# Patient Record
Sex: FEMALE | Race: WHITE | NOT HISPANIC OR LATINO | Employment: OTHER | URBAN - METROPOLITAN AREA
[De-identification: names, ages, dates, MRNs, and addresses within clinical notes are randomized per-mention and may not be internally consistent; named-entity substitution may affect disease eponyms.]

---

## 2017-04-21 RX ORDER — LOSARTAN POTASSIUM 50 MG/1
TABLET ORAL
Qty: 90 TABLET | Refills: 2 | Status: SHIPPED | OUTPATIENT
Start: 2017-04-21 | End: 2017-12-18 | Stop reason: SDUPTHER

## 2017-05-30 RX ORDER — SIMVASTATIN 20 MG
TABLET ORAL
Qty: 90 TABLET | Refills: 2 | Status: SHIPPED | OUTPATIENT
Start: 2017-05-30 | End: 2017-12-18 | Stop reason: SDUPTHER

## 2017-05-30 RX ORDER — LEVOTHYROXINE SODIUM 0.03 MG/1
TABLET ORAL
Qty: 90 TABLET | Refills: 2 | Status: SHIPPED | OUTPATIENT
Start: 2017-05-30 | End: 2017-12-18 | Stop reason: SDUPTHER

## 2017-06-12 RX ORDER — OMEPRAZOLE 20 MG/1
CAPSULE, DELAYED RELEASE ORAL
Qty: 90 CAPSULE | Refills: 3 | OUTPATIENT
Start: 2017-06-12

## 2017-07-27 ENCOUNTER — TELEPHONE (OUTPATIENT)
Dept: INTERNAL MEDICINE | Facility: CLINIC | Age: 72
End: 2017-07-27

## 2017-07-27 NOTE — TELEPHONE ENCOUNTER
----- Message from Tamiko Knowles sent at 7/26/2017  2:21 PM EDT -----  Pt called and said that she is having Stomach pain, breast pain, and chest tightness (states that the chest tightness is not related to chest pain). Would like to know if Dr. Ceja could work her in on Friday so she can see her for these issues.    Best call back number is [phone]          I am OFF on Wednesdays.    Received this message on 7-  I called pt at 8.00am to see how she was feeling. She said she is the same as yesterday and thinks it is a breast issue. I strongly encouraged her to go to at least  to seek treatment. Pt agreed to go and will call me when she returns home to let me know what they said. If needed I will make an appt to se Dr ceja on Friday.

## 2017-07-27 NOTE — TELEPHONE ENCOUNTER
Pt went to UC and was told it was GERD> she is still concerned with the breast so appt was made for tuesday

## 2017-08-01 ENCOUNTER — OFFICE VISIT (OUTPATIENT)
Dept: INTERNAL MEDICINE | Facility: CLINIC | Age: 72
End: 2017-08-01

## 2017-08-01 VITALS
SYSTOLIC BLOOD PRESSURE: 110 MMHG | HEART RATE: 78 BPM | BODY MASS INDEX: 28.49 KG/M2 | DIASTOLIC BLOOD PRESSURE: 60 MMHG | WEIGHT: 166 LBS | OXYGEN SATURATION: 97 %

## 2017-08-01 DIAGNOSIS — N64.4 BREAST PAIN: Primary | ICD-10-CM

## 2017-08-01 DIAGNOSIS — C50.112 MALIGNANT NEOPLASM OF CENTRAL PORTION OF LEFT FEMALE BREAST (HCC): ICD-10-CM

## 2017-08-01 DIAGNOSIS — K20.90 ESOPHAGITIS: ICD-10-CM

## 2017-08-01 PROCEDURE — 99214 OFFICE O/P EST MOD 30 MIN: CPT | Performed by: INTERNAL MEDICINE

## 2017-08-01 RX ORDER — PANTOPRAZOLE SODIUM 40 MG/1
40 TABLET, DELAYED RELEASE ORAL DAILY
Qty: 30 TABLET | Refills: 5 | Status: SHIPPED | OUTPATIENT
Start: 2017-08-01 | End: 2017-12-18 | Stop reason: SDUPTHER

## 2017-08-01 NOTE — PROGRESS NOTES
"Chief Complaint   Patient presents with   • Breast Pain     both breasts \"tightness\"   chest pain, JO,     History of Present Illness   Nedra Roberts is a 72 y.o. female presents for acute care. Reports increasing tightness and fullness in the breasts B. She went to urgent care for chest tightness. Patient previously taking omeprazole. Self discontinued this related to her concerns for possible side effects. She then developed some increasing symptoms of reflux as well as chest pain. She went to urgent care for this. Had a negative EKG.   Patient has a h/o breast cancer. In the past this was identified when she had unilateral fullness of the left breast.   Last mammogram 10/16.     The following portions of the patient's history were reviewed and updated as appropriate: allergies, current medications, past family history, past medical history, past social history, past surgical history and problem list.  Current Outpatient Prescriptions on File Prior to Visit   Medication Sig Dispense Refill   • levothyroxine (SYNTHROID, LEVOTHROID) 25 MCG tablet take 1 tablet by mouth once daily as directed 90 tablet 2   • losartan (COZAAR) 50 MG tablet take 1 tablet by mouth once daily 90 tablet 2   • simvastatin (ZOCOR) 20 MG tablet take 1 tablet by mouth once daily as directed 90 tablet 2   • [DISCONTINUED] omeprazole (PRILOSEC) 20 MG capsule Take 1 capsule by mouth Daily. 90 capsule 0     No current facility-administered medications on file prior to visit.      Review of Systems   Constitutional: Negative.    HENT: Negative.    Eyes: Negative.    Respiratory: Positive for chest tightness.    Cardiovascular: Negative.    Gastrointestinal: Positive for nausea.        Reflux   Endocrine: Negative.    Genitourinary: Negative.    Musculoskeletal: Positive for arthralgias.   Skin: Negative.    Allergic/Immunologic: Negative.    Neurological: Negative.    Hematological: Negative.    Psychiatric/Behavioral: Negative.        Objective "   Physical Exam   Constitutional: She is oriented to person, place, and time. She appears well-developed and well-nourished.   HENT:   Head: Normocephalic and atraumatic.   Right Ear: External ear normal.   Left Ear: External ear normal.   Mouth/Throat: Oropharynx is clear and moist.   Eyes: Conjunctivae and EOM are normal. Pupils are equal, round, and reactive to light.   Neck: Normal range of motion. Neck supple.   Cardiovascular: Normal rate, regular rhythm, normal heart sounds and intact distal pulses.    Pulmonary/Chest: Effort normal and breath sounds normal.   Abdominal: Soft. Bowel sounds are normal.   Musculoskeletal: Normal range of motion.   Neurological: She is alert and oriented to person, place, and time. She has normal reflexes.   Skin: Skin is warm and dry.   Psychiatric: She has a normal mood and affect. Her behavior is normal. Judgment and thought content normal.   Nursing note and vitals reviewed.       /60  Pulse 78  Wt 166 lb (75.3 kg)  SpO2 97%  BMI 28.49 kg/m2    Assessment/Plan   Diagnoses and all orders for this visit:    Breast pain  -     Mammo Diagnostic Bilateral With CAD; Future    Malignant neoplasm of central portion of left female breast  -     Mammo Diagnostic Bilateral With CAD; Future    Esophagitis    Other orders  -     pantoprazole (PROTONIX) 40 MG EC tablet; Take 1 tablet by mouth Daily.      Patient w/ known esophagitis. She developed chest pain off of med. Will try pantoprazole and encouraged to continue this medication indefinately.she will probably need repeat EGD next year. She will have a mammogram now as the breast pain mimics what she felt when diagnosed w/ breast ca previously. She will f/u in December for a CPE.

## 2017-08-21 ENCOUNTER — TELEPHONE (OUTPATIENT)
Dept: INTERNAL MEDICINE | Facility: CLINIC | Age: 72
End: 2017-08-21

## 2017-10-23 ENCOUNTER — HOSPITAL ENCOUNTER (OUTPATIENT)
Dept: MAMMOGRAPHY | Facility: HOSPITAL | Age: 72
Discharge: HOME | End: 2017-10-23
Attending: INTERNAL MEDICINE | Admitting: INTERNAL MEDICINE

## 2017-10-23 DIAGNOSIS — I10 ESSENTIAL HYPERTENSION: ICD-10-CM

## 2017-10-23 PROCEDURE — G0202 SCR MAMMO BI INCL CAD: HCPCS

## 2017-10-30 ENCOUNTER — OFFICE VISIT (OUTPATIENT)
Dept: ONCOLOGY | Facility: CLINIC | Age: 72
End: 2017-10-30

## 2017-10-30 ENCOUNTER — LAB (OUTPATIENT)
Dept: LAB | Facility: HOSPITAL | Age: 72
End: 2017-10-30

## 2017-10-30 VITALS
SYSTOLIC BLOOD PRESSURE: 168 MMHG | TEMPERATURE: 97.8 F | HEIGHT: 64 IN | BODY MASS INDEX: 28.31 KG/M2 | HEART RATE: 68 BPM | OXYGEN SATURATION: 97 % | WEIGHT: 165.8 LBS | RESPIRATION RATE: 16 BRPM | DIASTOLIC BLOOD PRESSURE: 88 MMHG

## 2017-10-30 DIAGNOSIS — Z85.3 HISTORY OF BREAST CANCER: Primary | ICD-10-CM

## 2017-10-30 DIAGNOSIS — Z12.31 ENCOUNTER FOR SCREENING MAMMOGRAM FOR MALIGNANT NEOPLASM OF BREAST: ICD-10-CM

## 2017-10-30 DIAGNOSIS — I10 ESSENTIAL HYPERTENSION: ICD-10-CM

## 2017-10-30 LAB
ALBUMIN SERPL-MCNC: 4.5 G/DL (ref 3.5–5.2)
ALBUMIN/GLOB SERPL: 1.6 G/DL (ref 1.1–2.4)
ALP SERPL-CCNC: 76 U/L (ref 38–116)
ALT SERPL W P-5'-P-CCNC: 25 U/L (ref 0–33)
ANION GAP SERPL CALCULATED.3IONS-SCNC: 12 MMOL/L
AST SERPL-CCNC: 18 U/L (ref 0–32)
BASOPHILS # BLD AUTO: 0.06 10*3/MM3 (ref 0–0.1)
BASOPHILS NFR BLD AUTO: 0.7 % (ref 0–1.1)
BILIRUB SERPL-MCNC: 0.2 MG/DL (ref 0.1–1.2)
BUN BLD-MCNC: 21 MG/DL (ref 6–20)
BUN/CREAT SERPL: 25.3 (ref 7.3–30)
CALCIUM SPEC-SCNC: 10 MG/DL (ref 8.5–10.2)
CHLORIDE SERPL-SCNC: 104 MMOL/L (ref 98–107)
CO2 SERPL-SCNC: 24 MMOL/L (ref 22–29)
CREAT BLD-MCNC: 0.83 MG/DL (ref 0.6–1.1)
DEPRECATED RDW RBC AUTO: 42.1 FL (ref 37–49)
EOSINOPHIL # BLD AUTO: 0.05 10*3/MM3 (ref 0–0.36)
EOSINOPHIL NFR BLD AUTO: 0.6 % (ref 1–5)
ERYTHROCYTE [DISTWIDTH] IN BLOOD BY AUTOMATED COUNT: 12.1 % (ref 11.7–14.5)
GFR SERPL CREATININE-BSD FRML MDRD: 68 ML/MIN/1.73
GLOBULIN UR ELPH-MCNC: 2.9 GM/DL (ref 1.8–3.5)
GLUCOSE BLD-MCNC: 95 MG/DL (ref 74–124)
HCT VFR BLD AUTO: 41 % (ref 34–45)
HGB BLD-MCNC: 13.6 G/DL (ref 11.5–14.9)
IMM GRANULOCYTES # BLD: 0.02 10*3/MM3 (ref 0–0.03)
IMM GRANULOCYTES NFR BLD: 0.2 % (ref 0–0.5)
LYMPHOCYTES # BLD AUTO: 1.53 10*3/MM3 (ref 1–3.5)
LYMPHOCYTES NFR BLD AUTO: 17.9 % (ref 20–49)
MCH RBC QN AUTO: 31.4 PG (ref 27–33)
MCHC RBC AUTO-ENTMCNC: 33.2 G/DL (ref 32–35)
MCV RBC AUTO: 94.7 FL (ref 83–97)
MONOCYTES # BLD AUTO: 0.58 10*3/MM3 (ref 0.25–0.8)
MONOCYTES NFR BLD AUTO: 6.8 % (ref 4–12)
NEUTROPHILS # BLD AUTO: 6.31 10*3/MM3 (ref 1.5–7)
NEUTROPHILS NFR BLD AUTO: 73.8 % (ref 39–75)
NRBC BLD MANUAL-RTO: 0 /100 WBC (ref 0–0)
PLATELET # BLD AUTO: 388 10*3/MM3 (ref 150–375)
PMV BLD AUTO: 8.3 FL (ref 8.9–12.1)
POTASSIUM BLD-SCNC: 4.8 MMOL/L (ref 3.5–4.7)
PROT SERPL-MCNC: 7.4 G/DL (ref 6.3–8)
RBC # BLD AUTO: 4.33 10*6/MM3 (ref 3.9–5)
SODIUM BLD-SCNC: 140 MMOL/L (ref 134–145)
WBC NRBC COR # BLD: 8.55 10*3/MM3 (ref 4–10)

## 2017-10-30 PROCEDURE — 36415 COLL VENOUS BLD VENIPUNCTURE: CPT | Performed by: INTERNAL MEDICINE

## 2017-10-30 PROCEDURE — 80053 COMPREHEN METABOLIC PANEL: CPT | Performed by: INTERNAL MEDICINE

## 2017-10-30 PROCEDURE — 99213 OFFICE O/P EST LOW 20 MIN: CPT | Performed by: INTERNAL MEDICINE

## 2017-10-30 PROCEDURE — 85025 COMPLETE CBC W/AUTO DIFF WBC: CPT | Performed by: INTERNAL MEDICINE

## 2017-10-30 NOTE — PROGRESS NOTES
"  Subjective    Follow-up for history of stage I breast cancer of the left breast      History of Present Illness    Mrs. Roberts is a jeffry 71-year-old woman returning for annual follow-up for history of stage I breast cancer affecting the left breast.  She underwent lumpectomy and 4 cycles of adjuvant chemotherapy with TC.  Her tumor was estrogen receptor positive and she underwent hormonal therapy with Arimidex between May 2009 in May 2014.    She returned today doing well.  2 months ago she noted some mild tightness in the breast but had a negative exam with another physician and since has had bilateral screening mammography which were negative.  She has noted no lumps or bumps in the breasts.  She denies bone pain, shortness of breath, cough or any other major concerns.    Past Medical History:  Pertinent for hypertension, acid reflux, hyperlipidemia, hypothyroidism    Review of Systems   A comprehensive 14 point review of systems was performed and was negative except as mentioned.    Medications:  The current medication list was reviewed in the EMR    ALLERGIES:  No Known Allergies    Objective      Vitals:    10/30/17 1014   BP: 168/88   Pulse: 68   Resp: 16   Temp: 97.8 °F (36.6 °C)   TempSrc: Oral   SpO2: 97%   Weight: 165 lb 12.8 oz (75.2 kg)   Height: 64\" (162.6 cm)   PainSc: 0-No pain     Current Status 10/30/2017   ECOG score 0       Physical Exam    Gen: pleasant lady, NAD  HEENT: decreased hearing  CV:  RRR  RESP: CTA bilat  Breast:  No masses are noted in either breast, no supraclavicular or axillary lymphadenopathy noted.  There is a healed lumpectomy scar inferior to the areola of the left breast with skin dimpling    RECENT LABS:  Hematology WBC   Date Value Ref Range Status   10/30/2017 8.55 4.00 - 10.00 10*3/mm3 Final     RBC   Date Value Ref Range Status   10/30/2017 4.33 3.90 - 5.00 10*6/mm3 Final     Hemoglobin   Date Value Ref Range Status   10/30/2017 13.6 11.5 - 14.9 g/dL Final "     Hematocrit   Date Value Ref Range Status   10/30/2017 41.0 34.0 - 45.0 % Final     Platelets   Date Value Ref Range Status   10/30/2017 388 (H) 150 - 375 10*3/mm3 Final     Lab Results   Component Value Date    GLUCOSE 93 10/31/2016    BUN 15 10/31/2016    CREATININE 0.81 10/31/2016    EGFRIFNONA 70 10/31/2016    EGFRIFAFRI 87 03/31/2016    BCR 18.5 10/31/2016    K 4.2 10/31/2016    CO2 24.1 10/31/2016    CALCIUM 9.4 10/31/2016    PROTENTOTREF 7.0 03/31/2016    ALBUMIN 4.40 10/31/2016    LABIL2 1.8 10/31/2016    AST 18 10/31/2016    ALT 19 10/31/2016              Assessment/Plan   Ms. Roberts returns today for follow-up of her stage I hormone receptor positive breast cancer affecting the left breast treated with lumpectomy, 4 cycles of adjuvant TC, and 5 years of tamoxifen completed May 2014.  She had a negative exam and review of systems today.  I recommended she continue her monthly self breast exam and she will be due screening mammography October 2018.  One-year follow-up requested after her annual mammogram.                  10/30/2017      CC:

## 2017-12-11 ENCOUNTER — LAB (OUTPATIENT)
Dept: INTERNAL MEDICINE | Facility: CLINIC | Age: 72
End: 2017-12-11

## 2017-12-11 DIAGNOSIS — Z85.3 HISTORY OF BREAST CANCER: ICD-10-CM

## 2017-12-11 DIAGNOSIS — I10 ESSENTIAL HYPERTENSION: Primary | ICD-10-CM

## 2017-12-11 DIAGNOSIS — E03.9 HYPOTHYROIDISM, UNSPECIFIED TYPE: ICD-10-CM

## 2017-12-11 DIAGNOSIS — E78.2 MULTIPLE-TYPE HYPERLIPIDEMIA: ICD-10-CM

## 2017-12-12 LAB
BUN SERPL-MCNC: 18 MG/DL (ref 8–27)
BUN/CREAT SERPL: 23 (ref 12–28)
CALCIUM SERPL-MCNC: 10.6 MG/DL (ref 8.7–10.3)
CHLORIDE SERPL-SCNC: 101 MMOL/L (ref 96–106)
CHOLEST SERPL-MCNC: 187 MG/DL (ref 100–199)
CO2 SERPL-SCNC: 25 MMOL/L (ref 18–29)
CREAT SERPL-MCNC: 0.8 MG/DL (ref 0.57–1)
GFR SERPLBLD CREATININE-BSD FMLA CKD-EPI: 74 ML/MIN/1.73
GFR SERPLBLD CREATININE-BSD FMLA CKD-EPI: 86 ML/MIN/1.73
GLUCOSE SERPL-MCNC: 96 MG/DL (ref 65–99)
HDLC SERPL-MCNC: 54 MG/DL
LDLC SERPL CALC-MCNC: 100 MG/DL (ref 0–99)
LDLC/HDLC SERPL: 1.9 RATIO UNITS (ref 0–3.2)
POTASSIUM SERPL-SCNC: 5 MMOL/L (ref 3.5–5.2)
SODIUM SERPL-SCNC: 142 MMOL/L (ref 134–144)
TRIGL SERPL-MCNC: 164 MG/DL (ref 0–149)
TSH SERPL DL<=0.005 MIU/L-ACNC: 1.06 UIU/ML (ref 0.45–4.5)
VLDLC SERPL CALC-MCNC: 33 MG/DL (ref 5–40)

## 2017-12-18 ENCOUNTER — OFFICE VISIT (OUTPATIENT)
Dept: INTERNAL MEDICINE | Facility: CLINIC | Age: 72
End: 2017-12-18

## 2017-12-18 VITALS
DIASTOLIC BLOOD PRESSURE: 70 MMHG | HEART RATE: 85 BPM | OXYGEN SATURATION: 97 % | HEIGHT: 64 IN | SYSTOLIC BLOOD PRESSURE: 116 MMHG | WEIGHT: 167 LBS | BODY MASS INDEX: 28.51 KG/M2

## 2017-12-18 DIAGNOSIS — K21.00 GASTROESOPHAGEAL REFLUX DISEASE WITH ESOPHAGITIS: ICD-10-CM

## 2017-12-18 DIAGNOSIS — K20.90 ESOPHAGITIS: ICD-10-CM

## 2017-12-18 DIAGNOSIS — I10 ESSENTIAL HYPERTENSION: ICD-10-CM

## 2017-12-18 DIAGNOSIS — Z00.00 HEALTHCARE MAINTENANCE: Primary | ICD-10-CM

## 2017-12-18 DIAGNOSIS — E03.9 HYPOTHYROIDISM, UNSPECIFIED TYPE: ICD-10-CM

## 2017-12-18 PROBLEM — K21.9 GERD (GASTROESOPHAGEAL REFLUX DISEASE): Status: ACTIVE | Noted: 2017-12-18

## 2017-12-18 PROCEDURE — G0101 CA SCREEN;PELVIC/BREAST EXAM: HCPCS | Performed by: INTERNAL MEDICINE

## 2017-12-18 PROCEDURE — 99213 OFFICE O/P EST LOW 20 MIN: CPT | Performed by: INTERNAL MEDICINE

## 2017-12-18 PROCEDURE — G0008 ADMIN INFLUENZA VIRUS VAC: HCPCS | Performed by: INTERNAL MEDICINE

## 2017-12-18 PROCEDURE — 90662 IIV NO PRSV INCREASED AG IM: CPT | Performed by: INTERNAL MEDICINE

## 2017-12-18 PROCEDURE — G0439 PPPS, SUBSEQ VISIT: HCPCS | Performed by: INTERNAL MEDICINE

## 2017-12-18 RX ORDER — LOSARTAN POTASSIUM 50 MG/1
50 TABLET ORAL DAILY
Qty: 90 TABLET | Refills: 3 | Status: SHIPPED | OUTPATIENT
Start: 2017-12-18 | End: 2019-02-19 | Stop reason: SDUPTHER

## 2017-12-18 RX ORDER — PANTOPRAZOLE SODIUM 40 MG/1
40 TABLET, DELAYED RELEASE ORAL DAILY
Qty: 90 TABLET | Refills: 3 | Status: SHIPPED | OUTPATIENT
Start: 2017-12-18 | End: 2019-02-19 | Stop reason: SDUPTHER

## 2017-12-18 RX ORDER — LEVOTHYROXINE SODIUM 0.03 MG/1
25 TABLET ORAL DAILY
Qty: 90 TABLET | Refills: 3 | Status: SHIPPED | OUTPATIENT
Start: 2017-12-18 | End: 2018-12-27 | Stop reason: SDUPTHER

## 2017-12-18 RX ORDER — SIMVASTATIN 20 MG
20 TABLET ORAL NIGHTLY
Qty: 90 TABLET | Refills: 3 | Status: SHIPPED | OUTPATIENT
Start: 2017-12-18 | End: 2018-12-27 | Stop reason: SDUPTHER

## 2017-12-18 NOTE — PATIENT INSTRUCTIONS
Food Choices for Gastroesophageal Reflux Disease, Adult  When you have gastroesophageal reflux disease (GERD), the foods you eat and your eating habits are very important. Choosing the right foods can help ease the discomfort of GERD.  WHAT GENERAL GUIDELINES DO I NEED TO FOLLOW?  · Choose fruits, vegetables, whole grains, low-fat dairy products, and low-fat meat, fish, and poultry.  · Limit fats such as oils, salad dressings, butter, nuts, and avocado.  · Keep a food diary to identify foods that cause symptoms.  · Avoid foods that cause reflux. These may be different for different people.  · Eat frequent small meals instead of three large meals each day.  · Eat your meals slowly, in a relaxed setting.  · Limit fried foods.  · Cook foods using methods other than frying.  · Avoid drinking alcohol.  · Avoid drinking large amounts of liquids with your meals.  · Avoid bending over or lying down until 2-3 hours after eating.  WHAT FOODS ARE NOT RECOMMENDED?  The following are some foods and drinks that may worsen your symptoms:  Vegetables  Tomatoes. Tomato juice. Tomato and spaghetti sauce. Chili peppers. Onion and garlic. Horseradish.  Fruits  Oranges, grapefruit, and lemon (fruit and juice).  Meats  High-fat meats, fish, and poultry. This includes hot dogs, ribs, ham, sausage, salami, and davila.  Dairy  Whole milk and chocolate milk. Sour cream. Cream. Butter. Ice cream. Cream cheese.   Beverages  Coffee and tea, with or without caffeine. Carbonated beverages or energy drinks.  Condiments  Hot sauce. Barbecue sauce.   Sweets/Desserts  Chocolate and cocoa. Donuts. Peppermint and spearmint.  Fats and Oils  High-fat foods, including French fries and potato chips.  Other  Vinegar. Strong spices, such as black pepper, white pepper, red pepper, cayenne, sultana powder, cloves, ginger, and chili powder.  The items listed above may not be a complete list of foods and beverages to avoid. Contact your dietitian for more  information.     This information is not intended to replace advice given to you by your health care provider. Make sure you discuss any questions you have with your health care provider.     Document Released: 2006 Document Revised: 2016 Document Reviewed: 10/22/2014  [employer] Interactive Patient Education © [employer]    Medicare Wellness  Personal Prevention Plan of Service     Date of Office Visit:  2017  Encounter Provider:  Prisca Church MD  Place of Service:  [facility]  Patient Name: Nedra Roberts  :  1945    As part of the Medicare Wellness portion of your visit today, we are providing you with this personalized preventive plan of services (PPPS). This plan is based upon recommendations of the [place] Preventive Services Task Force (USPSTF) and the Advisory Committee on Immunization Practices (ACIP).    This lists the preventive care services that should be considered, and provides dates of when you are due. Items listed as completed are up-to-date and do not require any further intervention.    Health Maintenance   Topic Date Due   • HEPATITIS C SCREENING  2016   • ZOSTER VACCINE  2016   • LIPID PANEL  2018   • MEDICARE ANNUAL WELLNESS  2018   • MAMMOGRAM  10/23/2019   • TDAP/TD VACCINES (2 - Td) 2022   • COLONOSCOPY  2025   • INFLUENZA VACCINE  Completed   • PNEUMOCOCCAL VACCINES (65+ LOW/MEDIUM RISK)  Completed       Orders Placed This Encounter   Procedures   • Flu Vaccine High Dose PF 65YR+   • Ambulatory Referral to General Surgery     Referral Priority:   Routine     Referral Type:   Consultation     Referral Reason:   Specialty Services Required     Referred to Provider:   Aaron Tolliver MD     Requested Specialty:   General Surgery     Number of Visits Requested:   1   • ECG 12 Lead     Order Specific Question:   Reason for Exam:     Answer:   hm       Return in about 1 year (around  12/18/2018) for Medicare Wellness.

## 2017-12-18 NOTE — PROGRESS NOTES
Subjective   AWV  JO  Hypothyroidism  Hyperlipidemia    Nedra Roberts is a 72 y.o. female who presents for an annual wellness visit and review of chronic medical issues. Patient has a history of reflux esophagitis with ulcerations. She has been rxd pantoprazole. Reports that she has had recent weight gain. She feels that she has had some increased eating at times. She is drinking wine 2-3 glasses every evening. She reports walking about 3 days/ week about 3 miles each time.  She drinks 2 cups decaf coffee daily. She also notes reflux w/ tomato based foods.   Reports some arthritis pain of the hands and knees. She does not take medications for this.       Review of Systems   Constitutional: Negative.    HENT: Negative.    Eyes: Negative.    Respiratory: Negative.    Cardiovascular: Negative.    Gastrointestinal: Negative.    Endocrine: Negative.    Genitourinary: Negative.    Musculoskeletal: Negative.    Skin: Negative.    Allergic/Immunologic: Negative.    Neurological: Negative.    Hematological: Negative.    Psychiatric/Behavioral: Negative.        The following portions of the patient's history were reviewed and updated as appropriate: allergies, current medications, past family history, past medical history, past social history, past surgical history and problem list.     Patient Active Problem List   Diagnosis   • Essential hypertension   • Gastritis   • Hypothyroidism   • Multiple-type hyperlipidemia   • Healthcare maintenance   • History of breast cancer   • GERD (gastroesophageal reflux disease)   • Esophagitis       Past Medical History:   Diagnosis Date   • Breast cancer 01/2009    Stage I left breast   • Drug therapy    • Gastritis    • GERD (gastroesophageal reflux disease)    • H/O lumpectomy    • Hyperlipidemia    • Hypertension    • Hypothyroidism        Past Surgical History:   Procedure Laterality Date   • BREAST BIOPSY     • BREAST LUMPECTOMY  01/21/2009   • COLONOSCOPY  2002    Dr. Tolliver   •  "ENDOSCOPY      gastritis   • ENDOSCOPY AND COLONOSCOPY  03/31/2015    Ulcerative esophagitis and diverticulosis   • HYSTERECTOMY  04/2002       Family History   Problem Relation Age of Onset   • Heart disease Mother    • Breast cancer Mother    • Heart disease Father    • Diabetes Maternal Grandmother    • Cancer Paternal Grandfather    • Cancer Sister    • Diabetes Sister    • Leukemia Maternal Aunt        Social History     Social History   • Marital status:      Spouse name: Rocky   • Number of children: N/A   • Years of education: N/A     Occupational History   • Disability leave    •  Retired     Social History Main Topics   • Smoking status: Former Smoker     Packs/day: 1.00     Years: 30.00     Types: Cigarettes     Quit date: 1/1/2002   • Smokeless tobacco: Never Used   • Alcohol use 4.2 oz/week     7 Glasses of wine per week      Comment: NIGHTLY   • Drug use: Defer   • Sexual activity: Defer     Other Topics Concern   • Not on file     Social History Narrative       Current Outpatient Prescriptions on File Prior to Visit   Medication Sig Dispense Refill   • [DISCONTINUED] levothyroxine (SYNTHROID, LEVOTHROID) 25 MCG tablet take 1 tablet by mouth once daily as directed 90 tablet 2   • [DISCONTINUED] losartan (COZAAR) 50 MG tablet take 1 tablet by mouth once daily 90 tablet 2   • [DISCONTINUED] pantoprazole (PROTONIX) 40 MG EC tablet Take 1 tablet by mouth Daily. 30 tablet 5   • [DISCONTINUED] simvastatin (ZOCOR) 20 MG tablet take 1 tablet by mouth once daily as directed 90 tablet 2     No current facility-administered medications on file prior to visit.        No Known Allergies    Immunization History   Administered Date(s) Administered   • Pneumococcal Conjugate 13-Valent 11/30/2015   • Pneumococcal Polysaccharide 01/01/2012   • Tdap 01/01/2012       Objective     /70  Pulse 85  Ht 162.6 cm (64\")  Wt 75.8 kg (167 lb)  SpO2 97%  BMI 28.67 kg/m2    Physical Exam   Constitutional: She is " oriented to person, place, and time. She appears well-developed and well-nourished.   HENT:   Head: Normocephalic and atraumatic.   Right Ear: External ear normal.   Left Ear: External ear normal.   Nose: Nose normal.   Mouth/Throat: Oropharynx is clear and moist.   Eyes: EOM are normal. Pupils are equal, round, and reactive to light.   Neck: Neck supple.   Cardiovascular: Normal rate, regular rhythm and normal heart sounds.    Pulmonary/Chest: Effort normal and breath sounds normal. No respiratory distress. Right breast exhibits no inverted nipple, no mass, no nipple discharge, no skin change and no tenderness. Left breast exhibits no inverted nipple, no mass, no nipple discharge, no skin change and no tenderness.       Abdominal: Soft.   Genitourinary:       There is no rash, tenderness, lesion or injury on the right labia. There is no rash, tenderness, lesion or injury on the left labia.   Musculoskeletal: Normal range of motion.   Neurological: She is alert and oriented to person, place, and time.   Skin: Skin is warm and dry.   Psychiatric: She has a normal mood and affect. Her behavior is normal.   Nursing note and vitals reviewed.  No cognitive deficits.     Assessment/Plan   Nedra was seen today for annual exam.    Diagnoses and all orders for this visit:    Healthcare maintenance    Essential hypertension  -     ECG 12 Lead    Esophagitis  -     Ambulatory Referral to General Surgery    Gastroesophageal reflux disease with esophagitis    Hypothyroidism, unspecified type    Other orders  -     levothyroxine (SYNTHROID, LEVOTHROID) 25 MCG tablet; Take 1 tablet by mouth Daily.  -     losartan (COZAAR) 50 MG tablet; Take 1 tablet by mouth Daily.  -     pantoprazole (PROTONIX) 40 MG EC tablet; Take 1 tablet by mouth Daily.  -     simvastatin (ZOCOR) 20 MG tablet; Take 1 tablet by mouth Every Night.        Discussion    AWV.  See scanned forms for catrachita history, PHQ-9, functional ability questionnaire,  cognitive impairment screening and preventive services check list.  These were all reviewed with the patient and the patient was provided with a copy of the preventative services checklist. Direct observation of cognitive ability was evaluated and is normal. Patient was given health advice or handouts on the following:  nutrition, fall prevention, exercise. Flu vaccination today. zosta vax when new preparation is available.     Patient w/ hypertension, hyperlipidemia, and hypothyroidism. She will continue to take cozaar, synthroid, and zocor. She has a history of esophagitis. Encouraged to restart protonix in a qd fashion. Strongly encouraged to reduce etoh and limit caffeine. Elevate head of bed. Follow up for repeat egd if indicated. Discussed lifestyle modifications with increased fitness and decreased calories for her weight concerns. Advised that wine calories and light activity much more likely to cause weight gain than pantoprazole. Also advised that this is exacerbating her mackenzie.   She will continue to f/u w/ oncologist for history of breast cancer and is current on mammo at this time.                  Future Appointments  Date Time Provider Department Center   10/25/2018 9:00 AM [facility]  [facility]

## 2018-10-25 ENCOUNTER — HOSPITAL ENCOUNTER (OUTPATIENT)
Dept: MAMMOGRAPHY | Facility: HOSPITAL | Age: 73
Discharge: HOME | End: 2018-10-25
Attending: INTERNAL MEDICINE | Admitting: INTERNAL MEDICINE

## 2018-10-25 DIAGNOSIS — Z85.3 HISTORY OF BREAST CANCER: ICD-10-CM

## 2018-10-25 DIAGNOSIS — Z12.31 ENCOUNTER FOR SCREENING MAMMOGRAM FOR MALIGNANT NEOPLASM OF BREAST: ICD-10-CM

## 2018-10-25 PROCEDURE — 77067 SCR MAMMO BI INCL CAD: CPT

## 2018-11-09 ENCOUNTER — OFFICE VISIT (OUTPATIENT)
Dept: ONCOLOGY | Facility: CLINIC | Age: 73
End: 2018-11-09

## 2018-11-09 ENCOUNTER — APPOINTMENT (OUTPATIENT)
Dept: LAB | Facility: HOSPITAL | Age: 73
End: 2018-11-09

## 2018-11-09 VITALS
DIASTOLIC BLOOD PRESSURE: 82 MMHG | TEMPERATURE: 98.1 F | WEIGHT: 169.8 LBS | HEIGHT: 64 IN | OXYGEN SATURATION: 91 % | HEART RATE: 76 BPM | BODY MASS INDEX: 28.99 KG/M2 | SYSTOLIC BLOOD PRESSURE: 138 MMHG | RESPIRATION RATE: 16 BRPM

## 2018-11-09 DIAGNOSIS — Z85.3 HISTORY OF BREAST CANCER: Primary | ICD-10-CM

## 2018-11-09 LAB
ALBUMIN SERPL-MCNC: 4.5 G/DL (ref 3.5–5.2)
ALBUMIN/GLOB SERPL: 1.6 G/DL (ref 1.1–2.4)
ALP SERPL-CCNC: 77 U/L (ref 38–116)
ALT SERPL W P-5'-P-CCNC: 29 U/L (ref 0–33)
ANION GAP SERPL CALCULATED.3IONS-SCNC: 12.4 MMOL/L
AST SERPL-CCNC: 22 U/L (ref 0–32)
BASOPHILS # BLD AUTO: 0.02 10*3/MM3 (ref 0–0.1)
BASOPHILS NFR BLD AUTO: 0.3 % (ref 0–1.1)
BILIRUB SERPL-MCNC: 0.3 MG/DL (ref 0.1–1.2)
BUN BLD-MCNC: 13 MG/DL (ref 6–20)
BUN/CREAT SERPL: 14.3 (ref 7.3–30)
CALCIUM SPEC-SCNC: 9.5 MG/DL (ref 8.5–10.2)
CHLORIDE SERPL-SCNC: 104 MMOL/L (ref 98–107)
CO2 SERPL-SCNC: 26.6 MMOL/L (ref 22–29)
CREAT BLD-MCNC: 0.91 MG/DL (ref 0.6–1.1)
DEPRECATED RDW RBC AUTO: 40.9 FL (ref 37–49)
EOSINOPHIL # BLD AUTO: 0.05 10*3/MM3 (ref 0–0.36)
EOSINOPHIL NFR BLD AUTO: 0.9 % (ref 1–5)
ERYTHROCYTE [DISTWIDTH] IN BLOOD BY AUTOMATED COUNT: 12 % (ref 11.7–14.5)
GFR SERPL CREATININE-BSD FRML MDRD: 61 ML/MIN/1.73
GLOBULIN UR ELPH-MCNC: 2.8 GM/DL (ref 1.8–3.5)
GLUCOSE BLD-MCNC: 99 MG/DL (ref 74–124)
HCT VFR BLD AUTO: 39.6 % (ref 34–45)
HGB BLD-MCNC: 13.3 G/DL (ref 11.5–14.9)
IMM GRANULOCYTES # BLD: 0.01 10*3/MM3 (ref 0–0.03)
IMM GRANULOCYTES NFR BLD: 0.2 % (ref 0–0.5)
LYMPHOCYTES # BLD AUTO: 1.53 10*3/MM3 (ref 1–3.5)
LYMPHOCYTES NFR BLD AUTO: 26.7 % (ref 20–49)
MCH RBC QN AUTO: 31.2 PG (ref 27–33)
MCHC RBC AUTO-ENTMCNC: 33.6 G/DL (ref 32–35)
MCV RBC AUTO: 93 FL (ref 83–97)
MONOCYTES # BLD AUTO: 0.47 10*3/MM3 (ref 0.25–0.8)
MONOCYTES NFR BLD AUTO: 8.2 % (ref 4–12)
NEUTROPHILS # BLD AUTO: 3.66 10*3/MM3 (ref 1.5–7)
NEUTROPHILS NFR BLD AUTO: 63.7 % (ref 39–75)
NRBC BLD MANUAL-RTO: 0 /100 WBC (ref 0–0)
PLATELET # BLD AUTO: 384 10*3/MM3 (ref 150–375)
PMV BLD AUTO: 8.1 FL (ref 8.9–12.1)
POTASSIUM BLD-SCNC: 4.7 MMOL/L (ref 3.5–4.7)
PROT SERPL-MCNC: 7.3 G/DL (ref 6.3–8)
RBC # BLD AUTO: 4.26 10*6/MM3 (ref 3.9–5)
SODIUM BLD-SCNC: 143 MMOL/L (ref 134–145)
WBC NRBC COR # BLD: 5.74 10*3/MM3 (ref 4–10)

## 2018-11-09 PROCEDURE — 85025 COMPLETE CBC W/AUTO DIFF WBC: CPT | Performed by: INTERNAL MEDICINE

## 2018-11-09 PROCEDURE — 80053 COMPREHEN METABOLIC PANEL: CPT | Performed by: INTERNAL MEDICINE

## 2018-11-09 PROCEDURE — 99213 OFFICE O/P EST LOW 20 MIN: CPT | Performed by: INTERNAL MEDICINE

## 2018-11-09 PROCEDURE — 36415 COLL VENOUS BLD VENIPUNCTURE: CPT | Performed by: INTERNAL MEDICINE

## 2018-11-09 NOTE — PROGRESS NOTES
"  Subjective    Follow-up for history of stage I breast cancer of the left breast      History of Present Illness    Mrs. Roberts is a jeffry 71-year-old woman returning for annual follow-up for history of stage I breast cancer affecting the left breast.  She underwent lumpectomy and 4 cycles of adjuvant chemotherapy with TC.  Her tumor was estrogen receptor positive and she underwent hormonal therapy with Arimidex between May 2009 in May 2014.    The patient returns today doing well.  She has twinges of pain in the left lower quadrant pretty infrequently.  She denies changes in the bowel habits, is up-to-date on colonoscopy.  She denies weight loss.  She has noted no changes in the self breast exam.    Past Medical History:  Pertinent for hypertension, acid reflux, hyperlipidemia, hypothyroidism    Review of Systems   Constitutional: Negative.    Respiratory: Negative.    Cardiovascular: Negative.    Gastrointestinal: Positive for abdominal pain.   Musculoskeletal: Negative.    Skin: Negative.    Hematological: Negative.    Psychiatric/Behavioral: Negative.           Medications:  The current medication list was reviewed in the EMR    ALLERGIES:  No Known Allergies    Objective      Vitals:    11/09/18 1102   BP: 138/82   Pulse: 76   Resp: 16   Temp: 98.1 °F (36.7 °C)   TempSrc: Oral   SpO2: 91%   Weight: 77 kg (169 lb 12.8 oz)   Height: 163 cm (64.17\")   PainSc: 0-No pain     Current Status 11/9/2018   ECOG score 0       Physical Exam    Gen: pleasant lady, NAD  HEENT: decreased hearing  CV:  RRR  RESP: CTA bilat  Breast:  No masses are noted in either breast, no supraclavicular or axillary lymphadenopathy noted.  There is a healed lumpectomy scar inferior to the areola of the left breast with skin dimpling.  There is fullness in the upper outer quadrant of the left breast    RECENT LABS:  Hematology WBC   Date Value Ref Range Status   11/09/2018 5.74 4.00 - 10.00 10*3/mm3 Final     RBC   Date Value Ref Range Status "   11/09/2018 4.26 3.90 - 5.00 10*6/mm3 Final     Hemoglobin   Date Value Ref Range Status   11/09/2018 13.3 11.5 - 14.9 g/dL Final     Hematocrit   Date Value Ref Range Status   11/09/2018 39.6 34.0 - 45.0 % Final     Platelets   Date Value Ref Range Status   11/09/2018 384 (H) 150 - 375 10*3/mm3 Final     Lab Results   Component Value Date    GLUCOSE 95 10/30/2017    BUN 18 12/11/2017    CREATININE 0.80 12/11/2017    EGFRIFNONA 74 12/11/2017    EGFRIFAFRI 86 12/11/2017    BCR 23 12/11/2017    K 5.0 12/11/2017    CO2 25 12/11/2017    CALCIUM 10.6 (H) 12/11/2017    PROTENTOTREF 7.0 03/31/2016    ALBUMIN 4.50 10/30/2017    LABIL2 1.8 03/31/2016    AST 18 10/30/2017    ALT 25 10/30/2017              Assessment/Plan   Ms. Roberts returns today for follow-up of her stage I hormone receptor positive breast cancer affecting the left breast treated with lumpectomy, 4 cycles of adjuvant TC, and 5 years of tamoxifen completed May 2014.  Mammography from October was benign.  On exam there is vague fullness in the upper outer quadrant of the left breast; I recommended an ultrasound of the area to be performed and ordered.  As long as this is negative, one year follow-up recommended.  I recommended she continue her monthly self breast exam and will be due mammography October 2019.                  11/9/2018      CC:

## 2018-11-13 ENCOUNTER — HOSPITAL ENCOUNTER (OUTPATIENT)
Dept: ULTRASOUND IMAGING | Facility: HOSPITAL | Age: 73
Discharge: HOME | End: 2018-11-13
Attending: INTERNAL MEDICINE | Admitting: INTERNAL MEDICINE

## 2018-11-13 DIAGNOSIS — Z85.3 HISTORY OF BREAST CANCER: ICD-10-CM

## 2018-11-13 PROCEDURE — 76641 ULTRASOUND BREAST COMPLETE: CPT

## 2018-12-16 DIAGNOSIS — E03.9 HYPOTHYROIDISM, UNSPECIFIED TYPE: ICD-10-CM

## 2018-12-16 DIAGNOSIS — I10 ESSENTIAL HYPERTENSION: Primary | ICD-10-CM

## 2018-12-16 DIAGNOSIS — E78.2 MULTIPLE-TYPE HYPERLIPIDEMIA: ICD-10-CM

## 2018-12-21 ENCOUNTER — OFFICE VISIT (OUTPATIENT)
Dept: INTERNAL MEDICINE | Facility: CLINIC | Age: 73
End: 2018-12-21

## 2018-12-21 VITALS
OXYGEN SATURATION: 98 % | DIASTOLIC BLOOD PRESSURE: 92 MMHG | SYSTOLIC BLOOD PRESSURE: 152 MMHG | WEIGHT: 170 LBS | BODY MASS INDEX: 29.02 KG/M2 | HEART RATE: 80 BPM

## 2018-12-21 DIAGNOSIS — Z00.00 HEALTHCARE MAINTENANCE: Primary | ICD-10-CM

## 2018-12-21 DIAGNOSIS — E78.2 MULTIPLE-TYPE HYPERLIPIDEMIA: ICD-10-CM

## 2018-12-21 DIAGNOSIS — I10 ESSENTIAL HYPERTENSION: ICD-10-CM

## 2018-12-21 DIAGNOSIS — Z11.59 ENCOUNTER FOR HEPATITIS C SCREENING TEST FOR LOW RISK PATIENT: ICD-10-CM

## 2018-12-21 DIAGNOSIS — K29.50 CHRONIC GASTRITIS WITHOUT BLEEDING, UNSPECIFIED GASTRITIS TYPE: ICD-10-CM

## 2018-12-21 DIAGNOSIS — E03.9 HYPOTHYROIDISM, UNSPECIFIED TYPE: ICD-10-CM

## 2018-12-21 LAB
ALBUMIN SERPL-MCNC: 4.4 G/DL (ref 3.5–5.2)
ALBUMIN/GLOB SERPL: 1.6 G/DL
ALP SERPL-CCNC: 76 U/L (ref 39–117)
ALT SERPL-CCNC: 19 U/L (ref 1–33)
APPEARANCE UR: CLEAR
AST SERPL-CCNC: 18 U/L (ref 1–32)
BACTERIA #/AREA URNS HPF: NORMAL /HPF
BACTERIA UR CULT: ABNORMAL
BACTERIA UR CULT: ABNORMAL
BASOPHILS # BLD AUTO: 0.03 10*3/MM3 (ref 0–0.2)
BASOPHILS NFR BLD AUTO: 0.5 % (ref 0–1.5)
BILIRUB SERPL-MCNC: 0.3 MG/DL (ref 0.1–1.2)
BILIRUB UR QL STRIP: NEGATIVE
BUN SERPL-MCNC: 14 MG/DL (ref 8–23)
BUN/CREAT SERPL: 17.5 (ref 7–25)
CALCIUM SERPL-MCNC: 10.1 MG/DL (ref 8.6–10.5)
CHLORIDE SERPL-SCNC: 103 MMOL/L (ref 98–107)
CHOLEST SERPL-MCNC: 181 MG/DL (ref 0–200)
CO2 SERPL-SCNC: 23.6 MMOL/L (ref 22–29)
COLOR UR: YELLOW
CREAT SERPL-MCNC: 0.8 MG/DL (ref 0.57–1)
EOSINOPHIL # BLD AUTO: 0.03 10*3/MM3 (ref 0–0.7)
EOSINOPHIL NFR BLD AUTO: 0.5 % (ref 0.3–6.2)
EPI CELLS #/AREA URNS HPF: NORMAL /HPF
ERYTHROCYTE [DISTWIDTH] IN BLOOD BY AUTOMATED COUNT: 12.5 % (ref 11.7–13)
GLOBULIN SER CALC-MCNC: 2.7 GM/DL
GLUCOSE SERPL-MCNC: 109 MG/DL (ref 65–99)
GLUCOSE UR QL: NEGATIVE
HCT VFR BLD AUTO: 39 % (ref 35.6–45.5)
HDLC SERPL-MCNC: 53 MG/DL (ref 40–60)
HGB BLD-MCNC: 13.1 G/DL (ref 11.9–15.5)
HGB UR QL STRIP: NEGATIVE
IMM GRANULOCYTES # BLD: 0.02 10*3/MM3 (ref 0–0.03)
IMM GRANULOCYTES NFR BLD: 0.3 % (ref 0–0.5)
KETONES UR QL STRIP: NEGATIVE
LDLC SERPL CALC-MCNC: 91 MG/DL (ref 0–100)
LDLC/HDLC SERPL: 1.71 {RATIO}
LEUKOCYTE ESTERASE UR QL STRIP: ABNORMAL
LYMPHOCYTES # BLD AUTO: 1.77 10*3/MM3 (ref 0.9–4.8)
LYMPHOCYTES NFR BLD AUTO: 27.2 % (ref 19.6–45.3)
MCH RBC QN AUTO: 30.7 PG (ref 26.9–32)
MCHC RBC AUTO-ENTMCNC: 33.6 G/DL (ref 32.4–36.3)
MCV RBC AUTO: 91.3 FL (ref 80.5–98.2)
MICRO URNS: ABNORMAL
MONOCYTES # BLD AUTO: 0.49 10*3/MM3 (ref 0.2–1.2)
MONOCYTES NFR BLD AUTO: 7.5 % (ref 5–12)
NEUTROPHILS # BLD AUTO: 4.18 10*3/MM3 (ref 1.9–8.1)
NEUTROPHILS NFR BLD AUTO: 64.3 % (ref 42.7–76)
NITRITE UR QL STRIP: NEGATIVE
PH UR STRIP: 6.5 [PH] (ref 5–7.5)
PLATELET # BLD AUTO: 399 10*3/MM3 (ref 140–500)
POTASSIUM SERPL-SCNC: 4.9 MMOL/L (ref 3.5–5.2)
PROT SERPL-MCNC: 7.1 G/DL (ref 6–8.5)
PROT UR QL STRIP: NEGATIVE
RBC # BLD AUTO: 4.27 10*6/MM3 (ref 3.9–5.2)
RBC #/AREA URNS HPF: NORMAL /HPF
SODIUM SERPL-SCNC: 140 MMOL/L (ref 136–145)
SP GR UR: 1.01 (ref 1–1.03)
TRIGL SERPL-MCNC: 186 MG/DL (ref 0–150)
TSH SERPL DL<=0.005 MIU/L-ACNC: 2 MIU/ML (ref 0.27–4.2)
URINALYSIS REFLEX: ABNORMAL
UROBILINOGEN UR STRIP-MCNC: 0.2 MG/DL (ref 0.2–1)
VLDLC SERPL CALC-MCNC: 37.2 MG/DL (ref 5–40)
WBC # BLD AUTO: 6.5 10*3/MM3 (ref 4.5–10.7)
WBC #/AREA URNS HPF: NORMAL /HPF

## 2018-12-21 PROCEDURE — G0439 PPPS, SUBSEQ VISIT: HCPCS | Performed by: INTERNAL MEDICINE

## 2018-12-21 PROCEDURE — 93000 ELECTROCARDIOGRAM COMPLETE: CPT | Performed by: INTERNAL MEDICINE

## 2018-12-21 PROCEDURE — 90662 IIV NO PRSV INCREASED AG IM: CPT | Performed by: INTERNAL MEDICINE

## 2018-12-21 PROCEDURE — G0008 ADMIN INFLUENZA VIRUS VAC: HCPCS | Performed by: INTERNAL MEDICINE

## 2018-12-21 PROCEDURE — 99214 OFFICE O/P EST MOD 30 MIN: CPT | Performed by: INTERNAL MEDICINE

## 2018-12-21 RX ORDER — RANITIDINE 150 MG/1
150 CAPSULE ORAL EVERY EVENING
Qty: 30 CAPSULE | Refills: 11 | Status: SHIPPED | OUTPATIENT
Start: 2018-12-21 | End: 2019-05-30

## 2018-12-21 NOTE — PROGRESS NOTES
Subjective   AWV  Right knee pain  Hypothyroid  Hypertension  Hyperlipidemia  JO    Nedra Roberts is a 73 y.o. female who presents for an annual wellness visit, review of chronic issues, and to discuss acute needs. Patient reports right knee pain. Can be sharp in nature. She has taken tylenol in the past for this. She does note that she has not been walking as much related to the pain. She has JO. She is taking pantoprazole daily. Can have a flair in symptoms w/ chocolate or wine. Patient has hypertensions. She is taking cozaar daily for this. Her bp is elevated today. She reports occasional cramping in her legs. Thighs. She is unsure of her hydration status. Thyroid is euthryoid. Lipids are at goal w/ exception of tg elevated. fbs also elevated.       Review of Systems   Constitutional: Negative.    HENT: Negative.    Eyes: Negative.    Respiratory: Negative.    Cardiovascular: Negative.    Gastrointestinal: Negative.    Endocrine: Negative.    Genitourinary: Negative.    Musculoskeletal: Positive for arthralgias.   Skin: Negative.    Allergic/Immunologic: Negative.    Neurological: Negative.    Hematological: Negative.    Psychiatric/Behavioral: Negative.        The following portions of the patient's history were reviewed and updated as appropriate: allergies, current medications, past family history, past medical history, past social history, past surgical history and problem list.     Patient Active Problem List   Diagnosis   • Essential hypertension   • Gastritis   • Hypothyroidism   • Multiple-type hyperlipidemia   • Healthcare maintenance   • History of breast cancer   • GERD (gastroesophageal reflux disease)   • Esophagitis       Past Medical History:   Diagnosis Date   • Breast cancer (CMS/[facility]) 01/2009    Stage I left breast   • Drug therapy    • Gastritis    • GERD (gastroesophageal reflux disease)    • H/O lumpectomy    • Hyperlipidemia    • Hypertension    • Hypothyroidism        Past Surgical  History:   Procedure Laterality Date   • BREAST BIOPSY     • BREAST LUMPECTOMY  2009   • COLONOSCOPY      Dr. Tolliver   • ENDOSCOPY      gastritis   • ENDOSCOPY AND COLONOSCOPY  2015    Ulcerative esophagitis and diverticulosis   • HYSTERECTOMY  2002       Family History   Problem Relation Age of Onset   • Heart disease Mother    • Breast cancer Mother    • Heart disease Father    • Diabetes Maternal Grandmother    • Cancer Paternal Grandfather    • Cancer Sister    • Diabetes Sister    • Leukemia Maternal Aunt        Social History     Socioeconomic History   • Marital status:      Spouse name: Rocky   • Number of children: Not on file   • Years of education: Not on file   • Highest education level: Not on file   Social Needs   • Financial resource strain: Not on file   • Food insecurity - worry: Not on file   • Food insecurity - inability: Not on file   • Transportation needs - medical: Not on file   • Transportation needs - non-medical: Not on file   Occupational History   • Occupation: Disability leave     Employer: RETIRED   Tobacco Use   • Smoking status: Former Smoker     Packs/day: 1.00     Years: 30.00     Pack years: 30.00     Types: Cigarettes     Last attempt to quit: 2002     Years since quittin.9   • Smokeless tobacco: Never Used   Substance and Sexual Activity   • Alcohol use: Yes     Alcohol/week: 4.2 oz     Types: 7 Glasses of wine per week     Comment: NIGHTLY   • Drug use: Defer   • Sexual activity: Defer   Other Topics Concern   • Not on file   Social History Narrative   • Not on file       Current Outpatient Medications on File Prior to Visit   Medication Sig Dispense Refill   • levothyroxine (SYNTHROID, LEVOTHROID) 25 MCG tablet Take 1 tablet by mouth Daily. 90 tablet 3   • losartan (COZAAR) 50 MG tablet Take 1 tablet by mouth Daily. 90 tablet 3   • pantoprazole (PROTONIX) 40 MG EC tablet Take 1 tablet by mouth Daily. 90 tablet 3   • simvastatin (ZOCOR) 20 MG  tablet Take 1 tablet by mouth Every Night. 90 tablet 3     No current facility-administered medications on file prior to visit.        No Known Allergies    Immunization History   Administered Date(s) Administered   • Flu Vaccine High Dose PF 65YR+ 12/18/2017   • Pneumococcal Conjugate 13-Valent (PCV13) 11/30/2015   • Pneumococcal Polysaccharide (PPSV23) 01/01/2012   • Shingrix 11/20/2018   • Tdap 01/01/2012       Objective     /92   Pulse 80   Wt 77.1 kg (170 lb)   SpO2 98%   BMI 29.02 kg/m²     Physical Exam   Constitutional: She is oriented to person, place, and time. She appears well-developed and well-nourished.   HENT:   Head: Normocephalic and atraumatic.   Right Ear: Hearing, tympanic membrane and external ear normal.   Left Ear: Hearing, tympanic membrane and external ear normal.   Nose: Nose normal.   Mouth/Throat: Oropharynx is clear and moist.   Eyes: Conjunctivae and EOM are normal. Pupils are equal, round, and reactive to light.   Neck: Normal range of motion. Neck supple. No thyromegaly present.   Cardiovascular: Normal rate, regular rhythm, normal heart sounds and intact distal pulses.   No murmur heard.  Pulmonary/Chest: Effort normal and breath sounds normal. Right breast exhibits no mass. Left breast exhibits no mass.   Abdominal: Soft. Bowel sounds are normal. She exhibits no distension. There is no hepatosplenomegaly. There is no tenderness.   Genitourinary: No breast tenderness.   Musculoskeletal: Normal range of motion.   Lymphadenopathy:     She has no cervical adenopathy.   Neurological: She is alert and oriented to person, place, and time.   Skin: Skin is warm and dry.   Psychiatric: She has a normal mood and affect. Her speech is normal and behavior is normal. Judgment and thought content normal. Cognition and memory are normal.   Nursing note and vitals reviewed.  ekg for htn, hyperlipidemia, hypothyroid. Sinus. No st changes. Unchanged from prior.       Assessment/Plan    Nedra was seen today for annual exam, heartburn and knee pain.    Diagnoses and all orders for this visit:    Healthcare maintenance    Essential hypertension  -     ECG 12 Lead    Encounter for hepatitis C screening test for low risk patient  -     Hepatitis C Antibody    Multiple-type hyperlipidemia    Chronic gastritis without bleeding, unspecified gastritis type    Hypothyroidism, unspecified type    Other orders  -     ranitidine (ZANTAC) 150 MG capsule; Take 1 capsule by mouth Every Evening.        Discussion    AWV.     Direct observation of cognitive ability was evaluated and is normal. Patient was given health advice or handouts on the following:  nutrition, fall prevention, exercise, weight management. Patient will receive a flu vac today. She is advised hep A vac as well. She will add on zantac as needed. She will f/u for repeat egd routinely. She will continue med for lipids, thyroid, and bp regulation. She will reduce her dietary carbohydrates and fats. She will have a follow up in 7 months to repeat glucose testing, bp, and lipid regulation. She is encouraged to continue annual eye exams, q 6 mo dental, and q year derm.                No future appointments.

## 2018-12-21 NOTE — PROGRESS NOTES
Your laboratory results are NORMAL. We will discuss these results in detail at your next office visit. Please call with any questions or concerns.  Sincerely,  Prisca Church MD

## 2018-12-25 LAB
HCV AB S/CO SERPL IA: <0.1 S/CO RATIO (ref 0–0.9)
Lab: NORMAL
WRITTEN AUTHORIZATION: NORMAL

## 2018-12-27 RX ORDER — LEVOTHYROXINE SODIUM 0.03 MG/1
TABLET ORAL
Qty: 90 TABLET | Refills: 3 | Status: SHIPPED | OUTPATIENT
Start: 2018-12-27 | End: 2020-01-23 | Stop reason: SDUPTHER

## 2018-12-27 RX ORDER — SIMVASTATIN 20 MG
TABLET ORAL
Qty: 90 TABLET | Refills: 3 | Status: SHIPPED | OUTPATIENT
Start: 2018-12-27 | End: 2020-01-23 | Stop reason: SDUPTHER

## 2019-01-09 ENCOUNTER — TELEPHONE (OUTPATIENT)
Dept: INTERNAL MEDICINE | Facility: CLINIC | Age: 74
End: 2019-01-09

## 2019-01-09 DIAGNOSIS — R23.9 RECENT SKIN CHANGES: Primary | ICD-10-CM

## 2019-01-09 NOTE — TELEPHONE ENCOUNTER
Pt was calling to get a dermatology referral for Dr. Alvarenga at [facility] for a pre cancerous condition. The phone number is [phone] and the fax number is [phone].

## 2019-02-19 RX ORDER — PANTOPRAZOLE SODIUM 40 MG/1
TABLET, DELAYED RELEASE ORAL
Qty: 90 TABLET | Refills: 3 | Status: SHIPPED | OUTPATIENT
Start: 2019-02-19 | End: 2020-01-22

## 2019-02-19 RX ORDER — LOSARTAN POTASSIUM 50 MG/1
TABLET ORAL
Qty: 90 TABLET | Refills: 3 | Status: SHIPPED | OUTPATIENT
Start: 2019-02-19 | End: 2019-07-30 | Stop reason: DRUGHIGH

## 2019-07-18 DIAGNOSIS — I10 ESSENTIAL HYPERTENSION: ICD-10-CM

## 2019-07-18 DIAGNOSIS — E78.5 HYPERLIPIDEMIA, UNSPECIFIED HYPERLIPIDEMIA TYPE: Primary | ICD-10-CM

## 2019-07-23 LAB
ALBUMIN SERPL-MCNC: 4.5 G/DL (ref 3.5–5.2)
ALBUMIN/GLOB SERPL: 2 G/DL
ALP SERPL-CCNC: 81 U/L (ref 39–117)
ALT SERPL-CCNC: 25 U/L (ref 1–33)
AST SERPL-CCNC: 19 U/L (ref 1–32)
BILIRUB SERPL-MCNC: 0.4 MG/DL (ref 0.2–1.2)
BUN SERPL-MCNC: 18 MG/DL (ref 8–23)
BUN/CREAT SERPL: 21.7 (ref 7–25)
CALCIUM SERPL-MCNC: 9 MG/DL (ref 8.6–10.5)
CHLORIDE SERPL-SCNC: 104 MMOL/L (ref 98–107)
CHOLEST SERPL-MCNC: 190 MG/DL (ref 0–200)
CO2 SERPL-SCNC: 28.1 MMOL/L (ref 22–29)
CREAT SERPL-MCNC: 0.83 MG/DL (ref 0.57–1)
GLOBULIN SER CALC-MCNC: 2.3 GM/DL
GLUCOSE SERPL-MCNC: 106 MG/DL (ref 65–99)
HDLC SERPL-MCNC: 51 MG/DL (ref 40–60)
LDLC SERPL CALC-MCNC: 109 MG/DL (ref 0–100)
POTASSIUM SERPL-SCNC: 4.5 MMOL/L (ref 3.5–5.2)
PROT SERPL-MCNC: 6.8 G/DL (ref 6–8.5)
SODIUM SERPL-SCNC: 142 MMOL/L (ref 136–145)
TRIGL SERPL-MCNC: 149 MG/DL (ref 0–150)
VLDLC SERPL CALC-MCNC: 29.8 MG/DL (ref 5–40)

## 2019-07-30 ENCOUNTER — OFFICE VISIT (OUTPATIENT)
Dept: INTERNAL MEDICINE | Facility: CLINIC | Age: 74
End: 2019-07-30

## 2019-07-30 VITALS
OXYGEN SATURATION: 96 % | BODY MASS INDEX: 26.78 KG/M2 | SYSTOLIC BLOOD PRESSURE: 130 MMHG | HEART RATE: 79 BPM | DIASTOLIC BLOOD PRESSURE: 74 MMHG | WEIGHT: 156 LBS

## 2019-07-30 DIAGNOSIS — E78.2 MULTIPLE-TYPE HYPERLIPIDEMIA: ICD-10-CM

## 2019-07-30 DIAGNOSIS — E03.9 HYPOTHYROIDISM, UNSPECIFIED TYPE: ICD-10-CM

## 2019-07-30 DIAGNOSIS — I10 ESSENTIAL HYPERTENSION: Primary | ICD-10-CM

## 2019-07-30 DIAGNOSIS — K21.00 GASTROESOPHAGEAL REFLUX DISEASE WITH ESOPHAGITIS: ICD-10-CM

## 2019-07-30 DIAGNOSIS — Z12.31 ENCOUNTER FOR SCREENING MAMMOGRAM FOR BREAST CANCER: ICD-10-CM

## 2019-07-30 PROCEDURE — 99214 OFFICE O/P EST MOD 30 MIN: CPT | Performed by: INTERNAL MEDICINE

## 2019-07-30 RX ORDER — LOSARTAN POTASSIUM 100 MG/1
100 TABLET ORAL DAILY
Qty: 90 TABLET | Refills: 3 | Status: SHIPPED | OUTPATIENT
Start: 2019-07-30 | End: 2020-07-24

## 2019-07-30 NOTE — PATIENT INSTRUCTIONS
"da  DASH Eating Plan  DASH stands for \"Dietary Approaches to Stop Hypertension.\" The DASH eating plan is a healthy eating plan that has been shown to reduce high blood pressure (hypertension). It may also reduce your risk for type 2 diabetes, heart disease, and stroke. The DASH eating plan may also help with weight loss.  What are tips for following this plan?  General guidelines  · Avoid eating more than 2,300 mg (milligrams) of salt (sodium) a day. If you have hypertension, you may need to reduce your sodium intake to 1,500 mg a day.  · Limit alcohol intake to no more than 1 drink a day for nonpregnant women and 2 drinks a day for men. One drink equals 12 oz of beer, 5 oz of wine, or 1½ oz of hard liquor.  · Work with your health care provider to maintain a healthy body weight or to lose weight. Ask what an ideal weight is for you.  · Get at least 30 minutes of exercise that causes your heart to beat faster (aerobic exercise) most days of the week. Activities may include walking, swimming, or biking.  · Work with your health care provider or diet and nutrition specialist (dietitian) to adjust your eating plan to your individual calorie needs.  Reading food labels  · Check food labels for the amount of sodium per serving. Choose foods with less than 5 percent of the Daily Value of sodium. Generally, foods with less than 300 mg of sodium per serving fit into this eating plan.  · To find whole grains, look for the word \"whole\" as the first word in the ingredient list.  Shopping  · Buy products labeled as \"low-sodium\" or \"no salt added.\"  · Buy fresh foods. Avoid canned foods and premade or frozen meals.  Cooking  · Avoid adding salt when cooking. Use salt-free seasonings or herbs instead of table salt or sea salt. Check with your health care provider or pharmacist before using salt substitutes.  · Do not mg foods. Cook foods using healthy methods such as baking, boiling, grilling, and broiling instead.  · Cook with " heart-healthy oils, such as olive, canola, soybean, or sunflower oil.  Meal planning    · Eat a balanced diet that includes:  ? 5 or more servings of fruits and vegetables each day. At each meal, try to fill half of your plate with fruits and vegetables.  ? Up to 6-8 servings of whole grains each day.  ? Less than 6 oz of lean meat, poultry, or fish each day. A 3-oz serving of meat is about the same size as a deck of cards. One egg equals 1 oz.  ? 2 servings of low-fat dairy each day.  ? A serving of nuts, seeds, or beans 5 times each week.  ? Heart-healthy fats. Healthy fats called Omega-3 fatty acids are found in foods such as flaxseeds and coldwater fish, like sardines, salmon, and mackerel.  · Limit how much you eat of the following:  ? Canned or prepackaged foods.  ? Food that is high in trans fat, such as fried foods.  ? Food that is high in saturated fat, such as fatty meat.  ? Sweets, desserts, sugary drinks, and other foods with added sugar.  ? Full-fat dairy products.  · Do not salt foods before eating.  · Try to eat at least 2 vegetarian meals each week.  · Eat more home-cooked food and less restaurant, buffet, and fast food.  · When eating at a restaurant, ask that your food be prepared with less salt or no salt, if possible.  What foods are recommended?  The items listed may not be a complete list. Talk with your dietitian about what dietary choices are best for you.  Grains  Whole-grain or whole-wheat bread. Whole-grain or whole-wheat pasta. Brown rice. Oatmeal. Quinoa. Bulgur. Whole-grain and low-sodium cereals. Malia bread. Low-fat, low-sodium crackers. Whole-wheat flour tortillas.  Vegetables  Fresh or frozen vegetables (raw, steamed, roasted, or grilled). Low-sodium or reduced-sodium tomato and vegetable juice. Low-sodium or reduced-sodium tomato sauce and tomato paste. Low-sodium or reduced-sodium canned vegetables.  Fruits  All fresh, dried, or frozen fruit. Canned fruit in natural juice (without  added sugar).  Meat and other protein foods  Skinless chicken or turkey. Ground chicken or turkey. Pork with fat trimmed off. Fish and seafood. Egg whites. Dried beans, peas, or lentils. Unsalted nuts, nut butters, and seeds. Unsalted canned beans. Lean cuts of beef with fat trimmed off. Low-sodium, lean deli meat.  Dairy  Low-fat (1%) or fat-free (skim) milk. Fat-free, low-fat, or reduced-fat cheeses. Nonfat, low-sodium ricotta or cottage cheese. Low-fat or nonfat yogurt. Low-fat, low-sodium cheese.  Fats and oils  Soft margarine without trans fats. Vegetable oil. Low-fat, reduced-fat, or light mayonnaise and salad dressings (reduced-sodium). Canola, safflower, olive, soybean, and sunflower oils. Avocado.  Seasoning and other foods  Herbs. Spices. Seasoning mixes without salt. Unsalted popcorn and pretzels. Fat-free sweets.  What foods are not recommended?  The items listed may not be a complete list. Talk with your dietitian about what dietary choices are best for you.  Grains  Baked goods made with fat, such as croissants, muffins, or some breads. Dry pasta or rice meal packs.  Vegetables  Creamed or fried vegetables. Vegetables in a cheese sauce. Regular canned vegetables (not low-sodium or reduced-sodium). Regular canned tomato sauce and paste (not low-sodium or reduced-sodium). Regular tomato and vegetable juice (not low-sodium or reduced-sodium). Pickles. Olives.  Fruits  Canned fruit in a light or heavy syrup. Fried fruit. Fruit in cream or butter sauce.  Meat and other protein foods  Fatty cuts of meat. Ribs. Fried meat. Camacho. Sausage. Bologna and other processed lunch meats. Salami. Fatback. Hotdogs. Bratwurst. Salted nuts and seeds. Canned beans with added salt. Canned or smoked fish. Whole eggs or egg yolks. Chicken or turkey with skin.  Dairy  Whole or 2% milk, cream, and half-and-half. Whole or full-fat cream cheese. Whole-fat or sweetened yogurt. Full-fat cheese. Nondairy creamers. Whipped toppings.  Processed cheese and cheese spreads.  Fats and oils  Butter. Stick margarine. Lard. Shortening. Ghee. Camacho fat. Tropical oils, such as coconut, palm kernel, or palm oil.  Seasoning and other foods  Salted popcorn and pretzels. Onion salt, garlic salt, seasoned salt, table salt, and sea salt. Worcestershire sauce. Tartar sauce. Barbecue sauce. Teriyaki sauce. Soy sauce, including reduced-sodium. Steak sauce. Canned and packaged gravies. Fish sauce. Oyster sauce. Cocktail sauce. Horseradish that you find on the shelf. Ketchup. Mustard. Meat flavorings and tenderizers. Bouillon cubes. Hot sauce and Tabasco sauce. Premade or packaged marinades. Premade or packaged taco seasonings. Relishes. Regular salad dressings.  Where to find more information:  · National Heart, Lung, and Blood [place]: www.nhlbi.nih.gov  · American Heart Association: www.heart.org  Summary  · The DASH eating plan is a healthy eating plan that has been shown to reduce high blood pressure (hypertension). It may also reduce your risk for type 2 diabetes, heart disease, and stroke.  · With the DASH eating plan, you should limit salt (sodium) intake to 2,300 mg a day. If you have hypertension, you may need to reduce your sodium intake to 1,500 mg a day.  · When on the DASH eating plan, aim to eat more fresh fruits and vegetables, whole grains, lean proteins, low-fat dairy, and heart-healthy fats.  · Work with your health care provider or diet and nutrition specialist (dietitian) to adjust your eating plan to your individual calorie needs.  This information is not intended to replace advice given to you by your health care provider. Make sure you discuss any questions you have with your health care provider.  Document Released: 12/06/2012 Document Revised: 12/11/2017 Document Reviewed: 12/11/2017  [employer] Patient Education © 2019 [employer]

## 2019-07-30 NOTE — PROGRESS NOTES
Chief Complaint   Patient presents with   • Hypertension   • Hyperlipidemia   • Heartburn   • Hypothyroidism       History of Present Illness   Nedra Roberts is a 74 y.o. female presents for follow up evaluation. She is doing well today. Has a history of hypertension, mackenzie, hyperlipidemia, and hypothryoidism. She is doing well. She has medically managed hypertension and hyperlipidemia. Levels are at goal with this. She has MACKENZIE that does well with daily protonix. Occasionally needs a tums but this is predictably after drinking wine or similar triggers. She has hypothyroidims and she feels clinically euthryroid.     The following portions of the patient's history were reviewed and updated as appropriate: allergies, current medications, past family history, past medical history, past social history, past surgical history and problem list.  Current Outpatient Medications on File Prior to Visit   Medication Sig Dispense Refill   • levothyroxine (SYNTHROID, LEVOTHROID) 25 MCG tablet take 1 tablet by mouth once daily 90 tablet 3   • pantoprazole (PROTONIX) 40 MG EC tablet take 1 tablet by mouth once daily 90 tablet 3   • simvastatin (ZOCOR) 20 MG tablet take 1 tablet by mouth every evening 90 tablet 3   • [DISCONTINUED] losartan (COZAAR) 50 MG tablet take 1 tablet by mouth once daily 90 tablet 3   • azithromycin (ZITHROMAX Z-MONA) 250 MG tablet Take 2 tablets the first day, then 1 tablet daily for 4 days. 6 tablet 0   • benzonatate (TESSALON) 200 MG capsule Take 1 capsule by mouth 3 (Three) Times a Day As Needed for Cough. 30 capsule 0     No current facility-administered medications on file prior to visit.      Review of Systems   Constitutional: Negative.    HENT: Negative.    Eyes: Negative.    Respiratory: Negative.    Cardiovascular: Negative.    Gastrointestinal:        Reflux     Endocrine: Negative.    Genitourinary: Negative.    Musculoskeletal: Negative.    Skin: Negative.    Allergic/Immunologic: Negative.     Neurological: Negative.    Hematological: Negative.    Psychiatric/Behavioral: Negative.        Objective   Physical Exam   Constitutional: She is oriented to person, place, and time. She appears well-developed and well-nourished.   HENT:   Head: Normocephalic and atraumatic.   Right Ear: External ear normal.   Left Ear: External ear normal.   Nose: Nose normal.   Mouth/Throat: Oropharynx is clear and moist.   Eyes: Conjunctivae and EOM are normal. Pupils are equal, round, and reactive to light.   Neck: Normal range of motion. Neck supple.   Cardiovascular: Normal rate, regular rhythm, normal heart sounds and intact distal pulses.   Pulmonary/Chest: Effort normal and breath sounds normal. No respiratory distress.   Abdominal: Soft. Bowel sounds are normal.   Musculoskeletal: Normal range of motion.   Neurological: She is alert and oriented to person, place, and time.   Skin: Skin is warm and dry.   Psychiatric: She has a normal mood and affect. Her behavior is normal. Judgment and thought content normal.   Nursing note and vitals reviewed.       /74   Pulse 79   Wt 70.8 kg (156 lb)   SpO2 96%   BMI 26.78 kg/m²     Assessment/Plan   Diagnoses and all orders for this visit:    Essential hypertension    Encounter for screening mammogram for breast cancer  -     Mammo screening digital tomosynthesis bilateral w CAD; Future    Multiple-type hyperlipidemia    Gastroesophageal reflux disease with esophagitis    Hypothyroidism, unspecified type    Other orders  -     losartan (COZAAR) 100 MG tablet; Take 1 tablet by mouth Daily.      Patient has hypertension. She will reduce dietary sodium and increase fitness. Increase losartan to 100 mg daily. She will reduce dietary carbohydrates as well given ifg. Test a1c w/ next labs. She will avoid mackenzie triggers and continue prontonix. May take zantac or pepcid prn. Continue sytnhroid for thyroid replacement. Continue zocor for lipid regulation. Stretch legs after activity  or w/ cramping. F/u in 6-8 mo awv. Get hep a and shingrix at pharmacy.

## 2019-11-04 ENCOUNTER — HOSPITAL ENCOUNTER (OUTPATIENT)
Dept: MAMMOGRAPHY | Facility: HOSPITAL | Age: 74
Discharge: HOME | End: 2019-11-04
Admitting: INTERNAL MEDICINE

## 2019-11-04 DIAGNOSIS — Z12.31 ENCOUNTER FOR SCREENING MAMMOGRAM FOR BREAST CANCER: ICD-10-CM

## 2019-11-04 PROCEDURE — 77067 SCR MAMMO BI INCL CAD: CPT

## 2019-11-04 PROCEDURE — 77063 BREAST TOMOSYNTHESIS BI: CPT

## 2019-12-06 ENCOUNTER — LAB (OUTPATIENT)
Dept: LAB | Facility: HOSPITAL | Age: 74
End: 2019-12-06

## 2019-12-06 ENCOUNTER — OFFICE VISIT (OUTPATIENT)
Dept: ONCOLOGY | Facility: CLINIC | Age: 74
End: 2019-12-06

## 2019-12-06 VITALS
RESPIRATION RATE: 12 BRPM | DIASTOLIC BLOOD PRESSURE: 75 MMHG | OXYGEN SATURATION: 96 % | TEMPERATURE: 98 F | WEIGHT: 167.4 LBS | SYSTOLIC BLOOD PRESSURE: 145 MMHG | BODY MASS INDEX: 28.58 KG/M2 | HEIGHT: 64 IN | HEART RATE: 85 BPM

## 2019-12-06 DIAGNOSIS — Z85.3 HISTORY OF BREAST CANCER: Primary | ICD-10-CM

## 2019-12-06 LAB
ALBUMIN SERPL-MCNC: 4.4 G/DL (ref 3.5–5.2)
ALBUMIN/GLOB SERPL: 1.5 G/DL (ref 1.1–2.4)
ALP SERPL-CCNC: 85 U/L (ref 38–116)
ALT SERPL W P-5'-P-CCNC: 22 U/L (ref 0–33)
ANION GAP SERPL CALCULATED.3IONS-SCNC: 12.1 MMOL/L (ref 5–15)
AST SERPL-CCNC: 20 U/L (ref 0–32)
BASOPHILS # BLD AUTO: 0.03 10*3/MM3 (ref 0–0.2)
BASOPHILS NFR BLD AUTO: 0.5 % (ref 0–1.5)
BILIRUB SERPL-MCNC: 0.4 MG/DL (ref 0.2–1.2)
BUN BLD-MCNC: 17 MG/DL (ref 6–20)
BUN/CREAT SERPL: 17.7 (ref 7.3–30)
CALCIUM SPEC-SCNC: 10.2 MG/DL (ref 8.5–10.2)
CHLORIDE SERPL-SCNC: 103 MMOL/L (ref 98–107)
CO2 SERPL-SCNC: 26.9 MMOL/L (ref 22–29)
CREAT BLD-MCNC: 0.96 MG/DL (ref 0.6–1.1)
DEPRECATED RDW RBC AUTO: 40.3 FL (ref 37–54)
EOSINOPHIL # BLD AUTO: 0.04 10*3/MM3 (ref 0–0.4)
EOSINOPHIL NFR BLD AUTO: 0.6 % (ref 0.3–6.2)
ERYTHROCYTE [DISTWIDTH] IN BLOOD BY AUTOMATED COUNT: 11.8 % (ref 12.3–15.4)
GFR SERPL CREATININE-BSD FRML MDRD: 57 ML/MIN/1.73
GLOBULIN UR ELPH-MCNC: 2.9 GM/DL (ref 1.8–3.5)
GLUCOSE BLD-MCNC: 111 MG/DL (ref 74–124)
HCT VFR BLD AUTO: 40.8 % (ref 34–46.6)
HGB BLD-MCNC: 13.7 G/DL (ref 12–15.9)
IMM GRANULOCYTES # BLD AUTO: 0.02 10*3/MM3 (ref 0–0.05)
IMM GRANULOCYTES NFR BLD AUTO: 0.3 % (ref 0–0.5)
LYMPHOCYTES # BLD AUTO: 1.52 10*3/MM3 (ref 0.7–3.1)
LYMPHOCYTES NFR BLD AUTO: 23.2 % (ref 19.6–45.3)
MCH RBC QN AUTO: 31 PG (ref 26.6–33)
MCHC RBC AUTO-ENTMCNC: 33.6 G/DL (ref 31.5–35.7)
MCV RBC AUTO: 92.3 FL (ref 79–97)
MONOCYTES # BLD AUTO: 0.46 10*3/MM3 (ref 0.1–0.9)
MONOCYTES NFR BLD AUTO: 7 % (ref 5–12)
NEUTROPHILS # BLD AUTO: 4.48 10*3/MM3 (ref 1.7–7)
NEUTROPHILS NFR BLD AUTO: 68.4 % (ref 42.7–76)
NRBC BLD AUTO-RTO: 0 /100 WBC (ref 0–0.2)
PLATELET # BLD AUTO: 402 10*3/MM3 (ref 140–450)
PMV BLD AUTO: 8.3 FL (ref 6–12)
POTASSIUM BLD-SCNC: 4.4 MMOL/L (ref 3.5–4.7)
PROT SERPL-MCNC: 7.3 G/DL (ref 6.3–8)
RBC # BLD AUTO: 4.42 10*6/MM3 (ref 3.77–5.28)
SODIUM BLD-SCNC: 142 MMOL/L (ref 134–145)
WBC NRBC COR # BLD: 6.55 10*3/MM3 (ref 3.4–10.8)

## 2019-12-06 PROCEDURE — 36415 COLL VENOUS BLD VENIPUNCTURE: CPT

## 2019-12-06 PROCEDURE — 99213 OFFICE O/P EST LOW 20 MIN: CPT | Performed by: INTERNAL MEDICINE

## 2019-12-06 PROCEDURE — 80053 COMPREHEN METABOLIC PANEL: CPT

## 2019-12-06 PROCEDURE — 85025 COMPLETE CBC W/AUTO DIFF WBC: CPT

## 2019-12-06 NOTE — PROGRESS NOTES
"  Subjective    Follow-up for history of stage I breast cancer of the left breast      History of Present Illness    Mrs. Roberts is a jeffry 71-year-old woman returning for annual follow-up for history of stage I breast cancer affecting the left breast.  She underwent lumpectomy and 4 cycles of adjuvant chemotherapy with TC.  Her tumor was estrogen receptor positive and she underwent hormonal therapy with Arimidex between May 2009 in May 2014.    The patient returns today doing well.  She denies changes in the self breast exam.  She denies unusual pain, cough or shortness of breath.  Only new medical problem over the past year is worsening acid reflux.  This is been evaluated with an upper GI.  Past Medical History:  Pertinent for hypertension, acid reflux, hyperlipidemia, hypothyroidism    Review of Systems   Constitutional: Negative.    Respiratory: Negative.    Cardiovascular: Negative.    Gastrointestinal: Negative for abdominal pain.        Reflux   Musculoskeletal: Negative.    Skin: Negative.    Hematological: Negative.    Psychiatric/Behavioral: Negative.           Medications:  The current medication list was reviewed in the EMR    ALLERGIES:  No Known Allergies    Objective      Vitals:    12/06/19 1015   BP: 145/75   Pulse: 85   Resp: 12   Temp: 98 °F (36.7 °C)   TempSrc: Oral   SpO2: 96%   Weight: 75.9 kg (167 lb 6.4 oz)   Height: 162.6 cm (64.02\")   PainSc: 0-No pain     Current Status 12/6/2019   ECOG score 0       Physical Exam    Gen: [facility], NAD  HEENT: decreased hearing  CV:  RRR  RESP: CTA bilat  Breast:  No masses are noted in either breast, no supraclavicular or axillary lymphadenopathy noted.  There is a healed lumpectomy scar inferior to the areola of the left breast with skin dimpling and downward retraction of the nipple.      RECENT LABS:  Hematology WBC   Date Value Ref Range Status   12/06/2019 6.55 3.40 - 10.80 10*3/mm3 Final   12/17/2018 6.50 4.50 - 10.70 10*3/mm3 Final     RBC "   Date Value Ref Range Status   12/06/2019 4.42 3.77 - 5.28 10*6/mm3 Final   12/17/2018 4.27 3.90 - 5.20 10*6/mm3 Final     Hemoglobin   Date Value Ref Range Status   12/06/2019 13.7 12.0 - 15.9 g/dL Final     Hematocrit   Date Value Ref Range Status   12/06/2019 40.8 34.0 - 46.6 % Final     Platelets   Date Value Ref Range Status   12/06/2019 402 140 - 450 10*3/mm3 Final     Lab Results   Component Value Date    GLUCOSE 99 11/09/2018    BUN 18 07/23/2019    CREATININE 0.83 07/23/2019    EGFRIFNONA 67 07/23/2019    EGFRIFAFRI 82 07/23/2019    BCR 21.7 07/23/2019    K 4.5 07/23/2019    CO2 28.1 07/23/2019    CALCIUM 9.0 07/23/2019    PROTENTOTREF 6.8 07/23/2019    ALBUMIN 4.50 07/23/2019    LABIL2 2.0 07/23/2019    AST 19 07/23/2019    ALT 25 07/23/2019          Bilateral mammography 11/4/2019: Scattered fibroglandular densities unchanged.  No evidence of malignancy    Assessment/Plan   Ms. Roberts returns today for follow-up of her stage I hormone receptor positive breast cancer affecting the left breast treated with lumpectomy, 4 cycles of adjuvant TC, and 5 years of tamoxifen completed May 2014.      No changes on the breast exam and mammography reviewed showed benign findings.  I recommended the patient to continue her monthly breast exams.  I will see her back in 1 year after annual mammography.              12/6/2019      CC:

## 2019-12-09 DIAGNOSIS — Z85.3 HISTORY OF BREAST CANCER: Primary | ICD-10-CM

## 2019-12-09 DIAGNOSIS — Z12.31 SCREENING MAMMOGRAM FOR HIGH-RISK PATIENT: ICD-10-CM

## 2020-01-22 RX ORDER — PANTOPRAZOLE SODIUM 40 MG/1
TABLET, DELAYED RELEASE ORAL
Qty: 90 TABLET | Refills: 0 | Status: SHIPPED | OUTPATIENT
Start: 2020-01-22 | End: 2020-02-17 | Stop reason: SDUPTHER

## 2020-01-23 RX ORDER — LEVOTHYROXINE SODIUM 0.03 MG/1
25 TABLET ORAL DAILY
Qty: 90 TABLET | Refills: 0 | Status: SHIPPED | OUTPATIENT
Start: 2020-01-23 | End: 2020-04-19

## 2020-01-23 RX ORDER — SIMVASTATIN 20 MG
20 TABLET ORAL EVERY EVENING
Qty: 90 TABLET | Refills: 0 | Status: SHIPPED | OUTPATIENT
Start: 2020-01-23 | End: 2020-04-19

## 2020-01-30 DIAGNOSIS — E78.2 MULTIPLE-TYPE HYPERLIPIDEMIA: ICD-10-CM

## 2020-01-30 DIAGNOSIS — E03.9 HYPOTHYROIDISM, UNSPECIFIED TYPE: ICD-10-CM

## 2020-01-30 DIAGNOSIS — I10 ESSENTIAL HYPERTENSION: Primary | ICD-10-CM

## 2020-01-30 DIAGNOSIS — Z00.00 HEALTHCARE MAINTENANCE: ICD-10-CM

## 2020-02-08 LAB
ALBUMIN SERPL-MCNC: 4.8 G/DL (ref 3.5–5.2)
ALBUMIN/GLOB SERPL: 2.1 G/DL
ALP SERPL-CCNC: 74 U/L (ref 39–117)
ALT SERPL-CCNC: 21 U/L (ref 1–33)
APPEARANCE UR: CLEAR
AST SERPL-CCNC: 19 U/L (ref 1–32)
BACTERIA #/AREA URNS HPF: NORMAL /HPF
BASOPHILS # BLD AUTO: 0.03 10*3/MM3 (ref 0–0.2)
BASOPHILS NFR BLD AUTO: 0.4 % (ref 0–1.5)
BILIRUB SERPL-MCNC: 0.3 MG/DL (ref 0.2–1.2)
BILIRUB UR QL STRIP: NEGATIVE
BUN SERPL-MCNC: 13 MG/DL (ref 8–23)
BUN/CREAT SERPL: 14.3 (ref 7–25)
CALCIUM SERPL-MCNC: 10.4 MG/DL (ref 8.6–10.5)
CHLORIDE SERPL-SCNC: 102 MMOL/L (ref 98–107)
CHOLEST SERPL-MCNC: 195 MG/DL (ref 0–200)
CO2 SERPL-SCNC: 24.8 MMOL/L (ref 22–29)
COLOR UR: YELLOW
CREAT SERPL-MCNC: 0.91 MG/DL (ref 0.57–1)
EOSINOPHIL # BLD AUTO: 0.04 10*3/MM3 (ref 0–0.4)
EOSINOPHIL NFR BLD AUTO: 0.6 % (ref 0.3–6.2)
EPI CELLS #/AREA URNS HPF: NORMAL /HPF (ref 0–10)
ERYTHROCYTE [DISTWIDTH] IN BLOOD BY AUTOMATED COUNT: 12.3 % (ref 12.3–15.4)
GLOBULIN SER CALC-MCNC: 2.3 GM/DL
GLUCOSE SERPL-MCNC: 104 MG/DL (ref 65–99)
GLUCOSE UR QL: NEGATIVE
HCT VFR BLD AUTO: 40 % (ref 34–46.6)
HDLC SERPL-MCNC: 59 MG/DL (ref 40–60)
HGB BLD-MCNC: 13.6 G/DL (ref 12–15.9)
HGB UR QL STRIP: NEGATIVE
IMM GRANULOCYTES # BLD AUTO: 0.03 10*3/MM3 (ref 0–0.05)
IMM GRANULOCYTES NFR BLD AUTO: 0.4 % (ref 0–0.5)
KETONES UR QL STRIP: NEGATIVE
LDLC SERPL CALC-MCNC: 102 MG/DL (ref 0–100)
LEUKOCYTE ESTERASE UR QL STRIP: NEGATIVE
LYMPHOCYTES # BLD AUTO: 1.63 10*3/MM3 (ref 0.7–3.1)
LYMPHOCYTES NFR BLD AUTO: 24.1 % (ref 19.6–45.3)
MCH RBC QN AUTO: 30.6 PG (ref 26.6–33)
MCHC RBC AUTO-ENTMCNC: 34 G/DL (ref 31.5–35.7)
MCV RBC AUTO: 90.1 FL (ref 79–97)
MICRO URNS: NORMAL
MICRO URNS: NORMAL
MONOCYTES # BLD AUTO: 0.39 10*3/MM3 (ref 0.1–0.9)
MONOCYTES NFR BLD AUTO: 5.8 % (ref 5–12)
MUCOUS THREADS URNS QL MICRO: PRESENT /HPF
NEUTROPHILS # BLD AUTO: 4.65 10*3/MM3 (ref 1.7–7)
NEUTROPHILS NFR BLD AUTO: 68.7 % (ref 42.7–76)
NITRITE UR QL STRIP: NEGATIVE
NRBC BLD AUTO-RTO: 0 /100 WBC (ref 0–0.2)
PH UR STRIP: 7.5 [PH] (ref 5–7.5)
PLATELET # BLD AUTO: 464 10*3/MM3 (ref 140–450)
POTASSIUM SERPL-SCNC: 4.9 MMOL/L (ref 3.5–5.2)
PROT SERPL-MCNC: 7.1 G/DL (ref 6–8.5)
PROT UR QL STRIP: NEGATIVE
RBC # BLD AUTO: 4.44 10*6/MM3 (ref 3.77–5.28)
RBC #/AREA URNS HPF: NORMAL /HPF (ref 0–2)
SODIUM SERPL-SCNC: 141 MMOL/L (ref 136–145)
SP GR UR: 1.02 (ref 1–1.03)
TRIGL SERPL-MCNC: 170 MG/DL (ref 0–150)
TSH SERPL DL<=0.005 MIU/L-ACNC: 1.23 UIU/ML (ref 0.27–4.2)
URINALYSIS REFLEX: NORMAL
UROBILINOGEN UR STRIP-MCNC: 0.2 MG/DL (ref 0.2–1)
VLDLC SERPL CALC-MCNC: 34 MG/DL (ref 5–40)
WBC # BLD AUTO: 6.77 10*3/MM3 (ref 3.4–10.8)
WBC #/AREA URNS HPF: NORMAL /HPF (ref 0–5)

## 2020-02-17 ENCOUNTER — OFFICE VISIT (OUTPATIENT)
Dept: INTERNAL MEDICINE | Facility: CLINIC | Age: 75
End: 2020-02-17

## 2020-02-17 VITALS
HEART RATE: 80 BPM | HEIGHT: 64 IN | WEIGHT: 168 LBS | DIASTOLIC BLOOD PRESSURE: 78 MMHG | BODY MASS INDEX: 28.68 KG/M2 | SYSTOLIC BLOOD PRESSURE: 138 MMHG

## 2020-02-17 DIAGNOSIS — E78.2 MULTIPLE-TYPE HYPERLIPIDEMIA: ICD-10-CM

## 2020-02-17 DIAGNOSIS — Z00.00 HEALTHCARE MAINTENANCE: Primary | ICD-10-CM

## 2020-02-17 DIAGNOSIS — I10 ESSENTIAL HYPERTENSION: ICD-10-CM

## 2020-02-17 DIAGNOSIS — K21.00 GASTROESOPHAGEAL REFLUX DISEASE WITH ESOPHAGITIS: ICD-10-CM

## 2020-02-17 DIAGNOSIS — E03.9 HYPOTHYROIDISM, UNSPECIFIED TYPE: ICD-10-CM

## 2020-02-17 PROCEDURE — 90653 IIV ADJUVANT VACCINE IM: CPT | Performed by: INTERNAL MEDICINE

## 2020-02-17 PROCEDURE — 99214 OFFICE O/P EST MOD 30 MIN: CPT | Performed by: INTERNAL MEDICINE

## 2020-02-17 PROCEDURE — G0008 ADMIN INFLUENZA VIRUS VAC: HCPCS | Performed by: INTERNAL MEDICINE

## 2020-02-17 PROCEDURE — G0439 PPPS, SUBSEQ VISIT: HCPCS | Performed by: INTERNAL MEDICINE

## 2020-02-17 RX ORDER — PANTOPRAZOLE SODIUM 40 MG/1
40 TABLET, DELAYED RELEASE ORAL 2 TIMES DAILY
Qty: 180 TABLET | Refills: 1 | Status: SHIPPED | OUTPATIENT
Start: 2020-02-17 | End: 2021-02-16 | Stop reason: SDUPTHER

## 2020-02-17 NOTE — PATIENT INSTRUCTIONS
Gastroesophageal Reflux Disease, Adult  Gastroesophageal reflux (JO) happens when acid from the stomach flows up into the tube that connects the mouth and the stomach (esophagus). Normally, food travels down the esophagus and stays in the stomach to be digested. With JO, food and stomach acid sometimes move back up into the esophagus. You may have a disease called gastroesophageal reflux disease (GERD) if the reflux:  Happens often.  Causes frequent or very bad symptoms.  Causes problems such as damage to the esophagus.  When this happens, the esophagus becomes sore and swollen (inflamed). Over time, GERD can make small holes (ulcers) in the lining of the esophagus.  What are the causes?  This condition is caused by a problem with the muscle between the esophagus and the stomach. When this muscle is weak or not normal, it does not close properly to keep food and acid from coming back up from the stomach. The muscle can be weak because of:  Tobacco use.  Pregnancy.  Having a certain type of hernia (hiatal hernia).  Alcohol use.  Certain foods and drinks, such as coffee, chocolate, onions, and peppermint.  What increases the risk?  You are more likely to develop this condition if you:  Are overweight.  Have a disease that affects your connective tissue.  Use NSAID medicines.  What are the signs or symptoms?  Symptoms of this condition include:  Heartburn.  Difficult or painful swallowing.  The feeling of having a lump in the throat.  A bitter taste in the mouth.  Bad breath.  Having a lot of saliva.  Having an upset or bloated stomach.  Belching.  Chest pain. Different conditions can cause chest pain. Make sure you see your doctor if you have chest pain.  Shortness of breath or noisy breathing (wheezing).  Ongoing (chronic) cough or a cough at night.  Wearing away of the surface of teeth (tooth enamel).  Weight loss.  How is this treated?  Treatment will depend on how bad your symptoms are. Your doctor may  suggest:  Changes to your diet.  Medicine.  Surgery.  Follow these instructions at home:  Eating and drinking    Follow a diet as told by your doctor. You may need to avoid foods and drinks such as:  Coffee and tea (with or without caffeine).  Drinks that contain alcohol.  Energy drinks and sports drinks.  Bubbly (carbonated) drinks or sodas.  Chocolate and cocoa.  Peppermint and mint flavorings.  Garlic and onions.  Horseradish.  Spicy and acidic foods. These include peppers, chili powder, sultana powder, vinegar, hot sauces, and BBQ sauce.  Citrus fruit juices and citrus fruits, such as oranges, ludwin, and limes.  Tomato-based foods. These include red sauce, chili, salsa, and pizza with red sauce.  Fried and fatty foods. These include donuts, french fries, potato chips, and high-fat dressings.  High-fat meats. These include hot dogs, rib eye steak, sausage, ham, and davila.  High-fat dairy items, such as whole milk, butter, and cream cheese.  Eat small meals often. Avoid eating large meals.  Avoid drinking large amounts of liquid with your meals.  Avoid eating meals during the 2-3 hours before bedtime.  Avoid lying down right after you eat.  Do not exercise right after you eat.  Lifestyle    Do not use any products that contain nicotine or tobacco. These include cigarettes, e-cigarettes, and chewing tobacco. If you need help quitting, ask your doctor.  Try to lower your stress. If you need help doing this, ask your doctor.  If you are overweight, lose an amount of weight that is healthy for you. Ask your doctor about a safe weight loss goal.  General instructions  Pay attention to any changes in your symptoms.  Take over-the-counter and prescription medicines only as told by your doctor. Do not take aspirin, ibuprofen, or other NSAIDs unless your doctor says it is okay.  Wear loose clothes. Do not wear anything tight around your waist.  Raise (elevate) the head of your bed about 6 inches (15 cm).  Avoid bending over  if this makes your symptoms worse.  Keep all follow-up visits as told by your doctor. This is important.  Contact a doctor if:  You have new symptoms.  You lose weight and you do not know why.  You have trouble swallowing or it hurts to swallow.  You have wheezing or a cough that keeps happening.  Your symptoms do not get better with treatment.  You have a hoarse voice.  Get help right away if:  You have pain in your arms, neck, jaw, teeth, or back.  You feel sweaty, dizzy, or light-headed.  You have chest pain or shortness of breath.  You throw up (vomit) and your throw-up looks like blood or coffee grounds.  You pass out (faint).  Your poop (stool) is bloody or black.  You cannot swallow, drink, or eat.  Summary  If a person has gastroesophageal reflux disease (GERD), food and stomach acid move back up into the esophagus and cause symptoms or problems such as damage to the esophagus.  Treatment will depend on how bad your symptoms are.  Follow a diet as told by your doctor.  Take all medicines only as told by your doctor.  This information is not intended to replace advice given to you by your health care provider. Make sure you discuss any questions you have with your health care provider.  Document Released: 06/05/2009 Document Revised: 06/26/2019 Document Reviewed: 06/26/2019  [employer] Interactive Patient Education © 2019 [employer]  Food Choices for Gastroesophageal Reflux Disease, Adult  When you have gastroesophageal reflux disease (GERD), the foods you eat and your eating habits are very important. Choosing the right foods can help ease your discomfort. Think about working with a nutrition specialist (dietitian) to help you make good choices.  What are tips for following this plan?    Meals  · Choose healthy foods that are low in fat, such as fruits, vegetables, whole grains, low-fat dairy products, and lean meat, fish, and poultry.  · Eat small meals often instead of 3 large meals a day. Eat your meals  slowly, and in a place where you are relaxed. Avoid bending over or lying down until 2-3 hours after eating.  · Avoid eating meals 2-3 hours before bed.  · Avoid drinking a lot of liquid with meals.  · Cook foods using methods other than frying. Bake, grill, or broil food instead.  · Avoid or limit:  ? Chocolate.  ? Peppermint or spearmint.  ? Alcohol.  ? Pepper.  ? Black and decaffeinated coffee.  ? Black and decaffeinated tea.  ? Bubbly (carbonated) soft drinks.  ? Caffeinated energy drinks and soft drinks.  · Limit high-fat foods such as:  ? Fatty meat or fried foods.  ? Whole milk, cream, butter, or ice cream.  ? Nuts and nut butters.  ? Pastries, donuts, and sweets made with butter or shortening.  · Avoid foods that cause symptoms. These foods may be different for everyone. Common foods that cause symptoms include:  ? Tomatoes.  ? Oranges, ludwin, and limes.  ? Peppers.  ? Spicy food.  ? Onions and garlic.  ? Vinegar.  Lifestyle  · Maintain a healthy weight. Ask your doctor what weight is healthy for you. If you need to lose weight, work with your doctor to do so safely.  · Exercise for at least 30 minutes for 5 or more days each week, or as told by your doctor.  · Wear loose-fitting clothes.  · Do not smoke. If you need help quitting, ask your doctor.  · Sleep with the head of your bed higher than your feet. Use a wedge under the mattress or blocks under the bed frame to raise the head of the bed.  Summary  · When you have gastroesophageal reflux disease (GERD), food and lifestyle choices are very important in easing your symptoms.  · Eat small meals often instead of 3 large meals a day. Eat your meals slowly, and in a place where you are relaxed.  · Limit high-fat foods such as fatty meat or fried foods.  · Avoid bending over or lying down until 2-3 hours after eating.  · Avoid peppermint and spearmint, caffeine, alcohol, and chocolate.  This information is not intended to replace advice given to you by your  "health care provider. Make sure you discuss any questions you have with your health care provider.  Document Released: 06/18/2013 Document Revised: 01/23/2018 Document Reviewed: 01/23/2018  [employer] Interactive Patient Education © 2019 [employer]  DASH Eating Plan  DASH stands for \"Dietary Approaches to Stop Hypertension.\" The DASH eating plan is a healthy eating plan that has been shown to reduce high blood pressure (hypertension). It may also reduce your risk for type 2 diabetes, heart disease, and stroke. The DASH eating plan may also help with weight loss.  What are tips for following this plan?    General guidelines  · Avoid eating more than 2,300 mg (milligrams) of salt (sodium) a day. If you have hypertension, you may need to reduce your sodium intake to 1,500 mg a day.  · Limit alcohol intake to no more than 1 drink a day for nonpregnant women and 2 drinks a day for men. One drink equals 12 oz of beer, 5 oz of wine, or 1½ oz of hard liquor.  · Work with your health care provider to maintain a healthy body weight or to lose weight. Ask what an ideal weight is for you.  · Get at least 30 minutes of exercise that causes your heart to beat faster (aerobic exercise) most days of the week. Activities may include walking, swimming, or biking.  · Work with your health care provider or diet and nutrition specialist (dietitian) to adjust your eating plan to your individual calorie needs.  Reading food labels    · Check food labels for the amount of sodium per serving. Choose foods with less than 5 percent of the Daily Value of sodium. Generally, foods with less than 300 mg of sodium per serving fit into this eating plan.  · To find whole grains, look for the word \"whole\" as the first word in the ingredient list.  Shopping  · Buy products labeled as \"low-sodium\" or \"no salt added.\"  · Buy fresh foods. Avoid canned foods and premade or frozen meals.  Cooking  · Avoid adding salt when cooking. Use salt-free seasonings " or herbs instead of table salt or sea salt. Check with your health care provider or pharmacist before using salt substitutes.  · Do not mg foods. Cook foods using healthy methods such as baking, boiling, grilling, and broiling instead.  · Cook with heart-healthy oils, such as olive, canola, soybean, or sunflower oil.  Meal planning  · Eat a balanced diet that includes:  ? 5 or more servings of fruits and vegetables each day. At each meal, try to fill half of your plate with fruits and vegetables.  ? Up to 6-8 servings of whole grains each day.  ? Less than 6 oz of lean meat, poultry, or fish each day. A 3-oz serving of meat is about the same size as a deck of cards. One egg equals 1 oz.  ? 2 servings of low-fat dairy each day.  ? A serving of nuts, seeds, or beans 5 times each week.  ? Heart-healthy fats. Healthy fats called Omega-3 fatty acids are found in foods such as flaxseeds and coldwater fish, like sardines, salmon, and mackerel.  · Limit how much you eat of the following:  ? Canned or prepackaged foods.  ? Food that is high in trans fat, such as fried foods.  ? Food that is high in saturated fat, such as fatty meat.  ? Sweets, desserts, sugary drinks, and other foods with added sugar.  ? Full-fat dairy products.  · Do not salt foods before eating.  · Try to eat at least 2 vegetarian meals each week.  · Eat more home-cooked food and less restaurant, buffet, and fast food.  · When eating at a restaurant, ask that your food be prepared with less salt or no salt, if possible.  What foods are recommended?  The items listed may not be a complete list. Talk with your dietitian about what dietary choices are best for you.  Grains  Whole-grain or whole-wheat bread. Whole-grain or whole-wheat pasta. Brown rice. Oatmeal. Quinoa. Bulgur. Whole-grain and low-sodium cereals. Malia bread. Low-fat, low-sodium crackers. Whole-wheat flour tortillas.  Vegetables  Fresh or frozen vegetables (raw, steamed, roasted, or grilled).  Low-sodium or reduced-sodium tomato and vegetable juice. Low-sodium or reduced-sodium tomato sauce and tomato paste. Low-sodium or reduced-sodium canned vegetables.  Fruits  All fresh, dried, or frozen fruit. Canned fruit in natural juice (without added sugar).  Meat and other protein foods  Skinless chicken or turkey. Ground chicken or turkey. Pork with fat trimmed off. Fish and seafood. Egg whites. Dried beans, peas, or lentils. Unsalted nuts, nut butters, and seeds. Unsalted canned beans. Lean cuts of beef with fat trimmed off. Low-sodium, lean deli meat.  Dairy  Low-fat (1%) or fat-free (skim) milk. Fat-free, low-fat, or reduced-fat cheeses. Nonfat, low-sodium ricotta or cottage cheese. Low-fat or nonfat yogurt. Low-fat, low-sodium cheese.  Fats and oils  Soft margarine without trans fats. Vegetable oil. Low-fat, reduced-fat, or light mayonnaise and salad dressings (reduced-sodium). Canola, safflower, olive, soybean, and sunflower oils. Avocado.  Seasoning and other foods  Herbs. Spices. Seasoning mixes without salt. Unsalted popcorn and pretzels. Fat-free sweets.  What foods are not recommended?  The items listed may not be a complete list. Talk with your dietitian about what dietary choices are best for you.  Grains  Baked goods made with fat, such as croissants, muffins, or some breads. Dry pasta or rice meal packs.  Vegetables  Creamed or fried vegetables. Vegetables in a cheese sauce. Regular canned vegetables (not low-sodium or reduced-sodium). Regular canned tomato sauce and paste (not low-sodium or reduced-sodium). Regular tomato and vegetable juice (not low-sodium or reduced-sodium). Pickles. Olives.  Fruits  Canned fruit in a light or heavy syrup. Fried fruit. Fruit in cream or butter sauce.  Meat and other protein foods  Fatty cuts of meat. Ribs. Fried meat. Caamcho. Sausage. Bologna and other processed lunch meats. Salami. Fatback. Hotdogs. Bratwurst. Salted nuts and seeds. Canned beans with added  salt. Canned or smoked fish. Whole eggs or egg yolks. Chicken or turkey with skin.  Dairy  Whole or 2% milk, cream, and half-and-half. Whole or full-fat cream cheese. Whole-fat or sweetened yogurt. Full-fat cheese. Nondairy creamers. Whipped toppings. Processed cheese and cheese spreads.  Fats and oils  Butter. Stick margarine. Lard. Shortening. Ghee. Camacho fat. Tropical oils, such as coconut, palm kernel, or palm oil.  Seasoning and other foods  Salted popcorn and pretzels. Onion salt, garlic salt, seasoned salt, table salt, and sea salt. Worcestershire sauce. Tartar sauce. Barbecue sauce. Teriyaki sauce. Soy sauce, including reduced-sodium. Steak sauce. Canned and packaged gravies. Fish sauce. Oyster sauce. Cocktail sauce. Horseradish that you find on the shelf. Ketchup. Mustard. Meat flavorings and tenderizers. Bouillon cubes. Hot sauce and Tabasco sauce. Premade or packaged marinades. Premade or packaged taco seasonings. Relishes. Regular salad dressings.  Where to find more information:  · National Heart, Lung, and Blood [place]: www.nhlbi.nih.gov  · American Heart Association: www.heart.org  Summary  · The DASH eating plan is a healthy eating plan that has been shown to reduce high blood pressure (hypertension). It may also reduce your risk for type 2 diabetes, heart disease, and stroke.  · With the DASH eating plan, you should limit salt (sodium) intake to 2,300 mg a day. If you have hypertension, you may need to reduce your sodium intake to 1,500 mg a day.  · When on the DASH eating plan, aim to eat more fresh fruits and vegetables, whole grains, lean proteins, low-fat dairy, and heart-healthy fats.  · Work with your health care provider or diet and nutrition specialist (dietitian) to adjust your eating plan to your individual calorie needs.  This information is not intended to replace advice given to you by your health care provider. Make sure you discuss any questions you have with your health care  provider.  Document Released: 12/06/2012 Document Revised: 12/11/2017 Document Reviewed: 12/11/2017  [employer] Interactive Patient Education © 2019 [employer]

## 2020-02-17 NOTE — PROGRESS NOTES
Subjective     Nedra Roberts is a 74 y.o. female who presents for an annual wellness visit as well as check up of JO, hypothyroidism, hypertension.    History of Present Illness   Patient has reflux esophagitis. EGD last year with evidence of esophagitis. She is taking panotprazole q hs w/ continued symptoms. She notes this to be more pronounced at night after spicy meals and etoh. She is drinking wine every evening. No NSAIDS.       Review of Systems   Constitutional: Negative.    HENT: Negative.    Eyes: Negative.    Respiratory: Negative.    Cardiovascular: Negative.    Gastrointestinal: Positive for abdominal pain.   Endocrine: Negative.    Genitourinary: Negative.    Musculoskeletal: Negative.    Skin: Negative.    Allergic/Immunologic: Negative.    Neurological: Negative.    Hematological: Negative.    Psychiatric/Behavioral: Negative.        The following portions of the patient's history were reviewed and updated as appropriate: allergies, current medications, past family history, past medical history, past social history, past surgical history and problem list.  Health maintenance tab was reviewed and updated with the patient.       Patient Active Problem List    Diagnosis Date Noted   • GERD (gastroesophageal reflux disease) 12/18/2017   • Esophagitis 12/18/2017   • History of breast cancer 10/30/2017   • Essential hypertension 04/11/2016   • Gastritis 04/11/2016   • Hypothyroidism 04/11/2016   • Multiple-type hyperlipidemia 04/11/2016   • Healthcare maintenance 04/11/2016       Past Medical History:   Diagnosis Date   • Breast cancer (CMS/[facility]) 01/2009    Stage I left breast   • Drug therapy    • Gastritis    • GERD (gastroesophageal reflux disease)    • H/O lumpectomy    • Hyperlipidemia    • Hypertension    • Hypothyroidism        Past Surgical History:   Procedure Laterality Date   • BREAST BIOPSY     • BREAST LUMPECTOMY  01/21/2009   • COLONOSCOPY  2002    Dr. Tolliver   • ENDOSCOPY      gastritis   •  ENDOSCOPY AND COLONOSCOPY  2015    Ulcerative esophagitis and diverticulosis   • HYSTERECTOMY  2002       Family History   Problem Relation Age of Onset   • Heart disease Mother    • Breast cancer Mother    • Heart disease Father    • Diabetes Maternal Grandmother    • Cancer Paternal Grandfather    • Cancer Sister    • Diabetes Sister    • Leukemia Maternal Aunt        Social History     Socioeconomic History   • Marital status:      Spouse name: Rocky   • Number of children: Not on file   • Years of education: Not on file   • Highest education level: Not on file   Occupational History   • Occupation: Disability leave     Employer: RETIRED   Tobacco Use   • Smoking status: Former Smoker     Packs/day: 1.00     Years: 30.00     Pack years: 30.00     Types: Cigarettes     Last attempt to quit: 2002     Years since quittin.1   • Smokeless tobacco: Never Used   Substance and Sexual Activity   • Alcohol use: Yes     Alcohol/week: 7.0 standard drinks     Types: 7 Glasses of wine per week     Comment: NIGHTLY   • Drug use: Defer   • Sexual activity: Defer       Current Outpatient Medications on File Prior to Visit   Medication Sig Dispense Refill   • levothyroxine (SYNTHROID, LEVOTHROID) 25 MCG tablet Take 1 tablet by mouth Daily. 90 tablet 0   • losartan (COZAAR) 100 MG tablet Take 1 tablet by mouth Daily. 90 tablet 3   • simvastatin (ZOCOR) 20 MG tablet Take 1 tablet by mouth Every Evening. 90 tablet 0   • [DISCONTINUED] pantoprazole (PROTONIX) 40 MG EC tablet TAKE 1 TABLET BY MOUTH ONCE DAILY 90 tablet 0   • [DISCONTINUED] azithromycin (ZITHROMAX Z-MONA) 250 MG tablet Take 2 tablets the first day, then 1 tablet daily for 4 days. 6 tablet 0   • [DISCONTINUED] benzonatate (TESSALON) 200 MG capsule Take 1 capsule by mouth 3 (Three) Times a Day As Needed for Cough. 30 capsule 0     No current facility-administered medications on file prior to visit.        No Known Allergies    Immunization History  "  Administered Date(s) Administered   • Fluzone High Dose =>65 Years ([employer] ONLY) 12/18/2017, 12/21/2018   • Pneumococcal Conjugate 13-Valent (PCV13) 11/30/2015   • Pneumococcal Polysaccharide (PPSV23) 01/01/2012   • Shingrix 11/20/2018, 07/17/2019   • Tdap 01/01/2012       Objective     /78   Pulse 80   Ht 162.6 cm (64\")   Wt 76.2 kg (168 lb)   BMI 28.84 kg/m²     Physical Exam   Constitutional: She is oriented to person, place, and time. She appears well-developed and well-nourished.   HENT:   Head: Normocephalic and atraumatic.   Right Ear: External ear normal.   Left Ear: External ear normal.   Nose: Nose normal.   Mouth/Throat: Oropharynx is clear and moist.   Eyes: Pupils are equal, round, and reactive to light. EOM are normal.   Neck: Neck supple.   Cardiovascular: Normal rate, regular rhythm and normal heart sounds.   Pulmonary/Chest: Effort normal and breath sounds normal. No respiratory distress. Right breast exhibits no inverted nipple, no mass, no nipple discharge, no skin change and no tenderness. Left breast exhibits no inverted nipple, no mass, no nipple discharge, no skin change and no tenderness.       Abdominal: Soft.   Genitourinary:   Genitourinary Comments: Atrophic vaginal changes as well as below and london the rectum. . S/p hysterectomy.    Musculoskeletal: Normal range of motion.   Neurological: She is alert and oriented to person, place, and time.   Skin: Skin is warm and dry.   Psychiatric: She has a normal mood and affect. Her behavior is normal.   Nursing note and vitals reviewed.      Assessment/Plan   Nedra was seen today for annual exam.    Diagnoses and all orders for this visit:    Gastroesophageal reflux disease with esophagitis  -     Ambulatory Referral to Gastroenterology    Healthcare maintenance    Hypothyroidism, unspecified type    Essential hypertension    Multiple-type hyperlipidemia    Other orders  -     pantoprazole (PROTONIX) 40 MG EC tablet; Take 1 tablet " by mouth 2 (Two) Times a Day. Indications: Esophagus Inflammation with Erosion, Gastroesophageal Reflux Disease        Discussion    AWV.  See scanned forms and/or computer for catrachita history, PHQ-9, functional ability questionnaire, cognitive impairment screening.  Direct observation of cognitive abilities:  Normal cognition. These were all reviewed with the patient and the patient was provided with a personal prevention plan of service in patient instructions.  Patient was given advice or handouts on the following topics:  nutrition, fall prevention, exercise, weight management.   ACP discussion was held with the patient during this visit. Patient has an advance directive that is valid in EMR. .    I have recommended that the patient get the following immunizations:  flu and hepatitis A.    She is encouraged to get an eye exam and dental evaluation. She will continue annual mammo. cscope was 3/31/15. Given continued reflux symptoms on daily pantoprazole will increase to bid and refer to GI specialist. She is to decrease wine and is given handouts on DASH for htn and JO dietary modifications. She will hydrate well. She will receive a flu vac today and is to get hep A at the [facility]. F/u in 6 mo or prn.       Health Maintenance   Topic Date Due   • LIPID PANEL  02/07/2021   • MEDICARE ANNUAL WELLNESS  02/17/2021   • MAMMOGRAM  11/04/2021   • TDAP/TD VACCINES (2 - Td) 01/01/2022   • COLONOSCOPY  03/23/2025   • HEPATITIS C SCREENING  Completed   • Pneumococcal Vaccine Once at 65 Years Old  Completed   • INFLUENZA VACCINE  Completed   • ZOSTER VACCINE  Completed            Future Appointments   Date Time Provider Department Center   11/5/2020 10:30 AM [facility]   11/13/2020 10:40 AM VITALS ONLY [facility]   11/13/2020 11:00 AM [place] MD [facility]

## 2020-04-19 RX ORDER — SIMVASTATIN 20 MG
20 TABLET ORAL EVERY EVENING
Qty: 90 TABLET | Refills: 0 | Status: SHIPPED | OUTPATIENT
Start: 2020-04-19 | End: 2020-07-10

## 2020-04-19 RX ORDER — LEVOTHYROXINE SODIUM 0.03 MG/1
25 TABLET ORAL DAILY
Qty: 90 TABLET | Refills: 0 | Status: SHIPPED | OUTPATIENT
Start: 2020-04-19 | End: 2020-07-10

## 2020-07-06 ENCOUNTER — OFFICE VISIT (OUTPATIENT)
Dept: GASTROENTEROLOGY | Facility: CLINIC | Age: 75
End: 2020-07-06

## 2020-07-06 VITALS
DIASTOLIC BLOOD PRESSURE: 70 MMHG | HEIGHT: 64 IN | SYSTOLIC BLOOD PRESSURE: 110 MMHG | TEMPERATURE: 98.6 F | WEIGHT: 168 LBS | BODY MASS INDEX: 28.68 KG/M2

## 2020-07-06 DIAGNOSIS — R10.13 DYSPEPSIA: Primary | ICD-10-CM

## 2020-07-06 PROCEDURE — 99204 OFFICE O/P NEW MOD 45 MIN: CPT | Performed by: INTERNAL MEDICINE

## 2020-07-06 NOTE — PROGRESS NOTES
Chief Complaint   Patient presents with   • Heartburn   • Difficulty Swallowing       History of Present Illness:   75 y.o. female c/o burning discomfort in the upper chest area with nausea. It is worse after eating or with wine, chocolate. She drinks 1.5 glasses of wine/night. NO dysphagia or odynaphagia. She takes Pantoprazole 40 mg/day and takes in the evening. Denies NSAIDs use. No change with exercise. No SOA. Rare nausea. NO fevers, chills. Some diarrhea. Rare consitpation. No rectal bleeding or melena. Weight stable. Last colonoscopy in 2015. Drinks decaf coffee.     She last had an EGD in 5 of 2019 by Dr. Vitaly Tolliver (@ [facility] that showed grade C distal esophagitis with a hiatal hernia.    Past Medical History:   Diagnosis Date   • Breast cancer (CMS/HCC) 01/2009    Stage I left breast   • Drug therapy    • Gastritis    • GERD (gastroesophageal reflux disease)    • H/O lumpectomy    • Hyperlipidemia    • Hypertension    • Hypothyroidism        Past Surgical History:   Procedure Laterality Date   • BREAST BIOPSY     • BREAST LUMPECTOMY  01/21/2009   • COLONOSCOPY  2002    Dr. Tollvier   • ENDOSCOPY      gastritis   • ENDOSCOPY AND COLONOSCOPY  03/31/2015    Ulcerative esophagitis and diverticulosis   • HYSTERECTOMY  04/2002         Current Outpatient Medications:   •  levothyroxine (SYNTHROID, LEVOTHROID) 25 MCG tablet, TAKE 1 TABLET BY MOUTH DAILY, Disp: 90 tablet, Rfl: 0  •  losartan (COZAAR) 100 MG tablet, Take 1 tablet by mouth Daily., Disp: 90 tablet, Rfl: 3  •  pantoprazole (PROTONIX) 40 MG EC tablet, Take 1 tablet by mouth 2 (Two) Times a Day. Indications: Esophagus Inflammation with Erosion, Gastroesophageal Reflux Disease, Disp: 180 tablet, Rfl: 1  •  simvastatin (ZOCOR) 20 MG tablet, TAKE 1 TABLET BY MOUTH EVERY EVENING, Disp: 90 tablet, Rfl: 0    No Known Allergies    Family History   Problem Relation Age of Onset   • Heart disease Mother    • Breast cancer Mother    • Heart disease Father     • Diabetes Maternal Grandmother    • Cancer Paternal Grandfather    • Cancer Sister    • Diabetes Sister    • Leukemia Maternal Aunt        Social History     Socioeconomic History   • Marital status:      Spouse name: Rocky   • Number of children: Not on file   • Years of education: Not on file   • Highest education level: Not on file   Occupational History   • Occupation: Disability leave     Employer: RETIRED   Tobacco Use   • Smoking status: Former Smoker     Packs/day: 1.00     Years: 30.00     Pack years: 30.00     Types: Cigarettes     Last attempt to quit: 2002     Years since quittin.5   • Smokeless tobacco: Never Used   Substance and Sexual Activity   • Alcohol use: Yes     Alcohol/week: 7.0 standard drinks     Types: 7 Glasses of wine per week     Comment: NIGHTLY   • Drug use: Defer   • Sexual activity: Defer       Review of Systems   Gastrointestinal: Negative for abdominal pain.     Pertinent positives and negatives documented in the HPI and all other systems reviewed and were found to be negative.  Vitals:    20 0918   BP: 110/70   Temp: 98.6 °F (37 °C)       Physical Exam   Constitutional: She is oriented to person, place, and time. She appears well-developed and well-nourished. No distress.   HENT:   Head: Normocephalic and atraumatic. Hair is normal.   Right Ear: Hearing, tympanic membrane, external ear and ear canal normal. No drainage. No decreased hearing is noted.   Left Ear: Hearing, tympanic membrane, external ear and ear canal normal. No decreased hearing is noted.   Nose: No nasal deformity.   Mouth/Throat: Oropharynx is clear and moist.   Eyes: Pupils are equal, round, and reactive to light. Conjunctivae, EOM and lids are normal. Right eye exhibits no discharge. Left eye exhibits no discharge.   Neck: Normal range of motion. Neck supple. No JVD present. No tracheal deviation present. No thyromegaly present.   Cardiovascular: Normal rate, regular rhythm, normal heart  sounds, intact distal pulses and normal pulses. Exam reveals no gallop and no friction rub.   No murmur heard.  Pulmonary/Chest: Effort normal and breath sounds normal. No respiratory distress. She has no wheezes. She has no rales. She exhibits no tenderness.   Abdominal: Soft. Bowel sounds are normal. She exhibits no distension and no mass. There is no tenderness. There is no rebound and no guarding. No hernia.   Genitourinary: Rectal exam shows guaiac negative stool.   Musculoskeletal: Normal range of motion. She exhibits no edema, tenderness or deformity.   Lymphadenopathy:     She has no cervical adenopathy.   Neurological: She is alert and oriented to person, place, and time. She has normal reflexes. She displays normal reflexes. No cranial nerve deficit. She exhibits normal muscle tone. Coordination normal.   Skin: Skin is warm and dry. No rash noted. She is not diaphoretic. No erythema.   Psychiatric: She has a normal mood and affect. Her behavior is normal. Judgment and thought content normal.   Vitals reviewed.      Nedra was seen today for heartburn and difficulty swallowing.    Diagnoses and all orders for this visit:    Dyspepsia  -     US Gallbladder; Future      Assessment:  1. Dyspepsia  2.     Recommendations:  1. Try taking Pantoprazole 40 mg/day first thing in the AM on an empty stomach.   2. Try stopping all ETOH and see if dyspepsia resolves?  3. US of the GB  4. F/u 8 weeks.     Return in about 8 weeks (around 8/31/2020).    Lucius Gomez MD  7/6/2020  Answers for HPI/ROS submitted by the patient on 6/29/2020   What is the primary reason for your visit?: Other  Please describe your symptoms.: GERD, Pain/discomfort/inflammation in upper GI tract.  Have you had these symptoms before?: Yes  How long have you been having these symptoms?: Greater than 2 weeks  Please list any medications you are currently taking for this condition.: Pantoprazole, Tums  Please describe any probable cause for these  symptoms. : Eating, drinking occasional triggers

## 2020-07-10 RX ORDER — LEVOTHYROXINE SODIUM 0.03 MG/1
25 TABLET ORAL DAILY
Qty: 90 TABLET | Refills: 0 | Status: SHIPPED | OUTPATIENT
Start: 2020-07-10 | End: 2020-10-08 | Stop reason: SDUPTHER

## 2020-07-10 RX ORDER — SIMVASTATIN 20 MG
20 TABLET ORAL EVERY EVENING
Qty: 90 TABLET | Refills: 0 | Status: SHIPPED | OUTPATIENT
Start: 2020-07-10 | End: 2020-10-08 | Stop reason: SDUPTHER

## 2020-07-16 ENCOUNTER — HOSPITAL ENCOUNTER (OUTPATIENT)
Dept: ULTRASOUND IMAGING | Facility: HOSPITAL | Age: 75
Discharge: HOME | End: 2020-07-16
Admitting: INTERNAL MEDICINE

## 2020-07-16 DIAGNOSIS — R10.13 DYSPEPSIA: ICD-10-CM

## 2020-07-16 PROCEDURE — 76705 ECHO EXAM OF ABDOMEN: CPT

## 2020-07-17 NOTE — PROGRESS NOTES
07/16/20  Tell her that the US of the gallbladder shows fatty liver (the treatment for fatty liver is weight loss and stop ETOH). She has two small gallbladder polyps. We can discuss in more detail when I see her in the office next. FAX to her PCP.  Janis ortega

## 2020-07-20 ENCOUNTER — TELEPHONE (OUTPATIENT)
Dept: GASTROENTEROLOGY | Facility: CLINIC | Age: 75
End: 2020-07-20

## 2020-07-20 NOTE — TELEPHONE ENCOUNTER
----- Message from Lucius Gomez MD sent at 7/16/2020  9:20 PM EDT -----  07/16/20  Tell her that the US of the gallbladder shows fatty liver (the treatment for fatty liver is weight loss and stop ETOH). She has two small gallbladder polyps. We can discuss in more detail when I see her in the office next. FAX to her PCP.  Janis vail

## 2020-07-22 ENCOUNTER — TELEPHONE (OUTPATIENT)
Dept: GASTROENTEROLOGY | Facility: CLINIC | Age: 75
End: 2020-07-22

## 2020-07-22 NOTE — TELEPHONE ENCOUNTER
Called pt and pt is requesting a copy of Dr Gomez's note. Advised pt we will mail this to her home address.

## 2020-07-22 NOTE — TELEPHONE ENCOUNTER
----- Message from eNdra Roberts sent at 7/21/2020 12:17 PM EDT -----  Regarding: Test Results Question  Contact: [phone]  Dr. Jason Romero. Your office called me with the results of a recent ultrasound. While they conveyed the results verbally over the phone, I requested a written copy. I was hoping to see the test results either via e-mail, [facility] My[facility] or text, but I haven't seen them yet. Could your office please send me or post my test results and their ramifications? Thank you, Nedra Roberts

## 2020-07-24 RX ORDER — LOSARTAN POTASSIUM 100 MG/1
TABLET ORAL
Qty: 90 TABLET | Refills: 3 | Status: SHIPPED | OUTPATIENT
Start: 2020-07-24 | End: 2021-07-06

## 2020-08-06 DIAGNOSIS — I10 ESSENTIAL HYPERTENSION: Primary | ICD-10-CM

## 2020-08-06 DIAGNOSIS — E78.2 MULTIPLE-TYPE HYPERLIPIDEMIA: ICD-10-CM

## 2020-08-06 DIAGNOSIS — Z00.00 HEALTHCARE MAINTENANCE: ICD-10-CM

## 2020-08-06 DIAGNOSIS — E03.9 HYPOTHYROIDISM, UNSPECIFIED TYPE: ICD-10-CM

## 2020-08-14 LAB
ALBUMIN SERPL-MCNC: 4.8 G/DL (ref 3.5–5.2)
ALBUMIN/GLOB SERPL: 2.8 G/DL
ALP SERPL-CCNC: 75 U/L (ref 39–117)
ALT SERPL-CCNC: 22 U/L (ref 1–33)
AST SERPL-CCNC: 20 U/L (ref 1–32)
BASOPHILS # BLD AUTO: 0.04 10*3/MM3 (ref 0–0.2)
BASOPHILS NFR BLD AUTO: 0.6 % (ref 0–1.5)
BILIRUB SERPL-MCNC: 0.3 MG/DL (ref 0–1.2)
BUN SERPL-MCNC: 16 MG/DL (ref 8–23)
BUN/CREAT SERPL: 17.4 (ref 7–25)
CALCIUM SERPL-MCNC: 10 MG/DL (ref 8.6–10.5)
CHLORIDE SERPL-SCNC: 104 MMOL/L (ref 98–107)
CHOLEST SERPL-MCNC: 172 MG/DL (ref 0–200)
CO2 SERPL-SCNC: 27.1 MMOL/L (ref 22–29)
CREAT SERPL-MCNC: 0.92 MG/DL (ref 0.57–1)
EOSINOPHIL # BLD AUTO: 0.05 10*3/MM3 (ref 0–0.4)
EOSINOPHIL NFR BLD AUTO: 0.7 % (ref 0.3–6.2)
ERYTHROCYTE [DISTWIDTH] IN BLOOD BY AUTOMATED COUNT: 12.3 % (ref 12.3–15.4)
GLOBULIN SER CALC-MCNC: 1.7 GM/DL
GLUCOSE SERPL-MCNC: 103 MG/DL (ref 65–99)
HCT VFR BLD AUTO: 40 % (ref 34–46.6)
HDLC SERPL-MCNC: 46 MG/DL (ref 40–60)
HGB BLD-MCNC: 13.4 G/DL (ref 12–15.9)
IMM GRANULOCYTES # BLD AUTO: 0.02 10*3/MM3 (ref 0–0.05)
IMM GRANULOCYTES NFR BLD AUTO: 0.3 % (ref 0–0.5)
LDLC SERPL CALC-MCNC: 90 MG/DL (ref 0–100)
LYMPHOCYTES # BLD AUTO: 1.52 10*3/MM3 (ref 0.7–3.1)
LYMPHOCYTES NFR BLD AUTO: 21.5 % (ref 19.6–45.3)
MCH RBC QN AUTO: 30.7 PG (ref 26.6–33)
MCHC RBC AUTO-ENTMCNC: 33.5 G/DL (ref 31.5–35.7)
MCV RBC AUTO: 91.5 FL (ref 79–97)
MONOCYTES # BLD AUTO: 0.42 10*3/MM3 (ref 0.1–0.9)
MONOCYTES NFR BLD AUTO: 5.9 % (ref 5–12)
NEUTROPHILS # BLD AUTO: 5.01 10*3/MM3 (ref 1.7–7)
NEUTROPHILS NFR BLD AUTO: 71 % (ref 42.7–76)
NRBC BLD AUTO-RTO: 0 /100 WBC (ref 0–0.2)
PLATELET # BLD AUTO: 468 10*3/MM3 (ref 140–450)
POTASSIUM SERPL-SCNC: 4.5 MMOL/L (ref 3.5–5.2)
PROT SERPL-MCNC: 6.5 G/DL (ref 6–8.5)
RBC # BLD AUTO: 4.37 10*6/MM3 (ref 3.77–5.28)
SODIUM SERPL-SCNC: 142 MMOL/L (ref 136–145)
TRIGL SERPL-MCNC: 178 MG/DL (ref 0–150)
TSH SERPL DL<=0.005 MIU/L-ACNC: 1.5 UIU/ML (ref 0.27–4.2)
VLDLC SERPL CALC-MCNC: 35.6 MG/DL
WBC # BLD AUTO: 7.06 10*3/MM3 (ref 3.4–10.8)

## 2020-08-18 ENCOUNTER — OFFICE VISIT (OUTPATIENT)
Dept: INTERNAL MEDICINE | Facility: CLINIC | Age: 75
End: 2020-08-18

## 2020-08-18 VITALS
BODY MASS INDEX: 28.68 KG/M2 | DIASTOLIC BLOOD PRESSURE: 74 MMHG | OXYGEN SATURATION: 97 % | SYSTOLIC BLOOD PRESSURE: 132 MMHG | HEART RATE: 79 BPM | HEIGHT: 64 IN | TEMPERATURE: 98.4 F | WEIGHT: 168 LBS

## 2020-08-18 DIAGNOSIS — E78.2 MULTIPLE-TYPE HYPERLIPIDEMIA: ICD-10-CM

## 2020-08-18 DIAGNOSIS — I10 ESSENTIAL HYPERTENSION: Primary | ICD-10-CM

## 2020-08-18 DIAGNOSIS — E03.9 HYPOTHYROIDISM, UNSPECIFIED TYPE: ICD-10-CM

## 2020-08-18 PROCEDURE — 99214 OFFICE O/P EST MOD 30 MIN: CPT | Performed by: INTERNAL MEDICINE

## 2020-08-18 NOTE — PATIENT INSTRUCTIONS
Cholesterol Content in Foods  Cholesterol is a waxy, fat-like substance that helps to carry fat in the blood. The body needs cholesterol in small amounts, but too much cholesterol can cause damage to the arteries and heart.  Most people should eat less than 200 milligrams (mg) of cholesterol a day.  Foods with cholesterol    Cholesterol is found in animal-based foods, such as meat, seafood, and dairy. Generally, low-fat dairy and lean meats have less cholesterol than full-fat dairy and fatty meats. The milligrams of cholesterol per serving (mg per serving) of common cholesterol-containing foods are listed below.  Meat and other proteins  · Egg -- one large whole egg has 186 mg.  · Veal shank -- 4 oz has 141 mg.  · Lean ground turkey (93% lean) -- 4 oz has 118 mg.  · Fat-trimmed lamb loin -- 4 oz has 106 mg.  · Lean ground beef (90% lean) -- 4 oz has 100 mg.  · Lobster -- 3.5 oz has 90 mg.  · Pork loin chops -- 4 oz has 86 mg.  · Canned salmon -- 3.5 oz has 83 mg.  · Fat-trimmed beef top loin -- 4 oz has 78 mg.  · Frankfurter -- 1 mehul (3.5 oz) has 77 mg.  · Crab -- 3.5 oz has 71 mg.  · Roasted chicken without skin, white meat -- 4 oz has 66 mg.  · Light bologna -- 2 oz has 45 mg.  · Deli-cut turkey -- 2 oz has 31 mg.  · Canned tuna -- 3.5 oz has 31 mg.  · Camacho -- 1 oz has 29 mg.  · Oysters and mussels (raw) -- 3.5 oz has 25 mg.  · Mackerel -- 1 oz has 22 mg.  · Trout -- 1 oz has 20 mg.  · Pork sausage -- 1 link (1 oz) has 17 mg.  · [place] -- 1 oz has 16 mg.  · Tilapia -- 1 oz has 14 mg.  Dairy  · Soft-serve ice cream -- ½ cup (4 oz) has 103 mg.  · Whole-milk yogurt -- 1 cup (8 oz) has 29 mg.  · Cheddar cheese -- 1 oz has 28 mg.  · American cheese -- 1 oz has 28 mg.  · Whole milk -- 1 cup (8 oz) has 23 mg.  · 2% milk -- 1 cup (8 oz) has 18 mg.  · Cream cheese -- 1 tablespoon (Tbsp) has 15 mg.  · Cottage cheese -- ½ cup (4 oz) has 14 mg.  · Low-fat (1%) milk -- 1 cup (8 oz) has 10 mg.  · Sour cream -- 1 Tbsp has 8.5  "mg.  · Low-fat yogurt -- 1 cup (8 oz) has 8 mg.  · Nonfat Greek yogurt -- 1 cup (8 oz) has 7 mg.  · Half-and-half cream -- 1 Tbsp has 5 mg.  Fats and oils  · Cod liver oil -- 1 tablespoon (Tbsp) has 82 mg.  · Butter -- 1 Tbsp has 15 mg.  · Lard -- 1 Tbsp has 14 mg.  · Camacho grease -- 1 Tbsp has 14 mg.  · Mayonnaise -- 1 Tbsp has 5-10 mg.  · Margarine -- 1 Tbsp has 3-10 mg.  Exact amounts of cholesterol in these foods may vary depending on specific ingredients and brands.  Foods without cholesterol  Most plant-based foods do not have cholesterol unless you combine them with a food that has cholesterol. Foods without cholesterol include:  · Grains and cereals.  · Vegetables.  · Fruits.  · Vegetable oils, such as olive, canola, and sunflower oil.  · Legumes, such as peas, beans, and lentils.  · Nuts and seeds.  · Egg whites.  Summary  · The body needs cholesterol in small amounts, but too much cholesterol can cause damage to the arteries and heart.  · Most people should eat less than 200 milligrams (mg) of cholesterol a day.  This information is not intended to replace advice given to you by your health care provider. Make sure you discuss any questions you have with your health care provider.  Document Released: 08/14/2018 Document Revised: 11/30/2018 Document Reviewed: 08/14/2018  [employer] Patient Education © 2020 [employer]  DASH Eating Plan  DASH stands for \"Dietary Approaches to Stop Hypertension.\" The DASH eating plan is a healthy eating plan that has been shown to reduce high blood pressure (hypertension). It may also reduce your risk for type 2 diabetes, heart disease, and stroke. The DASH eating plan may also help with weight loss.  What are tips for following this plan?    General guidelines  · Avoid eating more than 2,300 mg (milligrams) of salt (sodium) a day. If you have hypertension, you may need to reduce your sodium intake to 1,500 mg a day.  · Limit alcohol intake to no more than 1 drink a day for " "nonpregnant women and 2 drinks a day for men. One drink equals 12 oz of beer, 5 oz of wine, or 1½ oz of hard liquor.  · Work with your health care provider to maintain a healthy body weight or to lose weight. Ask what an ideal weight is for you.  · Get at least 30 minutes of exercise that causes your heart to beat faster (aerobic exercise) most days of the week. Activities may include walking, swimming, or biking.  · Work with your health care provider or diet and nutrition specialist (dietitian) to adjust your eating plan to your individual calorie needs.  Reading food labels    · Check food labels for the amount of sodium per serving. Choose foods with less than 5 percent of the Daily Value of sodium. Generally, foods with less than 300 mg of sodium per serving fit into this eating plan.  · To find whole grains, look for the word \"whole\" as the first word in the ingredient list.  Shopping  · Buy products labeled as \"low-sodium\" or \"no salt added.\"  · Buy fresh foods. Avoid canned foods and premade or frozen meals.  Cooking  · Avoid adding salt when cooking. Use salt-free seasonings or herbs instead of table salt or sea salt. Check with your health care provider or pharmacist before using salt substitutes.  · Do not mg foods. Cook foods using healthy methods such as baking, boiling, grilling, and broiling instead.  · Cook with heart-healthy oils, such as olive, canola, soybean, or sunflower oil.  Meal planning  · Eat a balanced diet that includes:  ? 5 or more servings of fruits and vegetables each day. At each meal, try to fill half of your plate with fruits and vegetables.  ? Up to 6-8 servings of whole grains each day.  ? Less than 6 oz of lean meat, poultry, or fish each day. A 3-oz serving of meat is about the same size as a deck of cards. One egg equals 1 oz.  ? 2 servings of low-fat dairy each day.  ? A serving of nuts, seeds, or beans 5 times each week.  ? Heart-healthy fats. Healthy fats called Omega-3 " fatty acids are found in foods such as flaxseeds and coldwater fish, like sardines, salmon, and mackerel.  · Limit how much you eat of the following:  ? Canned or prepackaged foods.  ? Food that is high in trans fat, such as fried foods.  ? Food that is high in saturated fat, such as fatty meat.  ? Sweets, desserts, sugary drinks, and other foods with added sugar.  ? Full-fat dairy products.  · Do not salt foods before eating.  · Try to eat at least 2 vegetarian meals each week.  · Eat more home-cooked food and less restaurant, buffet, and fast food.  · When eating at a restaurant, ask that your food be prepared with less salt or no salt, if possible.  What foods are recommended?  The items listed may not be a complete list. Talk with your dietitian about what dietary choices are best for you.  Grains  Whole-grain or whole-wheat bread. Whole-grain or whole-wheat pasta. Brown rice. Oatmeal. Quinoa. Bulgur. Whole-grain and low-sodium cereals. Malia bread. Low-fat, low-sodium crackers. Whole-wheat flour tortillas.  Vegetables  Fresh or frozen vegetables (raw, steamed, roasted, or grilled). Low-sodium or reduced-sodium tomato and vegetable juice. Low-sodium or reduced-sodium tomato sauce and tomato paste. Low-sodium or reduced-sodium canned vegetables.  Fruits  All fresh, dried, or frozen fruit. Canned fruit in natural juice (without added sugar).  Meat and other protein foods  Skinless chicken or turkey. Ground chicken or turkey. Pork with fat trimmed off. Fish and seafood. Egg whites. Dried beans, peas, or lentils. Unsalted nuts, nut butters, and seeds. Unsalted canned beans. Lean cuts of beef with fat trimmed off. Low-sodium, lean deli meat.  Dairy  Low-fat (1%) or fat-free (skim) milk. Fat-free, low-fat, or reduced-fat cheeses. Nonfat, low-sodium ricotta or cottage cheese. Low-fat or nonfat yogurt. Low-fat, low-sodium cheese.  Fats and oils  Soft margarine without trans fats. Vegetable oil. Low-fat, reduced-fat, or  light mayonnaise and salad dressings (reduced-sodium). Canola, safflower, olive, soybean, and sunflower oils. Avocado.  Seasoning and other foods  Herbs. Spices. Seasoning mixes without salt. Unsalted popcorn and pretzels. Fat-free sweets.  What foods are not recommended?  The items listed may not be a complete list. Talk with your dietitian about what dietary choices are best for you.  Grains  Baked goods made with fat, such as croissants, muffins, or some breads. Dry pasta or rice meal packs.  Vegetables  Creamed or fried vegetables. Vegetables in a cheese sauce. Regular canned vegetables (not low-sodium or reduced-sodium). Regular canned tomato sauce and paste (not low-sodium or reduced-sodium). Regular tomato and vegetable juice (not low-sodium or reduced-sodium). Pickles. Olives.  Fruits  Canned fruit in a light or heavy syrup. Fried fruit. Fruit in cream or butter sauce.  Meat and other protein foods  Fatty cuts of meat. Ribs. Fried meat. Caamcho. Sausage. Bologna and other processed lunch meats. Salami. Fatback. Hotdogs. Bratwurst. Salted nuts and seeds. Canned beans with added salt. Canned or smoked fish. Whole eggs or egg yolks. Chicken or turkey with skin.  Dairy  Whole or 2% milk, cream, and half-and-half. Whole or full-fat cream cheese. Whole-fat or sweetened yogurt. Full-fat cheese. Nondairy creamers. Whipped toppings. Processed cheese and cheese spreads.  Fats and oils  Butter. Stick margarine. Lard. Shortening. Ghee. Camacho fat. Tropical oils, such as coconut, palm kernel, or palm oil.  Seasoning and other foods  Salted popcorn and pretzels. Onion salt, garlic salt, seasoned salt, table salt, and sea salt. Worcestershire sauce. Tartar sauce. Barbecue sauce. Teriyaki sauce. Soy sauce, including reduced-sodium. Steak sauce. Canned and packaged gravies. Fish sauce. Oyster sauce. Cocktail sauce. Horseradish that you find on the shelf. Ketchup. Mustard. Meat flavorings and tenderizers. Bouillon cubes. Hot  sauce and Tabasco sauce. Premade or packaged marinades. Premade or packaged taco seasonings. Relishes. Regular salad dressings.  Where to find more information:  · National Heart, Lung, and [employer] [place]: www.nhlbi.nih.gov  · American Heart Association: www.heart.org  Summary  · The DASH eating plan is a healthy eating plan that has been shown to reduce high blood pressure (hypertension). It may also reduce your risk for type 2 diabetes, heart disease, and stroke.  · With the DASH eating plan, you should limit salt (sodium) intake to 2,300 mg a day. If you have hypertension, you may need to reduce your sodium intake to 1,500 mg a day.  · When on the DASH eating plan, aim to eat more fresh fruits and vegetables, whole grains, lean proteins, low-fat dairy, and heart-healthy fats.  · Work with your health care provider or diet and nutrition specialist (dietitian) to adjust your eating plan to your individual calorie needs.  This information is not intended to replace advice given to you by your health care provider. Make sure you discuss any questions you have with your health care provider.  Document Released: 12/06/2012 Document Revised: 11/30/2018 Document Reviewed: 12/11/2017  [employer] Patient Education © 2020 [employer]

## 2020-08-18 NOTE — PROGRESS NOTES
Chief Complaint   Patient presents with   • Hypertension   • Hyperlipidemia   • Follow-up     from gastro    • Hypothyroidism       History of Present Illness   Nedra Roberts is a 75 y.o. female presents for follow up evaluation. She has hypertension. She is compliant w/ losartan one tablet daily. She has mackenzie and this has improved after stopping evening etoh. She had an ultrasound ruq which did reveal some fatty liver changes. ldl chol at goal of 90 but tg slightly elevated at 178. Patient reports in addition to wine she was indulging in cream sauces on foods. She is taking simvastatin daily. Patient is walking for fitness 30-40 min 3-4 days weekly.   Mild back pain. Located in low back region and hips. Noted after lifting recently. She reports some discomfort when walking.   Thyroid is euthyroid      The following portions of the patient's history were reviewed and updated as appropriate: allergies, current medications, past family history, past medical history, past social history, past surgical history and problem list.  Current Outpatient Medications on File Prior to Visit   Medication Sig Dispense Refill   • levothyroxine (SYNTHROID, LEVOTHROID) 25 MCG tablet TAKE 1 TABLET BY MOUTH DAILY 90 tablet 0   • losartan (COZAAR) 100 MG tablet TAKE 1 TABLET BY MOUTH ONCE DAILY 90 tablet 3   • pantoprazole (PROTONIX) 40 MG EC tablet Take 1 tablet by mouth 2 (Two) Times a Day. Indications: Esophagus Inflammation with Erosion, Gastroesophageal Reflux Disease 180 tablet 1   • simvastatin (ZOCOR) 20 MG tablet TAKE 1 TABLET BY MOUTH EVERY EVENING 90 tablet 0     No current facility-administered medications on file prior to visit.      Review of Systems   Constitutional: Negative.    HENT: Negative.    Eyes: Negative.    Respiratory: Negative.    Cardiovascular: Negative.    Gastrointestinal: Negative.    Endocrine: Negative.    Genitourinary: Negative.    Musculoskeletal: Negative.    Skin: Negative.    Allergic/Immunologic:  "Negative.    Neurological: Negative.    Hematological: Negative.    Psychiatric/Behavioral: Negative.        Objective   Physical Exam   Constitutional: She is oriented to person, place, and time. She appears well-developed and well-nourished.   HENT:   Head: Normocephalic and atraumatic.   Right Ear: Hearing, tympanic membrane and external ear normal.   Left Ear: Hearing, tympanic membrane and external ear normal.   Nose: Nose normal.   Mouth/Throat: Oropharynx is clear and moist.   Eyes: Pupils are equal, round, and reactive to light. Conjunctivae and EOM are normal.   Neck: Normal range of motion. Neck supple. No thyromegaly present.   Cardiovascular: Normal rate, regular rhythm, normal heart sounds and intact distal pulses.   No murmur heard.  Pulmonary/Chest: Effort normal and breath sounds normal. Right breast exhibits no mass. Left breast exhibits no mass. No breast tenderness.   Abdominal: Soft. Bowel sounds are normal. She exhibits no distension. There is no hepatosplenomegaly. There is no tenderness.   Genitourinary: No breast tenderness.   Musculoskeletal: Normal range of motion.   Lymphadenopathy:     She has no cervical adenopathy.   Neurological: She is alert and oriented to person, place, and time.   Skin: Skin is warm and dry.   Psychiatric: She has a normal mood and affect. Her speech is normal and behavior is normal. Judgment and thought content normal. Cognition and memory are normal.   Nursing note and vitals reviewed.       /74   Pulse 79   Temp 98.4 °F (36.9 °C)   Ht 162.6 cm (64\")   Wt 76.2 kg (168 lb)   SpO2 97%   BMI 28.84 kg/m²     Assessment/Plan   Diagnoses and all orders for this visit:    Essential hypertension    Multiple-type hyperlipidemia    Hypothyroidism, unspecified type        Patient w/ hypertension. Gave lit on DASH dietary plan. Encouraged to reduce dietary sodium, carbohydrates, and fats. reitereated that treatment for fatty liver is largely through diet and " activity. She was given hand outs on low fat diet plans as well as advised to get vascular screening. She will continue current meds. Check bp 3-4 times weekly. To get new footwear and use tylenol prn low back pain. F/u in 6 months or prn.

## 2020-08-31 ENCOUNTER — OFFICE VISIT (OUTPATIENT)
Dept: GASTROENTEROLOGY | Facility: CLINIC | Age: 75
End: 2020-08-31

## 2020-08-31 VITALS — TEMPERATURE: 97 F | BODY MASS INDEX: 28.48 KG/M2 | WEIGHT: 166.8 LBS | HEIGHT: 64 IN

## 2020-08-31 DIAGNOSIS — K29.00 OTHER ACUTE GASTRITIS WITHOUT HEMORRHAGE: ICD-10-CM

## 2020-08-31 DIAGNOSIS — K76.0 FATTY LIVER: ICD-10-CM

## 2020-08-31 DIAGNOSIS — K21.00 GASTROESOPHAGEAL REFLUX DISEASE WITH ESOPHAGITIS: Primary | ICD-10-CM

## 2020-08-31 DIAGNOSIS — K82.4 GALLBLADDER POLYP: ICD-10-CM

## 2020-08-31 PROCEDURE — 99214 OFFICE O/P EST MOD 30 MIN: CPT | Performed by: NURSE PRACTITIONER

## 2020-08-31 NOTE — PROGRESS NOTES
Chief Complaint   Patient presents with   • Heartburn       Nedra Roberts is a  75 y.o. female here for a follow up visit for GERD.    HPI  5-year-old female presents today for follow-up visit for GERD.  She is a patient of Dr. Gomez.  She was last seen in the office on 7/6/2020.  She has a history of LA grade C reflux esophagitis with gastritis.  She is happy to report that since cutting significantly back on her wine drinking at night and changing her pantoprazole to in the morning she is feeling so much better.  She is continuing to take Protonix 40 mg every morning and is working hard to lose weight.  She underwent a right upper quadrant ultrasound that showed:    IMPRESSION:  1. There are findings suggestive of mild hepatic steatosis. Correlation  with the patient's liver specific laboratory values is suggested as this  may be within normal limits.  2. There are 2 sub-5 mm gallbladder polyps.    Most recent LFTs were completely normal.  She tells me her bowels are moving well.  She tells me she has been working hard to lose weight and has cut way back on her alcohol at night.  She used to drink a couple glasses of wine every night and now she is cut that way back to maybe 1 or 2 glasses a week.  She tells me she can definitely feel a difference.  Last EGD was on 5/19 by Dr. Tolliver.  She denies any dysphagia, reflux, abdominal pain, nausea vomiting, diarrhea, constipation, to bleeding or melena.  She was appetite is good and her weight is stable.  Past Medical History:   Diagnosis Date   • Breast cancer (CMS/HCC) 01/2009    Stage I left breast   • Drug therapy    • Gastritis    • GERD (gastroesophageal reflux disease)    • H/O lumpectomy    • Hyperlipidemia    • Hypertension    • Hypothyroidism        Past Surgical History:   Procedure Laterality Date   • BREAST BIOPSY     • BREAST LUMPECTOMY  01/21/2009   • COLONOSCOPY  2002    Dr. Tolliver   • ENDOSCOPY      gastritis   • ENDOSCOPY AND COLONOSCOPY  03/31/2015     Ulcerative esophagitis and diverticulosis   • HYSTERECTOMY  2002       Scheduled Meds:    Continuous Infusions:  No current facility-administered medications for this visit.     PRN Meds:.    No Known Allergies    Social History     Socioeconomic History   • Marital status:      Spouse name: Rocky   • Number of children: Not on file   • Years of education: Not on file   • Highest education level: Not on file   Occupational History   • Occupation: Disability leave     Employer: RETIRED   Tobacco Use   • Smoking status: Former Smoker     Packs/day: 1.00     Years: 30.00     Pack years: 30.00     Types: Cigarettes     Last attempt to quit: 2002     Years since quittin.6   • Smokeless tobacco: Never Used   Substance and Sexual Activity   • Alcohol use: Yes     Alcohol/week: 7.0 standard drinks     Types: 7 Glasses of wine per week     Comment: NIGHTLY   • Drug use: Defer   • Sexual activity: Defer       Family History   Problem Relation Age of Onset   • Heart disease Mother    • Breast cancer Mother    • Heart disease Father    • Diabetes Maternal Grandmother    • Cancer Paternal Grandfather    • Cancer Sister    • Diabetes Sister    • Leukemia Maternal Aunt        Review of Systems   Constitutional: Negative for appetite change, chills, diaphoresis, fatigue, fever and unexpected weight change.   HENT: Negative for nosebleeds, postnasal drip, sore throat, trouble swallowing and voice change.    Respiratory: Negative for cough, choking, chest tightness, shortness of breath and wheezing.    Cardiovascular: Negative for chest pain, palpitations and leg swelling.   Gastrointestinal: Negative for abdominal distention, abdominal pain, anal bleeding, blood in stool, constipation, diarrhea, nausea, rectal pain and vomiting.   Endocrine: Negative for polydipsia, polyphagia and polyuria.   Musculoskeletal: Negative for gait problem.   Skin: Negative for rash and wound.   Allergic/Immunologic: Negative for food  allergies.   Neurological: Negative for dizziness, speech difficulty and light-headedness.   Psychiatric/Behavioral: Negative for confusion, self-injury, sleep disturbance and suicidal ideas.       Vitals:    08/31/20 0901   Temp: 97 °F (36.1 °C)       Physical Exam   Constitutional: She is oriented to person, place, and time. She appears well-developed and well-nourished. She does not appear ill. No distress.   HENT:   Head: Normocephalic.   Eyes: Pupils are equal, round, and reactive to light.   Cardiovascular: Normal rate, regular rhythm and normal heart sounds.   Pulmonary/Chest: Effort normal and breath sounds normal.   Abdominal: Soft. Bowel sounds are normal. She exhibits no distension and no mass. There is no hepatosplenomegaly. There is no tenderness. There is no rebound and no guarding. No hernia.   Musculoskeletal: Normal range of motion.   Neurological: She is alert and oriented to person, place, and time.   Skin: Skin is warm and dry.   Psychiatric: She has a normal mood and affect. Her speech is normal and behavior is normal. Judgment normal.       No radiology results for the last 7 days     Assessment and plan     1. Gastroesophageal reflux disease with esophagitis    2. Other acute gastritis without hemorrhage    3. Fatty liver    4. Gallbladder polyp    Reviewed gallbladder ultrasound with her today.  Most recent LFTs were completely normal.  Bowels are moving well.  She has really noticed a significant improvement in her reflux symptoms with cutting back on the alcohol at night.  She also thinks moving the Protonix to daytime is also helped.  Continue Protonix 40 mg once daily.  Continue GERD precautions.  We had a long discussion today about fatty liver disease and the diet modifications that need to take place for her to see improvement.  She is going to continue to cut back on the alcohol and continue to lose weight.  She also needs to start exercising as tolerated.  Patient to call the office  with any issues.  Patient to follow-up with me in 6 months.

## 2020-10-08 RX ORDER — SIMVASTATIN 20 MG
20 TABLET ORAL EVERY EVENING
Qty: 90 TABLET | Refills: 0 | Status: SHIPPED | OUTPATIENT
Start: 2020-10-08 | End: 2021-01-06

## 2020-10-08 RX ORDER — LEVOTHYROXINE SODIUM 0.03 MG/1
25 TABLET ORAL DAILY
Qty: 90 TABLET | Refills: 0 | Status: SHIPPED | OUTPATIENT
Start: 2020-10-08 | End: 2021-01-06

## 2020-11-05 ENCOUNTER — HOSPITAL ENCOUNTER (OUTPATIENT)
Dept: MAMMOGRAPHY | Facility: HOSPITAL | Age: 75
Discharge: HOME | End: 2020-11-05
Admitting: INTERNAL MEDICINE

## 2020-11-05 DIAGNOSIS — Z12.31 SCREENING MAMMOGRAM FOR HIGH-RISK PATIENT: ICD-10-CM

## 2020-11-05 DIAGNOSIS — Z85.3 HISTORY OF BREAST CANCER: ICD-10-CM

## 2020-11-05 PROCEDURE — 77067 SCR MAMMO BI INCL CAD: CPT

## 2020-11-05 PROCEDURE — 77063 BREAST TOMOSYNTHESIS BI: CPT

## 2020-11-10 ENCOUNTER — FLU SHOT (OUTPATIENT)
Dept: INTERNAL MEDICINE | Facility: CLINIC | Age: 75
End: 2020-11-10

## 2020-11-10 PROCEDURE — 90694 VACC AIIV4 NO PRSRV 0.5ML IM: CPT | Performed by: INTERNAL MEDICINE

## 2020-11-10 PROCEDURE — G0008 ADMIN INFLUENZA VIRUS VAC: HCPCS | Performed by: INTERNAL MEDICINE

## 2020-11-10 NOTE — PROGRESS NOTES
"  Subjective    Follow-up for history of stage I breast cancer of the left breast      History of Present Illness    Mrs. Roberts is a jeffry 75 y.o. woman returning for annual follow-up for history of stage I breast cancer affecting the left breast.  She underwent lumpectomy and 4 cycles of adjuvant chemotherapy with TC.  Her tumor was estrogen receptor positive and she underwent hormonal therapy with Arimidex between May 2009 in May 2014.    The patient returns today doing well.  She denies any breast changes per self breast exam.  Patient asking about lab work to be done in our office today.  She continues close follow-up through primary care, Dr. Prisca Church and lab work done in August 2020 reviewed with CBC unremarkable.  She has follow-up again with additional lab work in 2 weeks with Dr. Church and therefore we discussed no need for lab work in the office today.    She had yearly mammogram 11/5/2020, reviewed today with no concerning findings.  She is grateful for this.    She does have underlying issues with reflux though reports this is now improved with daily pantoprazole and giving up chocolate and wine.    She denies other concerns today.    Past Medical History:  Pertinent for hypertension, acid reflux, hyperlipidemia, hypothyroidism    Review of Systems   Constitutional: Negative.    Respiratory: Negative.    Cardiovascular: Negative.    Gastrointestinal: Negative for abdominal pain.        Reflux   Musculoskeletal: Negative.    Skin: Negative.    Hematological: Negative.    Psychiatric/Behavioral: Negative.           Medications:  The current medication list was reviewed in the EMR    ALLERGIES:  No Known Allergies    Objective      Vitals:    11/13/20 1051   BP: 144/81   Pulse: 70   Resp: 16   Temp: 98.4 °F (36.9 °C)   TempSrc: Skin   SpO2: 95%   Weight: 75.9 kg (167 lb 6.4 oz)   Height: 162.6 cm (64.02\")   PainSc: 0-No pain     Current Status 11/13/2020   ECOG score 0       Physical Exam    Gen: " pleasant lady, NAD  HEENT: decreased hearing  CV:  RRR  RESP: CTA bilat  Breast:  No masses are noted in either breast, no supraclavicular or axillary lymphadenopathy noted.  There is a healed lumpectomy scar inferior to the areola of the left breast with skin dimpling and downward retraction of the nipple.    Physical exam is unchanged today, 11/13/2020    RECENT LABS:              Lab work done through primary care August 2020 reviewed      Bilateral mammogram 11/5/2020:  IMPRESSION/RECOMMENDATION(S):  No mammographic evidence of malignancy. Recommend annual screening  mammogram in one year.    Assessment/Plan   Ms. Roberts is seen today in annual follow-up for history of stage I hormone receptor positive breast cancer affecting the left breast treated with lumpectomy, 4 cycles of adjuvant TC, and 5 years of tamoxifen completed May 2014.      Most recent mammogram performed 11/5/2020 shows no evidence of recurrent malignancy or concerning findings.  She is doing well.  She will return in 1 year for annual follow-up with mammogram performed just prior to that time, ordered today.              11/13/2020      CC:

## 2020-11-13 ENCOUNTER — OFFICE VISIT (OUTPATIENT)
Dept: ONCOLOGY | Facility: CLINIC | Age: 75
End: 2020-11-13

## 2020-11-13 ENCOUNTER — APPOINTMENT (OUTPATIENT)
Dept: LAB | Facility: HOSPITAL | Age: 75
End: 2020-11-13

## 2020-11-13 VITALS
WEIGHT: 167.4 LBS | SYSTOLIC BLOOD PRESSURE: 144 MMHG | TEMPERATURE: 98.4 F | RESPIRATION RATE: 16 BRPM | OXYGEN SATURATION: 95 % | BODY MASS INDEX: 28.58 KG/M2 | HEIGHT: 64 IN | HEART RATE: 70 BPM | DIASTOLIC BLOOD PRESSURE: 81 MMHG

## 2020-11-13 DIAGNOSIS — Z12.31 SCREENING MAMMOGRAM FOR HIGH-RISK PATIENT: ICD-10-CM

## 2020-11-13 DIAGNOSIS — Z85.3 HISTORY OF BREAST CANCER: Primary | ICD-10-CM

## 2020-11-13 PROCEDURE — 99213 OFFICE O/P EST LOW 20 MIN: CPT | Performed by: NURSE PRACTITIONER

## 2021-01-06 RX ORDER — SIMVASTATIN 20 MG
20 TABLET ORAL EVERY EVENING
Qty: 90 TABLET | Refills: 0 | Status: SHIPPED | OUTPATIENT
Start: 2021-01-06 | End: 2021-04-06

## 2021-01-06 RX ORDER — LEVOTHYROXINE SODIUM 0.03 MG/1
25 TABLET ORAL DAILY
Qty: 90 TABLET | Refills: 0 | Status: SHIPPED | OUTPATIENT
Start: 2021-01-06 | End: 2021-04-06

## 2021-02-11 DIAGNOSIS — E78.2 MULTIPLE-TYPE HYPERLIPIDEMIA: ICD-10-CM

## 2021-02-11 DIAGNOSIS — Z00.00 HEALTHCARE MAINTENANCE: ICD-10-CM

## 2021-02-11 DIAGNOSIS — I10 ESSENTIAL HYPERTENSION: Primary | ICD-10-CM

## 2021-02-11 DIAGNOSIS — E03.9 HYPOTHYROIDISM, UNSPECIFIED TYPE: ICD-10-CM

## 2021-02-16 LAB
ALBUMIN SERPL-MCNC: 4.7 G/DL (ref 3.5–5.2)
ALBUMIN/GLOB SERPL: 1.7 G/DL
ALP SERPL-CCNC: 103 U/L (ref 39–117)
ALT SERPL-CCNC: 29 U/L (ref 1–33)
APPEARANCE UR: CLEAR
AST SERPL-CCNC: 26 U/L (ref 1–32)
BACTERIA #/AREA URNS HPF: NORMAL /HPF
BASOPHILS # BLD AUTO: 0.04 10*3/MM3 (ref 0–0.2)
BASOPHILS NFR BLD AUTO: 0.6 % (ref 0–1.5)
BILIRUB SERPL-MCNC: 0.3 MG/DL (ref 0–1.2)
BILIRUB UR QL STRIP: NEGATIVE
BUN SERPL-MCNC: 14 MG/DL (ref 8–23)
BUN/CREAT SERPL: 12.8 (ref 7–25)
CALCIUM SERPL-MCNC: 10.5 MG/DL (ref 8.6–10.5)
CASTS URNS MICRO: NORMAL
CHLORIDE SERPL-SCNC: 104 MMOL/L (ref 98–107)
CHOLEST SERPL-MCNC: 188 MG/DL (ref 0–200)
CO2 SERPL-SCNC: 26.7 MMOL/L (ref 22–29)
COLOR UR: YELLOW
CREAT SERPL-MCNC: 1.09 MG/DL (ref 0.57–1)
EOSINOPHIL # BLD AUTO: 0.07 10*3/MM3 (ref 0–0.4)
EOSINOPHIL NFR BLD AUTO: 1.1 % (ref 0.3–6.2)
EPI CELLS #/AREA URNS HPF: NORMAL /HPF
ERYTHROCYTE [DISTWIDTH] IN BLOOD BY AUTOMATED COUNT: 12.7 % (ref 12.3–15.4)
GLOBULIN SER CALC-MCNC: 2.7 GM/DL
GLUCOSE SERPL-MCNC: 98 MG/DL (ref 65–99)
GLUCOSE UR QL: NEGATIVE
HCT VFR BLD AUTO: 40.4 % (ref 34–46.6)
HDLC SERPL-MCNC: 48 MG/DL (ref 40–60)
HGB BLD-MCNC: 13.5 G/DL (ref 12–15.9)
HGB UR QL STRIP: NEGATIVE
IMM GRANULOCYTES # BLD AUTO: 0.01 10*3/MM3 (ref 0–0.05)
IMM GRANULOCYTES NFR BLD AUTO: 0.2 % (ref 0–0.5)
KETONES UR QL STRIP: NEGATIVE
LDLC SERPL CALC-MCNC: 105 MG/DL (ref 0–100)
LEUKOCYTE ESTERASE UR QL STRIP: ABNORMAL
LYMPHOCYTES # BLD AUTO: 2.08 10*3/MM3 (ref 0.7–3.1)
LYMPHOCYTES NFR BLD AUTO: 33.3 % (ref 19.6–45.3)
MCH RBC QN AUTO: 30.7 PG (ref 26.6–33)
MCHC RBC AUTO-ENTMCNC: 33.4 G/DL (ref 31.5–35.7)
MCV RBC AUTO: 91.8 FL (ref 79–97)
MONOCYTES # BLD AUTO: 0.41 10*3/MM3 (ref 0.1–0.9)
MONOCYTES NFR BLD AUTO: 6.6 % (ref 5–12)
NEUTROPHILS # BLD AUTO: 3.63 10*3/MM3 (ref 1.7–7)
NEUTROPHILS NFR BLD AUTO: 58.2 % (ref 42.7–76)
NITRITE UR QL STRIP: NEGATIVE
NRBC BLD AUTO-RTO: 0 /100 WBC (ref 0–0.2)
PH UR STRIP: 6 [PH] (ref 5–8)
PLATELET # BLD AUTO: 443 10*3/MM3 (ref 140–450)
POTASSIUM SERPL-SCNC: 4.6 MMOL/L (ref 3.5–5.2)
PROT SERPL-MCNC: 7.4 G/DL (ref 6–8.5)
PROT UR QL STRIP: NEGATIVE
RBC # BLD AUTO: 4.4 10*6/MM3 (ref 3.77–5.28)
RBC #/AREA URNS HPF: NORMAL /HPF
SODIUM SERPL-SCNC: 142 MMOL/L (ref 136–145)
SP GR UR: 1.01 (ref 1–1.03)
TRIGL SERPL-MCNC: 203 MG/DL (ref 0–150)
TSH SERPL DL<=0.005 MIU/L-ACNC: 2.11 UIU/ML (ref 0.27–4.2)
UROBILINOGEN UR STRIP-MCNC: ABNORMAL MG/DL
VLDLC SERPL CALC-MCNC: 35 MG/DL (ref 5–40)
WBC # BLD AUTO: 6.24 10*3/MM3 (ref 3.4–10.8)
WBC #/AREA URNS HPF: NORMAL /HPF

## 2021-02-17 RX ORDER — PANTOPRAZOLE SODIUM 40 MG/1
40 TABLET, DELAYED RELEASE ORAL 2 TIMES DAILY
Qty: 180 TABLET | Refills: 1 | Status: SHIPPED | OUTPATIENT
Start: 2021-02-17 | End: 2021-10-13

## 2021-02-22 ENCOUNTER — OFFICE VISIT (OUTPATIENT)
Dept: INTERNAL MEDICINE | Facility: CLINIC | Age: 76
End: 2021-02-22

## 2021-02-22 VITALS
SYSTOLIC BLOOD PRESSURE: 140 MMHG | HEIGHT: 64 IN | DIASTOLIC BLOOD PRESSURE: 78 MMHG | HEART RATE: 79 BPM | BODY MASS INDEX: 28.51 KG/M2 | WEIGHT: 167 LBS

## 2021-02-22 DIAGNOSIS — I10 ESSENTIAL HYPERTENSION: ICD-10-CM

## 2021-02-22 DIAGNOSIS — E78.2 MULTIPLE-TYPE HYPERLIPIDEMIA: ICD-10-CM

## 2021-02-22 DIAGNOSIS — E03.9 HYPOTHYROIDISM, UNSPECIFIED TYPE: ICD-10-CM

## 2021-02-22 DIAGNOSIS — Z00.00 HEALTHCARE MAINTENANCE: Primary | ICD-10-CM

## 2021-02-22 DIAGNOSIS — K29.00 OTHER ACUTE GASTRITIS WITHOUT HEMORRHAGE: ICD-10-CM

## 2021-02-22 DIAGNOSIS — K21.00 GASTROESOPHAGEAL REFLUX DISEASE WITH ESOPHAGITIS WITHOUT HEMORRHAGE: ICD-10-CM

## 2021-02-22 PROCEDURE — 1160F RVW MEDS BY RX/DR IN RCRD: CPT | Performed by: INTERNAL MEDICINE

## 2021-02-22 PROCEDURE — G0439 PPPS, SUBSEQ VISIT: HCPCS | Performed by: INTERNAL MEDICINE

## 2021-02-22 PROCEDURE — 99213 OFFICE O/P EST LOW 20 MIN: CPT | Performed by: INTERNAL MEDICINE

## 2021-02-22 PROCEDURE — 1170F FXNL STATUS ASSESSED: CPT | Performed by: INTERNAL MEDICINE

## 2021-02-22 NOTE — PROGRESS NOTES
"Subjective   AWV  Reflux  Hypothyroidism  Hypertension  Hyperlipidemia  Gastritis    Nedra Roberts is a 75 y.o. female who presents for an annual wellness visit. She is doing fairly well. She notes that she has continued reflux symptoms. This is most common when she lies down at night or after eating certain foods. She is taking protonix twice daily. She does walk 40 min without symptoms. Does this about 3 times a week.   Having left hand discomfort. Describes this as an occasional \"sharp\" pain. Right has stable discomfort.   Patient has hypertension. She does not routinely test bp. She is normotensive. She is euthyroid with lipids at a goal level.   marquise has a history of breast cancer. She is doing well and follows w/ oncology annually.   No recent hospitalizations  Mental and physical health unchanged from prior.   She is not taking aspirin and it is not indicated.       Review of Systems   Constitutional: Negative.    HENT: Negative.    Eyes: Negative.    Respiratory: Negative.    Cardiovascular: Negative.    Gastrointestinal: Negative.         Reflux   Endocrine: Negative.    Genitourinary: Negative.    Musculoskeletal: Negative.         Wrist pain   Skin: Negative.    Allergic/Immunologic: Negative.    Neurological: Negative.    Hematological: Negative.    Psychiatric/Behavioral: Negative.        The following portions of the patient's history were reviewed and updated as appropriate: allergies, current medications, past family history, past medical history, past social history, past surgical history and problem list.     Patient Active Problem List   Diagnosis   • Essential hypertension   • Gastritis   • Hypothyroidism   • Multiple-type hyperlipidemia   • Healthcare maintenance   • History of breast cancer   • GERD (gastroesophageal reflux disease)   • Esophagitis       Past Medical History:   Diagnosis Date   • Breast cancer (CMS/[facility]) 01/2009    Stage I left breast   • Drug therapy    • Gastritis    • " GERD (gastroesophageal reflux disease)    • H/O lumpectomy    • Hyperlipidemia    • Hypertension    • Hypothyroidism        Past Surgical History:   Procedure Laterality Date   • BREAST BIOPSY     • BREAST LUMPECTOMY  2009   • COLONOSCOPY      Dr. Tolliver   • ENDOSCOPY      gastritis   • ENDOSCOPY AND COLONOSCOPY  2015    Ulcerative esophagitis and diverticulosis   • HYSTERECTOMY  2002       Family History   Problem Relation Age of Onset   • Heart disease Mother    • Breast cancer Mother    • Heart disease Father    • Diabetes Maternal Grandmother    • Cancer Paternal Grandfather    • Cancer Sister    • Diabetes Sister    • Leukemia Maternal Aunt        Social History     Socioeconomic History   • Marital status:      Spouse name: Rocky   • Number of children: Not on file   • Years of education: Not on file   • Highest education level: Not on file   Occupational History   • Occupation: Disability leave     Employer: RETIRED   Tobacco Use   • Smoking status: Former Smoker     Packs/day: 1.00     Years: 30.00     Pack years: 30.00     Types: Cigarettes     Quit date: 2002     Years since quittin.1   • Smokeless tobacco: Never Used   Substance and Sexual Activity   • Alcohol use: Yes     Alcohol/week: 7.0 standard drinks     Types: 7 Glasses of wine per week     Comment: NIGHTLY   • Drug use: Defer   • Sexual activity: Defer       Current Outpatient Medications on File Prior to Visit   Medication Sig Dispense Refill   • levothyroxine (SYNTHROID, LEVOTHROID) 25 MCG tablet TAKE 1 TABLET BY MOUTH DAILY 90 tablet 0   • losartan (COZAAR) 100 MG tablet TAKE 1 TABLET BY MOUTH ONCE DAILY 90 tablet 3   • pantoprazole (PROTONIX) 40 MG EC tablet Take 1 tablet by mouth 2 (Two) Times a Day. Indications: Esophagus Inflammation with Erosion, Gastroesophageal Reflux Disease 180 tablet 1   • simvastatin (ZOCOR) 20 MG tablet TAKE 1 TABLET BY MOUTH EVERY EVENING 90 tablet 0     No current  "facility-administered medications on file prior to visit.        No Known Allergies    Immunization History   Administered Date(s) Administered   • COVID-19 (PFIZER) 01/16/2021, 02/07/2021   • Fluad Quad 65+ 02/17/2020, 11/10/2020   • Fluzone High Dose =>65 Years ([employer] ONLY) 12/18/2017, 12/21/2018   • Pneumococcal Conjugate 13-Valent (PCV13) 11/30/2015   • Pneumococcal Polysaccharide (PPSV23) 01/01/2012   • Shingrix 11/20/2018, 07/17/2019   • Tdap 01/01/2012       Objective     /78   Pulse 79   Ht 162.6 cm (64\")   Wt 75.8 kg (167 lb)   BMI 28.67 kg/m²     Physical Exam  Vitals signs and nursing note reviewed.   Constitutional:       Appearance: Normal appearance. She is well-developed.   HENT:      Head: Normocephalic and atraumatic.      Right Ear: Tympanic membrane and external ear normal.      Left Ear: Tympanic membrane and external ear normal.      Nose: Nose normal.      Mouth/Throat:      Mouth: Mucous membranes are moist.   Eyes:      Extraocular Movements: Extraocular movements intact.      Pupils: Pupils are equal, round, and reactive to light.   Neck:      Musculoskeletal: Normal range of motion and neck supple.   Cardiovascular:      Rate and Rhythm: Normal rate and regular rhythm.      Heart sounds: Normal heart sounds.   Pulmonary:      Effort: Pulmonary effort is normal. No respiratory distress.      Breath sounds: Normal breath sounds.   Abdominal:      General: Abdomen is flat. Bowel sounds are normal.      Palpations: Abdomen is soft.   Musculoskeletal: Normal range of motion.   Skin:     General: Skin is warm and dry.   Neurological:      General: No focal deficit present.      Mental Status: She is alert and oriented to person, place, and time.   Psychiatric:         Mood and Affect: Mood normal.         Behavior: Behavior normal.         Thought Content: Thought content normal.         Judgment: Judgment normal.         Assessment/Plan   Diagnoses and all orders for this " visit:    1. Healthcare maintenance (Primary)    2. Essential hypertension    3. Other acute gastritis without hemorrhage    4. Hypothyroidism, unspecified type    5. Gastroesophageal reflux disease with esophagitis without hemorrhage    6. Multiple-type hyperlipidemia        Discussion    AWV.     Direct observation of cognitive ability was evaluated and is normal. Patient was given health advice or handouts on the following:  nutrition, fall prevention, exercise, weight management  Patient will continue current medications for bp, thyroid, and lipid regulation. She has JO. She is to continue protonix but will monitor renal function and get a DEXA next year (normal bone on last study). She is encouraged lifestyle modifications w/ increased fitness, reduced dietary carbohydrates and fats. She is advised to get HEP A vaccination and is current on all others. She will f/u w/ oncology annually and follow up here in one year or as needed.              Future Appointments   Date Time Provider Department Center   11/8/2021 10:30 AM [facility] 2  [facility]   11/12/2021 10:50 AM LAB CHAIR 1 [facility]  [facility]   11/12/2021 11:20 AM [place] MD [facility]

## 2021-04-06 RX ORDER — SIMVASTATIN 20 MG
20 TABLET ORAL EVERY EVENING
Qty: 90 TABLET | Refills: 0 | Status: SHIPPED | OUTPATIENT
Start: 2021-04-06 | End: 2021-07-06

## 2021-04-06 RX ORDER — LEVOTHYROXINE SODIUM 0.03 MG/1
25 TABLET ORAL DAILY
Qty: 90 TABLET | Refills: 0 | Status: SHIPPED | OUTPATIENT
Start: 2021-04-06 | End: 2021-07-06

## 2021-07-06 RX ORDER — LEVOTHYROXINE SODIUM 0.03 MG/1
25 TABLET ORAL DAILY
Qty: 90 TABLET | Refills: 0 | Status: SHIPPED | OUTPATIENT
Start: 2021-07-06 | End: 2021-07-09

## 2021-07-06 RX ORDER — LOSARTAN POTASSIUM 100 MG/1
TABLET ORAL
Qty: 90 TABLET | Refills: 3 | Status: SHIPPED | OUTPATIENT
Start: 2021-07-06 | End: 2022-06-30

## 2021-07-06 RX ORDER — SIMVASTATIN 20 MG
20 TABLET ORAL EVERY EVENING
Qty: 90 TABLET | Refills: 0 | Status: SHIPPED | OUTPATIENT
Start: 2021-07-06 | End: 2021-10-04

## 2021-07-09 RX ORDER — LEVOTHYROXINE SODIUM 0.03 MG/1
25 TABLET ORAL DAILY
Qty: 90 TABLET | Refills: 0 | Status: SHIPPED | OUTPATIENT
Start: 2021-07-09 | End: 2022-01-03

## 2021-10-04 RX ORDER — SIMVASTATIN 20 MG
20 TABLET ORAL EVERY EVENING
Qty: 90 TABLET | Refills: 0 | Status: SHIPPED | OUTPATIENT
Start: 2021-10-04 | End: 2021-10-15

## 2021-10-13 RX ORDER — PANTOPRAZOLE SODIUM 40 MG/1
TABLET, DELAYED RELEASE ORAL
Qty: 180 TABLET | Refills: 1 | Status: SHIPPED | OUTPATIENT
Start: 2021-10-13 | End: 2022-05-11

## 2021-10-15 RX ORDER — SIMVASTATIN 20 MG
20 TABLET ORAL EVERY EVENING
Qty: 90 TABLET | Refills: 0 | Status: SHIPPED | OUTPATIENT
Start: 2021-10-15 | End: 2022-04-01

## 2021-11-08 ENCOUNTER — HOSPITAL ENCOUNTER (OUTPATIENT)
Dept: MAMMOGRAPHY | Facility: HOSPITAL | Age: 76
Discharge: HOME | End: 2021-11-08
Admitting: NURSE PRACTITIONER

## 2021-11-08 DIAGNOSIS — Z85.3 HISTORY OF BREAST CANCER: ICD-10-CM

## 2021-11-08 DIAGNOSIS — Z12.31 SCREENING MAMMOGRAM FOR HIGH-RISK PATIENT: ICD-10-CM

## 2021-11-08 PROCEDURE — 77063 BREAST TOMOSYNTHESIS BI: CPT

## 2021-11-08 PROCEDURE — 77067 SCR MAMMO BI INCL CAD: CPT

## 2021-11-15 ENCOUNTER — OFFICE VISIT (OUTPATIENT)
Dept: ONCOLOGY | Facility: CLINIC | Age: 76
End: 2021-11-15

## 2021-11-15 ENCOUNTER — LAB (OUTPATIENT)
Dept: LAB | Facility: HOSPITAL | Age: 76
End: 2021-11-15

## 2021-11-15 VITALS
BODY MASS INDEX: 28.92 KG/M2 | HEIGHT: 63 IN | HEART RATE: 83 BPM | RESPIRATION RATE: 16 BRPM | TEMPERATURE: 96.6 F | DIASTOLIC BLOOD PRESSURE: 79 MMHG | SYSTOLIC BLOOD PRESSURE: 133 MMHG | OXYGEN SATURATION: 96 % | WEIGHT: 163.2 LBS

## 2021-11-15 DIAGNOSIS — Z85.3 HISTORY OF BREAST CANCER: Primary | ICD-10-CM

## 2021-11-15 DIAGNOSIS — Z12.31 ENCOUNTER FOR SCREENING MAMMOGRAM FOR MALIGNANT NEOPLASM OF BREAST: ICD-10-CM

## 2021-11-15 DIAGNOSIS — E03.9 HYPOTHYROIDISM, UNSPECIFIED TYPE: Primary | ICD-10-CM

## 2021-11-15 LAB
BASOPHILS # BLD AUTO: 0.04 10*3/MM3 (ref 0–0.2)
BASOPHILS NFR BLD AUTO: 0.4 % (ref 0–1.5)
DEPRECATED RDW RBC AUTO: 39.9 FL (ref 37–54)
EOSINOPHIL # BLD AUTO: 0.06 10*3/MM3 (ref 0–0.4)
EOSINOPHIL NFR BLD AUTO: 0.6 % (ref 0.3–6.2)
ERYTHROCYTE [DISTWIDTH] IN BLOOD BY AUTOMATED COUNT: 12.3 % (ref 12.3–15.4)
HCT VFR BLD AUTO: 39.9 % (ref 34–46.6)
HGB BLD-MCNC: 13.1 G/DL (ref 12–15.9)
IMM GRANULOCYTES # BLD AUTO: 0.06 10*3/MM3 (ref 0–0.05)
IMM GRANULOCYTES NFR BLD AUTO: 0.6 % (ref 0–0.5)
LYMPHOCYTES # BLD AUTO: 1.77 10*3/MM3 (ref 0.7–3.1)
LYMPHOCYTES NFR BLD AUTO: 18.3 % (ref 19.6–45.3)
MCH RBC QN AUTO: 29.4 PG (ref 26.6–33)
MCHC RBC AUTO-ENTMCNC: 32.8 G/DL (ref 31.5–35.7)
MCV RBC AUTO: 89.5 FL (ref 79–97)
MONOCYTES # BLD AUTO: 0.66 10*3/MM3 (ref 0.1–0.9)
MONOCYTES NFR BLD AUTO: 6.8 % (ref 5–12)
NEUTROPHILS NFR BLD AUTO: 7.07 10*3/MM3 (ref 1.7–7)
NEUTROPHILS NFR BLD AUTO: 73.3 % (ref 42.7–76)
NRBC BLD AUTO-RTO: 0 /100 WBC (ref 0–0.2)
PLATELET # BLD AUTO: 401 10*3/MM3 (ref 140–450)
PMV BLD AUTO: 8.4 FL (ref 6–12)
RBC # BLD AUTO: 4.46 10*6/MM3 (ref 3.77–5.28)
WBC # BLD AUTO: 9.66 10*3/MM3 (ref 3.4–10.8)

## 2021-11-15 PROCEDURE — 85025 COMPLETE CBC W/AUTO DIFF WBC: CPT

## 2021-11-15 PROCEDURE — 36415 COLL VENOUS BLD VENIPUNCTURE: CPT

## 2021-11-15 PROCEDURE — 99213 OFFICE O/P EST LOW 20 MIN: CPT | Performed by: INTERNAL MEDICINE

## 2021-11-15 NOTE — PROGRESS NOTES
"  Subjective    Follow-up for history of stage I breast cancer of the left breast      History of Present Illness    Mrs. Roberts is a jeffry 76 y.o. woman returning for annual follow-up for history of stage I breast cancer affecting the left breast.  She underwent lumpectomy and 4 cycles of adjuvant chemotherapy with TC.  Her tumor was estrogen receptor positive and she underwent hormonal therapy with Arimidex between May 2009 in May 2014.    The patient returned today for an annual follow-up.  She is doing well.  She complains of knee pain.  She has had no changes in the self breast examination.  She enjoys painting watercolor pictures of houses.    Past Medical History:  Pertinent for hypertension, acid reflux, hyperlipidemia, hypothyroidism    Review of Systems   Constitutional: Negative.    Respiratory: Negative.    Cardiovascular: Negative.    Gastrointestinal: Negative for abdominal pain.   Musculoskeletal: Negative.    Skin: Negative.    Hematological: Negative.    Psychiatric/Behavioral: Negative.           Medications:  The current medication list was reviewed in the EMR    ALLERGIES:  No Known Allergies    Objective      Vitals:    11/15/21 1053   BP: 133/79   Pulse: 83   Resp: 16   Temp: 96.6 °F (35.9 °C)   TempSrc: Temporal   SpO2: 96%   Weight: 74 kg (163 lb 3.2 oz)   Height: 160 cm (62.99\")  Comment: new ht   PainSc: 0-No pain     Current Status 11/15/2021   ECOG score 0       Physical Exam    Gen: pleasant lady, NAD  HEENT: decreased hearing  Lymph: Mild prominence of the left trapezius muscle but does not feel like a mass or lymph node to me  CV:  RRR  RESP: CTA bilat  Breast:  No masses are noted in either breast, no supraclavicular or axillary lymphadenopathy noted.  There is a healed lumpectomy scar inferior to the areola of the left breast with skin dimpling and downward retraction of the nipple.        RECENT LABS:  Results from last 7 days   Lab Units 11/15/21  1036   WBC 10*3/mm3 9.66   NEUTROS " ABS 10*3/mm3 7.07*   HEMOGLOBIN g/dL 13.1   HEMATOCRIT % 39.9   PLATELETS 10*3/mm3 401             Lab work done through primary care August 2020 reviewed      Bilateral mammogram 11/8/2021: No evidence of malignancy    Assessment/Plan   Ms. Roberts is seen today in annual follow-up for history of stage I hormone receptor positive breast cancer affecting the left breast treated with lumpectomy, 4 cycles of adjuvant TC, and 5 years of tamoxifen completed May 2014.      Most recent mammogram performed 11/8/2021 shows no evidence of recurrent malignancy or concerning findings.  She is doing well.  She will return in 1 year for annual follow-up with mammogram performed just prior to that time, ordered today.              11/15/2021      CC:

## 2022-01-03 RX ORDER — LEVOTHYROXINE SODIUM 0.03 MG/1
25 TABLET ORAL DAILY
Qty: 90 TABLET | Refills: 0 | Status: SHIPPED | OUTPATIENT
Start: 2022-01-03 | End: 2022-04-01

## 2022-01-25 ENCOUNTER — TELEPHONE (OUTPATIENT)
Dept: INTERNAL MEDICINE | Facility: CLINIC | Age: 77
End: 2022-01-25

## 2022-02-16 DIAGNOSIS — E03.9 HYPOTHYROIDISM, UNSPECIFIED TYPE: ICD-10-CM

## 2022-02-16 DIAGNOSIS — E78.2 MULTIPLE-TYPE HYPERLIPIDEMIA: ICD-10-CM

## 2022-02-16 DIAGNOSIS — I10 ESSENTIAL HYPERTENSION: Primary | ICD-10-CM

## 2022-02-22 LAB
ALBUMIN SERPL-MCNC: 4.3 G/DL (ref 3.7–4.7)
ALBUMIN/GLOB SERPL: 1.6 {RATIO} (ref 1.2–2.2)
ALP SERPL-CCNC: 110 IU/L (ref 44–121)
ALT SERPL-CCNC: 19 IU/L (ref 0–32)
APPEARANCE UR: CLEAR
AST SERPL-CCNC: 20 IU/L (ref 0–40)
BACTERIA #/AREA URNS HPF: ABNORMAL /[HPF]
BASOPHILS # BLD AUTO: 0 X10E3/UL (ref 0–0.2)
BASOPHILS NFR BLD AUTO: 1 %
BILIRUB SERPL-MCNC: 0.3 MG/DL (ref 0–1.2)
BILIRUB UR QL STRIP: NEGATIVE
BUN SERPL-MCNC: 18 MG/DL (ref 8–27)
BUN/CREAT SERPL: 19 (ref 12–28)
CALCIUM SERPL-MCNC: 9.7 MG/DL (ref 8.7–10.3)
CASTS URNS QL MICRO: ABNORMAL /LPF
CHLORIDE SERPL-SCNC: 103 MMOL/L (ref 96–106)
CHOLEST SERPL-MCNC: 156 MG/DL (ref 100–199)
CO2 SERPL-SCNC: 23 MMOL/L (ref 20–29)
COLOR UR: YELLOW
CREAT SERPL-MCNC: 0.95 MG/DL (ref 0.57–1)
EOSINOPHIL # BLD AUTO: 0.1 X10E3/UL (ref 0–0.4)
EOSINOPHIL NFR BLD AUTO: 1 %
EPI CELLS #/AREA URNS HPF: ABNORMAL /HPF (ref 0–10)
ERYTHROCYTE [DISTWIDTH] IN BLOOD BY AUTOMATED COUNT: 12.4 % (ref 11.7–15.4)
GLOBULIN SER CALC-MCNC: 2.7 G/DL (ref 1.5–4.5)
GLUCOSE SERPL-MCNC: 94 MG/DL (ref 65–99)
GLUCOSE UR QL STRIP: NEGATIVE
HCT VFR BLD AUTO: 39.4 % (ref 34–46.6)
HDLC SERPL-MCNC: 48 MG/DL
HGB BLD-MCNC: 12.8 G/DL (ref 11.1–15.9)
HGB UR QL STRIP: NEGATIVE
IMM GRANULOCYTES # BLD AUTO: 0 X10E3/UL (ref 0–0.1)
IMM GRANULOCYTES NFR BLD AUTO: 0 %
KETONES UR QL STRIP: ABNORMAL
LDLC SERPL CALC-MCNC: 86 MG/DL (ref 0–99)
LEUKOCYTE ESTERASE UR QL STRIP: ABNORMAL
LYMPHOCYTES # BLD AUTO: 1.4 X10E3/UL (ref 0.7–3.1)
LYMPHOCYTES NFR BLD AUTO: 26 %
MCH RBC QN AUTO: 29.2 PG (ref 26.6–33)
MCHC RBC AUTO-ENTMCNC: 32.5 G/DL (ref 31.5–35.7)
MCV RBC AUTO: 90 FL (ref 79–97)
MICRO URNS: ABNORMAL
MONOCYTES # BLD AUTO: 0.4 X10E3/UL (ref 0.1–0.9)
MONOCYTES NFR BLD AUTO: 7 %
NEUTROPHILS # BLD AUTO: 3.5 X10E3/UL (ref 1.4–7)
NEUTROPHILS NFR BLD AUTO: 65 %
NITRITE UR QL STRIP: NEGATIVE
PH UR STRIP: 6 [PH] (ref 5–7.5)
PLATELET # BLD AUTO: 457 X10E3/UL (ref 150–450)
POTASSIUM SERPL-SCNC: 4.6 MMOL/L (ref 3.5–5.2)
PROT SERPL-MCNC: 7 G/DL (ref 6–8.5)
PROT UR QL STRIP: NEGATIVE
RBC # BLD AUTO: 4.38 X10E6/UL (ref 3.77–5.28)
RBC #/AREA URNS HPF: ABNORMAL /HPF (ref 0–2)
SODIUM SERPL-SCNC: 140 MMOL/L (ref 134–144)
SP GR UR STRIP: 1.02 (ref 1–1.03)
TRIGL SERPL-MCNC: 124 MG/DL (ref 0–149)
TSH SERPL DL<=0.005 MIU/L-ACNC: 1.64 UIU/ML (ref 0.45–4.5)
UROBILINOGEN UR STRIP-MCNC: 0.2 MG/DL (ref 0.2–1)
VLDLC SERPL CALC-MCNC: 22 MG/DL (ref 5–40)
WBC # BLD AUTO: 5.3 X10E3/UL (ref 3.4–10.8)
WBC #/AREA URNS HPF: ABNORMAL /HPF (ref 0–5)

## 2022-02-25 ENCOUNTER — OFFICE VISIT (OUTPATIENT)
Dept: INTERNAL MEDICINE | Facility: CLINIC | Age: 77
End: 2022-02-25

## 2022-02-25 VITALS
BODY MASS INDEX: 29.06 KG/M2 | DIASTOLIC BLOOD PRESSURE: 70 MMHG | HEART RATE: 87 BPM | WEIGHT: 164 LBS | HEIGHT: 63 IN | SYSTOLIC BLOOD PRESSURE: 130 MMHG

## 2022-02-25 DIAGNOSIS — E03.9 ACQUIRED HYPOTHYROIDISM: ICD-10-CM

## 2022-02-25 DIAGNOSIS — G47.33 OSA (OBSTRUCTIVE SLEEP APNEA): ICD-10-CM

## 2022-02-25 DIAGNOSIS — Z78.0 MENOPAUSE: ICD-10-CM

## 2022-02-25 DIAGNOSIS — G89.29 CHRONIC PAIN OF RIGHT KNEE: ICD-10-CM

## 2022-02-25 DIAGNOSIS — D23.21 CYST, DERMOID, EAR, RIGHT: ICD-10-CM

## 2022-02-25 DIAGNOSIS — G89.29 CHRONIC LEFT SHOULDER PAIN: ICD-10-CM

## 2022-02-25 DIAGNOSIS — I10 ESSENTIAL HYPERTENSION: ICD-10-CM

## 2022-02-25 DIAGNOSIS — M25.561 CHRONIC PAIN OF RIGHT KNEE: ICD-10-CM

## 2022-02-25 DIAGNOSIS — Z00.00 HEALTHCARE MAINTENANCE: Primary | ICD-10-CM

## 2022-02-25 DIAGNOSIS — M25.512 CHRONIC LEFT SHOULDER PAIN: ICD-10-CM

## 2022-02-25 PROCEDURE — 99214 OFFICE O/P EST MOD 30 MIN: CPT | Performed by: INTERNAL MEDICINE

## 2022-02-25 PROCEDURE — 1170F FXNL STATUS ASSESSED: CPT | Performed by: INTERNAL MEDICINE

## 2022-02-25 PROCEDURE — 93000 ELECTROCARDIOGRAM COMPLETE: CPT | Performed by: INTERNAL MEDICINE

## 2022-02-25 PROCEDURE — 1159F MED LIST DOCD IN RCRD: CPT | Performed by: INTERNAL MEDICINE

## 2022-02-25 PROCEDURE — G0439 PPPS, SUBSEQ VISIT: HCPCS | Performed by: INTERNAL MEDICINE

## 2022-02-25 NOTE — PROGRESS NOTES
"The ABCs of the Annual Wellness Visit  Subsequent Medicare Wellness Visit    Chief Complaint   Patient presents with   • Annual Exam   • Earache   • Hypertension   • Hyperlipidemia   • Hypothyroidism   • Knee Pain      Subjective    History of Present Illness:  Nedra Roberts is a 76 y.o. female who presents for a Subsequent Medicare Wellness Visit, to review chronic issues, and to discuss acute needs.patient reports about 1 month of right ear pain. Scheduled a visit to evaluate and then cancelled this. Still notes a little discomfort. Patient has several complaints. Right krnee pain. Had injections about 3 years ago that gave benefit at that time. She is now having pain once again. Also having right shoulder pain.   Patient reports a prior history of ROBERTO CARLOS. She used a CPAP device several years ago then stopped due to concerns of keeping the device clean etc. She now is \"afraid to use it for that reason\".     The following portions of the patient's history were reviewed and   updated as appropriate: allergies, current medications, past family history, past medical history, past social history, past surgical history and problem list.    Compared to one year ago, the patient feels her physical   health is the same.    Compared to one year ago, the patient feels her mental   health is the same.    Recent Hospitalizations:  She was not admitted to the hospital during the last year.       Current Medical Providers:  Patient Care Team:  Prisca Church MD as PCP - General  Prisca Church MD as PCP - Family Medicine  Rocky Ford MD as Consulting Physician (Hematology and Oncology)  Aaron Tolliver MD as Referring Physician (General Surgery)    Outpatient Medications Prior to Visit   Medication Sig Dispense Refill   • levothyroxine (SYNTHROID, LEVOTHROID) 25 MCG tablet TAKE 1 TABLET BY MOUTH DAILY 90 tablet 0   • losartan (COZAAR) 100 MG tablet TAKE 1 TABLET BY MOUTH EVERY DAY 90 tablet 3   • pantoprazole (PROTONIX) " "40 MG EC tablet TAKE 1 TABLET BY MOUTH TWICE DAILY 180 tablet 1   • simvastatin (ZOCOR) 20 MG tablet TAKE 1 TABLET BY MOUTH EVERY EVENING 90 tablet 0     No facility-administered medications prior to visit.       No opioid medication identified on active medication list. I have reviewed chart for other potential  high risk medication/s and harmful drug interactions in the elderly.          Aspirin is not on active medication list.  Aspirin use is not indicated based on review of current medical condition/s. Risk of harm outweighs potential benefits.  .    Patient Active Problem List   Diagnosis   • Essential hypertension   • Gastritis   • Hypothyroidism   • Multiple-type hyperlipidemia   • Healthcare maintenance   • History of breast cancer   • GERD (gastroesophageal reflux disease)   • Esophagitis     Advance Care Planning  Advance Directive is on file.  ACP discussion was held with the patient during this visit. Patient has an advance directive in EMR which is still valid.           Objective    Vitals:    02/25/22 0911   BP: 130/70   Pulse: 87   Weight: 74.4 kg (164 lb)   Height: 160 cm (63\")     BMI Readings from Last 1 Encounters:   02/25/22 29.05 kg/m²   BMI is above normal parameters. Recommendations include: exercise counseling and nutrition counseling    Does the patient have evidence of cognitive impairment? No    Physical Exam  Vitals and nursing note reviewed.   Constitutional:       Appearance: Normal appearance. She is well-developed.   HENT:      Head: Normocephalic and atraumatic.      Right Ear: Tympanic membrane and external ear normal.      Left Ear: Tympanic membrane and external ear normal.      Ears:      Comments: Cyst at base just behind right ear       Nose: Nose normal.      Mouth/Throat:      Mouth: Mucous membranes are moist.   Eyes:      Extraocular Movements: Extraocular movements intact.      Pupils: Pupils are equal, round, and reactive to light.   Cardiovascular:      Rate and " Rhythm: Normal rate and regular rhythm.      Pulses: Normal pulses.      Heart sounds: Normal heart sounds.   Pulmonary:      Effort: Pulmonary effort is normal. No respiratory distress.      Breath sounds: Normal breath sounds.   Abdominal:      General: Abdomen is flat.      Palpations: Abdomen is soft.   Musculoskeletal:         General: Normal range of motion.      Cervical back: Normal range of motion and neck supple.   Skin:     General: Skin is warm and dry.   Neurological:      General: No focal deficit present.      Mental Status: She is alert and oriented to person, place, and time.   Psychiatric:         Mood and Affect: Mood normal.         Behavior: Behavior normal.         Thought Content: Thought content normal.         Judgment: Judgment normal.       Lab Results   Component Value Date    CHLPL 156 2022    TRIG 124 2022    HDL 48 2022    LDL 86 2022    VLDL 22 2022            HEALTH RISK ASSESSMENT    Smoking Status:  Social History     Tobacco Use   Smoking Status Former Smoker   • Packs/day: 1.00   • Years: 30.00   • Pack years: 30.00   • Types: Cigarettes   • Quit date: 2002   • Years since quittin.1   Smokeless Tobacco Never Used     Alcohol Consumption:  Social History     Substance and Sexual Activity   Alcohol Use Yes   • Alcohol/week: 7.0 standard drinks   • Types: 7 Glasses of wine per week    Comment: NIGHTLY     Fall Risk Screen:    YORDANADI Fall Risk Assessment was completed, and patient is at LOW risk for falls.Assessment completed on:2022    Depression Screening:  PHQ-2/PHQ-9 Depression Screening 2022   Little interest or pleasure in doing things 0   Feeling down, depressed, or hopeless 0   Total Score 0       Health Habits and Functional and Cognitive Screening:  Functional & Cognitive Status 2022   Do you have difficulty preparing food and eating? No   Do you have difficulty bathing yourself, getting dressed or grooming yourself? No    Do you have difficulty using the toilet? No   Do you have difficulty moving around from place to place? No   Do you have trouble with steps or getting out of a bed or a chair? No   Current Diet Well Balanced Diet   Dental Exam Up to date   Eye Exam Up to date   Exercise (times per week) -   Current Exercise Activities Include -   Do you need help using the phone?  No   Are you deaf or do you have serious difficulty hearing?  No   Do you need help with transportation? No   Do you need help shopping? No   Do you need help preparing meals?  No   Do you need help with housework?  No   Do you need help with laundry? No   Do you need help taking your medications? No   Do you need help managing money? No   Do you ever drive or ride in a car without wearing a seat belt? No   Have you felt unusual stress, anger or loneliness in the last month? No   Who do you live with? Spouse   If you need help, do you have trouble finding someone available to you? No   Have you been bothered in the last four weeks by sexual problems? No   Do you have difficulty concentrating, remembering or making decisions? No       Age-appropriate Screening Schedule:  Refer to the list below for future screening recommendations based on patient's age, sex and/or medical conditions. Orders for these recommended tests are listed in the plan section. The patient has been provided with a written plan.    Health Maintenance   Topic Date Due   • DXA SCAN  12/18/2020   • INFLUENZA VACCINE  08/01/2021   • TDAP/TD VACCINES (2 - Td or Tdap) 01/01/2022   • LIPID PANEL  02/21/2023   • MAMMOGRAM  11/08/2023   • ZOSTER VACCINE  Completed              Assessment/Plan   CMS Preventative Services Quick Reference  Risk Factors Identified During Encounter  Cardiovascular Disease  Hearing Problem  Immunizations Discussed/Encouraged (specific Immunizations; Td, Hepatitis A Vaccine/Series and Influenza  The above risks/problems have been discussed with the patient.  Follow  up actions/plans if indicated are seen below in the Assessment/Plan Section.  Pertinent information has been shared with the patient in the After Visit Summary.    Diagnoses and all orders for this visit:    1. Healthcare maintenance (Primary)    2. Essential hypertension  -     ECG 12 Lead    3. ROBERTO CARLOS (obstructive sleep apnea)  -     Ambulatory Referral to Sleep Medicine    4. Chronic left shoulder pain  -     Ambulatory Referral to Orthopedic Surgery    5. Chronic pain of right knee  -     Ambulatory Referral to Orthopedic Surgery    6. Acquired hypothyroidism    7. Cyst, dermoid, ear, right  -     Ambulatory Referral to ENT (Otolaryngology)    8. Menopause  -     DEXA Bone Density Axial        Follow Up:   No follow-ups on file.     An After Visit Summary and PPPS were made available to the patient.  reveiwed cbc, cmp, lipid, tsh, u/a.   ROBERTO CARLOS- to sleep med. She will take her CPAP device with her to visit. She is not to drive if feeling drowsy.  Knee and shoulder pain- to ortho. Declines PT until seen by ortho  htn- continue current therapy  Hypothyroid- continue current therapy.   Dermoid cyst right ear- to ent   Of note, slight elevation platelet. ? Reactive. wnl in nov. She will have this monitored here and w/ hematology.   To continue all routine hcm. Advised td, flu, hep A and vascular screening. DEXA ordered.           I spent 50 minutes caring for Nedra on this date of service. This time includes time spent by me in the following activities:preparing for the visit, reviewing tests, obtaining and/or reviewing a separately obtained history, performing a medically appropriate examination and/or evaluation , counseling and educating the patient/family/caregiver, ordering medications, tests, or procedures, referring and communicating with other health care professionals , documenting information in the medical record and independently interpreting results and communicating that information with the  patient/family/caregiver

## 2022-04-01 RX ORDER — SIMVASTATIN 20 MG
20 TABLET ORAL EVERY EVENING
Qty: 90 TABLET | Refills: 0 | Status: SHIPPED | OUTPATIENT
Start: 2022-04-01 | End: 2022-06-30

## 2022-04-01 RX ORDER — LEVOTHYROXINE SODIUM 0.03 MG/1
25 TABLET ORAL DAILY
Qty: 90 TABLET | Refills: 0 | Status: SHIPPED | OUTPATIENT
Start: 2022-04-01 | End: 2022-05-13

## 2022-05-11 RX ORDER — PANTOPRAZOLE SODIUM 40 MG/1
TABLET, DELAYED RELEASE ORAL
Qty: 180 TABLET | Refills: 1 | Status: SHIPPED | OUTPATIENT
Start: 2022-05-11 | End: 2023-01-23

## 2022-05-13 RX ORDER — LEVOTHYROXINE SODIUM 0.03 MG/1
25 TABLET ORAL DAILY
Qty: 90 TABLET | Refills: 0 | Status: SHIPPED | OUTPATIENT
Start: 2022-05-13 | End: 2022-08-18

## 2022-06-30 RX ORDER — SIMVASTATIN 20 MG
20 TABLET ORAL EVERY EVENING
Qty: 90 TABLET | Refills: 0 | Status: SHIPPED | OUTPATIENT
Start: 2022-06-30 | End: 2022-09-28

## 2022-06-30 RX ORDER — LOSARTAN POTASSIUM 100 MG/1
TABLET ORAL
Qty: 90 TABLET | Refills: 3 | Status: SHIPPED | OUTPATIENT
Start: 2022-06-30

## 2022-07-13 ENCOUNTER — TRANSCRIBE ORDERS (OUTPATIENT)
Dept: ADMINISTRATIVE | Facility: HOSPITAL | Age: 77
End: 2022-07-13

## 2022-07-13 DIAGNOSIS — Z13.6 ENCOUNTER FOR SCREENING FOR VASCULAR DISEASE: Primary | ICD-10-CM

## 2022-07-22 ENCOUNTER — OFFICE VISIT (OUTPATIENT)
Dept: INTERNAL MEDICINE | Facility: CLINIC | Age: 77
End: 2022-07-22

## 2022-07-22 VITALS
SYSTOLIC BLOOD PRESSURE: 140 MMHG | BODY MASS INDEX: 26.46 KG/M2 | DIASTOLIC BLOOD PRESSURE: 80 MMHG | HEART RATE: 71 BPM | WEIGHT: 155 LBS | HEIGHT: 64 IN

## 2022-07-22 DIAGNOSIS — G51.0 BELL'S PALSY: Primary | ICD-10-CM

## 2022-07-22 DIAGNOSIS — E03.9 ACQUIRED HYPOTHYROIDISM: ICD-10-CM

## 2022-07-22 DIAGNOSIS — R79.89 HIGH PLATELET COUNT: ICD-10-CM

## 2022-07-22 PROCEDURE — 99214 OFFICE O/P EST MOD 30 MIN: CPT | Performed by: INTERNAL MEDICINE

## 2022-07-22 NOTE — PROGRESS NOTES
"Chief Complaint   Patient presents with   • Follow-up     From urgent care   • acute bells palsy       History of Present Illness   Nedra Roberts is a 77 y.o. female presents for urgent care follow up. Patient reports a recent oral weakness. Patient went to urgent care. Diagnosed w/ [place] Palsy. She was rx a course of steroids. She does feel improvement today. She has lass weakness. No longer drooling, able to chew forcefully, and use a straw. She initially was experiencing ear discomfort and this has also resolved.   Patient has hypertension. She reports \"i'm not in the habit of checking this.\" she has hypothyroidism. tsh wnl 2/22.     The following portions of the patient's history were reviewed and updated as appropriate: allergies, current medications, past family history, past medical history, past social history, past surgical history and problem list.  Current Outpatient Medications on File Prior to Visit   Medication Sig Dispense Refill   • levothyroxine (SYNTHROID, LEVOTHROID) 25 MCG tablet TAKE 1 TABLET BY MOUTH DAILY 90 tablet 0   • losartan (COZAAR) 100 MG tablet TAKE 1 TABLET BY MOUTH EVERY DAY 90 tablet 3   • pantoprazole (PROTONIX) 40 MG EC tablet TAKE 1 TABLET BY MOUTH TWICE DAILY 180 tablet 1   • simvastatin (ZOCOR) 20 MG tablet TAKE 1 TABLET BY MOUTH EVERY EVENING 90 tablet 0   • predniSONE (DELTASONE) 20 MG tablet 1 po tid 21 tablet 0     No current facility-administered medications on file prior to visit.     Review of Systems   Constitutional: Negative for diaphoresis, fatigue and fever.   HENT: Negative.    Eyes: Negative.    Respiratory: Negative for shortness of breath.    Cardiovascular: Negative for chest pain and palpitations.   Gastrointestinal: Negative for abdominal pain, nausea and vomiting.   Endocrine: Negative.    Genitourinary: Negative.    Musculoskeletal: Negative for back pain and neck pain.   Allergic/Immunologic: Negative.    Neurological: Negative.  Negative for dizziness, " "syncope, weakness, light-headedness and headaches.        See hpi   Psychiatric/Behavioral: Negative for confusion.       Objective   Physical Exam  Vitals and nursing note reviewed.   Constitutional:       Appearance: Normal appearance.   HENT:      Head: Normocephalic and atraumatic.      Right Ear: Tympanic membrane normal.      Left Ear: Tympanic membrane normal.      Nose: Nose normal.      Mouth/Throat:      Mouth: Mucous membranes are moist.   Eyes:      Extraocular Movements: Extraocular movements intact.      Pupils: Pupils are equal, round, and reactive to light.   Cardiovascular:      Rate and Rhythm: Normal rate and regular rhythm.      Pulses: Normal pulses.      Heart sounds: Normal heart sounds.   Pulmonary:      Effort: Pulmonary effort is normal.      Breath sounds: Normal breath sounds.   Abdominal:      General: Abdomen is flat.      Palpations: Abdomen is soft.   Musculoskeletal:         General: Normal range of motion.      Cervical back: Normal range of motion.   Skin:     General: Skin is warm and dry.   Neurological:      Mental Status: She is alert and oriented to person, place, and time.      Comments: Facial nerve palsy     Psychiatric:         Mood and Affect: Mood normal.         Behavior: Behavior normal.         Thought Content: Thought content normal.         Judgment: Judgment normal.          /80   Pulse 71   Ht 162.6 cm (64\")   Wt 70.3 kg (155 lb)   BMI 26.61 kg/m²     Assessment & Plan   Diagnoses and all orders for this visit:    Bell's palsy    Acquired hypothyroidism  -     Basic Metabolic Panel  -     TSH    High platelet count  -     CBC & Differential        Patient w/ acute facial nerve palsy. She completed steroid course. No ocular dryness noted. Offered PT and she declines this. She does have a history of hypothyroidism and will test tsh today as well as repeat platelet levels. She will engage in facial strenthening at home. Advise stress reductions techniques " and gave number for alzheimers association for a support group given spouses dementia. Routine follow up.

## 2022-07-23 LAB
BASOPHILS # BLD AUTO: 0 X10E3/UL (ref 0–0.2)
BASOPHILS NFR BLD AUTO: 0 %
BUN SERPL-MCNC: 21 MG/DL (ref 8–27)
BUN/CREAT SERPL: 23 (ref 12–28)
CALCIUM SERPL-MCNC: 9.9 MG/DL (ref 8.7–10.3)
CHLORIDE SERPL-SCNC: 99 MMOL/L (ref 96–106)
CO2 SERPL-SCNC: 25 MMOL/L (ref 20–29)
CREAT SERPL-MCNC: 0.93 MG/DL (ref 0.57–1)
EGFRCR SERPLBLD CKD-EPI 2021: 64 ML/MIN/1.73
EOSINOPHIL # BLD AUTO: 0.1 X10E3/UL (ref 0–0.4)
EOSINOPHIL NFR BLD AUTO: 1 %
ERYTHROCYTE [DISTWIDTH] IN BLOOD BY AUTOMATED COUNT: 13.7 % (ref 11.7–15.4)
GLUCOSE SERPL-MCNC: 86 MG/DL (ref 65–99)
HCT VFR BLD AUTO: 40.3 % (ref 34–46.6)
HGB BLD-MCNC: 13.2 G/DL (ref 11.1–15.9)
IMM GRANULOCYTES # BLD AUTO: 0.1 X10E3/UL (ref 0–0.1)
IMM GRANULOCYTES NFR BLD AUTO: 1 %
LYMPHOCYTES # BLD AUTO: 2.4 X10E3/UL (ref 0.7–3.1)
LYMPHOCYTES NFR BLD AUTO: 23 %
MCH RBC QN AUTO: 29.9 PG (ref 26.6–33)
MCHC RBC AUTO-ENTMCNC: 32.8 G/DL (ref 31.5–35.7)
MCV RBC AUTO: 91 FL (ref 79–97)
MONOCYTES # BLD AUTO: 0.8 X10E3/UL (ref 0.1–0.9)
MONOCYTES NFR BLD AUTO: 8 %
NEUTROPHILS # BLD AUTO: 7.1 X10E3/UL (ref 1.4–7)
NEUTROPHILS NFR BLD AUTO: 67 %
PLATELET # BLD AUTO: 383 X10E3/UL (ref 150–450)
POTASSIUM SERPL-SCNC: 4.9 MMOL/L (ref 3.5–5.2)
RBC # BLD AUTO: 4.42 X10E6/UL (ref 3.77–5.28)
SODIUM SERPL-SCNC: 138 MMOL/L (ref 134–144)
TSH SERPL DL<=0.005 MIU/L-ACNC: 1.99 UIU/ML (ref 0.45–4.5)
WBC # BLD AUTO: 10.5 X10E3/UL (ref 3.4–10.8)

## 2022-07-25 ENCOUNTER — TELEPHONE (OUTPATIENT)
Dept: INTERNAL MEDICINE | Facility: CLINIC | Age: 77
End: 2022-07-25

## 2022-07-25 NOTE — TELEPHONE ENCOUNTER
Pt informed    ----- Message from Prisca Church MD sent at 7/24/2022  9:10 PM EDT -----  Lab results are normal  jw

## 2022-08-18 RX ORDER — LEVOTHYROXINE SODIUM 0.03 MG/1
25 TABLET ORAL DAILY
Qty: 90 TABLET | Refills: 0 | Status: SHIPPED | OUTPATIENT
Start: 2022-08-18 | End: 2022-10-18

## 2022-09-28 RX ORDER — SIMVASTATIN 20 MG
20 TABLET ORAL EVERY EVENING
Qty: 90 TABLET | Refills: 0 | Status: SHIPPED | OUTPATIENT
Start: 2022-09-28 | End: 2022-11-22

## 2022-10-18 RX ORDER — LEVOTHYROXINE SODIUM 0.03 MG/1
25 TABLET ORAL DAILY
Qty: 90 TABLET | Refills: 0 | Status: SHIPPED | OUTPATIENT
Start: 2022-10-18

## 2022-10-27 ENCOUNTER — TELEPHONE (OUTPATIENT)
Dept: INTERNAL MEDICINE | Facility: CLINIC | Age: 77
End: 2022-10-27

## 2022-10-27 DIAGNOSIS — M25.561 ACUTE PAIN OF RIGHT KNEE: ICD-10-CM

## 2022-10-27 DIAGNOSIS — M25.552 LEFT HIP PAIN: Primary | ICD-10-CM

## 2022-10-27 NOTE — TELEPHONE ENCOUNTER
Caller: Nedra Roberts    Relationship: Self    Best call back number: [phone]    What is the medical concern/diagnosis: PAIN IN RIGHT KNEE AND LEFT HIP     What specialty or service is being requested: ORTHOPEDIC     What is the provider, practice or medical service name: DR. TRUONG BERKOWITZ     What is the office phone number: [phone]    Any additional details: HAS APPOINTMENT SET UP ON Monday October 31ST  BUT NEEDS REFERRAL SENT OVER TO OFFICE

## 2022-11-09 ENCOUNTER — HOSPITAL ENCOUNTER (OUTPATIENT)
Dept: MAMMOGRAPHY | Facility: HOSPITAL | Age: 77
Discharge: HOME | End: 2022-11-09
Admitting: INTERNAL MEDICINE

## 2022-11-09 DIAGNOSIS — Z12.31 ENCOUNTER FOR SCREENING MAMMOGRAM FOR MALIGNANT NEOPLASM OF BREAST: ICD-10-CM

## 2022-11-09 DIAGNOSIS — Z85.3 HISTORY OF BREAST CANCER: ICD-10-CM

## 2022-11-09 PROCEDURE — 77063 BREAST TOMOSYNTHESIS BI: CPT

## 2022-11-09 PROCEDURE — 77067 SCR MAMMO BI INCL CAD: CPT

## 2022-11-09 NOTE — PROGRESS NOTES
Subjective    Follow-up for history of stage I breast cancer of the left breast      History of Present Illness    Mrs. Roberts is a jeffry 77 y.o. woman returning for annual follow-up for history of stage I breast cancer affecting the left breast.  She underwent lumpectomy and 4 cycles of adjuvant chemotherapy with TC.  Her tumor was estrogen receptor positive and she underwent hormonal therapy with Arimidex between May 2009 in May 2014.    The patient returned today for an annual follow-up.  She is doing well.  She had no new complaints today's visit except a tiny nodule at the junction of the earlobe with skin that has been evaluated by ENT    Past Medical History:  Pertinent for hypertension, acid reflux, hyperlipidemia, hypothyroidism    Review of Systems   Constitutional: Negative.    Respiratory: Negative.    Cardiovascular: Negative.    Gastrointestinal: Negative for abdominal pain.   Musculoskeletal: Negative.    Skin: Negative.    Hematological: Negative.    Psychiatric/Behavioral: Negative.    ROS unchanged-11/11/2022      Medications:  The current medication list was reviewed in the EMR    ALLERGIES:  No Known Allergies    Objective      There were no vitals filed for this visit.  Current Status 11/15/2021   ECOG score 0       Physical Exam    Gen: pleasant lady, NAD  HEENT: decreased hearing  CV:  RRR  RESP: CTA bilat  Breast:  No masses are noted in either breast, no supraclavicular or axillary lymphadenopathy noted.  There is a healed lumpectomy scar inferior to the areola of the left breast with skin dimpling and downward retraction of the nipple.  Breast exam unchanged from previous years.      RECENT LABS:           WBC   Date Value Ref Range Status   11/11/2022 7.46 3.40 - 10.80 10*3/mm3 Final   07/22/2022 10.5 3.4 - 10.8 x10E3/uL Final     RBC   Date Value Ref Range Status   11/11/2022 4.35 3.77 - 5.28 10*6/mm3 Final   07/22/2022 4.42 3.77 - 5.28 x10E6/uL Final     Hemoglobin   Date Value Ref  Range Status   11/11/2022 13.0 12.0 - 15.9 g/dL Final     Hematocrit   Date Value Ref Range Status   11/11/2022 39.6 34.0 - 46.6 % Final     MCV   Date Value Ref Range Status   11/11/2022 91.0 79.0 - 97.0 fL Final     MCH   Date Value Ref Range Status   11/11/2022 29.9 26.6 - 33.0 pg Final     MCHC   Date Value Ref Range Status   11/11/2022 32.8 31.5 - 35.7 g/dL Final     RDW   Date Value Ref Range Status   11/11/2022 12.4 12.3 - 15.4 % Final     RDW-SD   Date Value Ref Range Status   11/11/2022 41.4 37.0 - 54.0 fl Final     MPV   Date Value Ref Range Status   11/11/2022 8.4 6.0 - 12.0 fL Final     Platelets   Date Value Ref Range Status   11/11/2022 433 140 - 450 10*3/mm3 Final     Neutrophil %   Date Value Ref Range Status   11/11/2022 65.5 42.7 - 76.0 % Final     Lymphocyte %   Date Value Ref Range Status   11/11/2022 24.5 19.6 - 45.3 % Final     Monocyte %   Date Value Ref Range Status   11/11/2022 7.6 5.0 - 12.0 % Final     Eosinophil %   Date Value Ref Range Status   11/11/2022 1.2 0.3 - 6.2 % Final     Basophil %   Date Value Ref Range Status   11/11/2022 0.7 0.0 - 1.5 % Final     Immature Grans %   Date Value Ref Range Status   11/11/2022 0.5 0.0 - 0.5 % Final     Neutrophils, Absolute   Date Value Ref Range Status   11/11/2022 4.88 1.70 - 7.00 10*3/mm3 Final     Lymphocytes, Absolute   Date Value Ref Range Status   11/11/2022 1.83 0.70 - 3.10 10*3/mm3 Final     Monocytes, Absolute   Date Value Ref Range Status   11/11/2022 0.57 0.10 - 0.90 10*3/mm3 Final     Eosinophils, Absolute   Date Value Ref Range Status   11/11/2022 0.09 0.00 - 0.40 10*3/mm3 Final     Basophils, Absolute   Date Value Ref Range Status   11/11/2022 0.05 0.00 - 0.20 10*3/mm3 Final     Immature Grans, Absolute   Date Value Ref Range Status   11/11/2022 0.04 0.00 - 0.05 10*3/mm3 Final     nRBC   Date Value Ref Range Status   11/11/2022 0.0 0.0 - 0.2 /100 WBC Final                 Bilateral mammogram 11/9/2022: No evidence of  malignancy    Assessment & Plan   Ms. Roberts is seen today in annual follow-up for history of stage I hormone receptor positive breast cancer affecting the left breast treated with lumpectomy, 4 cycles of adjuvant TC, and 5 years of tamoxifen completed May 2014.      Most recent mammogram performed 11/9/2022 shows no evidence of recurrent malignancy or concerning findings.  She is doing well.  She will return in 1 year for annual follow-up with mammogram performed just prior to that time-ordered today.              11/9/2022      CC:

## 2022-11-11 ENCOUNTER — LAB (OUTPATIENT)
Dept: LAB | Facility: HOSPITAL | Age: 77
End: 2022-11-11

## 2022-11-11 ENCOUNTER — OFFICE VISIT (OUTPATIENT)
Dept: ONCOLOGY | Facility: CLINIC | Age: 77
End: 2022-11-11

## 2022-11-11 ENCOUNTER — TRANSCRIBE ORDERS (OUTPATIENT)
Dept: ADMINISTRATIVE | Facility: HOSPITAL | Age: 77
End: 2022-11-11

## 2022-11-11 VITALS
OXYGEN SATURATION: 98 % | HEIGHT: 64 IN | SYSTOLIC BLOOD PRESSURE: 146 MMHG | TEMPERATURE: 97.6 F | RESPIRATION RATE: 16 BRPM | WEIGHT: 155.5 LBS | HEART RATE: 68 BPM | DIASTOLIC BLOOD PRESSURE: 83 MMHG | BODY MASS INDEX: 26.55 KG/M2

## 2022-11-11 DIAGNOSIS — Z85.3 HISTORY OF BREAST CANCER: Primary | ICD-10-CM

## 2022-11-11 DIAGNOSIS — Z12.31 SCREENING MAMMOGRAM, ENCOUNTER FOR: Primary | ICD-10-CM

## 2022-11-11 DIAGNOSIS — Z12.31 ENCOUNTER FOR SCREENING MAMMOGRAM FOR MALIGNANT NEOPLASM OF BREAST: ICD-10-CM

## 2022-11-11 DIAGNOSIS — Z85.3 HISTORY OF BREAST CANCER: ICD-10-CM

## 2022-11-11 LAB
BASOPHILS # BLD AUTO: 0.05 10*3/MM3 (ref 0–0.2)
BASOPHILS NFR BLD AUTO: 0.7 % (ref 0–1.5)
DEPRECATED RDW RBC AUTO: 41.4 FL (ref 37–54)
EOSINOPHIL # BLD AUTO: 0.09 10*3/MM3 (ref 0–0.4)
EOSINOPHIL NFR BLD AUTO: 1.2 % (ref 0.3–6.2)
ERYTHROCYTE [DISTWIDTH] IN BLOOD BY AUTOMATED COUNT: 12.4 % (ref 12.3–15.4)
HCT VFR BLD AUTO: 39.6 % (ref 34–46.6)
HGB BLD-MCNC: 13 G/DL (ref 12–15.9)
IMM GRANULOCYTES # BLD AUTO: 0.04 10*3/MM3 (ref 0–0.05)
IMM GRANULOCYTES NFR BLD AUTO: 0.5 % (ref 0–0.5)
LYMPHOCYTES # BLD AUTO: 1.83 10*3/MM3 (ref 0.7–3.1)
LYMPHOCYTES NFR BLD AUTO: 24.5 % (ref 19.6–45.3)
MCH RBC QN AUTO: 29.9 PG (ref 26.6–33)
MCHC RBC AUTO-ENTMCNC: 32.8 G/DL (ref 31.5–35.7)
MCV RBC AUTO: 91 FL (ref 79–97)
MONOCYTES # BLD AUTO: 0.57 10*3/MM3 (ref 0.1–0.9)
MONOCYTES NFR BLD AUTO: 7.6 % (ref 5–12)
NEUTROPHILS NFR BLD AUTO: 4.88 10*3/MM3 (ref 1.7–7)
NEUTROPHILS NFR BLD AUTO: 65.5 % (ref 42.7–76)
NRBC BLD AUTO-RTO: 0 /100 WBC (ref 0–0.2)
PLATELET # BLD AUTO: 433 10*3/MM3 (ref 140–450)
PMV BLD AUTO: 8.4 FL (ref 6–12)
RBC # BLD AUTO: 4.35 10*6/MM3 (ref 3.77–5.28)
WBC NRBC COR # BLD: 7.46 10*3/MM3 (ref 3.4–10.8)

## 2022-11-11 PROCEDURE — 99213 OFFICE O/P EST LOW 20 MIN: CPT | Performed by: INTERNAL MEDICINE

## 2022-11-11 PROCEDURE — 36415 COLL VENOUS BLD VENIPUNCTURE: CPT

## 2022-11-11 PROCEDURE — 85025 COMPLETE CBC W/AUTO DIFF WBC: CPT

## 2022-11-22 ENCOUNTER — HOSPITAL ENCOUNTER (OUTPATIENT)
Dept: CARDIOLOGY | Facility: HOSPITAL | Age: 77
Discharge: HOME | End: 2022-11-22
Admitting: INTERNAL MEDICINE

## 2022-11-22 VITALS
HEIGHT: 63 IN | BODY MASS INDEX: 27.64 KG/M2 | WEIGHT: 156 LBS | DIASTOLIC BLOOD PRESSURE: 76 MMHG | SYSTOLIC BLOOD PRESSURE: 126 MMHG | HEART RATE: 70 BPM

## 2022-11-22 DIAGNOSIS — I65.21 STENOSIS OF RIGHT CAROTID ARTERY: Primary | ICD-10-CM

## 2022-11-22 DIAGNOSIS — Z13.6 ENCOUNTER FOR SCREENING FOR VASCULAR DISEASE: ICD-10-CM

## 2022-11-22 LAB
BH CV ECHO MEAS - DIST AO DIAM: 1.46 CM
BH CV VAS BP LEFT ARM: NORMAL MMHG
BH CV VAS BP RIGHT ARM: NORMAL MMHG
BH CV XLRA MEAS - MID AO DIAM: 1.66 CM
BH CV XLRA MEAS - PAD LEFT ABI DP: 1.24
BH CV XLRA MEAS - PAD LEFT ABI PT: 1.27
BH CV XLRA MEAS - PAD LEFT ARM: 122 MMHG
BH CV XLRA MEAS - PAD LEFT LEG DP: 156 MMHG
BH CV XLRA MEAS - PAD LEFT LEG PT: 160 MMHG
BH CV XLRA MEAS - PAD RIGHT ABI DP: 1.22
BH CV XLRA MEAS - PAD RIGHT ABI PT: 1.24
BH CV XLRA MEAS - PAD RIGHT ARM: 126 MMHG
BH CV XLRA MEAS - PAD RIGHT LEG DP: 154 MMHG
BH CV XLRA MEAS - PAD RIGHT LEG PT: 156 MMHG
BH CV XLRA MEAS - PROX AO DIAM: 1.73 CM
BH CV XLRA MEAS LEFT ICA/CCA RATIO: 1.4
BH CV XLRA MEAS LEFT MID CCA PSV: NORMAL CM/SEC
BH CV XLRA MEAS LEFT MID ICA PSV: NORMAL CM/SEC
BH CV XLRA MEAS LEFT PROX ECA PSV: NORMAL CM/SEC
BH CV XLRA MEAS RIGHT ICA/CCA RATIO: 2.4
BH CV XLRA MEAS RIGHT MID CCA PSV: NORMAL CM/SEC
BH CV XLRA MEAS RIGHT MID ICA PSV: NORMAL CM/SEC
BH CV XLRA MEAS RIGHT PROX ECA PSV: NORMAL CM/SEC
MAXIMAL PREDICTED HEART RATE: 144 BPM
STRESS TARGET HR: 122 BPM

## 2022-11-22 PROCEDURE — 93799 UNLISTED CV SVC/PROCEDURE: CPT

## 2022-11-22 RX ORDER — SIMVASTATIN 40 MG
40 TABLET ORAL NIGHTLY
Qty: 90 TABLET | Refills: 3 | Status: SHIPPED | OUTPATIENT
Start: 2022-11-22

## 2022-11-28 ENCOUNTER — TELEPHONE (OUTPATIENT)
Dept: INTERNAL MEDICINE | Facility: CLINIC | Age: 77
End: 2022-11-28

## 2022-11-28 NOTE — TELEPHONE ENCOUNTER
Caller: Nedra Roberts    Relationship: Self    Best call back number: [phone]    What orders are you requesting (i.e. lab or imaging): AUTHORIZATION     In what timeframe would the patient need to come in: AS SOON AS POSSIBLE     Where will you receive your lab/imaging services: [facility]     Additional notes: PATIENT STATES THAT [facility] TOLD HER THAT THEY HAVE NO RECORD OF REQUEST FOR AN AUTHORIZATION FOR HER VASCULAR SCAN SO THE HOSPITAL RESCHEDULED HER APPOINTMENT FROM 11/29/22 TO 12/5/22.     PATIENT STATES SHE WOULD LIKE A CALL BACK TO UPDATE HER.

## 2022-11-29 ENCOUNTER — APPOINTMENT (OUTPATIENT)
Dept: CARDIOLOGY | Facility: HOSPITAL | Age: 77
End: 2022-11-29

## 2022-11-29 NOTE — TELEPHONE ENCOUNTER
Spoke with patient advised her that [facility] [employer] submitted an authorization request through her insurance. The authorization is in pending review which means insurance is reviewing records to determine if imaging will be approved. [facility] [employer] [facility] is waiting on her insurance response. Gave patient [facility] Scheduling phone number. Patient to call scheduling.

## 2022-11-30 ENCOUNTER — HOSPITAL ENCOUNTER (OUTPATIENT)
Dept: CARDIOLOGY | Facility: HOSPITAL | Age: 77
Discharge: HOME | End: 2022-11-30
Admitting: INTERNAL MEDICINE

## 2022-11-30 LAB
BH CV XLRA MEAS RIGHT DIST CCA EDV: 20 CM/SEC
BH CV XLRA MEAS RIGHT DIST CCA PSV: 74.6 CM/SEC
BH CV XLRA MEAS RIGHT DIST ICA EDV: -18.1 CM/SEC
BH CV XLRA MEAS RIGHT DIST ICA PSV: -61.7 CM/SEC
BH CV XLRA MEAS RIGHT ICA/CCA RATIO: 1.83
BH CV XLRA MEAS RIGHT MID ICA EDV: -26.7 CM/SEC
BH CV XLRA MEAS RIGHT MID ICA PSV: -108 CM/SEC
BH CV XLRA MEAS RIGHT PROX CCA EDV: 17.7 CM/SEC
BH CV XLRA MEAS RIGHT PROX CCA PSV: 80.1 CM/SEC
BH CV XLRA MEAS RIGHT PROX ECA EDV: -12.2 CM/SEC
BH CV XLRA MEAS RIGHT PROX ECA PSV: -59.7 CM/SEC
BH CV XLRA MEAS RIGHT PROX ICA EDV: -41.3 CM/SEC
BH CV XLRA MEAS RIGHT PROX ICA PSV: -136.5 CM/SEC
BH CV XLRA MEAS RIGHT PROX SCLA PSV: 91.5 CM/SEC
BH CV XLRA MEAS RIGHT VERTEBRAL A EDV: 8.3 CM/SEC
BH CV XLRA MEAS RIGHT VERTEBRAL A PSV: 36.1 CM/SEC
MAXIMAL PREDICTED HEART RATE: 144 BPM
RIGHT ARM BP: NORMAL MMHG
STRESS TARGET HR: 122 BPM

## 2022-11-30 PROCEDURE — 93882 EXTRACRANIAL UNI/LTD STUDY: CPT

## 2022-12-02 ENCOUNTER — TELEPHONE (OUTPATIENT)
Dept: INTERNAL MEDICINE | Facility: CLINIC | Age: 77
End: 2022-12-02

## 2022-12-02 NOTE — TELEPHONE ENCOUNTER
----- Message from Prisca Church MD sent at 12/1/2022  9:11 PM EST -----  Carotid ultrasound shows moderate stenosis. Please ensure patient has appointment with vascular surgeon.   SEMAJ

## 2022-12-05 ENCOUNTER — APPOINTMENT (OUTPATIENT)
Dept: CARDIOLOGY | Facility: HOSPITAL | Age: 77
End: 2022-12-05

## 2023-01-23 RX ORDER — PANTOPRAZOLE SODIUM 40 MG/1
TABLET, DELAYED RELEASE ORAL
Qty: 180 TABLET | Refills: 1 | Status: SHIPPED | OUTPATIENT
Start: 2023-01-23

## 2023-01-23 RX ORDER — SIMVASTATIN 20 MG
20 TABLET ORAL EVERY EVENING
Qty: 90 TABLET | Refills: 0 | Status: SHIPPED | OUTPATIENT
Start: 2023-01-23 | End: 2023-02-27

## 2023-02-16 DIAGNOSIS — E78.2 MULTIPLE-TYPE HYPERLIPIDEMIA: ICD-10-CM

## 2023-02-16 DIAGNOSIS — I10 ESSENTIAL HYPERTENSION: Primary | ICD-10-CM

## 2023-02-16 DIAGNOSIS — E03.9 ACQUIRED HYPOTHYROIDISM: ICD-10-CM

## 2023-02-24 LAB
ALBUMIN SERPL-MCNC: 4.8 G/DL (ref 3.5–5.2)
ALBUMIN/GLOB SERPL: 2.2 G/DL
ALP SERPL-CCNC: 113 U/L (ref 39–117)
ALT SERPL-CCNC: 24 U/L (ref 1–33)
APPEARANCE UR: ABNORMAL
AST SERPL-CCNC: 24 U/L (ref 1–32)
BACTERIA #/AREA URNS HPF: ABNORMAL /HPF
BACTERIA UR CULT: NORMAL
BACTERIA UR CULT: NORMAL
BASOPHILS # BLD AUTO: 0.03 10*3/MM3 (ref 0–0.2)
BASOPHILS NFR BLD AUTO: 0.3 % (ref 0–1.5)
BILIRUB SERPL-MCNC: 0.4 MG/DL (ref 0–1.2)
BILIRUB UR QL STRIP: NEGATIVE
BUN SERPL-MCNC: 17 MG/DL (ref 8–23)
BUN/CREAT SERPL: 17.3 (ref 7–25)
CALCIUM SERPL-MCNC: 10 MG/DL (ref 8.6–10.5)
CASTS URNS QL MICRO: ABNORMAL /LPF
CHLORIDE SERPL-SCNC: 105 MMOL/L (ref 98–107)
CHOLEST SERPL-MCNC: 171 MG/DL (ref 0–200)
CO2 SERPL-SCNC: 26.8 MMOL/L (ref 22–29)
COLOR UR: YELLOW
CREAT SERPL-MCNC: 0.98 MG/DL (ref 0.57–1)
CRYSTALS URNS MICRO: ABNORMAL
EGFRCR SERPLBLD CKD-EPI 2021: 59.9 ML/MIN/1.73
EOSINOPHIL # BLD AUTO: 0.05 10*3/MM3 (ref 0–0.4)
EOSINOPHIL NFR BLD AUTO: 0.5 % (ref 0.3–6.2)
EPI CELLS #/AREA URNS HPF: ABNORMAL /HPF (ref 0–10)
ERYTHROCYTE [DISTWIDTH] IN BLOOD BY AUTOMATED COUNT: 12.5 % (ref 12.3–15.4)
GLOBULIN SER CALC-MCNC: 2.2 GM/DL
GLUCOSE SERPL-MCNC: 89 MG/DL (ref 65–99)
GLUCOSE UR QL STRIP: NEGATIVE
HCT VFR BLD AUTO: 38.8 % (ref 34–46.6)
HDLC SERPL-MCNC: 60 MG/DL (ref 40–60)
HGB BLD-MCNC: 12.6 G/DL (ref 12–15.9)
HGB UR QL STRIP: NEGATIVE
IMM GRANULOCYTES # BLD AUTO: 0.03 10*3/MM3 (ref 0–0.05)
IMM GRANULOCYTES NFR BLD AUTO: 0.3 % (ref 0–0.5)
KETONES UR QL STRIP: NEGATIVE
LDLC SERPL CALC-MCNC: 88 MG/DL (ref 0–100)
LEUKOCYTE ESTERASE UR QL STRIP: ABNORMAL
LYMPHOCYTES # BLD AUTO: 1.89 10*3/MM3 (ref 0.7–3.1)
LYMPHOCYTES NFR BLD AUTO: 20.3 % (ref 19.6–45.3)
MCH RBC QN AUTO: 29.7 PG (ref 26.6–33)
MCHC RBC AUTO-ENTMCNC: 32.5 G/DL (ref 31.5–35.7)
MCV RBC AUTO: 91.5 FL (ref 79–97)
MICRO URNS: ABNORMAL
MONOCYTES # BLD AUTO: 0.62 10*3/MM3 (ref 0.1–0.9)
MONOCYTES NFR BLD AUTO: 6.7 % (ref 5–12)
NEUTROPHILS # BLD AUTO: 6.7 10*3/MM3 (ref 1.7–7)
NEUTROPHILS NFR BLD AUTO: 71.9 % (ref 42.7–76)
NITRITE UR QL STRIP: NEGATIVE
NRBC BLD AUTO-RTO: 0 /100 WBC (ref 0–0.2)
PH UR STRIP: 6.5 [PH] (ref 5–7.5)
PLATELET # BLD AUTO: 429 10*3/MM3 (ref 140–450)
POTASSIUM SERPL-SCNC: 5 MMOL/L (ref 3.5–5.2)
PROT SERPL-MCNC: 7 G/DL (ref 6–8.5)
PROT UR QL STRIP: NEGATIVE
RBC # BLD AUTO: 4.24 10*6/MM3 (ref 3.77–5.28)
RBC #/AREA URNS HPF: ABNORMAL /HPF (ref 0–2)
SODIUM SERPL-SCNC: 142 MMOL/L (ref 136–145)
SP GR UR STRIP: 1.01 (ref 1–1.03)
TRIGL SERPL-MCNC: 135 MG/DL (ref 0–150)
TSH SERPL DL<=0.005 MIU/L-ACNC: 1.06 UIU/ML (ref 0.27–4.2)
UNIDENT CRYS URNS QL MICRO: PRESENT /LPF
URINALYSIS REFLEX: ABNORMAL
UROBILINOGEN UR STRIP-MCNC: 0.2 MG/DL (ref 0.2–1)
VLDLC SERPL CALC-MCNC: 23 MG/DL (ref 5–40)
WBC # BLD AUTO: 9.32 10*3/MM3 (ref 3.4–10.8)
WBC #/AREA URNS HPF: ABNORMAL /HPF (ref 0–5)

## 2023-02-27 ENCOUNTER — OFFICE VISIT (OUTPATIENT)
Dept: INTERNAL MEDICINE | Facility: CLINIC | Age: 78
End: 2023-02-27
Payer: MEDICARE

## 2023-02-27 VITALS
BODY MASS INDEX: 27.64 KG/M2 | DIASTOLIC BLOOD PRESSURE: 78 MMHG | HEART RATE: 84 BPM | WEIGHT: 156 LBS | SYSTOLIC BLOOD PRESSURE: 136 MMHG | OXYGEN SATURATION: 96 % | HEIGHT: 63 IN

## 2023-02-27 DIAGNOSIS — M54.50 CHRONIC MIDLINE LOW BACK PAIN WITHOUT SCIATICA: Primary | ICD-10-CM

## 2023-02-27 DIAGNOSIS — M25.551 BILATERAL HIP PAIN: ICD-10-CM

## 2023-02-27 DIAGNOSIS — M54.50 ACUTE MIDLINE LOW BACK PAIN WITHOUT SCIATICA: ICD-10-CM

## 2023-02-27 DIAGNOSIS — G89.29 CHRONIC MIDLINE LOW BACK PAIN WITHOUT SCIATICA: Primary | ICD-10-CM

## 2023-02-27 DIAGNOSIS — M25.561 CHRONIC PAIN OF RIGHT KNEE: ICD-10-CM

## 2023-02-27 DIAGNOSIS — M25.552 BILATERAL HIP PAIN: ICD-10-CM

## 2023-02-27 DIAGNOSIS — G89.29 CHRONIC PAIN OF RIGHT KNEE: ICD-10-CM

## 2023-02-27 PROCEDURE — 99214 OFFICE O/P EST MOD 30 MIN: CPT | Performed by: INTERNAL MEDICINE

## 2023-02-27 PROCEDURE — 1159F MED LIST DOCD IN RCRD: CPT | Performed by: INTERNAL MEDICINE

## 2023-02-27 PROCEDURE — G0439 PPPS, SUBSEQ VISIT: HCPCS | Performed by: INTERNAL MEDICINE

## 2023-02-27 PROCEDURE — 1170F FXNL STATUS ASSESSED: CPT | Performed by: INTERNAL MEDICINE

## 2023-02-27 PROCEDURE — 72100 X-RAY EXAM L-S SPINE 2/3 VWS: CPT | Performed by: INTERNAL MEDICINE

## 2023-02-27 RX ORDER — ASPIRIN 81 MG/1
81 TABLET ORAL DAILY
COMMUNITY

## 2023-02-27 NOTE — PROGRESS NOTES
"The ABCs of the Annual Wellness Visit  Subsequent Medicare Wellness Visit    Subjective    Nedra Roberts is a 77 y.o. female who presents for a Subsequent Medicare Wellness Visit. She reports \"I have a few complaints today\". Will also review chronic medical issues.       The following portions of the patient's history were reviewed and   updated as appropriate: allergies, current medications, past family history, past medical history, past social history, past surgical history and problem list.    Compared to one year ago, the patient feels her physical   health is the same.    Compared to one year ago, the patient feels her mental   health is the same.    Recent Hospitalizations:  She was not admitted to the hospital during the last year.       Current Medical Providers:  Patient Care Team:  Prisca Church MD as PCP - General  Prisca Church MD as PCP - Family Medicine  Rocky Ford MD as Consulting Physician (Hematology and Oncology)  Aaron Tolliver MD as Referring Physician (General Surgery)    Outpatient Medications Prior to Visit   Medication Sig Dispense Refill   • aspirin 81 MG EC tablet Take 81 mg by mouth Daily.     • levothyroxine (SYNTHROID, LEVOTHROID) 25 MCG tablet TAKE 1 TABLET BY MOUTH DAILY 90 tablet 0   • losartan (COZAAR) 100 MG tablet TAKE 1 TABLET BY MOUTH EVERY DAY 90 tablet 3   • pantoprazole (PROTONIX) 40 MG EC tablet TAKE 1 TABLET BY MOUTH TWICE DAILY 180 tablet 1   • simvastatin (Zocor) 40 MG tablet Take 1 tablet by mouth Every Night. 90 tablet 3   • simvastatin (ZOCOR) 20 MG tablet TAKE 1 TABLET BY MOUTH EVERY EVENING 90 tablet 0     No facility-administered medications prior to visit.       Opioid medication/s are on active medication list.  and I have evaluated her active treatment plan and pain score trends (see table).  There were no vitals filed for this visit.  I have reviewed the chart for potential of high risk medication and harmful drug interactions in the " "elderly.            Aspirin is on active medication list. Aspirin use is indicated based on review of current medical condition/s. Pros and cons of this therapy have been discussed today. Benefits of this medication outweigh potential harm.  Patient has been encouraged to continue taking this medication.  .      Patient Active Problem List   Diagnosis   • Essential hypertension   • Gastritis   • Hypothyroidism   • Multiple-type hyperlipidemia   • Healthcare maintenance   • History of breast cancer   • GERD (gastroesophageal reflux disease)   • Esophagitis     Advance Care Planning  Advance Directive is on file.  ACP discussion was held with the patient during this visit. Patient has an advance directive in EMR which is still valid.      Objective    Vitals:    23 1043   BP: 136/78   Pulse: 84   SpO2: 96%   Weight: 70.8 kg (156 lb)   Height: 160 cm (63\")     Estimated body mass index is 27.63 kg/m² as calculated from the following:    Height as of this encounter: 160 cm (63\").    Weight as of this encounter: 70.8 kg (156 lb).    BMI is >= 25 and <30. (Overweight) The following options were offered after discussion;: exercise counseling/recommendations and nutrition counseling/recommendations      Does the patient have evidence of cognitive impairment? No    Lab Results   Component Value Date    CHLPL 171 2023    TRIG 135 2023    HDL 60 2023    LDL 88 2023    VLDL 23 2023        HEALTH RISK ASSESSMENT    Smoking Status:  Social History     Tobacco Use   Smoking Status Former   • Packs/day: 1.00   • Years: 30.00   • Pack years: 30.00   • Types: Cigarettes   • Quit date: 2002   • Years since quittin.2   Smokeless Tobacco Never     Alcohol Consumption:  Social History     Substance and Sexual Activity   Alcohol Use Yes   • Alcohol/week: 7.0 standard drinks   • Types: 7 Glasses of wine per week    Comment: NIGHTLY     Fall Risk Screen:    DANIEL Fall Risk Assessment was " completed, and patient is at LOW risk for falls.Assessment completed on:2/27/2023    Depression Screening:  PHQ-2/PHQ-9 Depression Screening 2/27/2023   Little Interest or Pleasure in Doing Things 0-->not at all   Feeling Down, Depressed or Hopeless 0-->not at all   PHQ-9: Brief Depression Severity Measure Score 0       Health Habits and Functional and Cognitive Screening:  Functional & Cognitive Status 2/27/2023   Do you have difficulty preparing food and eating? No   Do you have difficulty bathing yourself, getting dressed or grooming yourself? No   Do you have difficulty using the toilet? No   Do you have difficulty moving around from place to place? No   Do you have trouble with steps or getting out of a bed or a chair? No   Current Diet Well Balanced Diet   Dental Exam Up to date   Eye Exam Up to date   Exercise (times per week) -   Current Exercise Activities Include -   Do you need help using the phone?  No   Are you deaf or do you have serious difficulty hearing?  No   Do you need help with transportation? No   Do you need help shopping? No   Do you need help preparing meals?  No   Do you need help with housework?  No   Do you need help with laundry? No   Do you need help taking your medications? No   Do you need help managing money? No   Do you ever drive or ride in a car without wearing a seat belt? No   Have you felt unusual stress, anger or loneliness in the last month? No   Who do you live with? Alone   If you need help, do you have trouble finding someone available to you? No   Have you been bothered in the last four weeks by sexual problems? No   Do you have difficulty concentrating, remembering or making decisions? No       Age-appropriate Screening Schedule:  Refer to the list below for future screening recommendations based on patient's age, sex and/or medical conditions. Orders for these recommended tests are listed in the plan section. The patient has been provided with a written plan.    Health  "Maintenance   Topic Date Due   • DXA SCAN  12/18/2020   • TDAP/TD VACCINES (2 - Td or Tdap) 01/01/2022   • LIPID PANEL  02/22/2024   • ANNUAL WELLNESS VISIT  02/27/2024   • MAMMOGRAM  11/09/2024   • COLORECTAL CANCER SCREENING  03/23/2025   • HEPATITIS C SCREENING  Completed   • COVID-19 Vaccine  Completed   • INFLUENZA VACCINE  Completed   • Pneumococcal Vaccine 65+  Completed   • ZOSTER VACCINE  Completed                CMS Preventative Services Quick Reference  Risk Factors Identified During Encounter  current on eye, dental, and skin evaluations per pateint   Advised hep A and td vaccinations    The above risks/problems have been discussed with the patient.  Pertinent information has been shared with the patient in the After Visit Summary.  An After Visit Summary and PPPS were made available to the patient.    Follow Up:   Next Medicare Wellness visit to be scheduled in 1 year.       Additional E&M Note during same encounter follows:  Patient has multiple medical problems which are significant and separately identifiable that require additional work above and beyond the Medicare Wellness Visit.      Chief Complaint  Annual Exam  htn  Ear pain  Hyperlipidemia  Hypothyroidism    Subjective        HPI  Nedra Roberts is also being seen today for right ear pain, upper thigh pain, back pain, right lower extremity numbness. Htn, hyperlipidemia, and hypothyroidism.   Patient notes \"soreness\" of her right 'ear\" under and behind it \"all the time\". diganosed w/ right side bells palsy 7/22 and was treated w/steroids for this. She did have some improvement but does still experience 3/10 discomfort of her right ear behind the ear.       She notes numbness in her right lower extremity  Groin pain bilaterally. This \"interferes w/ walking a long distance. Hurts after walking.   Low back pain aslso after walking better at rest.              Objective   Vital Signs:  /78   Pulse 84   Ht 160 cm (63\")   Wt 70.8 kg (156 lb) "   SpO2 96%   BMI 27.63 kg/m²     Physical Exam  Vitals and nursing note reviewed.   Constitutional:       Appearance: Normal appearance.      Comments: Chronic left lower lip droop   HENT:      Right Ear: Tympanic membrane normal.      Left Ear: Tympanic membrane normal.      Nose: Nose normal.      Mouth/Throat:      Mouth: Mucous membranes are moist.   Eyes:      Extraocular Movements: Extraocular movements intact.      Pupils: Pupils are equal, round, and reactive to light.   Cardiovascular:      Rate and Rhythm: Normal rate and regular rhythm.      Pulses: Normal pulses.      Heart sounds: Normal heart sounds.   Pulmonary:      Effort: Pulmonary effort is normal.      Breath sounds: Normal breath sounds.   Abdominal:      General: Abdomen is flat.      Palpations: Abdomen is soft.   Musculoskeletal:         General: Normal range of motion.      Cervical back: Normal range of motion.   Skin:     General: Skin is warm and dry.   Neurological:      General: No focal deficit present.      Mental Status: She is alert and oriented to person, place, and time.   Psychiatric:         Mood and Affect: Mood normal.         Behavior: Behavior normal.         Thought Content: Thought content normal.         Judgment: Judgment normal.          The following data was reviewed by: Prisca Church MD on 02/27/2023:  CMP    CMP 7/22/22 2/22/23   Glucose 86 89   BUN 21 17   Creatinine 0.93 0.98   Sodium 138 142   Potassium 4.9 5.0   Chloride 99 105   Calcium 9.9 10.0   Total Protein  7.0   Albumin  4.8   Globulin  2.2   Total Bilirubin  0.4   Alkaline Phosphatase  113   AST (SGOT)  24   ALT (SGPT)  24   BUN/Creatinine Ratio 23 17.3           CBC    CBC 7/22/22 11/11/22 2/22/23   WBC 10.5 7.46 9.32   RBC 4.42 4.35 4.24   Hemoglobin 13.2 13.0 12.6   Hematocrit 40.3 39.6 38.8   MCV 91 91.0 91.5   MCH 29.9 29.9 29.7   MCHC 32.8 32.8 32.5   RDW 13.7 12.4 12.5   Platelets 383 433 429           Lipid Panel    Lipid Panel 2/22/23    Total Cholesterol 171   Triglycerides 135   HDL Cholesterol 60   VLDL Cholesterol 23   LDL Cholesterol  88      Comments are available for some flowsheets but are not being displayed.           TSH    TSH 7/22/22 2/22/23   TSH 1.990 1.060           UA    Urinalysis 2/22/23 2/22/23    1003 1003   Blood, UA Negative    Leukocytes, UA 3+ (A)    Nitrite, UA Negative    RBC, UA  None seen   Bacteria, UA  Moderate (A)   (A) Abnormal value            Data reviewed: Radiologic studies films from ortho      Lumbar xr for low back pain- scoliotic curvature. Ddd. Most pronounced Ted-4-L5 and L5-s1. Sent for overread       Assessment and Plan   Diagnoses and all orders for this visit:    1. Chronic midline low back pain without sciatica (Primary)  -     XR Spine Lumbar 2 or 3 View (In Office)    2. Acute midline low back pain without sciatica  -     Ambulatory Referral to Physical Therapy Evaluate and treat    3. Bilateral hip pain  -     Ambulatory Referral to Physical Therapy Evaluate and treat    4. Chronic pain of right knee  -     Ambulatory Referral to Physical Therapy Evaluate and treat      Patient w/ psoterior auricular pain. Will try a lidocaine patch. Has a persistent but improved facial palsy. Is symptoms worsen or fail to improve would get mri imaging/ auditory focus. She will go to pt for back pain and try a lidocaine patch. She will continue a helathy diet w/ meds for thyroid, bp , and lipid regulation. whe will get td and hep a vaccinations. She will f/u in 6 months but sooner if pain worsens or fails to improve.        I spent 50 minutes caring for Nedra on this date of service. This time includes time spent by me in the following activities:preparing for the visit, reviewing tests, obtaining and/or reviewing a separately obtained history, performing a medically appropriate examination and/or evaluation , counseling and educating the patient/family/caregiver, ordering medications, tests, or procedures,  referring and communicating with other health care professionals , documenting information in the medical record and independently interpreting results and communicating that information with the patient/family/caregiver  Follow Up   No follow-ups on file.  Patient was given instructions and counseling regarding her condition or for health maintenance advice. Please see specific information pulled into the AVS if appropriate.

## 2023-03-05 DIAGNOSIS — Z85.3 HISTORY OF BREAST CANCER: ICD-10-CM

## 2023-03-05 DIAGNOSIS — R93.89 ABNORMAL X-RAY: Primary | ICD-10-CM

## 2023-03-06 ENCOUNTER — TELEPHONE (OUTPATIENT)
Dept: INTERNAL MEDICINE | Facility: CLINIC | Age: 78
End: 2023-03-06
Payer: MEDICARE

## 2023-03-06 NOTE — TELEPHONE ENCOUNTER
----- Message from Prisca Church MD sent at 3/5/2023  4:41 PM EST -----  Abnormalities noted on xray. Additional imaging is advised to further evaluate patient's back pain. She will be contacted to schedule this.   SEMAJ

## 2023-03-08 ENCOUNTER — TELEPHONE (OUTPATIENT)
Dept: INTERNAL MEDICINE | Facility: CLINIC | Age: 78
End: 2023-03-08

## 2023-03-08 NOTE — TELEPHONE ENCOUNTER
"Caller: Nedra Roberts \"Jeniffer\"    Relationship: Self    Best call back number: [phone]    What is the best time to reach you: ANYTIME     Who are you requesting to speak with (clinical staff, provider,  specific staff member): DR. SANDOVAL     What was the call regarding: PATIENT STATES SHE WOULD LIKE TO DR. SANDOVAL ABOUT PAIN MEDICATIONS.     PATIENT STATES SHE IS TAKING TYLENOL AND ALSO HAS 81% ASPIRIN.     PLEASE ADVISE.     Do you require a callback: YES   "

## 2023-03-10 ENCOUNTER — DOCUMENTATION (OUTPATIENT)
Dept: INTERNAL MEDICINE | Facility: CLINIC | Age: 78
End: 2023-03-10
Payer: MEDICARE

## 2023-03-10 DIAGNOSIS — M54.50 ACUTE MIDLINE LOW BACK PAIN WITHOUT SCIATICA: Primary | ICD-10-CM

## 2023-03-10 RX ORDER — TRAMADOL HYDROCHLORIDE 50 MG/1
50 TABLET ORAL EVERY 6 HOURS PRN
Qty: 60 TABLET | Refills: 2 | Status: SHIPPED | OUTPATIENT
Start: 2023-03-10

## 2023-03-10 NOTE — PROGRESS NOTES
Patient w/ back pain. Xray w/ ddd as well as sclerosis. She is scheduled for a bone scan to r/o metastasis. May need mri testing in the future. Will give rx for tramadol 50 mg tab q 8 hours aas needed for pain. Advised max of 3g of tylenol in a day 9ie 6 tab 500 mg.)/ stretching and healthy activity emphasized. Will escalate to hydrocodone if needed but she declines at this time. F/u after bone scan.   brandon

## 2023-03-20 ENCOUNTER — HOSPITAL ENCOUNTER (OUTPATIENT)
Dept: NUCLEAR MEDICINE | Facility: HOSPITAL | Age: 78
Discharge: HOME | End: 2023-03-20
Payer: MEDICARE

## 2023-03-20 DIAGNOSIS — R93.89 ABNORMAL X-RAY: ICD-10-CM

## 2023-03-20 DIAGNOSIS — Z85.3 HISTORY OF BREAST CANCER: ICD-10-CM

## 2023-03-20 PROCEDURE — 78306 BONE IMAGING WHOLE BODY: CPT

## 2023-03-20 PROCEDURE — 0 TECHNETIUM MEDRONATE KIT: Performed by: INTERNAL MEDICINE

## 2023-03-20 PROCEDURE — A9503 TC99M MEDRONATE: HCPCS | Performed by: INTERNAL MEDICINE

## 2023-03-20 RX ORDER — TC 99M MEDRONATE 20 MG/10ML
21 INJECTION, POWDER, LYOPHILIZED, FOR SOLUTION INTRAVENOUS
Status: COMPLETED | OUTPATIENT
Start: 2023-03-20 | End: 2023-03-20

## 2023-03-20 RX ADMIN — Medication 21 MILLICURIE: at 07:41

## 2023-03-21 ENCOUNTER — DOCUMENTATION (OUTPATIENT)
Dept: INTERNAL MEDICINE | Facility: CLINIC | Age: 78
End: 2023-03-21
Payer: MEDICARE

## 2023-03-21 DIAGNOSIS — C79.81 MALIGNANT NEOPLASM METASTATIC TO BREAST: Primary | ICD-10-CM

## 2023-03-21 NOTE — PROGRESS NOTES
Patient w recent back pain. Xray w/ suggestive findings for mets disease. Bone scan confirms multiple areas of metastasis. She was given tramadol for pain regulation. She notes that her pain has really subsided at this time and she notes that pain is now 0/10. She is adivsed of results and will f/u w/ oncology ASAP. May continue tylenol prn additional med if needed.   jw

## 2023-03-22 ENCOUNTER — TELEPHONE (OUTPATIENT)
Dept: ONCOLOGY | Facility: CLINIC | Age: 78
End: 2023-03-22
Payer: MEDICARE

## 2023-03-22 DIAGNOSIS — Z85.3 HISTORY OF BREAST CANCER: ICD-10-CM

## 2023-03-22 DIAGNOSIS — C79.51 MALIGNANT NEOPLASM METASTATIC TO BONE: Primary | ICD-10-CM

## 2023-03-22 RX ORDER — ANASTROZOLE 1 MG/1
1 TABLET ORAL DAILY
Qty: 30 TABLET | Refills: 2 | Status: SHIPPED | OUTPATIENT
Start: 2023-03-22

## 2023-03-22 NOTE — TELEPHONE ENCOUNTER
Returned call to pt. She had a bone scan done on 3/20 that showed she has bony metastatic disease. Dr. Church ordered scan. She would like to get back in to see Dr. Logan SOLIZ. Message sent to Dr. Ford to see when he would like to see pt in follow up.

## 2023-03-22 NOTE — TELEPHONE ENCOUNTER
Per Dr. Ford, if pt would like to start on treatment prior to her PET scan and MDVS, we can prescribe Arimidex 1mg for her to start now.     Called pt and informed her of this. She was anxious to get started on treatment and was agreeable to start Arimidex. Went over common and concerning side effects with pt as well as dosing and schedule. She v/u. Will e-mail her education regarding Arimidex as well. Escribed Arimidex to her pharmacy.

## 2023-03-22 NOTE — PROGRESS NOTES
Per Dr. Ford:    Pt had abnormal bone scan with PCP.  Please have her in for PET (breast cancer) cbc cmp ca15-3 and see me for follow up after.       Called pt and informed her of plan. She v/u. Orders placed and message sent to scheduling.

## 2023-03-22 NOTE — TELEPHONE ENCOUNTER
"  Caller: Nedra Roberts \"Lluvia\"    Relationship: Self    Best call back number: [phone]    What is the best time to reach you: ANY    Who are you requesting to speak with (clinical staff, provider,  specific staff member): CLINICAL    What was the call regarding: LLUVIA HAD A BONE SCAN ON Monday AND SHE STATES THAT SHE WAS DIAGNOSED WITH METASTATIC  BONE DISEASE . SHE WOULD LIKE AN APPT AS SOON POSSIBLE     RESULTS ARE ON Epic     Do you require a callback:YES           "

## 2023-03-23 ENCOUNTER — TELEPHONE (OUTPATIENT)
Dept: ONCOLOGY | Facility: CLINIC | Age: 78
End: 2023-03-23
Payer: MEDICARE

## 2023-03-23 NOTE — TELEPHONE ENCOUNTER
----- Message from Vikki Brand RN sent at 3/22/2023  9:45 AM EDT -----  Please schedule pt for PET scan as soon as possible, labs and MD visit with Dr. Ford following PET. Thank you!!  ----- Message -----  From: Claire Streeter RN  Sent: 3/22/2023   8:13 AM EDT  To: Avery Modi RN      ----- Message -----  From: Rocky Ford MD  Sent: 3/21/2023   4:56 PM EDT  To: Oncology Nurses    Pt had abnormal bone scan with PCP.  Please have her in for PET (breast cancer) cbc cmp ca15-3 and see me for follow up after.

## 2023-04-10 ENCOUNTER — HOSPITAL ENCOUNTER (OUTPATIENT)
Dept: PET IMAGING | Facility: HOSPITAL | Age: 78
Discharge: HOME | End: 2023-04-10
Payer: MEDICARE

## 2023-04-10 DIAGNOSIS — C79.51 MALIGNANT NEOPLASM METASTATIC TO BONE: ICD-10-CM

## 2023-04-10 DIAGNOSIS — Z85.3 HISTORY OF BREAST CANCER: ICD-10-CM

## 2023-04-10 LAB — GLUCOSE BLDC GLUCOMTR-MCNC: 88 MG/DL (ref 70–130)

## 2023-04-10 PROCEDURE — A9552 F18 FDG: HCPCS | Performed by: INTERNAL MEDICINE

## 2023-04-10 PROCEDURE — 78815 PET IMAGE W/CT SKULL-THIGH: CPT

## 2023-04-10 PROCEDURE — 82962 GLUCOSE BLOOD TEST: CPT

## 2023-04-10 PROCEDURE — 0 FLUDEOXYGLUCOSE F18 SOLUTION: Performed by: INTERNAL MEDICINE

## 2023-04-10 RX ADMIN — FLUDEOXYGLUCOSE F18 1 DOSE: 300 INJECTION INTRAVENOUS at 10:14

## 2023-04-10 NOTE — PROGRESS NOTES
Subjective    Follow-up for history of stage I breast cancer of the left breast      History of Present Illness    Mrs. Roberts is a jeffry 77 y.o. woman with a history of stage I breast cancer affecting the left breast ([place] 5/9, 1.5 cm, ER 90%, IN 70%, HER2 2+/negative FISH).  She underwent lumpectomy and 4 cycles of adjuvant chemotherapy with TC.  Her tumor was estrogen receptor positive and she underwent hormonal therapy with Arimidex between May 2009 in May 2014.    The patient saw her primary care provider on 3/21/2023 and complained of low back pain for which plain films were performed suspicious for malignancy.  Whole-body bone scan on 3/20/2023 showed multifocal uptake in the calvarium, ribs, cervical, thoracic and lumbar spine, sacrum, pelvis, humeri and proximal femora.  PET scan on 4/10/2023 showed multiple hypermetabolic bone lesions including the lumbar spine, thoracic spine, most numerous and intense in the pelvic bones and proximal femurs maximum SUV 5.7 pelvis and 6.4 proximal right femur.  The hypermetabolic foci are mixed lytic/sclerotic.  There is no hypermetabolic lymphadenopathy or visceral disease.  She has been started on anastrozole 1 mg daily.    The patient return today for follow-up accompanied by 2 daughters.  She is overall doing well.  Her pain in the back and hips is mild to nonexistent presently.  She denies cough or shortness of breath.  She of course regarding her results been mildly anxious.    Past Medical History:  Pertinent for hypertension, acid reflux, hyperlipidemia, hypothyroidism    Review of Systems   Constitutional: Negative.    Respiratory: Negative.    Cardiovascular: Negative.    Gastrointestinal: Negative for abdominal pain.   Musculoskeletal: Positive for arthralgias.   Skin: Negative.    Hematological: Negative.    Psychiatric/Behavioral: The patient is nervous/anxious.           Medications:  The current medication list was reviewed in the EMR    ALLERGIES:   "No Known Allergies    Objective      Vitals:    04/14/23 0757   BP: 158/85   Pulse: 83   Resp: 16   Temp: 97.1 °F (36.2 °C)   TempSrc: Temporal   SpO2: 96%   Weight: 71.8 kg (158 lb 6.4 oz)   Height: 162.6 cm (64.02\")   PainSc: 0-No pain         11/11/2022     9:06 AM   Current Status   ECOG score 0       Physical Exam    CONSTITUTIONAL: pleasant well-developed adult woman  HEENT: no icterus, no thrush, moist membranes, tender right mastoid  LYMPH: no cervical or supraclavicular lad  MUSC: no edema, normal gait  NEURO: alert and oriented x3, normal strength  PSYCH: normal mood and affect for situation  SKIN: no pallor or rash    RECENT LABS:  Results from last 7 days   Lab Units 04/14/23  0749   WBC 10*3/mm3 6.35   NEUTROS ABS 10*3/mm3 4.23   HEMOGLOBIN g/dL 12.9   HEMATOCRIT % 38.7   PLATELETS 10*3/mm3 386          WBC   Date Value Ref Range Status   04/14/2023 6.35 3.40 - 10.80 10*3/mm3 Final   02/22/2023 9.32 3.40 - 10.80 10*3/mm3 Final     RBC   Date Value Ref Range Status   04/14/2023 4.25 3.77 - 5.28 10*6/mm3 Final   02/22/2023 4.24 3.77 - 5.28 10*6/mm3 Final     Hemoglobin   Date Value Ref Range Status   04/14/2023 12.9 12.0 - 15.9 g/dL Final     Hematocrit   Date Value Ref Range Status   04/14/2023 38.7 34.0 - 46.6 % Final     MCV   Date Value Ref Range Status   04/14/2023 91.1 79.0 - 97.0 fL Final     MCH   Date Value Ref Range Status   04/14/2023 30.4 26.6 - 33.0 pg Final     MCHC   Date Value Ref Range Status   04/14/2023 33.3 31.5 - 35.7 g/dL Final     RDW   Date Value Ref Range Status   04/14/2023 12.7 12.3 - 15.4 % Final     RDW-SD   Date Value Ref Range Status   04/14/2023 41.9 37.0 - 54.0 fl Final     MPV   Date Value Ref Range Status   04/14/2023 8.0 6.0 - 12.0 fL Final     Platelets   Date Value Ref Range Status   04/14/2023 386 140 - 450 10*3/mm3 Final     Neutrophil %   Date Value Ref Range Status   04/14/2023 66.5 42.7 - 76.0 % Final     Lymphocyte %   Date Value Ref Range Status   04/14/2023 " 24.9 19.6 - 45.3 % Final     Monocyte %   Date Value Ref Range Status   04/14/2023 7.1 5.0 - 12.0 % Final     Eosinophil %   Date Value Ref Range Status   04/14/2023 0.8 0.3 - 6.2 % Final     Basophil %   Date Value Ref Range Status   04/14/2023 0.5 0.0 - 1.5 % Final     Immature Grans %   Date Value Ref Range Status   04/14/2023 0.2 0.0 - 0.5 % Final     Neutrophils, Absolute   Date Value Ref Range Status   04/14/2023 4.23 1.70 - 7.00 10*3/mm3 Final     Lymphocytes, Absolute   Date Value Ref Range Status   04/14/2023 1.58 0.70 - 3.10 10*3/mm3 Final     Monocytes, Absolute   Date Value Ref Range Status   04/14/2023 0.45 0.10 - 0.90 10*3/mm3 Final     Eosinophils, Absolute   Date Value Ref Range Status   04/14/2023 0.05 0.00 - 0.40 10*3/mm3 Final     Basophils, Absolute   Date Value Ref Range Status   04/14/2023 0.03 0.00 - 0.20 10*3/mm3 Final     Immature Grans, Absolute   Date Value Ref Range Status   04/14/2023 0.01 0.00 - 0.05 10*3/mm3 Final     nRBC   Date Value Ref Range Status   04/14/2023 0.0 0.0 - 0.2 /100 WBC Final           PET scan 4/10/2023:  IMPRESSION:  1. Multiple hypermetabolic lytic and mixed lytic and sclerotic bone  metastases as described. The activity at the inferior aspect of the  right mastoid process may possibly represent a metastasis, but the  low-level activity at the adjacent subcutaneous fat is of uncertain  etiology. There is no definitive fluid at the right mastoid air cells to  suggest acute mastoiditis, but please correlate clinically and follow-up  as needed.  2. There is no evidence for metastatic lymphadenopathy or visceral  Metastases.    I personally reviewed the pet images from 4/10/2023-the patient has significant metastatic disease to bone most prominent in the lumbar spine and pelvic bones    Bilateral mammogram 11/9/2022: No evidence of malignancy    Assessment & Plan   *history of stage I breast cancer affecting the left breast ([place] 5/9, 1.5 cm, ER 90%, [place] 70%, HER2  2+/negative FISH).    · She underwent lumpectomy and 4 cycles of adjuvant chemotherapy with TC.  Her tumor was estrogen receptor positive and she underwent hormonal therapy with Arimidex between May 2009 in May 2014.    *Radiographic studies consistent with metastatic disease to bone with sclerotic/lytic lesions involving spine (most prominent lumbar), scapula, pelvic bones, proximal femurs    *Pain of malignancy-mild at this point    Oncology plan/recommendations:  1. PET scan reviewed with the patient and her daughter today concerning for metastatic disease to bone most likely recurrence of her estrogen driven breast cancer.  I recommended a CT-guided bone biopsy to confirm the diagnosis and recheck ER/OR/HER2 in addition to broad molecular profiling of enough tissue  2. Check CA 15-3  3. Continue anastrozole 1 mg daily  4. Check DEXA scan  5. Check plain films of the bilateral proximal femurs to rule out impending fractures  6. She has extra-strength Tylenol or Ultram at home if needed for pain  7. Follow-up 1 week after biopsy for results and further discussion of treatment.  If metastatic breast cancer confirmed which remains ER positive which I expect will likely continue AI with addition of a CK4/6 inhibitor and Xgeva    I spent 56 minutes of time today on this case including reviewing primary care notes, imaging studies, personally reviewing PET images, face-to-face time with the patient and family discussing findings plan moving forward, , documentation of care etc.            4/14/2023      CC:

## 2023-04-14 ENCOUNTER — OFFICE VISIT (OUTPATIENT)
Dept: ONCOLOGY | Facility: CLINIC | Age: 78
End: 2023-04-14
Payer: MEDICARE

## 2023-04-14 ENCOUNTER — LAB (OUTPATIENT)
Dept: LAB | Facility: HOSPITAL | Age: 78
End: 2023-04-14
Payer: MEDICARE

## 2023-04-14 VITALS
SYSTOLIC BLOOD PRESSURE: 158 MMHG | WEIGHT: 158.4 LBS | OXYGEN SATURATION: 96 % | TEMPERATURE: 97.1 F | DIASTOLIC BLOOD PRESSURE: 85 MMHG | HEIGHT: 64 IN | RESPIRATION RATE: 16 BRPM | HEART RATE: 83 BPM | BODY MASS INDEX: 27.04 KG/M2

## 2023-04-14 DIAGNOSIS — C79.51 MALIGNANT NEOPLASM METASTATIC TO BONE: ICD-10-CM

## 2023-04-14 DIAGNOSIS — Z85.3 HISTORY OF BREAST CANCER: ICD-10-CM

## 2023-04-14 DIAGNOSIS — Z85.3 HISTORY OF BREAST CANCER: Primary | ICD-10-CM

## 2023-04-14 DIAGNOSIS — Z92.21 H/O AROMATASE INHIBITOR THERAPY: ICD-10-CM

## 2023-04-14 DIAGNOSIS — Z79.811 AROMATASE INHIBITOR USE: ICD-10-CM

## 2023-04-14 DIAGNOSIS — C79.51 CANCER, METASTATIC TO BONE: ICD-10-CM

## 2023-04-14 LAB
ALBUMIN SERPL-MCNC: 4.5 G/DL (ref 3.5–5.2)
ALBUMIN/GLOB SERPL: 1.7 G/DL (ref 1.1–2.4)
ALP SERPL-CCNC: 131 U/L (ref 38–116)
ALT SERPL W P-5'-P-CCNC: 25 U/L (ref 0–33)
ANION GAP SERPL CALCULATED.3IONS-SCNC: 12.7 MMOL/L (ref 5–15)
AST SERPL-CCNC: 26 U/L (ref 0–32)
BASOPHILS # BLD AUTO: 0.03 10*3/MM3 (ref 0–0.2)
BASOPHILS NFR BLD AUTO: 0.5 % (ref 0–1.5)
BILIRUB SERPL-MCNC: 0.5 MG/DL (ref 0.2–1.2)
BUN SERPL-MCNC: 15 MG/DL (ref 6–20)
BUN/CREAT SERPL: 16.3 (ref 7.3–30)
CALCIUM SPEC-SCNC: 10 MG/DL (ref 8.5–10.2)
CANCER AG15-3 SERPL-ACNC: 764 U/ML
CHLORIDE SERPL-SCNC: 102 MMOL/L (ref 98–107)
CO2 SERPL-SCNC: 24.3 MMOL/L (ref 22–29)
CREAT SERPL-MCNC: 0.92 MG/DL (ref 0.6–1.1)
DEPRECATED RDW RBC AUTO: 41.9 FL (ref 37–54)
EGFRCR SERPLBLD CKD-EPI 2021: 64.7 ML/MIN/1.73
EOSINOPHIL # BLD AUTO: 0.05 10*3/MM3 (ref 0–0.4)
EOSINOPHIL NFR BLD AUTO: 0.8 % (ref 0.3–6.2)
ERYTHROCYTE [DISTWIDTH] IN BLOOD BY AUTOMATED COUNT: 12.7 % (ref 12.3–15.4)
GLOBULIN UR ELPH-MCNC: 2.7 GM/DL (ref 1.8–3.5)
GLUCOSE SERPL-MCNC: 112 MG/DL (ref 74–124)
HCT VFR BLD AUTO: 38.7 % (ref 34–46.6)
HGB BLD-MCNC: 12.9 G/DL (ref 12–15.9)
IMM GRANULOCYTES # BLD AUTO: 0.01 10*3/MM3 (ref 0–0.05)
IMM GRANULOCYTES NFR BLD AUTO: 0.2 % (ref 0–0.5)
LYMPHOCYTES # BLD AUTO: 1.58 10*3/MM3 (ref 0.7–3.1)
LYMPHOCYTES NFR BLD AUTO: 24.9 % (ref 19.6–45.3)
MCH RBC QN AUTO: 30.4 PG (ref 26.6–33)
MCHC RBC AUTO-ENTMCNC: 33.3 G/DL (ref 31.5–35.7)
MCV RBC AUTO: 91.1 FL (ref 79–97)
MONOCYTES # BLD AUTO: 0.45 10*3/MM3 (ref 0.1–0.9)
MONOCYTES NFR BLD AUTO: 7.1 % (ref 5–12)
NEUTROPHILS NFR BLD AUTO: 4.23 10*3/MM3 (ref 1.7–7)
NEUTROPHILS NFR BLD AUTO: 66.5 % (ref 42.7–76)
NRBC BLD AUTO-RTO: 0 /100 WBC (ref 0–0.2)
PLATELET # BLD AUTO: 386 10*3/MM3 (ref 140–450)
PMV BLD AUTO: 8 FL (ref 6–12)
POTASSIUM SERPL-SCNC: 4.5 MMOL/L (ref 3.5–4.7)
PROT SERPL-MCNC: 7.2 G/DL (ref 6.3–8)
RBC # BLD AUTO: 4.25 10*6/MM3 (ref 3.77–5.28)
SODIUM SERPL-SCNC: 139 MMOL/L (ref 134–145)
WBC NRBC COR # BLD: 6.35 10*3/MM3 (ref 3.4–10.8)

## 2023-04-14 PROCEDURE — 36415 COLL VENOUS BLD VENIPUNCTURE: CPT

## 2023-04-14 PROCEDURE — 80053 COMPREHEN METABOLIC PANEL: CPT

## 2023-04-14 PROCEDURE — 85025 COMPLETE CBC W/AUTO DIFF WBC: CPT

## 2023-04-14 PROCEDURE — 86300 IMMUNOASSAY TUMOR CA 15-3: CPT | Performed by: INTERNAL MEDICINE

## 2023-04-21 RX ORDER — LEVOTHYROXINE SODIUM 0.03 MG/1
25 TABLET ORAL DAILY
Qty: 90 TABLET | Refills: 0 | Status: SHIPPED | OUTPATIENT
Start: 2023-04-21

## 2023-04-23 NOTE — PROGRESS NOTES
04/26/23 0001   Pre-Procedure Phone Call   Procedure Time Verified Yes   Arrival Time 0630  (4/26/2023)   Procedure Location Verified Yes   Medical History Reviewed No   NPO Status Reinforced Yes   Ride and Caregiver Arranged Yes   Phone Number for Ride/Caregiver Chris; daughter   Patient Knows to Bring Current Medications Yes   Bring Outside Films Requested No  (EPIC: 4/10/2023 - PET & 3/20/2023 - NM Bone scan.)

## 2023-04-25 ENCOUNTER — HOSPITAL ENCOUNTER (OUTPATIENT)
Dept: BONE DENSITY | Facility: HOSPITAL | Age: 78
Discharge: HOME | End: 2023-04-25
Admitting: INTERNAL MEDICINE
Payer: MEDICARE

## 2023-04-25 DIAGNOSIS — Z79.811 AROMATASE INHIBITOR USE: ICD-10-CM

## 2023-04-25 DIAGNOSIS — C79.51 CANCER, METASTATIC TO BONE: ICD-10-CM

## 2023-04-25 DIAGNOSIS — Z85.3 HISTORY OF BREAST CANCER: ICD-10-CM

## 2023-04-25 DIAGNOSIS — C79.51 MALIGNANT NEOPLASM METASTATIC TO BONE: ICD-10-CM

## 2023-04-25 PROCEDURE — 77080 DXA BONE DENSITY AXIAL: CPT

## 2023-04-26 ENCOUNTER — HOSPITAL ENCOUNTER (OUTPATIENT)
Dept: CT IMAGING | Facility: HOSPITAL | Age: 78
Discharge: HOME | End: 2023-04-26
Payer: MEDICARE

## 2023-04-26 ENCOUNTER — HOSPITAL ENCOUNTER (OUTPATIENT)
Dept: GENERAL RADIOLOGY | Facility: HOSPITAL | Age: 78
Discharge: HOME | End: 2023-04-26
Payer: MEDICARE

## 2023-04-26 VITALS
RESPIRATION RATE: 20 BRPM | OXYGEN SATURATION: 91 % | SYSTOLIC BLOOD PRESSURE: 131 MMHG | HEIGHT: 64 IN | HEART RATE: 80 BPM | DIASTOLIC BLOOD PRESSURE: 75 MMHG | TEMPERATURE: 98 F | BODY MASS INDEX: 26.46 KG/M2 | WEIGHT: 155 LBS

## 2023-04-26 DIAGNOSIS — Z85.3 HISTORY OF BREAST CANCER: ICD-10-CM

## 2023-04-26 DIAGNOSIS — C79.51 CANCER, METASTATIC TO BONE: ICD-10-CM

## 2023-04-26 DIAGNOSIS — C79.51 MALIGNANT NEOPLASM METASTATIC TO BONE: ICD-10-CM

## 2023-04-26 LAB
INR PPP: 1.1 (ref 0.8–1.2)
PLATELET # BLD AUTO: 377 10*3/MM3 (ref 140–450)
PROTHROMBIN TIME: 13.5 SECONDS (ref 12.8–15.2)

## 2023-04-26 PROCEDURE — 88360 TUMOR IMMUNOHISTOCHEM/MANUAL: CPT | Performed by: INTERNAL MEDICINE

## 2023-04-26 PROCEDURE — 85049 AUTOMATED PLATELET COUNT: CPT | Performed by: RADIOLOGY

## 2023-04-26 PROCEDURE — 73552 X-RAY EXAM OF FEMUR 2/>: CPT

## 2023-04-26 PROCEDURE — 0 LIDOCAINE 1 % SOLUTION: Performed by: RADIOLOGY

## 2023-04-26 PROCEDURE — 85610 PROTHROMBIN TIME: CPT

## 2023-04-26 PROCEDURE — 25010000002 FENTANYL CITRATE (PF) 50 MCG/ML SOLUTION: Performed by: RADIOLOGY

## 2023-04-26 PROCEDURE — 77012 CT SCAN FOR NEEDLE BIOPSY: CPT

## 2023-04-26 PROCEDURE — 88341 IMHCHEM/IMCYTCHM EA ADD ANTB: CPT | Performed by: INTERNAL MEDICINE

## 2023-04-26 PROCEDURE — 25010000002 MIDAZOLAM PER 1 MG: Performed by: RADIOLOGY

## 2023-04-26 PROCEDURE — 99152 MOD SED SAME PHYS/QHP 5/>YRS: CPT

## 2023-04-26 PROCEDURE — 88305 TISSUE EXAM BY PATHOLOGIST: CPT | Performed by: INTERNAL MEDICINE

## 2023-04-26 PROCEDURE — 88342 IMHCHEM/IMCYTCHM 1ST ANTB: CPT | Performed by: INTERNAL MEDICINE

## 2023-04-26 RX ORDER — MIDAZOLAM HYDROCHLORIDE 1 MG/ML
INJECTION INTRAMUSCULAR; INTRAVENOUS AS NEEDED
Status: COMPLETED | OUTPATIENT
Start: 2023-04-26 | End: 2023-04-26

## 2023-04-26 RX ORDER — FENTANYL CITRATE 50 UG/ML
INJECTION, SOLUTION INTRAMUSCULAR; INTRAVENOUS AS NEEDED
Status: COMPLETED | OUTPATIENT
Start: 2023-04-26 | End: 2023-04-26

## 2023-04-26 RX ORDER — LIDOCAINE HYDROCHLORIDE 10 MG/ML
20 INJECTION, SOLUTION INFILTRATION; PERINEURAL ONCE
Status: COMPLETED | OUTPATIENT
Start: 2023-04-26 | End: 2023-04-26

## 2023-04-26 RX ORDER — SODIUM CHLORIDE 0.9 % (FLUSH) 0.9 %
3 SYRINGE (ML) INJECTION EVERY 12 HOURS SCHEDULED
Status: DISCONTINUED | OUTPATIENT
Start: 2023-04-26 | End: 2023-04-27 | Stop reason: HOSPADM

## 2023-04-26 RX ORDER — SODIUM CHLORIDE 9 MG/ML
40 INJECTION, SOLUTION INTRAVENOUS AS NEEDED
Status: DISCONTINUED | OUTPATIENT
Start: 2023-04-26 | End: 2023-04-27 | Stop reason: HOSPADM

## 2023-04-26 RX ORDER — SODIUM CHLORIDE 0.9 % (FLUSH) 0.9 %
10 SYRINGE (ML) INJECTION AS NEEDED
Status: DISCONTINUED | OUTPATIENT
Start: 2023-04-26 | End: 2023-04-27 | Stop reason: HOSPADM

## 2023-04-26 RX ORDER — SODIUM CHLORIDE 9 MG/ML
25 INJECTION, SOLUTION INTRAVENOUS ONCE
Status: COMPLETED | OUTPATIENT
Start: 2023-04-26 | End: 2023-04-26

## 2023-04-26 RX ADMIN — FENTANYL CITRATE 50 MCG: 50 INJECTION, SOLUTION INTRAMUSCULAR; INTRAVENOUS at 08:25

## 2023-04-26 RX ADMIN — SODIUM CHLORIDE 25 ML/HR: 9 INJECTION, SOLUTION INTRAVENOUS at 08:16

## 2023-04-26 RX ADMIN — Medication 3 ML: at 08:16

## 2023-04-26 RX ADMIN — MIDAZOLAM 1 MG: 1 INJECTION INTRAMUSCULAR; INTRAVENOUS at 08:25

## 2023-04-26 RX ADMIN — LIDOCAINE HYDROCHLORIDE 20 ML: 10 INJECTION, SOLUTION INFILTRATION; PERINEURAL at 08:25

## 2023-04-26 NOTE — H&P
Name: Nedra Roberts ADMIT: 2023   : 1945  PCP: Prisca Church MD    MRN: [id number] LOS: 0 days   AGE/SEX: 77 y.o. female  ROOM: Room/bed info not found       Chief complaint metastatic breast cancer    Present Illness or Internal History:  Patient is a 77 y.o. female presents with same 0 bone mets.     Past Surgical History:  Past Surgical History:   Procedure Laterality Date   • BREAST BIOPSY     • BREAST LUMPECTOMY  2009   • COLONOSCOPY      Dr. Tolliver   • ENDOSCOPY      gastritis   • ENDOSCOPY AND COLONOSCOPY  2015    Ulcerative esophagitis and diverticulosis   • HYSTERECTOMY  2002       Past Medical History:  Past Medical History:   Diagnosis Date   • Breast cancer 2009    Stage I left breast   • Drug therapy    • Gastritis    • GERD (gastroesophageal reflux disease)    • H/O lumpectomy    • Hyperlipidemia    • Hypertension    • Hypothyroidism        Home Medications:  (Not in a hospital admission)      Allergies:  Patient has no known allergies.    Family History:  Family History   Problem Relation Age of Onset   • Heart disease Mother    • Breast cancer Mother    • Heart disease Father    • Diabetes Maternal Grandmother    • Cancer Paternal Grandfather    • Cancer Sister    • Diabetes Sister    • Leukemia Maternal Aunt        Social History:  Social History     Tobacco Use   • Smoking status: Former     Packs/day: 1.00     Years: 30.00     Pack years: 30.00     Types: Cigarettes     Quit date: 2002     Years since quittin.3   • Smokeless tobacco: Never   Substance Use Topics   • Alcohol use: Yes     Alcohol/week: 7.0 standard drinks     Types: 7 Glasses of wine per week     Comment: NIGHTLY   • Drug use: Defer        Objective     Physical Exam:      General Appearance:    Alert, cooperative, in no acute distress   Head:    Normocephalic, without obvious abnormality, atraumatic   Eyes:            Lids and lashes normal, conjunctivae and sclerae normal, no    icterus, no pallor, corneas clear, PERRLA   Ears:    Ears appear intact with no abnormalities noted   Throat:   No oral lesions, no thrush, oral mucosa moist   Neck:   No adenopathy, supple, trachea midline, no thyromegaly, no     carotid bruit, no JVD   Back:     No kyphosis present, no scoliosis present, no skin lesions,       erythema or scars, no tenderness to percussion or                   palpation,   range of motion normal   Lungs:     Clear to auscultation,respirations regular, even and                   unlabored    Heart:    Regular rhythm and normal rate, normal S1 and S2, no            murmur, no gallop, no rub, no click   Breast Exam:    Deferred   Abdomen:     Normal bowel sounds, no masses, no organomegaly, soft        non-tender, non-distended, no guarding, no rebound                 tenderness   Genitalia:    Deferred   Extremities:   Moves all extremities well, no edema, no cyanosis, no              redness   Pulses:   Pulses palpable and equal bilaterally   Skin:   No bleeding, bruising or rash   Lymph nodes:   No palpable adenopathy   Neurologic:   Cranial nerves 2 - 12 grossly intact, sensation intact, DTR        present and equal bilaterally       Vital Signs  Temp:  [98 °F (36.7 °C)] 98 °F (36.7 °C)  Heart Rate:  [70] 70  Resp:  [19] 19  BP: (133)/(80) 133/80    Anticipated Surgical Procedure:  Ct bone biopsy - left iliac bone    The risks, benefits and alternatives of this procedure have been discussed with the patient or responsible party: Yes        Gianni Cruz MD  04/26/23  07:34 EDT

## 2023-04-26 NOTE — DISCHARGE INSTRUCTIONS

## 2023-04-27 LAB
LAB AP CASE REPORT: NORMAL
LAB AP CLINICAL INFORMATION: NORMAL
LAB AP DIAGNOSIS COMMENT: NORMAL
LAB AP SPECIAL STAINS: NORMAL
LAB AP SYNOPTIC CHECKLIST: NORMAL
PATH REPORT.FINAL DX SPEC: NORMAL
PATH REPORT.GROSS SPEC: NORMAL

## 2023-05-02 LAB
LAB AP CASE REPORT: NORMAL
LAB AP CLINICAL INFORMATION: NORMAL
LAB AP DIAGNOSIS COMMENT: NORMAL
LAB AP SPECIAL STAINS: NORMAL
LAB AP SYNOPTIC CHECKLIST: NORMAL
Lab: NORMAL
PATH REPORT.ADDENDUM SPEC: NORMAL
PATH REPORT.FINAL DX SPEC: NORMAL
PATH REPORT.GROSS SPEC: NORMAL

## 2023-05-11 NOTE — PROGRESS NOTES
Subjective    Follow-up for metastatic breast cancer to bone      History of Present Illness    The patient return today for follow-up and pathology results which confirmed metastatic breast cancer ER/MI positive to bone.  She is tolerating Arimidex without side effects.  She has twinges of back and left hip pain but nothing substantial.  She for the most part has her normal performance status and activities.    Oncology history:  Mrs. Roberts is a jeffry 77 y.o. woman with a history of stage I breast cancer affecting the left breast ([place] 5/9, 1.5 cm, ER 90%, MI 70%, HER2 2+/negative FISH).  She underwent lumpectomy and 4 cycles of adjuvant chemotherapy with TC.  Her tumor was estrogen receptor positive and she underwent hormonal therapy with Arimidex between May 2009 in May 2014.    The patient saw her primary care provider on 3/21/2023 and complained of low back pain for which plain films were performed suspicious for malignancy.  Whole-body bone scan on 3/20/2023 showed multifocal uptake in the calvarium, ribs, cervical, thoracic and lumbar spine, sacrum, pelvis, humeri and proximal femora.  PET scan on 4/10/2023 showed multiple hypermetabolic bone lesions including the lumbar spine, thoracic spine, most numerous and intense in the pelvic bones and proximal femurs maximum SUV 5.7 pelvis and 6.4 proximal right femur.  The hypermetabolic foci are mixed lytic/sclerotic.  There is no hypermetabolic lymphadenopathy or visceral disease.  She has been started on anastrozole 1 mg daily.        Past Medical History:  Pertinent for hypertension, acid reflux, hyperlipidemia, hypothyroidism    Review of Systems   Constitutional: Negative.    Respiratory: Negative.    Cardiovascular: Negative.    Gastrointestinal: Negative for abdominal pain.   Musculoskeletal: Positive for arthralgias.   Skin: Negative.    Hematological: Negative.    Psychiatric/Behavioral: The patient is nervous/anxious.    ROS  "unchanged-5/12/2023      Medications:  The current medication list was reviewed in the EMR    ALLERGIES:  No Known Allergies    Objective      Vitals:    05/12/23 1506   BP: 144/78   Pulse: 79   Resp: 16   Temp: 98.6 °F (37 °C)   TempSrc: Temporal   SpO2: 94%   Weight: 71.3 kg (157 lb 1.6 oz)   Height: 162.6 cm (64.02\")   PainSc:   4   PainLoc: Back         5/12/2023     3:08 PM   Current Status   ECOG score 0       Physical Exam    CONSTITUTIONAL: pleasant well-developed adult woman  HEENT: no icterus, no thrush, moist membranes, tender right mastoid with decreased size of a nodule  LYMPH: no cervical or supraclavicular lad  MUSC: no edema, normal gait  NEURO: alert and oriented x3, normal strength  PSYCH: normal mood and affect for situation  SKIN: no pallor or rash  Exam otherwise unchanged-5/12/2023  RECENT LABS:  Results from last 7 days   Lab Units 05/12/23  1450   WBC 10*3/mm3 6.75   NEUTROS ABS 10*3/mm3 4.26   HEMOGLOBIN g/dL 12.6   HEMATOCRIT % 39.2   PLATELETS 10*3/mm3 437     Results from last 7 days   Lab Units 05/12/23  1450   SODIUM mmol/L 141   POTASSIUM mmol/L 4.3   CHLORIDE mmol/L 105   CO2 mmol/L 25.5   BUN mg/dL 15   CREATININE mg/dL 0.99   CALCIUM mg/dL 9.8   ALBUMIN g/dL 4.5   BILIRUBIN mg/dL 0.2   ALK PHOS U/L 107   ALT (SGPT) U/L 34*   AST (SGOT) U/L 28   GLUCOSE mg/dL 138*      WBC   Date Value Ref Range Status   05/12/2023 6.75 3.40 - 10.80 10*3/mm3 Final   02/22/2023 9.32 3.40 - 10.80 10*3/mm3 Final     RBC   Date Value Ref Range Status   05/12/2023 4.18 3.77 - 5.28 10*6/mm3 Final   02/22/2023 4.24 3.77 - 5.28 10*6/mm3 Final     Hemoglobin   Date Value Ref Range Status   05/12/2023 12.6 12.0 - 15.9 g/dL Final     Hematocrit   Date Value Ref Range Status   05/12/2023 39.2 34.0 - 46.6 % Final     MCV   Date Value Ref Range Status   05/12/2023 93.8 79.0 - 97.0 fL Final     MCH   Date Value Ref Range Status   05/12/2023 30.1 26.6 - 33.0 pg Final     MCHC   Date Value Ref Range Status "   05/12/2023 32.1 31.5 - 35.7 g/dL Final     RDW   Date Value Ref Range Status   05/12/2023 12.7 12.3 - 15.4 % Final     RDW-SD   Date Value Ref Range Status   05/12/2023 43.8 37.0 - 54.0 fl Final     MPV   Date Value Ref Range Status   05/12/2023 8.2 6.0 - 12.0 fL Final     Platelets   Date Value Ref Range Status   05/12/2023 437 140 - 450 10*3/mm3 Final     Neutrophil %   Date Value Ref Range Status   05/12/2023 63.1 42.7 - 76.0 % Final     Lymphocyte %   Date Value Ref Range Status   05/12/2023 27.3 19.6 - 45.3 % Final     Monocyte %   Date Value Ref Range Status   05/12/2023 7.7 5.0 - 12.0 % Final     Eosinophil %   Date Value Ref Range Status   05/12/2023 1.2 0.3 - 6.2 % Final     Basophil %   Date Value Ref Range Status   05/12/2023 0.6 0.0 - 1.5 % Final     Immature Grans %   Date Value Ref Range Status   05/12/2023 0.1 0.0 - 0.5 % Final     Neutrophils, Absolute   Date Value Ref Range Status   05/12/2023 4.26 1.70 - 7.00 10*3/mm3 Final     Lymphocytes, Absolute   Date Value Ref Range Status   05/12/2023 1.84 0.70 - 3.10 10*3/mm3 Final     Monocytes, Absolute   Date Value Ref Range Status   05/12/2023 0.52 0.10 - 0.90 10*3/mm3 Final     Eosinophils, Absolute   Date Value Ref Range Status   05/12/2023 0.08 0.00 - 0.40 10*3/mm3 Final     Basophils, Absolute   Date Value Ref Range Status   05/12/2023 0.04 0.00 - 0.20 10*3/mm3 Final     Immature Grans, Absolute   Date Value Ref Range Status   05/12/2023 0.01 0.00 - 0.05 10*3/mm3 Final     nRBC   Date Value Ref Range Status   05/12/2023 0.0 0.0 - 0.2 /100 WBC Final           PET scan 4/10/2023:  IMPRESSION:  1. Multiple hypermetabolic lytic and mixed lytic and sclerotic bone  metastases as described. The activity at the inferior aspect of the  right mastoid process may possibly represent a metastasis, but the  low-level activity at the adjacent subcutaneous fat is of uncertain  etiology. There is no definitive fluid at the right mastoid air cells to  suggest acute  mastoiditis, but please correlate clinically and follow-up  as needed.  2. There is no evidence for metastatic lymphadenopathy or visceral  Metastases.    I personally reviewed the pet images from 4/10/2023-the patient has significant metastatic disease to bone most prominent in the lumbar spine and pelvic bones    Bilateral mammogram 11/9/2022: No evidence of malignancy    DEXA Bone Density Axial 4/25/2023 - IMPRESSION: Osteopenia at the hips    XR Femur 2 View Bilateral 4/26/2023 - IMPRESSION: As described.        Assessment & Plan   *history of stage I breast cancer affecting the left breast ([place] 5/9, 1.5 cm, ER 90%, OH 70%, HER2 2+/negative FISH).    · She underwent lumpectomy and 4 cycles of adjuvant chemotherapy with TC.  Her tumor was estrogen receptor positive and she underwent hormonal therapy with Arimidex between May 2009 in May 2014.    *Radiographic studies consistent with metastatic disease to bone with sclerotic/lytic lesions involving spine (most prominent lumbar), scapula, pelvic bones, proximal femurs  · Initiated Arimidex April 2023  · 4/26/2023 CT-guided bone biopsy left iliac metastatic ductal adenocarcinoma of the breast ER 99% strongly positive, OH 31 to 40% weakly positive, HER2 2+/FISH negative  · CA 15-3 significantly elevated 764    *Pain of malignancy-mild at this point    Oncology plan/recommendations:  1. Follow-up [facility] next generation sequencing  2. Continue anastrozole 1 mg daily  3. Discussed with the patient adding Ibrance 100 mg daily 1 through 21 q. 28 days including risk and side effects of the medication.  If she tolerates well with no significant myelosuppression consider increasing to 125 mg subsequent cycles  4. Discussed adding Xgeva to reduce skeletal related events monthly risk and side effects  5. She has extra-strength Tylenol or Ultram at home if needed for pain  6. Follow-up 2 weeks after starting Ibrance CBC CMP CA 15-3 Xgeva labs and Xgeva with nurse  practitioner visit                5/12/2023      CC:

## 2023-05-12 ENCOUNTER — LAB (OUTPATIENT)
Dept: LAB | Facility: HOSPITAL | Age: 78
End: 2023-05-12
Payer: MEDICARE

## 2023-05-12 ENCOUNTER — OFFICE VISIT (OUTPATIENT)
Dept: ONCOLOGY | Facility: CLINIC | Age: 78
End: 2023-05-12
Payer: MEDICARE

## 2023-05-12 ENCOUNTER — SPECIALTY PHARMACY (OUTPATIENT)
Dept: PHARMACY | Facility: HOSPITAL | Age: 78
End: 2023-05-12
Payer: MEDICARE

## 2023-05-12 VITALS
HEART RATE: 79 BPM | DIASTOLIC BLOOD PRESSURE: 78 MMHG | WEIGHT: 157.1 LBS | SYSTOLIC BLOOD PRESSURE: 144 MMHG | BODY MASS INDEX: 26.82 KG/M2 | TEMPERATURE: 98.6 F | OXYGEN SATURATION: 94 % | RESPIRATION RATE: 16 BRPM | HEIGHT: 64 IN

## 2023-05-12 DIAGNOSIS — C79.51 CANCER, METASTATIC TO BONE: ICD-10-CM

## 2023-05-12 DIAGNOSIS — Z85.3 HISTORY OF BREAST CANCER: ICD-10-CM

## 2023-05-12 DIAGNOSIS — Z85.3 HISTORY OF BREAST CANCER: Primary | ICD-10-CM

## 2023-05-12 LAB
ALBUMIN SERPL-MCNC: 4.5 G/DL (ref 3.5–5.2)
ALBUMIN/GLOB SERPL: 1.9 G/DL (ref 1.1–2.4)
ALP SERPL-CCNC: 107 U/L (ref 38–116)
ALT SERPL W P-5'-P-CCNC: 34 U/L (ref 0–33)
ANION GAP SERPL CALCULATED.3IONS-SCNC: 10.5 MMOL/L (ref 5–15)
AST SERPL-CCNC: 28 U/L (ref 0–32)
BASOPHILS # BLD AUTO: 0.04 10*3/MM3 (ref 0–0.2)
BASOPHILS NFR BLD AUTO: 0.6 % (ref 0–1.5)
BILIRUB SERPL-MCNC: 0.2 MG/DL (ref 0.2–1.2)
BUN SERPL-MCNC: 15 MG/DL (ref 6–20)
BUN/CREAT SERPL: 15.2 (ref 7.3–30)
CALCIUM SPEC-SCNC: 9.8 MG/DL (ref 8.5–10.2)
CHLORIDE SERPL-SCNC: 105 MMOL/L (ref 98–107)
CO2 SERPL-SCNC: 25.5 MMOL/L (ref 22–29)
CREAT SERPL-MCNC: 0.99 MG/DL (ref 0.6–1.1)
DEPRECATED RDW RBC AUTO: 43.8 FL (ref 37–54)
EGFRCR SERPLBLD CKD-EPI 2021: 59.2 ML/MIN/1.73
EOSINOPHIL # BLD AUTO: 0.08 10*3/MM3 (ref 0–0.4)
EOSINOPHIL NFR BLD AUTO: 1.2 % (ref 0.3–6.2)
ERYTHROCYTE [DISTWIDTH] IN BLOOD BY AUTOMATED COUNT: 12.7 % (ref 12.3–15.4)
GLOBULIN UR ELPH-MCNC: 2.4 GM/DL (ref 1.8–3.5)
GLUCOSE SERPL-MCNC: 138 MG/DL (ref 74–124)
HCT VFR BLD AUTO: 39.2 % (ref 34–46.6)
HGB BLD-MCNC: 12.6 G/DL (ref 12–15.9)
IMM GRANULOCYTES # BLD AUTO: 0.01 10*3/MM3 (ref 0–0.05)
IMM GRANULOCYTES NFR BLD AUTO: 0.1 % (ref 0–0.5)
LYMPHOCYTES # BLD AUTO: 1.84 10*3/MM3 (ref 0.7–3.1)
LYMPHOCYTES NFR BLD AUTO: 27.3 % (ref 19.6–45.3)
MCH RBC QN AUTO: 30.1 PG (ref 26.6–33)
MCHC RBC AUTO-ENTMCNC: 32.1 G/DL (ref 31.5–35.7)
MCV RBC AUTO: 93.8 FL (ref 79–97)
MONOCYTES # BLD AUTO: 0.52 10*3/MM3 (ref 0.1–0.9)
MONOCYTES NFR BLD AUTO: 7.7 % (ref 5–12)
NEUTROPHILS NFR BLD AUTO: 4.26 10*3/MM3 (ref 1.7–7)
NEUTROPHILS NFR BLD AUTO: 63.1 % (ref 42.7–76)
NRBC BLD AUTO-RTO: 0 /100 WBC (ref 0–0.2)
PLATELET # BLD AUTO: 437 10*3/MM3 (ref 140–450)
PMV BLD AUTO: 8.2 FL (ref 6–12)
POTASSIUM SERPL-SCNC: 4.3 MMOL/L (ref 3.5–4.7)
PROT SERPL-MCNC: 6.9 G/DL (ref 6.3–8)
RBC # BLD AUTO: 4.18 10*6/MM3 (ref 3.77–5.28)
SODIUM SERPL-SCNC: 141 MMOL/L (ref 134–145)
WBC NRBC COR # BLD: 6.75 10*3/MM3 (ref 3.4–10.8)

## 2023-05-12 PROCEDURE — 80053 COMPREHEN METABOLIC PANEL: CPT

## 2023-05-12 PROCEDURE — 36415 COLL VENOUS BLD VENIPUNCTURE: CPT

## 2023-05-12 PROCEDURE — 85025 COMPLETE CBC W/AUTO DIFF WBC: CPT

## 2023-05-12 NOTE — PROGRESS NOTES
The following staff message was forwarded to my attention:      From: Rocky Ford MD   Sent: 5/12/2023   3:33 PM EDT   To: Avery Modi RN, *     Pt needs counseling and Ibrance 100 mg days 1-21 Q28 days; once she begins Ibrance schedule 2 wk f/u NP cbc cmp ca15-3 xgeva labs and xgeva please       The Prior Authorization for Ibrance is approved from 2/10/2023 to 5/11/2024.     The prescription should be filled with [facility].    Shira Bunn - Care Coordinator   5/12/2023  16:26 EDT

## 2023-05-15 ENCOUNTER — TELEPHONE (OUTPATIENT)
Dept: ONCOLOGY | Facility: CLINIC | Age: 78
End: 2023-05-15
Payer: MEDICARE

## 2023-05-15 ENCOUNTER — TELEPHONE (OUTPATIENT)
Dept: ONCOLOGY | Facility: CLINIC | Age: 78
End: 2023-05-15

## 2023-05-15 ENCOUNTER — SPECIALTY PHARMACY (OUTPATIENT)
Dept: PHARMACY | Facility: HOSPITAL | Age: 78
End: 2023-05-15
Payer: MEDICARE

## 2023-05-15 DIAGNOSIS — C79.51 CANCER, METASTATIC TO BONE: Primary | ICD-10-CM

## 2023-05-15 DIAGNOSIS — C79.51 MALIGNANT NEOPLASM METASTATIC TO BONE: ICD-10-CM

## 2023-05-15 RX ORDER — ANASTROZOLE 1 MG/1
1 TABLET ORAL DAILY
Qty: 30 TABLET | Refills: 5 | Status: SHIPPED | OUTPATIENT
Start: 2023-05-15

## 2023-05-15 NOTE — TELEPHONE ENCOUNTER
----- Message from Susana Ashford RPH sent at 5/15/2023  9:01 AM EDT -----    ----- Message -----  From: Susana Ashford RPH  Sent: 5/15/2023   9:00 AM EDT  To: [facility] Oral Onc Pool    Plz schedule her for dual ed

## 2023-05-15 NOTE — TELEPHONE ENCOUNTER
"  Caller: Nedra Roberts \"Jeniffer\"    Relationship: Self    Best call back number: [phone]    What is the best time to reach you: ASAP    Who are you requesting to speak with (clinical staff, provider,  specific staff member): CLINICAL    What was the call regarding: PT NEEDS IBRANCE RX SENT TO [facility].  THEY SAID IT HAS BEEN AUTHORIZED BUT THEY NEED THE RX SENT TO THEM.  ALSO, THE PT WILL NEED A NEW RX SENT FOR ANASTROZOLE FAIRLY SOON, SHE IS ON THE LAST REFILL OF THAT SHE SAID.    Do you require a callback: YES           "

## 2023-05-15 NOTE — PROGRESS NOTES
Oral Chemotherapy - New Referral    Received a referral from Dr. Ford    Treatment Plan: Ibrance (palbociclib)  Start date of treatment planned for: As soon as oral specialty medication is available.  Indication: Metastatic Breast Cancer  Relevant past treatments: TC  Is the therapy appropriate based on treatment guidelines and FDA labeling?: yes  Therapeutic Goals: Continue treatment until progression or intolerable toxicity  Patient can self-administer oral medications: Yes    • Drug-Drug Interactions: The current medication list was reviewed and there are some relevant drug-drug interactions with the oral specialty medication, including level d ddi with xgeva and level c ddi with zocor.  This will be discussed during education.  • Medication Allergies: The patient has NKDA  • Review of Labs/Dose Adjustments: The patient's most recent labs were reviewed and all are WNL to start treatment at this dose.     A prescription was released to [facility] for   Drug: Ibrance (palbociclib)  Strength: 100 mg  Directions: Take 1 tablet by mouth daily for 21 days on, then 7 days off  Quantity: 21  Refills: 5    Pharmacy education is not yet scheduled.    Name/Credentials Susana Ashford RPh, MADHU    5/15/2023  08:36 EDT

## 2023-05-17 ENCOUNTER — DOCUMENTATION (OUTPATIENT)
Dept: PHARMACY | Facility: HOSPITAL | Age: 78
End: 2023-05-17
Payer: MEDICARE

## 2023-05-17 PROBLEM — Z79.899 ENCOUNTER FOR LONG-TERM (CURRENT) USE OF OTHER MEDICATIONS: Status: ACTIVE | Noted: 2023-05-17

## 2023-05-17 NOTE — PROGRESS NOTES
"Orders entered based on Dr. Ford's staff message:  \"Pt needs counseling and Ibrance 100 mg days 1-21 Q28 days; once she begins Ibrance schedule 2 wk f/u NP cbc cmp ca15-3 xgeva labs and xgeva please\"    Patient is picking up a sample supply on 5/22, meaning 2 week follow up should be June 5.    "

## 2023-05-18 ENCOUNTER — TELEPHONE (OUTPATIENT)
Dept: GENERAL RADIOLOGY | Facility: HOSPITAL | Age: 78
End: 2023-05-18
Payer: MEDICARE

## 2023-05-18 NOTE — TELEPHONE ENCOUNTER
----- Message from Avery Modi RN sent at 5/17/2023 10:33 AM EDT -----  schedule 2 wk f/u NP around 6/5/2023 to assess how first 2 weeks of Ibrance have been with cbc cmp ca15-3 xgeva labs and xgeva please

## 2023-05-22 ENCOUNTER — SPECIALTY PHARMACY (OUTPATIENT)
Dept: ONCOLOGY | Facility: HOSPITAL | Age: 78
End: 2023-05-22
Payer: MEDICARE

## 2023-05-22 ENCOUNTER — OFFICE VISIT (OUTPATIENT)
Dept: ONCOLOGY | Facility: CLINIC | Age: 78
End: 2023-05-22
Payer: MEDICARE

## 2023-05-22 VITALS
TEMPERATURE: 98 F | WEIGHT: 155 LBS | OXYGEN SATURATION: 94 % | SYSTOLIC BLOOD PRESSURE: 133 MMHG | BODY MASS INDEX: 26.46 KG/M2 | HEIGHT: 64 IN | RESPIRATION RATE: 18 BRPM | HEART RATE: 71 BPM | DIASTOLIC BLOOD PRESSURE: 78 MMHG

## 2023-05-22 DIAGNOSIS — Z85.3 HISTORY OF BREAST CANCER: Primary | ICD-10-CM

## 2023-05-22 DIAGNOSIS — C79.51 CANCER, METASTATIC TO BONE: Primary | ICD-10-CM

## 2023-05-22 DIAGNOSIS — C79.51 MALIGNANT NEOPLASM METASTATIC TO BONE: ICD-10-CM

## 2023-05-22 RX ORDER — ONDANSETRON HYDROCHLORIDE 8 MG/1
8 TABLET, FILM COATED ORAL 3 TIMES DAILY PRN
Qty: 30 TABLET | Refills: 5 | Status: SHIPPED | OUTPATIENT
Start: 2023-05-22

## 2023-05-22 NOTE — PROGRESS NOTES
Specialty Pharmacy Patient Management Program  Oncology Initial Assessment       Nedra Roberts is a 77 y.o. female with metastatic breast cancer seen by an Oncology provider and enrolled in the Oncology Patient Management program offered by [facility].  An initial outreach was conducted, including assessment of therapy appropriateness and specialty medication education for Palbociclib ( Ibrance) and Anastrozole ( Arimidex) and Denosumab ( Xgeva). The patient was introduced to services offered by [facility], including: regular assessments, refill coordination, curbside pick-up or mail order delivery options, prior authorization maintenance, and financial assistance programs as applicable. The patient was also provided with contact information for the pharmacy team.     Regimen: Ibrance 100 mg po daily for 21 days on, then 7 days off, continue Arimidex 1 mg po daily and start Xgeva 120 mg subcutaneously monthly    Start date of oral specialty medication: tomorrow    Relevant Past Medical History, Comorbidities, and Vaccines  Relevant medical history and concomitant health conditions were discussed with the patient. The patient's chart has been reviewed for relevant past medical history and comorbid health conditions and updated as necessary.  Vaccines are coordinated by the patient's oncologist and primary care provider.  Past Medical History:   Diagnosis Date   • Breast cancer 2009    Stage I left breast   • Drug therapy    • Gastritis    • GERD (gastroesophageal reflux disease)    • H/O lumpectomy    • Hyperlipidemia    • Hypertension    • Hypothyroidism      Social History     Socioeconomic History   • Marital status:      Spouse name: Rocky   Tobacco Use   • Smoking status: Former     Packs/day: 1.00     Years: 30.00     Pack years: 30.00     Types: Cigarettes     Quit date: 2002     Years since quittin.4   • Smokeless tobacco: Never   Substance  and Sexual Activity   • Alcohol use: Yes     Alcohol/week: 7.0 standard drinks     Types: 7 Glasses of wine per week     Comment: NIGHTLY   • Drug use: Defer   • Sexual activity: Defer       Allergies  Known allergies and reactions were discussed with the patient. The patient's chart has been reviewed for allergy information and updated as necessary.   No Known Allergies     Current Medication List  This medication list has been reviewed with the patient and evaluated for any interactions or necessary modifications/recommendations, and updated to include all prescription medications, OTC medications, and supplements the patient is currently taking.  This list reflects what is contained in the patient's profile, which has also been marked as reviewed to communicate to other providers it is the most up to date version of the patient's current medication therapy.   Prior to Admission medications    Medication Sig Start Date End Date Taking? Authorizing Provider   anastrozole (Arimidex) 1 MG tablet Take 1 tablet by mouth Daily. 5/15/23  Yes Rocky Ford MD   aspirin 81 MG EC tablet Take 1 tablet by mouth Daily.   Yes ProviderRuma MD   Calcium Carbonate Antacid (TUMS PO) Take  by mouth As Needed.   Yes Ruma Sheldon MD   levothyroxine (SYNTHROID, LEVOTHROID) 25 MCG tablet TAKE 1 TABLET BY MOUTH DAILY 4/21/23  Yes Prisca Church MD   losartan (COZAAR) 100 MG tablet TAKE 1 TABLET BY MOUTH EVERY DAY 6/30/22  Yes Prisca Church MD   ondansetron (ZOFRAN) 8 MG tablet Take 1 tablet by mouth 3 (Three) Times a Day As Needed for Nausea or Vomiting. 5/22/23  Yes Rocky Ford MD   pantoprazole (PROTONIX) 40 MG EC tablet TAKE 1 TABLET BY MOUTH TWICE DAILY 1/23/23  Yes Prisca Church MD   simvastatin (Zocor) 40 MG tablet Take 1 tablet by mouth Every Night. 11/22/22  Yes Prisca Church MD   traMADol (ULTRAM) 50 MG tablet Take 1 tablet by mouth Every 6 (Six) Hours As Needed for Moderate Pain. 3/10/23  Yes  Prisca Church MD   Palbociclib (Ibrance) 100 MG tablet tablet Take 1 tablet by mouth Daily. 5/17/23   Rocky Ford MD   Palbociclib 100 MG tablet tablet Take 1 tablet by mouth Daily. Take with or without food for 21 days on, then off for 7 days. 5/15/23   Rocky Ford MD   anastrozole (Arimidex) 1 MG tablet Take 1 tablet by mouth Daily. 3/22/23 5/15/23  Rocky Ford MD       Drug Interactions  • Reviewed concomitant medications, allergies, labs, comorbidities/medical history, quality of life( no issues to report today), and immunization history.   • Drug-drug interactions noted and discussed during education: level d ddi with Ibrance and Xgeva can increase immunosuppression- CBC will be monitored, and a level c ddi with Zocor and Ibrance- can increase concentration of Zocor- LFTS and Cr will be monitored. Reminded the patient to let us know before making any changes or starting any new prescription or OTC medications so we can first assess drug interactions.  • Drug-food interactions noted and discussed during education: Patient was instructed to avoid eating grapefruit and drinking grapefruit juice    Recommended Medications Assessment  • Bone Health (such as calcium/vitamin D, bisphosphonate, RANKL inhibitor) - Will initiate Xgeva on 6/5/23  • VTE prophylaxis - Not Indicated   • Prophylactic antimicrobials - Not Indicated     Relevant Laboratory Values  Lab Results   Component Value Date    GLUCOSE 138 (H) 05/12/2023    CALCIUM 9.8 05/12/2023     05/12/2023    K 4.3 05/12/2023    CO2 25.5 05/12/2023     05/12/2023    BUN 15 05/12/2023    CREATININE 0.99 05/12/2023    EGFRIFAFRI 68 02/21/2022    EGFRIFNONA 59 (L) 02/21/2022    BCR 15.2 05/12/2023    ANIONGAP 10.5 05/12/2023     Lab Results   Component Value Date    WBC 6.75 05/12/2023    RBC 4.18 05/12/2023    HGB 12.6 05/12/2023    HCT 39.2 05/12/2023    MCV 93.8 05/12/2023    MCH 30.1 05/12/2023    MCHC 32.1 05/12/2023    RDW 12.7  05/12/2023    RDWSD 43.8 05/12/2023    MPV 8.2 05/12/2023     05/12/2023    NEUTRORELPCT 63.1 05/12/2023    LYMPHORELPCT 27.3 05/12/2023    MONORELPCT 7.7 05/12/2023    EOSRELPCT 1.2 05/12/2023    BASORELPCT 0.6 05/12/2023    AUTOIGPER 0.1 05/12/2023    NEUTROABS 4.26 05/12/2023    LYMPHSABS 1.84 05/12/2023    MONOSABS 0.52 05/12/2023    EOSABS 0.08 05/12/2023    BASOSABS 0.04 05/12/2023    AUTOIGNUM 0.01 05/12/2023    NRBC 0.0 05/12/2023       The above labs have been reviewed. No dose adjustments are needed for the oral specialty medication(s) based on the labs.    Initial Education Provided for Specialty Medication  The patient has been provided with the following education. All questions and concerns have been addressed prior to the patient receiving the medication, and the patient has verbalized understanding of the education and any materials provided.  Additional patient education shall be provided and documented upon request by the patient, provider or payer.      Provided patient with:   Education sheets about the medication, 24-hour clinic phone number and my contact information and instructions to call should additional questions arise.     Medication Education Sheets Provided: (select all that apply)  • Oral Specialty Medication: Ibrance    Other Education Sheets Provided: (select all that apply)  Xgeva    TOPICS COMMENTS   Storage and Handling of Oral Specialty Medication Store in the original container, in a dry location out of direct sunlight, and out of reach of children or pets. and Store at room temperature.  Discussed safe handling and what to do with any unused medication.   Administration of Oral Specialty Medication Take with or without food at the same time(s) each day.   Adherence to Oral Specialty Regimen and Handling Missed Doses Patient is likely to have good treatment adherence; reinforced the importance of adherence. Reviewed how to address missed doses and to let us know of any  missed doses.   Anemia: role of RBC, cause, s/s, ways to manage, role of transfusion Reviewed the role of RBC and the use of transfusions if hemoglobin decreases too much.  Patient to notify us if they experience shortness of breath, dizziness, or palpitations.  Also let patient know they could feel more tired than usual and to try to stay active, but rest if they need to.    Thrombocytopenia: role of platelet, cause, s/s, ways to prevent bleeding, things to avoid, when to seek help Reviewed the role of platelets in blood clotting and when to call clinic (bloody nose that bleeds for 5 mins despite pressure, a cut that won't stop bleeding despite pressure, gums that bleed excessively with brushing or flossing). Recommended using an electric razor, soft bristle toothbrush, and blowing your nose gently.    Neutropenia: role of WBC, cause, infection precautions, s/s of infection, when to call MD Reviewed the role of WBC, good infection prevention practices, and when to call the clinic (temperature 100.4F, sore throat, burning urination, etc)     Diarrhea: causes, s/s of dehydration, ways to manage, dietary changes, when to call MD Chemotherapy - Discussed risk of diarrhea. Instructed patient that they can use OTC loperamide at first presentation of diarrhea, but call MD if 4-6 episodes in 24 hours not relieved by OTC loperamide.   Constipation: causes, ways to manage, dietary changes, when to call MD Provided supplementary handout with instructions for use of docusate and other OTC therapies to manage constipation.  Instructed to call us if medications aren't working.   Nausea/Vomiting: cause, use of antiemetics, dietary changes, when to call MD • Emetic risk: Low-Minimal  • PRN home meds: Ondansetron  • Pharmacy home meds sent to: [facility]    Instructed the patient to take a dose of the PRN medication at the first onset of nausea and if it's not working to call us for additional medications.  Also provided non-drug  measures to mitigate nausea.   Mouth Sores: causes, oral care, ways to manage Mouth sores can be prevented by making a mouth wash mixture of salt, baking soda, and water. The patient was instructed to swish and spit four times daily after meals and before bedtime.  Use of a soft bristle toothbrush was recommended.  The patient was instructed to avoid alcohol-containing OTC mouthwashes.    Alopecia: cause, ways to manage, resources Discussed the possibility of hair loss with the patient. Informed patient that they could request a prescription for a wig if desired and most of the cost is usually covered by insurance. Recommended covering the head with a hat and/or protecting the skin on the head with SPF 30 or higher.  19-33% reported rate of occurence   Organ Toxicities: cause, s/s, need for diagnostic tests, labs, when to notify MD Discussed potential effects on organ systems, monitoring, diagnostic tests, labs, and when to notify their MD. Discussed the signs/symptoms of the following: hepatotoxicity and lung changes   Miscellaneous • Financial Issues: Dr Ford's MA gave the patient a month of samples today.  She also has a voucher for a free month supply from [employer].  She also signed an application today for free drug from [employer]  • Lab Draws: in 2 weeks   Infertility and Sexuality:  causes, fertility preservation options, sexuality changes, ways to manage, importance of birth control Oral Oncology Therapy: Reviewed safe sex practices and minimizing exposure to body fluids while on oral oncology therapy.   Home Care: how to manage bodily fluids Counseled on management of soiled linens and proper flush technique.  Discussed how to manage all the side effects at home and advised when to contact the MD office     Adherence and Self-Administration  • Barriers to Patient Adherence and/or Self-Administration: none  • Methods for Supporting Patient Adherence and/or Self-Administration: directed education  • Expected  duration of therapy: Until disease progression or intolerable toxicity    Goals of Therapy  • Patient Goals of Therapy:   o Consistently take medications as prescribed  o Patient will adhere to medication regimen  o Patient will report any medication side effects to healthcare provider  • Clinical Goals:    Goals     • Specialty Pharmacy General Goal      Progression free survival          o Support patient understanding of medication regimen  o Ensure patient knows the pharmacy contact information  o Schedule regular follow-up to monitor the treatment serious adverse events  o Schedule regular follow-up to confirm medication adherence  o Schedule regular follow-up to monitor disease progression or stability    Quality of Life Assessment   The patient's current (pre-therapy) quality of life was discussed with the patient. The QOL segment of this outreach has been reviewed and updated.   • Quality of Life Score: 8/10    Reassessment Plan & Follow-Up  1. Pharmacist to perform regular reassessments no more than (6) months from the previous assessment.  2. Welcome information and patient satisfaction survey to be sent by retail team with patient's initial fill.  3. Care Coordinator to set up future refill outreaches, coordinate prescription delivery, and escalate clinical questions to pharmacist.     Additional Plans, Therapy Recommendations or Therapy Problems to Be Addressed:  We discussed the addition of Xgeva today.  She was counseled to notify her dentist of taking this medication and to ask Dr Ford about holding xgeva prior to any dental work due to risk of ONJ. She verbalized understanding.     CCA and consents were signed today.     Attestation  I attest that the initiated specialty medication(s) are appropriate for the patient based on my assessment.  If the prescribed therapy is at any point deemed not appropriate based on the current or future assessments, a consultation will be initiated with the patient's  specialty care provider to determine the best course of action. The revised plan of therapy will be documented along with any additional patient education provided.     Name/Credentials Susana Zepeda RPh, MADHU  In person encounter    Date and Time: 5/22/2023  14:43 EDT

## 2023-05-22 NOTE — PROGRESS NOTES
TREATMENT  PREPARATION    Nedra Roberts  [id number]  1945    Chief Complaint: Treatment preparation and needs assessment    History of present illness:  Nedra Roberts is a 77 y.o. year old female who is here today for treatment preparation and needs assessment.  The patient has been diagnosed with   Encounter Diagnoses   Name Primary?   • History of breast cancer Yes   • Malignant neoplasm metastatic to bone     and is scheduled to begin treatment with:     Oncology History:    Oncology/Hematology History   Cancer, metastatic to bone   4/14/2023 Initial Diagnosis    Cancer, metastatic to bone     5/15/2023 -  Chemotherapy    OP BREAST Arimidex / Palbociclib     6/5/2023 -  Chemotherapy    OP SUPPORTIVE Denosumab (Xgeva) Q28D         The current medication list and allergy list were reviewed and reconciled.     Past Medical History, Past Surgical History, Social History, Family History have been reviewed and are without significant changes except as mentioned.    Physical Exam:    Vitals:    05/22/23 1508   BP: 133/78   Pulse: 71   Resp: 18   Temp: 98 °F (36.7 °C)   SpO2: 94%     Vitals:    05/22/23 1508   PainSc: 0-No pain        ECOG score: 0             Physical Exam      NEEDS ASSESSMENTS    Genetics  The patient's new diagnosis and family history have been reviewed for genetic counseling needs. The patient will not be referred..     Psychosocial and Barriers to care  The patient has completed a PHQ-9 Depression Screening and the Distress Thermometer (DT) today.  PHQ-9 results show PHQ-2 Total Score: 0 PHQ-9 Total Score: PHQ-9 Total Score: 0     The patient scored their distress today as Distress Level: 2 on a scale of 0-10 with 0 being no distress and 10 being extreme distress. Problems marked by the patient as being an issue for them within the last week include   .      Results were reviewed along with psychosocial resources offered by our cancer center.  Our Supportive Oncology team will be flagged for  a score of 4 or above, and a same day call will be made for a score of 9 or 10.  A mental health referral is offered at that time. Patients who score less than 4 have been educated on our support services and can be referred to our  upon request.  The patient will not be referred to our .       Nutrition  The patient has completed the malnutrition screening today. They scored Malnutrition Screening Tool  Have you recently lost weight without trying?  If yes, how much weight have you lost?: 0--> No  Have you been eating poorly because of a decreased appetite?: 0--> No  MST score: 0   with a score of 0-1 meaning not at risk in a score of 2 or greater meaning at risk.  Patients with a score of 3 or higher will be referred to our oncology dietitian for support. Patients beginning at risk treatment regimens or who have dietary concerns will also be referred to our oncology dietitian. The patient will not be referred.    Functional Assessment  Persons who are age 70 or greater will be screened for qualification of a comprehensive geriatric assessment by our survivorship nurse practitioner.  Older adults with cancer face unique challenges. These may include an increased risk of drug reactions, financial burdens, and caregiver stress. The patient scored G8 Questionnaire  Has food intake declined over the past 3 months due to loss of appetite, digestive problems, chewing or swallowing difficulties?: Severe decrease in food intake  Weight loss during the last 3 months: No weight loss  Mobility: Goes out  Neuropsychological Problems: No psychological problems  Body Mass Index (BMI (weight in kg) / (height in m2)): BMI 23 and > 23  Takes more than 3 medications a day: Yes, takes more than 3 prescription drugs per day  In comparison with other people of the same age, how does the patient consider his/her health status?: Better  Age: < 80  Total Score: 14 . Patients scoring 14 or lower will referred for  "an older adult functional assessment with the survivorship advanced practice registered nurse to ensure all needed support is provided as patients plan for their treatments. The patient will not be referred.    Intravenous Access Assessment  The patient and I discussed planned intravenous chemo/biotherapy as well as other IV treatments that are often needed throughout the course of treatment. These may include, but are not limited to blood transfusions, antibiotics, and IV hydration. Discussed that depending on selected treatment and vein assessment, patient may require venous access device (VAD) which could include but not limited to a Mediport or PICC line. Risks and benefits of VADs reviewed. The patient will be treated via Oral Treatment.    Reproductive/Sexual Activity   People should avoid becoming pregnant and should not get a partner pregnant while undergoing chemo/biotherapy.  People of childbearing age should use effective contraception during active therapy. The best recommendation for all people is to use a barrier method for a minimum of 1 week after the last infusion of chemo/biotherapy to prevent your partner being exposed to byproducts from treatment medications in bodily fluids. Effective contraception should be discussed with your oncology team to make sure it is safe to take based on your diagnosis. Possible options include oral contraceptives, barrier methods. Chemo/biotherapy can change your ability to reproduce children in the future.  There are options for fertility preservation. NOT APPLICABLE    Advanced Care Planning  Advance Care Planning   The patient and I discussed advanced care planning, \"Conversations that Matter\".   This service is offered for development of advance directives with a certified ACP facilitator.  The patient does have an up-to-date advanced directive. This document is on file with our office. The patient is not interested in an appointment with one of our facilitators " to create or update their advanced directives.    Have you reviewed your Advance Directive and is it valid for this stay?: No (will bring updated copy)  Patient Requests Assistance on Advance Directives: Patient Declined          Smoking cessation  Tobacco Use: Medium Risk   • Smoking Tobacco Use: Former   • Smokeless Tobacco Use: Never   • Passive Exposure: Not on file       Patient and I discussed their tobacco use history. Referral will not be made for smoking cessation.      Palliative Care  When appropriate, the patient and I discussed the availability palliative care services and when appropriate Hospice care. Palliative care is not the same as Hospice care which was explained to the patient.NOT APPLICABLE.    Survivorship   When appropriate, we discussed that we will refer the patient to survivorship clinic to discuss next steps following completion of planned treatment.  Reviewed this visit will include assessment of your physical, psychological, functional, and spiritual needs as a survivor and the need at attend this visit when scheduled.    Assessment and Plan:    Diagnoses and all orders for this visit:    1. History of breast cancer (Primary)    2. Malignant neoplasm metastatic to bone      No orders of the defined types were placed in this encounter.    1. Needs assessment was completed where applicable including genetics, psychosocial needs, barriers to care, VAD evaluation, advanced care planning, survivorship, and palliative care services where indicated. Referrals have been ordered as appropriate based upon evaluation today and patient desires.   2. Adequate time was given to answer questions.  Patient made aware of their care team members and contact information if they have questions or problems throughout the treatment course.  3. Discussion held and written information provided describing frequency of office visits and ongoing monitoring throughout the treatment plan.     4. Reviewed with  patient any prescribed medication sent to pharmacy.  Education provided regarding proper storage, safe handling, and proper disposal of unused medication.  5. Proper handling of body fluids and waste discussed and written information provided.  6. If appropriate, patient had pretreatment labs drawn today.    Learning assessment completed at initial patient encounter.    I spent 16 minutes caring for Nedra on this date of service. This time includes time spent by me in the following activities: preparing for the visit, reviewing tests, obtaining and/or reviewing a separately obtained history and documenting information in the medical record.     Sonia Alanis, ANGELA   05/22/23

## 2023-05-24 ENCOUNTER — DOCUMENTATION (OUTPATIENT)
Dept: PHARMACY | Facility: HOSPITAL | Age: 78
End: 2023-05-24
Payer: MEDICARE

## 2023-05-24 NOTE — PROGRESS NOTES
Mrs. Roberts informed me that Bill advised her to call them back around 6/2 to schedule the first shipment of Ibrance, since she's received samples from our office.     Shira Bunn - Care Coordinator   5/24/2023  09:09 EDT

## 2023-05-31 ENCOUNTER — SPECIALTY PHARMACY (OUTPATIENT)
Dept: PHARMACY | Facility: HOSPITAL | Age: 78
End: 2023-05-31

## 2023-05-31 LAB
LAB AP CASE REPORT: NORMAL
LAB AP CLINICAL INFORMATION: NORMAL
LAB AP DIAGNOSIS COMMENT: NORMAL
LAB AP SPECIAL STAINS: NORMAL
LAB AP SYNOPTIC CHECKLIST: NORMAL
Lab: NORMAL
Lab: NORMAL
PATH REPORT.ADDENDUM SPEC: NORMAL
PATH REPORT.FINAL DX SPEC: NORMAL
PATH REPORT.GROSS SPEC: NORMAL

## 2023-05-31 NOTE — PROGRESS NOTES
Specialty Note ( ibrance and Arimidex)    Call placed to assess adherence and toxicity- no answer.   direct line [phone].

## 2023-06-05 ENCOUNTER — LAB (OUTPATIENT)
Dept: LAB | Facility: HOSPITAL | Age: 78
End: 2023-06-05
Payer: MEDICARE

## 2023-06-05 ENCOUNTER — OFFICE VISIT (OUTPATIENT)
Dept: ONCOLOGY | Facility: CLINIC | Age: 78
End: 2023-06-05
Payer: MEDICARE

## 2023-06-05 ENCOUNTER — INFUSION (OUTPATIENT)
Dept: ONCOLOGY | Facility: HOSPITAL | Age: 78
End: 2023-06-05
Payer: MEDICARE

## 2023-06-05 VITALS
SYSTOLIC BLOOD PRESSURE: 115 MMHG | OXYGEN SATURATION: 95 % | WEIGHT: 159.3 LBS | TEMPERATURE: 98 F | DIASTOLIC BLOOD PRESSURE: 73 MMHG | HEIGHT: 64 IN | HEART RATE: 82 BPM | BODY MASS INDEX: 27.2 KG/M2 | RESPIRATION RATE: 16 BRPM

## 2023-06-05 DIAGNOSIS — C50.912 CARCINOMA OF LEFT BREAST METASTATIC TO BONE: ICD-10-CM

## 2023-06-05 DIAGNOSIS — C79.51 CANCER, METASTATIC TO BONE: Primary | ICD-10-CM

## 2023-06-05 DIAGNOSIS — Z79.811 AROMATASE INHIBITOR USE: ICD-10-CM

## 2023-06-05 DIAGNOSIS — Z79.899 ENCOUNTER FOR LONG-TERM (CURRENT) USE OF OTHER MEDICATIONS: ICD-10-CM

## 2023-06-05 DIAGNOSIS — Z79.899 HIGH RISK MEDICATION USE: ICD-10-CM

## 2023-06-05 DIAGNOSIS — R79.89 ELEVATED SERUM CREATININE: ICD-10-CM

## 2023-06-05 DIAGNOSIS — C79.51 CANCER, METASTATIC TO BONE: ICD-10-CM

## 2023-06-05 DIAGNOSIS — C79.51 CARCINOMA OF LEFT BREAST METASTATIC TO BONE: ICD-10-CM

## 2023-06-05 LAB
ALBUMIN SERPL-MCNC: 4.5 G/DL (ref 3.5–5.2)
ALBUMIN/GLOB SERPL: 1.8 G/DL (ref 1.1–2.4)
ALP SERPL-CCNC: 90 U/L (ref 38–116)
ALT SERPL W P-5'-P-CCNC: 13 U/L (ref 0–33)
ANION GAP SERPL CALCULATED.3IONS-SCNC: 12.6 MMOL/L (ref 5–15)
AST SERPL-CCNC: 18 U/L (ref 0–32)
BASOPHILS # BLD AUTO: 0.02 10*3/MM3 (ref 0–0.2)
BASOPHILS NFR BLD AUTO: 0.6 % (ref 0–1.5)
BILIRUB SERPL-MCNC: 0.3 MG/DL (ref 0.2–1.2)
BUN SERPL-MCNC: 21 MG/DL (ref 6–20)
BUN/CREAT SERPL: 15.9 (ref 7.3–30)
CALCIUM SPEC-SCNC: 9.6 MG/DL (ref 8.5–10.2)
CANCER AG15-3 SERPL-ACNC: 295 U/ML
CHLORIDE SERPL-SCNC: 103 MMOL/L (ref 98–107)
CO2 SERPL-SCNC: 24.4 MMOL/L (ref 22–29)
CREAT SERPL-MCNC: 1.32 MG/DL (ref 0.6–1.1)
DEPRECATED RDW RBC AUTO: 42.2 FL (ref 37–54)
EGFRCR SERPLBLD CKD-EPI 2021: 41.7 ML/MIN/1.73
EOSINOPHIL # BLD AUTO: 0.02 10*3/MM3 (ref 0–0.4)
EOSINOPHIL NFR BLD AUTO: 0.6 % (ref 0.3–6.2)
ERYTHROCYTE [DISTWIDTH] IN BLOOD BY AUTOMATED COUNT: 12.5 % (ref 12.3–15.4)
GLOBULIN UR ELPH-MCNC: 2.5 GM/DL (ref 1.8–3.5)
GLUCOSE SERPL-MCNC: 137 MG/DL (ref 74–124)
HCT VFR BLD AUTO: 36.3 % (ref 34–46.6)
HGB BLD-MCNC: 11.6 G/DL (ref 12–15.9)
IMM GRANULOCYTES # BLD AUTO: 0.01 10*3/MM3 (ref 0–0.05)
IMM GRANULOCYTES NFR BLD AUTO: 0.3 % (ref 0–0.5)
LYMPHOCYTES # BLD AUTO: 1.28 10*3/MM3 (ref 0.7–3.1)
LYMPHOCYTES NFR BLD AUTO: 36.5 % (ref 19.6–45.3)
MAGNESIUM SERPL-MCNC: 2.1 MG/DL (ref 1.8–2.5)
MCH RBC QN AUTO: 30.2 PG (ref 26.6–33)
MCHC RBC AUTO-ENTMCNC: 32 G/DL (ref 31.5–35.7)
MCV RBC AUTO: 94.5 FL (ref 79–97)
MONOCYTES # BLD AUTO: 0.12 10*3/MM3 (ref 0.1–0.9)
MONOCYTES NFR BLD AUTO: 3.4 % (ref 5–12)
NEUTROPHILS NFR BLD AUTO: 2.06 10*3/MM3 (ref 1.7–7)
NEUTROPHILS NFR BLD AUTO: 58.6 % (ref 42.7–76)
NRBC BLD AUTO-RTO: 0 /100 WBC (ref 0–0.2)
PHOSPHATE SERPL-MCNC: 4.1 MG/DL (ref 2.5–4.5)
PLATELET # BLD AUTO: 337 10*3/MM3 (ref 140–450)
PMV BLD AUTO: 8.5 FL (ref 6–12)
POTASSIUM SERPL-SCNC: 4.5 MMOL/L (ref 3.5–4.7)
PROT SERPL-MCNC: 7 G/DL (ref 6.3–8)
RBC # BLD AUTO: 3.84 10*6/MM3 (ref 3.77–5.28)
SODIUM SERPL-SCNC: 140 MMOL/L (ref 134–145)
WBC NRBC COR # BLD: 3.51 10*3/MM3 (ref 3.4–10.8)

## 2023-06-05 PROCEDURE — 99214 OFFICE O/P EST MOD 30 MIN: CPT | Performed by: NURSE PRACTITIONER

## 2023-06-05 PROCEDURE — 86300 IMMUNOASSAY TUMOR CA 15-3: CPT | Performed by: INTERNAL MEDICINE

## 2023-06-05 PROCEDURE — 85025 COMPLETE CBC W/AUTO DIFF WBC: CPT

## 2023-06-05 PROCEDURE — 1125F AMNT PAIN NOTED PAIN PRSNT: CPT | Performed by: NURSE PRACTITIONER

## 2023-06-05 PROCEDURE — 3074F SYST BP LT 130 MM HG: CPT | Performed by: NURSE PRACTITIONER

## 2023-06-05 PROCEDURE — 3078F DIAST BP <80 MM HG: CPT | Performed by: NURSE PRACTITIONER

## 2023-06-05 PROCEDURE — 80053 COMPREHEN METABOLIC PANEL: CPT

## 2023-06-05 PROCEDURE — 83735 ASSAY OF MAGNESIUM: CPT

## 2023-06-05 PROCEDURE — 84100 ASSAY OF PHOSPHORUS: CPT

## 2023-06-05 PROCEDURE — 36415 COLL VENOUS BLD VENIPUNCTURE: CPT

## 2023-06-05 PROCEDURE — 96372 THER/PROPH/DIAG INJ SC/IM: CPT

## 2023-06-05 PROCEDURE — 25010000002 DENOSUMAB 120 MG/1.7ML SOLUTION: Performed by: NURSE PRACTITIONER

## 2023-06-05 RX ADMIN — DENOSUMAB 120 MG: 120 INJECTION SUBCUTANEOUS at 15:50

## 2023-06-05 NOTE — PATIENT INSTRUCTIONS
Shira Bunn pharmacy technician [phone], call with questions regarding refills or cost of Ibrance    Begin 800-1,000 IU daily; continue calcium through using TUMS as you are doing

## 2023-06-05 NOTE — PROGRESS NOTES
Subjective    Follow-up for metastatic breast cancer to bone      History of Present Illness    The patient return today for toxicity check status post initiation of Ibrance 100 mg 21 days on followed by 7 days off.  Patient reports she is completing her second week of Ibrance at this point.  She also continues on anastrozole.  Patient denies any known side effects from the Ibrance except may be slightly slower bowels.  She denies any skin changes, new pain, nausea, or other concerns.  She did contact the pharmacy about getting her Ibrance refilled and was told she would have a co-pay of over $3000.  She thought that this would be covered and therefore is asking questions regarding this today.    Oncology history:  Mrs. Roberts is a jeffry 78 y.o. woman with a history of stage I breast cancer affecting the left breast ([place] 5/9, 1.5 cm, ER 90%, TX 70%, HER2 2+/negative FISH).  She underwent lumpectomy and 4 cycles of adjuvant chemotherapy with TC.  Her tumor was estrogen receptor positive and she underwent hormonal therapy with Arimidex between May 2009 in May 2014.    The patient saw her primary care provider on 3/21/2023 and complained of low back pain for which plain films were performed suspicious for malignancy.  Whole-body bone scan on 3/20/2023 showed multifocal uptake in the calvarium, ribs, cervical, thoracic and lumbar spine, sacrum, pelvis, humeri and proximal femora.  PET scan on 4/10/2023 showed multiple hypermetabolic bone lesions including the lumbar spine, thoracic spine, most numerous and intense in the pelvic bones and proximal femurs maximum SUV 5.7 pelvis and 6.4 proximal right femur.  The hypermetabolic foci are mixed lytic/sclerotic.  There is no hypermetabolic lymphadenopathy or visceral disease.  She has been started on anastrozole 1 mg daily.        Past Medical History:  Pertinent for hypertension, acid reflux, hyperlipidemia, hypothyroidism    Review of Systems   Constitutional:  "Negative.    Respiratory: Negative.     Cardiovascular: Negative.    Gastrointestinal:  Negative for abdominal pain.   Musculoskeletal:  Positive for arthralgias.   Skin: Negative.    Hematological: Negative.    Psychiatric/Behavioral:  The patient is nervous/anxious.  ROS unchanged-5/12/2023      Medications:  The current medication list was reviewed in the EMR    ALLERGIES:  No Known Allergies    Objective      Vitals:    06/05/23 1439   BP: 115/73   Pulse: 82   Resp: 16   Temp: 98 °F (36.7 °C)   TempSrc: Temporal   SpO2: 95%   Weight: 72.3 kg (159 lb 4.8 oz)   Height: 162.6 cm (64.02\")   PainSc:   2   PainLoc: Knee  Comment: back         6/5/2023     2:24 PM   Current Status   ECOG score 0       Physical Exam    CONSTITUTIONAL: pleasant well-developed adult woman  HEENT: no icterus, no thrush, moist membranes, tender right mastoid with nodule  LYMPH: no cervical or supraclavicular lad  MUSC: no edema, normal gait  NEURO: alert and oriented x3, normal strength  PSYCH: normal mood and affect for situation  SKIN: no pallor or rash  Exam otherwise unchanged-6/5/2023    RECENT LABS:  Results from last 7 days   Lab Units 06/05/23  1418   WBC 10*3/mm3 3.51   NEUTROS ABS 10*3/mm3 2.06   HEMOGLOBIN g/dL 11.6*   HEMATOCRIT % 36.3   PLATELETS 10*3/mm3 337              WBC   Date Value Ref Range Status   06/05/2023 3.51 3.40 - 10.80 10*3/mm3 Final   02/22/2023 9.32 3.40 - 10.80 10*3/mm3 Final     RBC   Date Value Ref Range Status   06/05/2023 3.84 3.77 - 5.28 10*6/mm3 Final   02/22/2023 4.24 3.77 - 5.28 10*6/mm3 Final     Hemoglobin   Date Value Ref Range Status   06/05/2023 11.6 (L) 12.0 - 15.9 g/dL Final     Hematocrit   Date Value Ref Range Status   06/05/2023 36.3 34.0 - 46.6 % Final     MCV   Date Value Ref Range Status   06/05/2023 94.5 79.0 - 97.0 fL Final     MCH   Date Value Ref Range Status   06/05/2023 30.2 26.6 - 33.0 pg Final     MCHC   Date Value Ref Range Status   06/05/2023 32.0 31.5 - 35.7 g/dL Final     RDW "   Date Value Ref Range Status   06/05/2023 12.5 12.3 - 15.4 % Final     RDW-SD   Date Value Ref Range Status   06/05/2023 42.2 37.0 - 54.0 fl Final     MPV   Date Value Ref Range Status   06/05/2023 8.5 6.0 - 12.0 fL Final     Platelets   Date Value Ref Range Status   06/05/2023 337 140 - 450 10*3/mm3 Final     Neutrophil %   Date Value Ref Range Status   06/05/2023 58.6 42.7 - 76.0 % Final     Lymphocyte %   Date Value Ref Range Status   06/05/2023 36.5 19.6 - 45.3 % Final     Monocyte %   Date Value Ref Range Status   06/05/2023 3.4 (L) 5.0 - 12.0 % Final     Eosinophil %   Date Value Ref Range Status   06/05/2023 0.6 0.3 - 6.2 % Final     Basophil %   Date Value Ref Range Status   06/05/2023 0.6 0.0 - 1.5 % Final     Immature Grans %   Date Value Ref Range Status   06/05/2023 0.3 0.0 - 0.5 % Final     Neutrophils, Absolute   Date Value Ref Range Status   06/05/2023 2.06 1.70 - 7.00 10*3/mm3 Final     Lymphocytes, Absolute   Date Value Ref Range Status   06/05/2023 1.28 0.70 - 3.10 10*3/mm3 Final     Monocytes, Absolute   Date Value Ref Range Status   06/05/2023 0.12 0.10 - 0.90 10*3/mm3 Final     Eosinophils, Absolute   Date Value Ref Range Status   06/05/2023 0.02 0.00 - 0.40 10*3/mm3 Final     Basophils, Absolute   Date Value Ref Range Status   06/05/2023 0.02 0.00 - 0.20 10*3/mm3 Final     Immature Grans, Absolute   Date Value Ref Range Status   06/05/2023 0.01 0.00 - 0.05 10*3/mm3 Final     nRBC   Date Value Ref Range Status   06/05/2023 0.0 0.0 - 0.2 /100 WBC Final           PET scan 4/10/2023:  IMPRESSION:  1. Multiple hypermetabolic lytic and mixed lytic and sclerotic bone  metastases as described. The activity at the inferior aspect of the  right mastoid process may possibly represent a metastasis, but the  low-level activity at the adjacent subcutaneous fat is of uncertain  etiology. There is no definitive fluid at the right mastoid air cells to  suggest acute mastoiditis, but please correlate clinically  and follow-up  as needed.  2. There is no evidence for metastatic lymphadenopathy or visceral  Metastases.    I personally reviewed the pet images from 4/10/2023-the patient has significant metastatic disease to bone most prominent in the lumbar spine and pelvic bones    Bilateral mammogram 11/9/2022: No evidence of malignancy    DEXA Bone Density Axial 4/25/2023 - IMPRESSION: Osteopenia at the hips    XR Femur 2 View Bilateral 4/26/2023 - IMPRESSION: As described.        Assessment & Plan   *history of stage I breast cancer affecting the left breast ([place] 5/9, 1.5 cm, ER 90%, NE 70%, HER2 2+/negative FISH).    She underwent lumpectomy and 4 cycles of adjuvant chemotherapy with TC.  Her tumor was estrogen receptor positive and she underwent hormonal therapy with Arimidex between May 2009 in May 2014.    *Radiographic studies consistent with metastatic disease to bone with sclerotic/lytic lesions involving spine (most prominent lumbar), scapula, pelvic bones, proximal femurs  Initiated Arimidex April 2023 4/26/2023 CT-guided bone biopsy left iliac metastatic ductal adenocarcinoma of the breast ER 99% strongly positive, NE 31 to 40% weakly positive, HER2 2+/FISH negative  4/14/2023 CA 15-3 significantly elevated 764  Patient initiated Ibrance around 5/22/2023 100 mg daily for 3 weeks on, 1 week off  Patient returns 6/5/2023 having completed 2 weeks thus far of Ibrance 100 mg daily for 1 week remaining.  She denies any side effects such as nausea or significant bowel changes.  She reports she is eating and drinking well and her weight is actually up today.  We will proceed with initiation of Xgeva today.  Patient does continue times daily, taking 2-4 each day.  She will start vitamin D.    *Pain of malignancy-mild at this point.  Patient has Tylenol and Ultram at home to use if needed.  Has not taken as of yet.    *Evaded creatinine.  6/5/2023 creatinine resulted after seeing patient at 1.32 with BUN 21.   Patient's baseline has been around 0.9.  Patient asked to avoid NSAIDs and increase water intake.  We will recheck next week.    Oncology plan/recommendations:  Continue anastrozole 1 mg daily  Continue Ibrance 100 mg daily, days 1 through 21 every 28 days.  If she tolerates well with no significant myelosuppression, consider increasing to 125 mg with subsequent cycles.  Proceed with initiation of Xgeva today  Patient takes Tums, 2-4 daily.  Will begin vitamin D 800 to 1000 international units daily  Follow-up [facility] next generation sequencing  She has extra-strength Tylenol or Ultram at home if needed for pain  Return in 1 week for BMP with nurse review  Patient asked to increase fluids and avoid NSAIDs  Follow-up in 2 weeks CBC CMP  with nurse practitioner visit  Follow-up in 4 weeks with CBC, CMP, CA 15-3, Xgeva, and Dr. Ford                6/5/2023      CC:

## 2023-06-06 ENCOUNTER — TELEPHONE (OUTPATIENT)
Dept: ONCOLOGY | Facility: CLINIC | Age: 78
End: 2023-06-06
Payer: MEDICARE

## 2023-06-06 NOTE — TELEPHONE ENCOUNTER
----- Message from ANGELA Duncan sent at 6/5/2023  4:20 PM EDT -----  Please let patient know her ca15-3 is coming down nicely.  Remind her to increase fluids and we will recheck her BMP next week.

## 2023-06-07 ENCOUNTER — DOCUMENTATION (OUTPATIENT)
Dept: PHARMACY | Facility: HOSPITAL | Age: 78
End: 2023-06-07
Payer: MEDICARE

## 2023-06-07 NOTE — PROGRESS NOTES
I was included in a SECURE CHAT message regarding scheduling Ibrance for Ms. Roberts.    I spoke with Lauren at [facility] ([facility] transferred the prescription to [facility]) and asked her to reprocess the prescription using the Free Trial Voucher that we provided back in May. She reprocessed it and got a $0 co-pay. She said that the pharmacy team would be reaching out to schedule the shipment.    I sent Ms. Roberts a text message via the [employer] explaining all of the above.        Shira Bunn - Care Coordinator   6/7/2023  09:26 EDT

## 2023-06-12 ENCOUNTER — DOCUMENTATION (OUTPATIENT)
Dept: PHARMACY | Facility: HOSPITAL | Age: 78
End: 2023-06-12
Payer: MEDICARE

## 2023-06-12 ENCOUNTER — TELEPHONE (OUTPATIENT)
Dept: INTERNAL MEDICINE | Facility: CLINIC | Age: 78
End: 2023-06-12
Payer: MEDICARE

## 2023-06-12 ENCOUNTER — SPECIALTY PHARMACY (OUTPATIENT)
Dept: PHARMACY | Facility: HOSPITAL | Age: 78
End: 2023-06-12
Payer: MEDICARE

## 2023-06-12 NOTE — PROGRESS NOTES
Pt has been approved for FREE Ibrance through [employer] until 12/31/2023.       Lor Hernandez  Specialty Pharmacy Technician

## 2023-06-12 NOTE — PROGRESS NOTES
MTM Encounter (06/12/2023): Re: Quick adherence and side effect check-in   Today's encounter was conducted via telephone.     Medication:  Ibrance 100 mg daily for 21 days on and 7 days off and Arimidex 1 mg daily  -Diagnosis: breast cancer  - Reason for outreach: check in on patient for possible side effects/adherence/concerns  - Administration: Patient currently taking 100 mg daily as prescribed. Has reported no missed doses recently.   -No barriers to adherence identified.   - Medication availability/affordability: Patient has had no issues obtaining medication from pharmacy.  - Questions/concerns about medications: Patient expressed no concerns. Mentioned some constipation, unsure what it is related to. Was advised to try MiraLax if the constipation continues.       Completed medication reconciliation today to assess for drug interactions.   Patient denies starting or stopping any medications.  Assessed medication list for interactions, no significant interactions noted.  Advised pt to call the clinic if any new medications are started so we can assess for drug-drug interactions.      All questions addressed. Patient had no additional concerns for MTM office.      Angie Holden, PharmD Candidate

## 2023-06-12 NOTE — TELEPHONE ENCOUNTER
PATIENT CALLED STATING THAT HER INSURANCE, [employer], WILL GIVE HER ADDITIONAL BENEFITS IF SHE HAS BEEN DIAGNOSED WITH A CHRONIC CONDITION.    THE PATIENT HAS BEEN DIAGNOSED WITH CANCER, AND VASILE WILL BE SENDING OVER AN ELECTRONIC APPLICATION FOR DR. SANDOVAL TO FILL OUT, TO VERIFY THE DIAGNOSIS.    SHE WANTED THE OFFICE TO BE AWARE AND KEEP AN EYE OUT FOR THAT APPLICATION.    CALL BACK IF NEEDED:  [phone]

## 2023-06-12 NOTE — PROGRESS NOTES
MTM Encounter: re: adherence and side effect check in     Called patient for routine adherence and side effect check-in but did not get an answer. I have left a voicemail requesting she call the MTM office at her convenience to discuss her medication.     Angie Holden PharmD Candidate     
I will STOP taking the medications listed below when I get home from the hospital:  None

## 2023-06-12 NOTE — PROGRESS NOTES
Pt was asking about her delivery during a tox call with Angie. I contacted [facility] and spoke to Scout. I inquired on the delivery and she states the package is out for delivery for today by 7 om.    [facility] tracking # [id number]    Pt notified of the above and provided the tracking number.    She was also asking about her Xgeva. She states she looked up pricing and is worries about cost. I will connect her with Tala Adame. Pt will be in the office tomorrow afternoon.      Lor Hernandez  Specialty Pharmacy Technician

## 2023-06-13 ENCOUNTER — SPECIALTY PHARMACY (OUTPATIENT)
Dept: PHARMACY | Facility: HOSPITAL | Age: 78
End: 2023-06-13
Payer: MEDICARE

## 2023-06-13 ENCOUNTER — CLINICAL SUPPORT (OUTPATIENT)
Dept: ONCOLOGY | Facility: HOSPITAL | Age: 78
End: 2023-06-13
Payer: MEDICARE

## 2023-06-13 ENCOUNTER — LAB (OUTPATIENT)
Dept: LAB | Facility: HOSPITAL | Age: 78
End: 2023-06-13
Payer: MEDICARE

## 2023-06-13 ENCOUNTER — DOCUMENTATION (OUTPATIENT)
Dept: PHARMACY | Facility: HOSPITAL | Age: 78
End: 2023-06-13
Payer: MEDICARE

## 2023-06-13 DIAGNOSIS — C50.912 CARCINOMA OF LEFT BREAST METASTATIC TO BONE: ICD-10-CM

## 2023-06-13 DIAGNOSIS — R79.89 ELEVATED SERUM CREATININE: ICD-10-CM

## 2023-06-13 DIAGNOSIS — C79.51 CANCER, METASTATIC TO BONE: ICD-10-CM

## 2023-06-13 DIAGNOSIS — Z79.899 HIGH RISK MEDICATION USE: ICD-10-CM

## 2023-06-13 DIAGNOSIS — C79.51 CARCINOMA OF LEFT BREAST METASTATIC TO BONE: ICD-10-CM

## 2023-06-13 LAB
ANION GAP SERPL CALCULATED.3IONS-SCNC: 10.4 MMOL/L (ref 5–15)
BASOPHILS # BLD AUTO: 0.02 10*3/MM3 (ref 0–0.2)
BASOPHILS NFR BLD AUTO: 0.6 % (ref 0–1.5)
BUN SERPL-MCNC: 12 MG/DL (ref 6–20)
BUN/CREAT SERPL: 12.1 (ref 7.3–30)
CALCIUM SPEC-SCNC: 8.7 MG/DL (ref 8.5–10.2)
CHLORIDE SERPL-SCNC: 106 MMOL/L (ref 98–107)
CO2 SERPL-SCNC: 24.6 MMOL/L (ref 22–29)
CREAT SERPL-MCNC: 0.99 MG/DL (ref 0.6–1.1)
DEPRECATED RDW RBC AUTO: 41.3 FL (ref 37–54)
EGFRCR SERPLBLD CKD-EPI 2021: 58.8 ML/MIN/1.73
EOSINOPHIL # BLD AUTO: 0.02 10*3/MM3 (ref 0–0.4)
EOSINOPHIL NFR BLD AUTO: 0.6 % (ref 0.3–6.2)
ERYTHROCYTE [DISTWIDTH] IN BLOOD BY AUTOMATED COUNT: 12.8 % (ref 12.3–15.4)
GLUCOSE SERPL-MCNC: 125 MG/DL (ref 74–124)
HCT VFR BLD AUTO: 31.5 % (ref 34–46.6)
HGB BLD-MCNC: 10.2 G/DL (ref 12–15.9)
IMM GRANULOCYTES # BLD AUTO: 0.02 10*3/MM3 (ref 0–0.05)
IMM GRANULOCYTES NFR BLD AUTO: 0.6 % (ref 0–0.5)
LYMPHOCYTES # BLD AUTO: 1.15 10*3/MM3 (ref 0.7–3.1)
LYMPHOCYTES NFR BLD AUTO: 31.9 % (ref 19.6–45.3)
MCH RBC QN AUTO: 30 PG (ref 26.6–33)
MCHC RBC AUTO-ENTMCNC: 32.4 G/DL (ref 31.5–35.7)
MCV RBC AUTO: 92.6 FL (ref 79–97)
MONOCYTES # BLD AUTO: 0.16 10*3/MM3 (ref 0.1–0.9)
MONOCYTES NFR BLD AUTO: 4.4 % (ref 5–12)
NEUTROPHILS NFR BLD AUTO: 2.23 10*3/MM3 (ref 1.7–7)
NEUTROPHILS NFR BLD AUTO: 61.9 % (ref 42.7–76)
NRBC BLD AUTO-RTO: 0 /100 WBC (ref 0–0.2)
PLATELET # BLD AUTO: 162 10*3/MM3 (ref 140–450)
PMV BLD AUTO: 8.8 FL (ref 6–12)
POTASSIUM SERPL-SCNC: 4.1 MMOL/L (ref 3.5–4.7)
RBC # BLD AUTO: 3.4 10*6/MM3 (ref 3.77–5.28)
SODIUM SERPL-SCNC: 141 MMOL/L (ref 134–145)
WBC NRBC COR # BLD: 3.6 10*3/MM3 (ref 3.4–10.8)

## 2023-06-13 PROCEDURE — 36415 COLL VENOUS BLD VENIPUNCTURE: CPT

## 2023-06-13 PROCEDURE — 85025 COMPLETE CBC W/AUTO DIFF WBC: CPT

## 2023-06-13 PROCEDURE — 80048 BASIC METABOLIC PNL TOTAL CA: CPT

## 2023-06-13 PROCEDURE — G0463 HOSPITAL OUTPT CLINIC VISIT: HCPCS

## 2023-06-13 NOTE — PROGRESS NOTES
Re: Refills of Oral Specialty Medication - Ibrance (palbociclib)    Drug-Drug Interactions: The current medication list was reviewed and there are no relevant drug-drug interactions.  Medication Allergies: The patient has NKDA  Review of Labs/Dose Adjustments: NO DOSE CHANGE - I reviewed the most recent note and labs and the patient will continue without any dose changes.  I sent refills as described below.    Drug: Ibrance (palbociclib)  Strength: 100 mg  Directions: Take 1 tablet by mouth  daily for 21 days on, then 7 days off  Quantity: 21  Refills: 5  Pharmacy prescription sent to: [facility]    Name/Credentials Susana Ashford RPh, MADHU    6/13/2023  15:21 EDT    Completed independent double check on medication order/RX.   Katharine Jc, AlmaD, BCPS

## 2023-06-13 NOTE — PROGRESS NOTES
Patient wanted to report a few things: Weight gain, constipation, pain/feeling of fullness more so after she's eating. Cost of Xgeva is of concern to her. She also notes that she is eligible for enhanced benefits with [employer] if she can provide proof of chronic condition, so we may be hearing from [facility] about that. Tala Adame will reach out to her tomorrow.    Patient is here for lab review with RN. CMP reviewed and Creatinine improved. Copy of labs given to patient and follow up appointment reviewed. Patient is instructed to call the office with any concerns or new symptoms prior to next visit. Patient verbalized understanding and discharged in stable condition.    Lab Results   Component Value Date    GLUCOSE 125 (H) 06/13/2023    BUN 12 06/13/2023    CREATININE 0.99 06/13/2023    EGFRRESULT 59.9 (L) 02/22/2023    EGFR 58.8 (L) 06/13/2023    BCR 12.1 06/13/2023    K 4.1 06/13/2023    CO2 24.6 06/13/2023    CALCIUM 8.7 06/13/2023    PROTENTOTREF 7.0 02/22/2023    ALBUMIN 4.5 06/05/2023    BILITOT 0.3 06/05/2023    AST 18 06/05/2023    ALT 13 06/05/2023

## 2023-06-14 ENCOUNTER — SPECIALTY PHARMACY (OUTPATIENT)
Dept: PHARMACY | Facility: HOSPITAL | Age: 78
End: 2023-06-14
Payer: MEDICARE

## 2023-06-15 ENCOUNTER — DOCUMENTATION (OUTPATIENT)
Dept: PHARMACY | Facility: HOSPITAL | Age: 78
End: 2023-06-15
Payer: MEDICARE

## 2023-06-15 DIAGNOSIS — C79.51 MALIGNANT NEOPLASM METASTATIC TO BONE: ICD-10-CM

## 2023-06-15 RX ORDER — ANASTROZOLE 1 MG/1
1 TABLET ORAL DAILY
Qty: 30 TABLET | Refills: 5 | Status: SHIPPED | OUTPATIENT
Start: 2023-06-15

## 2023-06-15 NOTE — PROGRESS NOTES
Per Jing with [employer], Mrs. Roberts's Ibrance shipment will be scheduled to arrive to her home on 6/19/23.  Jing will call Mrs. Roberts to confirm whether or not she wants the shipment to require a signature.    I informed Mrs. Roberts of all of the above. She V/U.    I have also received a fax notice of a shipping confirmation from [employer].    Shira Bunn - Care Coordinator   6/15/2023  11:11 EDT

## 2023-06-19 ENCOUNTER — OFFICE VISIT (OUTPATIENT)
Dept: ONCOLOGY | Facility: CLINIC | Age: 78
End: 2023-06-19
Payer: MEDICARE

## 2023-06-19 VITALS
DIASTOLIC BLOOD PRESSURE: 76 MMHG | HEIGHT: 64 IN | SYSTOLIC BLOOD PRESSURE: 127 MMHG | HEART RATE: 79 BPM | OXYGEN SATURATION: 94 % | TEMPERATURE: 97.8 F | RESPIRATION RATE: 16 BRPM | WEIGHT: 160 LBS | BODY MASS INDEX: 27.31 KG/M2

## 2023-06-19 DIAGNOSIS — Z79.899 HIGH RISK MEDICATION USE: ICD-10-CM

## 2023-06-19 DIAGNOSIS — C79.51 MALIGNANT NEOPLASM METASTATIC TO BONE: Primary | ICD-10-CM

## 2023-06-19 DIAGNOSIS — C79.51 CARCINOMA OF LEFT BREAST METASTATIC TO BONE: ICD-10-CM

## 2023-06-19 DIAGNOSIS — Z79.811 AROMATASE INHIBITOR USE: ICD-10-CM

## 2023-06-19 DIAGNOSIS — C79.51 CANCER, METASTATIC TO BONE: ICD-10-CM

## 2023-06-19 DIAGNOSIS — C50.912 CARCINOMA OF LEFT BREAST METASTATIC TO BONE: ICD-10-CM

## 2023-06-19 PROCEDURE — 3078F DIAST BP <80 MM HG: CPT | Performed by: NURSE PRACTITIONER

## 2023-06-19 PROCEDURE — 3074F SYST BP LT 130 MM HG: CPT | Performed by: NURSE PRACTITIONER

## 2023-06-19 PROCEDURE — 1126F AMNT PAIN NOTED NONE PRSNT: CPT | Performed by: NURSE PRACTITIONER

## 2023-06-19 PROCEDURE — 99214 OFFICE O/P EST MOD 30 MIN: CPT | Performed by: NURSE PRACTITIONER

## 2023-06-19 RX ORDER — THIAMINE HCL 100 MG
TABLET ORAL DAILY
COMMUNITY

## 2023-06-19 NOTE — PROGRESS NOTES
Subjective    Follow-up for metastatic breast cancer to bone      History of Present Illness    The patient return today for toxicity check continuing on Ibrance 100 mg 21 days on followed by 7 days off.  She is due to start a new cycle tomorrow.  She continues on her anastrozole daily.  She is reporting more bloating since initiation of Ibrance.  She has been utilizing Dulcolax every few days and we discussed moving this to daily.  She does continue her pantoprazole daily.  She denies any new pain.  She denies any fevers or chills.    Oncology history:  Mrs. Roberts is a jeffry 78 y.o. woman with a history of stage I breast cancer affecting the left breast ([place] 5/9, 1.5 cm, ER 90%, [place] 70%, HER2 2+/negative FISH).  She underwent lumpectomy and 4 cycles of adjuvant chemotherapy with TC.  Her tumor was estrogen receptor positive and she underwent hormonal therapy with Arimidex between May 2009 in May 2014.    The patient saw her primary care provider on 3/21/2023 and complained of low back pain for which plain films were performed suspicious for malignancy.  Whole-body bone scan on 3/20/2023 showed multifocal uptake in the calvarium, ribs, cervical, thoracic and lumbar spine, sacrum, pelvis, humeri and proximal femora.  PET scan on 4/10/2023 showed multiple hypermetabolic bone lesions including the lumbar spine, thoracic spine, most numerous and intense in the pelvic bones and proximal femurs maximum SUV 5.7 pelvis and 6.4 proximal right femur.  The hypermetabolic foci are mixed lytic/sclerotic.  There is no hypermetabolic lymphadenopathy or visceral disease.  She has been started on anastrozole 1 mg daily.        Past Medical History:  Pertinent for hypertension, acid reflux, hyperlipidemia, hypothyroidism    Review of Systems   Constitutional: Negative.    Respiratory: Negative.     Cardiovascular: Negative.    Gastrointestinal:  Positive for abdominal distention. Negative for abdominal pain.  "  Musculoskeletal:  Positive for arthralgias.   Skin: Negative.    Hematological: Negative.    Psychiatric/Behavioral:  The patient is nervous/anxious.        Medications:  The current medication list was reviewed in the EMR    ALLERGIES:  No Known Allergies    Objective      Vitals:    06/19/23 1355   BP: 127/76   Pulse: 79   Resp: 16   Temp: 97.8 °F (36.6 °C)   TempSrc: Temporal   SpO2: 94%   Weight: 72.6 kg (160 lb)   Height: 162 cm (63.78\")   PainSc: 0-No pain         6/19/2023     1:57 PM   Current Status   ECOG score 0       Physical Exam    CONSTITUTIONAL: pleasant well-developed adult woman  HEENT: no icterus, no thrush, moist membranes, tender right mastoid with nodule  LYMPH: no cervical or supraclavicular lad  MUSC: no edema, normal gait  NEURO: alert and oriented x3, normal strength  ABD: Soft, nontender to palpation, no hepatosplenomegaly noted  PSYCH: normal mood and affect for situation  SKIN: no pallor or rash  Exam otherwise unchanged-6/19/2023    RECENT LABS:  Results from last 7 days   Lab Units 06/19/23  1345 06/13/23  1513   WBC 10*3/mm3 2.78* 3.60   NEUTROS ABS 10*3/mm3 1.34* 2.23   HEMOGLOBIN g/dL 11.2* 10.2*   HEMATOCRIT % 34.0 31.5*   PLATELETS 10*3/mm3 297 162     Results from last 7 days   Lab Units 06/13/23  1513   SODIUM mmol/L 141   POTASSIUM mmol/L 4.1   CHLORIDE mmol/L 106   CO2 mmol/L 24.6   BUN mg/dL 12   CREATININE mg/dL 0.99   CALCIUM mg/dL 8.7   GLUCOSE mg/dL 125*      WBC   Date Value Ref Range Status   06/19/2023 2.78 (L) 3.40 - 10.80 10*3/mm3 Final   02/22/2023 9.32 3.40 - 10.80 10*3/mm3 Final     RBC   Date Value Ref Range Status   06/19/2023 3.62 (L) 3.77 - 5.28 10*6/mm3 Final   02/22/2023 4.24 3.77 - 5.28 10*6/mm3 Final     Hemoglobin   Date Value Ref Range Status   06/19/2023 11.2 (L) 12.0 - 15.9 g/dL Final     Hematocrit   Date Value Ref Range Status   06/19/2023 34.0 34.0 - 46.6 % Final     MCV   Date Value Ref Range Status   06/19/2023 93.9 79.0 - 97.0 fL Final     MCH "   Date Value Ref Range Status   06/19/2023 30.9 26.6 - 33.0 pg Final     MCHC   Date Value Ref Range Status   06/19/2023 32.9 31.5 - 35.7 g/dL Final     RDW   Date Value Ref Range Status   06/19/2023 13.9 12.3 - 15.4 % Final     RDW-SD   Date Value Ref Range Status   06/19/2023 42.9 37.0 - 54.0 fl Final     MPV   Date Value Ref Range Status   06/19/2023 8.4 6.0 - 12.0 fL Final     Platelets   Date Value Ref Range Status   06/19/2023 297 140 - 450 10*3/mm3 Final     Neutrophil %   Date Value Ref Range Status   06/19/2023 48.2 42.7 - 76.0 % Final     Lymphocyte %   Date Value Ref Range Status   06/19/2023 37.8 19.6 - 45.3 % Final     Monocyte %   Date Value Ref Range Status   06/19/2023 12.2 (H) 5.0 - 12.0 % Final     Eosinophil %   Date Value Ref Range Status   06/19/2023 0.7 0.3 - 6.2 % Final     Basophil %   Date Value Ref Range Status   06/19/2023 0.7 0.0 - 1.5 % Final     Immature Grans %   Date Value Ref Range Status   06/19/2023 0.4 0.0 - 0.5 % Final     Neutrophils, Absolute   Date Value Ref Range Status   06/19/2023 1.34 (L) 1.70 - 7.00 10*3/mm3 Final     Lymphocytes, Absolute   Date Value Ref Range Status   06/19/2023 1.05 0.70 - 3.10 10*3/mm3 Final     Monocytes, Absolute   Date Value Ref Range Status   06/19/2023 0.34 0.10 - 0.90 10*3/mm3 Final     Eosinophils, Absolute   Date Value Ref Range Status   06/19/2023 0.02 0.00 - 0.40 10*3/mm3 Final     Basophils, Absolute   Date Value Ref Range Status   06/19/2023 0.02 0.00 - 0.20 10*3/mm3 Final     Immature Grans, Absolute   Date Value Ref Range Status   06/19/2023 0.01 0.00 - 0.05 10*3/mm3 Final     nRBC   Date Value Ref Range Status   06/19/2023 0.0 0.0 - 0.2 /100 WBC Final           PET scan 4/10/2023:  IMPRESSION:  1. Multiple hypermetabolic lytic and mixed lytic and sclerotic bone  metastases as described. The activity at the inferior aspect of the  right mastoid process may possibly represent a metastasis, but the  low-level activity at the adjacent  subcutaneous fat is of uncertain  etiology. There is no definitive fluid at the right mastoid air cells to  suggest acute mastoiditis, but please correlate clinically and follow-up  as needed.  2. There is no evidence for metastatic lymphadenopathy or visceral  Metastases.    I personally reviewed the pet images from 4/10/2023-the patient has significant metastatic disease to bone most prominent in the lumbar spine and pelvic bones    Bilateral mammogram 11/9/2022: No evidence of malignancy    DEXA Bone Density Axial 4/25/2023 - IMPRESSION: Osteopenia at the hips    XR Femur 2 View Bilateral 4/26/2023 - IMPRESSION: As described.        Assessment & Plan   *history of stage I breast cancer affecting the left breast ([place] 5/9, 1.5 cm, ER 90%, DC 70%, HER2 2+/negative FISH).    She underwent lumpectomy and 4 cycles of adjuvant chemotherapy with TC.  Her tumor was estrogen receptor positive and she underwent hormonal therapy with Arimidex between May 2009 in May 2014.    *Radiographic studies consistent with metastatic disease to bone with sclerotic/lytic lesions involving spine (most prominent lumbar), scapula, pelvic bones, proximal femurs  Initiated Arimidex April 2023 4/26/2023 CT-guided bone biopsy left iliac metastatic ductal adenocarcinoma of the breast ER 99% strongly positive, DC 31 to 40% weakly positive, HER2 2+/FISH negative  4/14/2023 CA 15-3 significantly elevated 764  Patient initiated Ibrance around 5/22/2023 100 mg daily for 3 weeks on, 1 week off  Patient returns 6/5/2023 having completed 2 weeks thus far of Ibrance 100 mg daily for 1 week remaining.  She denies any side effects such as nausea or significant bowel changes.  She reports she is eating and drinking well and her weight is actually up today.  We will proceed with initiation of Xgeva today.  Patient does continue tums daily, taking 2-4 each day.  She will start vitamin D.  6/19/2022 patient returns continue on Ibrance 100 mg daily,  due to restart tomorrow.  She denies any nausea or vomiting.  She has struggled with constipation and is utilizing Dulcolax every 2 to 3 days.  We discussed moving to daily dosing.  She denies any recent fevers or chills.  Her ANC today is 1.34 and therefore we will maintain her current dosing of Ibrance and monitor her counts this cycle.    *Pain of malignancy-mild at this point.  Patient has Tylenol and Ultram at home to use if needed.  Has not taken as of yet.    *Evaded creatinine.    6/5/2023 creatinine resulted after seeing patient at 1.32 with BUN 21.  Patient's baseline has been around 0.9.  Patient asked to avoid NSAIDs and increase water intake.   6/13/2023 creatinine rechecked at 0.99  6/19/2023 creatinine stable at 0.99    Oncology plan/recommendations:  Continue anastrozole 1 mg daily  Continue Ibrance 100 mg daily, days 1 through 21 every 28 days.  Could consider increasing to 125 mg subsequent cycles pending counts.  Today  Continue Xgeva monthly   Patient takes Tums, 2-4 daily.  Will begin vitamin D 800 to 1000 international units daily  Follow-up [facility] next generation sequencing  She has extra-strength Tylenol or Ultram at home if needed for pain  Return in 1 week for BMP with nurse review  Patient asked to increase fluids and avoid NSAIDs  Follow-up in 2 weeks with CBC, CMP, CA 15-3, Xgeva, and Dr. Ford                6/19/2023      CC:

## 2023-07-31 ENCOUNTER — LAB (OUTPATIENT)
Dept: LAB | Facility: HOSPITAL | Age: 78
End: 2023-07-31
Payer: MEDICARE

## 2023-07-31 ENCOUNTER — INFUSION (OUTPATIENT)
Dept: ONCOLOGY | Facility: HOSPITAL | Age: 78
End: 2023-07-31
Payer: MEDICARE

## 2023-07-31 DIAGNOSIS — C79.51 CANCER, METASTATIC TO BONE: Primary | ICD-10-CM

## 2023-07-31 DIAGNOSIS — C79.51 CANCER, METASTATIC TO BONE: ICD-10-CM

## 2023-07-31 DIAGNOSIS — Z79.899 ENCOUNTER FOR LONG-TERM (CURRENT) USE OF OTHER MEDICATIONS: ICD-10-CM

## 2023-07-31 LAB
ALBUMIN SERPL-MCNC: 4 G/DL (ref 3.5–5.2)
ALBUMIN/GLOB SERPL: 1.9 G/DL
ALP SERPL-CCNC: 60 U/L (ref 39–117)
ALT SERPL W P-5'-P-CCNC: 8 U/L (ref 1–33)
ANION GAP SERPL CALCULATED.3IONS-SCNC: 16.2 MMOL/L (ref 5–15)
AST SERPL-CCNC: 16 U/L (ref 1–32)
BASOPHILS # BLD AUTO: 0.04 10*3/MM3 (ref 0–0.2)
BASOPHILS NFR BLD AUTO: 1.5 % (ref 0–1.5)
BILIRUB SERPL-MCNC: 0.2 MG/DL (ref 0–1.2)
BUN SERPL-MCNC: 14 MG/DL (ref 8–23)
BUN/CREAT SERPL: 13.1 (ref 7–25)
CALCIUM SPEC-SCNC: 8 MG/DL (ref 8.6–10.5)
CHLORIDE SERPL-SCNC: 109 MMOL/L (ref 98–107)
CO2 SERPL-SCNC: 20.8 MMOL/L (ref 22–29)
CREAT SERPL-MCNC: 1.07 MG/DL (ref 0.6–1.1)
DEPRECATED RDW RBC AUTO: 58.8 FL (ref 37–54)
EGFRCR SERPLBLD CKD-EPI 2021: 53.6 ML/MIN/1.73
EOSINOPHIL # BLD AUTO: 0.04 10*3/MM3 (ref 0–0.4)
EOSINOPHIL NFR BLD AUTO: 1.5 % (ref 0.3–6.2)
ERYTHROCYTE [DISTWIDTH] IN BLOOD BY AUTOMATED COUNT: 16.8 % (ref 12.3–15.4)
GLOBULIN UR ELPH-MCNC: 2.1 GM/DL
GLUCOSE SERPL-MCNC: 96 MG/DL (ref 65–99)
HCT VFR BLD AUTO: 31.2 % (ref 34–46.6)
HGB BLD-MCNC: 10.4 G/DL (ref 12–15.9)
IMM GRANULOCYTES # BLD AUTO: 0 10*3/MM3 (ref 0–0.05)
IMM GRANULOCYTES NFR BLD AUTO: 0 % (ref 0–0.5)
LYMPHOCYTES # BLD AUTO: 0.97 10*3/MM3 (ref 0.7–3.1)
LYMPHOCYTES NFR BLD AUTO: 36.2 % (ref 19.6–45.3)
MAGNESIUM SERPL-MCNC: 2.3 MG/DL (ref 1.6–2.4)
MCH RBC QN AUTO: 32.8 PG (ref 26.6–33)
MCHC RBC AUTO-ENTMCNC: 33.3 G/DL (ref 31.5–35.7)
MCV RBC AUTO: 98.4 FL (ref 79–97)
MONOCYTES # BLD AUTO: 0.17 10*3/MM3 (ref 0.1–0.9)
MONOCYTES NFR BLD AUTO: 6.3 % (ref 5–12)
NEUTROPHILS NFR BLD AUTO: 1.46 10*3/MM3 (ref 1.7–7)
NEUTROPHILS NFR BLD AUTO: 54.5 % (ref 42.7–76)
NRBC BLD AUTO-RTO: 0 /100 WBC (ref 0–0.2)
PHOSPHATE SERPL-MCNC: 2.5 MG/DL (ref 2.5–4.5)
PLATELET # BLD AUTO: 500 10*3/MM3 (ref 140–450)
PMV BLD AUTO: 8.4 FL (ref 6–12)
POTASSIUM SERPL-SCNC: 4.2 MMOL/L (ref 3.5–5.2)
PROT SERPL-MCNC: 6.1 G/DL (ref 6–8.5)
RBC # BLD AUTO: 3.17 10*6/MM3 (ref 3.77–5.28)
SODIUM SERPL-SCNC: 146 MMOL/L (ref 136–145)
WBC NRBC COR # BLD: 2.68 10*3/MM3 (ref 3.4–10.8)

## 2023-07-31 PROCEDURE — 83735 ASSAY OF MAGNESIUM: CPT

## 2023-07-31 PROCEDURE — 36415 COLL VENOUS BLD VENIPUNCTURE: CPT

## 2023-07-31 PROCEDURE — 85025 COMPLETE CBC W/AUTO DIFF WBC: CPT

## 2023-07-31 PROCEDURE — G0463 HOSPITAL OUTPT CLINIC VISIT: HCPCS

## 2023-07-31 PROCEDURE — 80053 COMPREHEN METABOLIC PANEL: CPT

## 2023-07-31 PROCEDURE — 84100 ASSAY OF PHOSPHORUS: CPT

## 2023-08-08 NOTE — PROGRESS NOTES
Subjective    Follow-up for metastatic breast cancer to bone      History of Present Illness    The patient return today for follow-up continuing Arimidex plus Ibrance and monthly Xgeva for metastatic ER positive breast cancer to bone.  She is tolerating the treatment well.  She has occasional nausea controlled with Zofran but causes constipation.  Pain is controlled with occasional Tylenol.    Oncology history:  Mrs. Roberts is a jeffry 78 y.o. woman with a history of stage I breast cancer affecting the left breast ([place] 5/9, 1.5 cm, ER 90%, [place] 70%, HER2 2+/negative FISH).  She underwent lumpectomy and 4 cycles of adjuvant chemotherapy with TC.  Her tumor was estrogen receptor positive and she underwent hormonal therapy with Arimidex between May 2009 in May 2014.    The patient saw her primary care provider on 3/21/2023 and complained of low back pain for which plain films were performed suspicious for malignancy.  Whole-body bone scan on 3/20/2023 showed multifocal uptake in the calvarium, ribs, cervical, thoracic and lumbar spine, sacrum, pelvis, humeri and proximal femora.  PET scan on 4/10/2023 showed multiple hypermetabolic bone lesions including the lumbar spine, thoracic spine, most numerous and intense in the pelvic bones and proximal femurs maximum SUV 5.7 pelvis and 6.4 proximal right femur.  The hypermetabolic foci are mixed lytic/sclerotic.  There is no hypermetabolic lymphadenopathy or visceral disease.  She has been started on anastrozole 1 mg daily and Ibrance 100 mg days 1-21 q. 28 days.    The patient is tolerating treatment well.  She denies overt nausea vomiting diarrhea more on the constipated side.  She denies cough or shortness of breath.  Her pain is fairly minimal mostly in the left hip with walking.  Previous back pain has improved.        Past Medical History:  Pertinent for hypertension, acid reflux, hyperlipidemia, hypothyroidism    Review of Systems   Constitutional: Negative.   "  Respiratory: Negative.     Cardiovascular: Negative.    Gastrointestinal:  Negative for abdominal pain.   Musculoskeletal:  Positive for arthralgias.   Skin: Negative.    Hematological: Negative.    Psychiatric/Behavioral:  Positive for sleep disturbance. The patient is not nervous/anxious.        Medications:  The current medication list was reviewed in the EMR    ALLERGIES:  No Known Allergies    Objective      Vitals:    08/14/23 1624   BP: 156/81   Pulse: 81   Resp: 16   Temp: 97.8 øF (36.6 øC)   TempSrc: Temporal   SpO2: 97%   Weight: 70.5 kg (155 lb 8 oz)   Height: 162.6 cm (64.02\")   PainSc: 0-No pain         8/14/2023     4:03 PM   Current Status   ECOG score 0       Physical Exam    CONSTITUTIONAL: pleasant well-developed adult woman  HEENT: no icterus, no thrush, moist membranes, tender right mastoid with decreased size of a nodule  LYMPH: no cervical or supraclavicular lad  MUSC: no edema, normal gait  NEURO: alert and oriented x3, normal strength  PSYCH: normal mood and affect for situation  SKIN: no pallor or rash  Exam otherwise unchanged-8/14/2023  RECENT LABS:  Results from last 7 days   Lab Units 08/14/23  1611   WBC 10*3/mm3 3.15*   NEUTROS ABS 10*3/mm3 1.30*   HEMOGLOBIN g/dL 10.2*   HEMATOCRIT % 30.0*   PLATELETS 10*3/mm3 270     Results from last 7 days   Lab Units 08/14/23  1611   MAGNESIUM mg/dL 2.2      WBC   Date Value Ref Range Status   08/14/2023 3.15 (L) 3.40 - 10.80 10*3/mm3 Final   02/22/2023 9.32 3.40 - 10.80 10*3/mm3 Final     RBC   Date Value Ref Range Status   08/14/2023 2.99 (L) 3.77 - 5.28 10*6/mm3 Final   02/22/2023 4.24 3.77 - 5.28 10*6/mm3 Final     Hemoglobin   Date Value Ref Range Status   08/14/2023 10.2 (L) 12.0 - 15.9 g/dL Final     Hematocrit   Date Value Ref Range Status   08/14/2023 30.0 (L) 34.0 - 46.6 % Final     MCV   Date Value Ref Range Status   08/14/2023 100.3 (H) 79.0 - 97.0 fL Final     MCH   Date Value Ref Range Status   08/14/2023 34.1 (H) 26.6 - 33.0 pg " Final     MCHC   Date Value Ref Range Status   08/14/2023 34.0 31.5 - 35.7 g/dL Final     RDW   Date Value Ref Range Status   08/14/2023 17.5 (H) 12.3 - 15.4 % Final     RDW-SD   Date Value Ref Range Status   08/14/2023 64.2 (H) 37.0 - 54.0 fl Final     MPV   Date Value Ref Range Status   08/14/2023 8.9 6.0 - 12.0 fL Final     Platelets   Date Value Ref Range Status   08/14/2023 270 140 - 450 10*3/mm3 Final     Neutrophil %   Date Value Ref Range Status   08/14/2023 41.4 (L) 42.7 - 76.0 % Final     Lymphocyte %   Date Value Ref Range Status   08/14/2023 46.3 (H) 19.6 - 45.3 % Final     Monocyte %   Date Value Ref Range Status   08/14/2023 9.8 5.0 - 12.0 % Final     Eosinophil %   Date Value Ref Range Status   08/14/2023 0.6 0.3 - 6.2 % Final     Basophil %   Date Value Ref Range Status   08/14/2023 1.3 0.0 - 1.5 % Final     Immature Grans %   Date Value Ref Range Status   08/14/2023 0.6 (H) 0.0 - 0.5 % Final     Neutrophils, Absolute   Date Value Ref Range Status   08/14/2023 1.30 (L) 1.70 - 7.00 10*3/mm3 Final     Lymphocytes, Absolute   Date Value Ref Range Status   08/14/2023 1.46 0.70 - 3.10 10*3/mm3 Final     Monocytes, Absolute   Date Value Ref Range Status   08/14/2023 0.31 0.10 - 0.90 10*3/mm3 Final     Eosinophils, Absolute   Date Value Ref Range Status   08/14/2023 0.02 0.00 - 0.40 10*3/mm3 Final     Basophils, Absolute   Date Value Ref Range Status   08/14/2023 0.04 0.00 - 0.20 10*3/mm3 Final     Immature Grans, Absolute   Date Value Ref Range Status   08/14/2023 0.02 0.00 - 0.05 10*3/mm3 Final     nRBC   Date Value Ref Range Status   08/14/2023 0.0 0.0 - 0.2 /100 WBC Final           PET scan 4/10/2023:  IMPRESSION:  1. Multiple hypermetabolic lytic and mixed lytic and sclerotic bone  metastases as described. The activity at the inferior aspect of the  right mastoid process may possibly represent a metastasis, but the  low-level activity at the adjacent subcutaneous fat is of uncertain  etiology. There is  no definitive fluid at the right mastoid air cells to  suggest acute mastoiditis, but please correlate clinically and follow-up  as needed.  2. There is no evidence for metastatic lymphadenopathy or visceral  Metastases.    I personally reviewed the pet images from 4/10/2023-the patient has significant metastatic disease to bone most prominent in the lumbar spine and pelvic bones    Bilateral mammogram 11/9/2022: No evidence of malignancy    DEXA Bone Density Axial 4/25/2023 - IMPRESSION: Osteopenia at the hips    XR Femur 2 View Bilateral 4/26/2023 - IMPRESSION: As described.    Bone scan 8/14/2023-multiple areas of uptake consistent with metastatic disease with intensity improvement in several locations compared to 3/20/2023    Assessment & Plan   *history of stage I breast cancer affecting the left breast ([place] 5/9, 1.5 cm, ER 90%, TN 70%, HER2 2+/negative FISH).    She underwent lumpectomy and 4 cycles of adjuvant chemotherapy with TC.  Her tumor was estrogen receptor positive and she underwent hormonal therapy with Arimidex between May 2009 in May 2014.    *Radiographic studies consistent with metastatic disease to bone with sclerotic/lytic lesions involving spine (most prominent lumbar), scapula, pelvic bones, proximal femurs  Initiated Arimidex April 2023; Ibrance subsequently added 100 mg D1-21 q. 28 days  4/26/2023 CT-guided bone biopsy left iliac metastatic ductal adenocarcinoma of the breast ER 99% strongly positive, TN 31 to 40% weakly positive, HER2 2+/FISH negative; [facility] next generation sequencing available in the chart  CA 15-3 significantly elevated 764  CA 15-3 decreased to 295 on 6/5/2023  Repeat bone scan 8/14/2023-improved intensity uptake multiple locations, no new sites identified    *Pain of malignancy-mild at this point, using extra treatment Tylenol as needed    *Hypophosphatemia    Oncology plan/recommendations:  Continue anastrozole 1 mg daily  Continue Ibrance 100 mg daily 1  through 21 q. 28 days   Continue Xgeva monthly with lab monitoring-delay 2 weeks secondary to low phosphorus, patient instructed to increase phosphorus rich foods  She has extra-strength Tylenol or Ultram at home if needed for pain  Follow-up 2 weeks CBC repeat Xgeva labs and Xgeva if lab appropriate  6-week lab and MD plus Xgeva  7.  Genetics referral                8/14/2023      CC:

## 2023-08-11 ENCOUNTER — PATIENT MESSAGE (OUTPATIENT)
Dept: ONCOLOGY | Facility: CLINIC | Age: 78
End: 2023-08-11
Payer: MEDICARE

## 2023-08-14 ENCOUNTER — HOSPITAL ENCOUNTER (OUTPATIENT)
Dept: NUCLEAR MEDICINE | Facility: HOSPITAL | Age: 78
Discharge: HOME | End: 2023-08-14
Payer: MEDICARE

## 2023-08-14 ENCOUNTER — INFUSION (OUTPATIENT)
Dept: ONCOLOGY | Facility: HOSPITAL | Age: 78
End: 2023-08-14
Payer: MEDICARE

## 2023-08-14 ENCOUNTER — OFFICE VISIT (OUTPATIENT)
Dept: ONCOLOGY | Facility: CLINIC | Age: 78
End: 2023-08-14
Payer: MEDICARE

## 2023-08-14 ENCOUNTER — LAB (OUTPATIENT)
Dept: LAB | Facility: HOSPITAL | Age: 78
End: 2023-08-14
Payer: MEDICARE

## 2023-08-14 VITALS
HEIGHT: 64 IN | WEIGHT: 155.5 LBS | TEMPERATURE: 97.8 F | BODY MASS INDEX: 26.55 KG/M2 | DIASTOLIC BLOOD PRESSURE: 81 MMHG | HEART RATE: 81 BPM | RESPIRATION RATE: 16 BRPM | OXYGEN SATURATION: 97 % | SYSTOLIC BLOOD PRESSURE: 156 MMHG

## 2023-08-14 DIAGNOSIS — R97.8 OTHER ABNORMAL TUMOR MARKERS: ICD-10-CM

## 2023-08-14 DIAGNOSIS — D61.810 ANTINEOPLASTIC CHEMOTHERAPY INDUCED PANCYTOPENIA: ICD-10-CM

## 2023-08-14 DIAGNOSIS — C79.51 CANCER, METASTATIC TO BONE: ICD-10-CM

## 2023-08-14 DIAGNOSIS — Z79.899 ENCOUNTER FOR LONG-TERM (CURRENT) USE OF OTHER MEDICATIONS: ICD-10-CM

## 2023-08-14 DIAGNOSIS — T45.1X5A ANTINEOPLASTIC CHEMOTHERAPY INDUCED PANCYTOPENIA: ICD-10-CM

## 2023-08-14 DIAGNOSIS — C79.51 CANCER, METASTATIC TO BONE: Primary | ICD-10-CM

## 2023-08-14 LAB
ALBUMIN SERPL-MCNC: 4.1 G/DL (ref 3.5–5.2)
ALBUMIN/GLOB SERPL: 2.2 G/DL
ALP SERPL-CCNC: 62 U/L (ref 39–117)
ALT SERPL W P-5'-P-CCNC: 14 U/L (ref 1–33)
ANION GAP SERPL CALCULATED.3IONS-SCNC: 16 MMOL/L (ref 5–15)
AST SERPL-CCNC: 22 U/L (ref 1–32)
BASOPHILS # BLD AUTO: 0.04 10*3/MM3 (ref 0–0.2)
BASOPHILS NFR BLD AUTO: 1.3 % (ref 0–1.5)
BILIRUB SERPL-MCNC: 0.2 MG/DL (ref 0–1.2)
BUN SERPL-MCNC: 13 MG/DL (ref 8–23)
BUN/CREAT SERPL: 13.7 (ref 7–25)
CALCIUM SPEC-SCNC: 8.7 MG/DL (ref 8.6–10.5)
CHLORIDE SERPL-SCNC: 108 MMOL/L (ref 98–107)
CO2 SERPL-SCNC: 18 MMOL/L (ref 22–29)
CREAT SERPL-MCNC: 0.95 MG/DL (ref 0.6–1.1)
DEPRECATED RDW RBC AUTO: 64.2 FL (ref 37–54)
EGFRCR SERPLBLD CKD-EPI 2021: 61.8 ML/MIN/1.73
EOSINOPHIL # BLD AUTO: 0.02 10*3/MM3 (ref 0–0.4)
EOSINOPHIL NFR BLD AUTO: 0.6 % (ref 0.3–6.2)
ERYTHROCYTE [DISTWIDTH] IN BLOOD BY AUTOMATED COUNT: 17.5 % (ref 12.3–15.4)
GLOBULIN UR ELPH-MCNC: 1.9 GM/DL
GLUCOSE SERPL-MCNC: 125 MG/DL (ref 65–99)
HCT VFR BLD AUTO: 30 % (ref 34–46.6)
HGB BLD-MCNC: 10.2 G/DL (ref 12–15.9)
IMM GRANULOCYTES # BLD AUTO: 0.02 10*3/MM3 (ref 0–0.05)
IMM GRANULOCYTES NFR BLD AUTO: 0.6 % (ref 0–0.5)
LYMPHOCYTES # BLD AUTO: 1.46 10*3/MM3 (ref 0.7–3.1)
LYMPHOCYTES NFR BLD AUTO: 46.3 % (ref 19.6–45.3)
MAGNESIUM SERPL-MCNC: 2.2 MG/DL (ref 1.6–2.4)
MCH RBC QN AUTO: 34.1 PG (ref 26.6–33)
MCHC RBC AUTO-ENTMCNC: 34 G/DL (ref 31.5–35.7)
MCV RBC AUTO: 100.3 FL (ref 79–97)
MONOCYTES # BLD AUTO: 0.31 10*3/MM3 (ref 0.1–0.9)
MONOCYTES NFR BLD AUTO: 9.8 % (ref 5–12)
NEUTROPHILS NFR BLD AUTO: 1.3 10*3/MM3 (ref 1.7–7)
NEUTROPHILS NFR BLD AUTO: 41.4 % (ref 42.7–76)
NRBC BLD AUTO-RTO: 0 /100 WBC (ref 0–0.2)
PHOSPHATE SERPL-MCNC: 1.7 MG/DL (ref 2.5–4.5)
PLATELET # BLD AUTO: 270 10*3/MM3 (ref 140–450)
PMV BLD AUTO: 8.9 FL (ref 6–12)
POTASSIUM SERPL-SCNC: 4 MMOL/L (ref 3.5–4.7)
PROT SERPL-MCNC: 6 G/DL (ref 6–8.5)
RBC # BLD AUTO: 2.99 10*6/MM3 (ref 3.77–5.28)
SODIUM SERPL-SCNC: 142 MMOL/L (ref 134–145)
WBC NRBC COR # BLD: 3.15 10*3/MM3 (ref 3.4–10.8)

## 2023-08-14 PROCEDURE — 78306 BONE IMAGING WHOLE BODY: CPT

## 2023-08-14 PROCEDURE — 84100 ASSAY OF PHOSPHORUS: CPT

## 2023-08-14 PROCEDURE — 0 TECHNETIUM MEDRONATE KIT: Performed by: INTERNAL MEDICINE

## 2023-08-14 PROCEDURE — 85025 COMPLETE CBC W/AUTO DIFF WBC: CPT

## 2023-08-14 PROCEDURE — 83735 ASSAY OF MAGNESIUM: CPT

## 2023-08-14 PROCEDURE — 36415 COLL VENOUS BLD VENIPUNCTURE: CPT

## 2023-08-14 PROCEDURE — A9503 TC99M MEDRONATE: HCPCS | Performed by: INTERNAL MEDICINE

## 2023-08-14 PROCEDURE — G0463 HOSPITAL OUTPT CLINIC VISIT: HCPCS

## 2023-08-14 PROCEDURE — 80053 COMPREHEN METABOLIC PANEL: CPT

## 2023-08-14 RX ORDER — TC 99M MEDRONATE 20 MG/10ML
20.9 INJECTION, POWDER, LYOPHILIZED, FOR SOLUTION INTRAVENOUS
Status: COMPLETED | OUTPATIENT
Start: 2023-08-14 | End: 2023-08-14

## 2023-08-14 RX ADMIN — Medication 20.9 MILLICURIE: at 07:50

## 2023-08-14 NOTE — NURSING NOTE
Phosphorus is 1.7. No xgeva today come back in 2 weeks for re-check per Dr. Ford. Gave pt copy of labs and phosphorus rich food list.

## 2023-08-28 ENCOUNTER — LAB (OUTPATIENT)
Dept: LAB | Facility: HOSPITAL | Age: 78
End: 2023-08-28
Payer: MEDICARE

## 2023-08-28 ENCOUNTER — INFUSION (OUTPATIENT)
Dept: ONCOLOGY | Facility: HOSPITAL | Age: 78
End: 2023-08-28
Payer: MEDICARE

## 2023-08-28 DIAGNOSIS — Z79.899 ENCOUNTER FOR LONG-TERM (CURRENT) USE OF OTHER MEDICATIONS: ICD-10-CM

## 2023-08-28 DIAGNOSIS — Z79.899 ENCOUNTER FOR LONG-TERM (CURRENT) USE OF OTHER MEDICATIONS: Primary | ICD-10-CM

## 2023-08-28 DIAGNOSIS — C79.51 CANCER, METASTATIC TO BONE: ICD-10-CM

## 2023-08-28 LAB
ALBUMIN SERPL-MCNC: 4.2 G/DL (ref 3.5–5.2)
ALBUMIN/GLOB SERPL: 2.1 G/DL
ALP SERPL-CCNC: 63 U/L (ref 39–117)
ALT SERPL W P-5'-P-CCNC: 10 U/L (ref 1–33)
ANION GAP SERPL CALCULATED.3IONS-SCNC: 14.1 MMOL/L (ref 5–15)
AST SERPL-CCNC: 26 U/L (ref 1–32)
BASOPHILS # BLD AUTO: 0.03 10*3/MM3 (ref 0–0.2)
BASOPHILS NFR BLD AUTO: 0.7 % (ref 0–1.5)
BILIRUB SERPL-MCNC: 0.2 MG/DL (ref 0–1.2)
BUN SERPL-MCNC: 15 MG/DL (ref 8–23)
BUN/CREAT SERPL: 13.2 (ref 7–25)
CALCIUM SPEC-SCNC: 8.6 MG/DL (ref 8.6–10.5)
CHLORIDE SERPL-SCNC: 105 MMOL/L (ref 98–107)
CO2 SERPL-SCNC: 20.9 MMOL/L (ref 22–29)
CREAT SERPL-MCNC: 1.14 MG/DL (ref 0.6–1.1)
DEPRECATED RDW RBC AUTO: 63.5 FL (ref 37–54)
EGFRCR SERPLBLD CKD-EPI 2021: 49.7 ML/MIN/1.73
EOSINOPHIL # BLD AUTO: 0.04 10*3/MM3 (ref 0–0.4)
EOSINOPHIL NFR BLD AUTO: 0.9 % (ref 0.3–6.2)
ERYTHROCYTE [DISTWIDTH] IN BLOOD BY AUTOMATED COUNT: 16.9 % (ref 12.3–15.4)
GLOBULIN UR ELPH-MCNC: 2 GM/DL
GLUCOSE SERPL-MCNC: 137 MG/DL (ref 65–99)
HCT VFR BLD AUTO: 30.1 % (ref 34–46.6)
HGB BLD-MCNC: 10.3 G/DL (ref 12–15.9)
IMM GRANULOCYTES # BLD AUTO: 0.01 10*3/MM3 (ref 0–0.05)
IMM GRANULOCYTES NFR BLD AUTO: 0.2 % (ref 0–0.5)
LYMPHOCYTES # BLD AUTO: 0.94 10*3/MM3 (ref 0.7–3.1)
LYMPHOCYTES NFR BLD AUTO: 20.5 % (ref 19.6–45.3)
MAGNESIUM SERPL-MCNC: 2.2 MG/DL (ref 1.6–2.4)
MCH RBC QN AUTO: 35 PG (ref 26.6–33)
MCHC RBC AUTO-ENTMCNC: 34.2 G/DL (ref 31.5–35.7)
MCV RBC AUTO: 102.4 FL (ref 79–97)
MONOCYTES # BLD AUTO: 0.17 10*3/MM3 (ref 0.1–0.9)
MONOCYTES NFR BLD AUTO: 3.7 % (ref 5–12)
NEUTROPHILS NFR BLD AUTO: 3.4 10*3/MM3 (ref 1.7–7)
NEUTROPHILS NFR BLD AUTO: 74 % (ref 42.7–76)
NRBC BLD AUTO-RTO: 0 /100 WBC (ref 0–0.2)
PHOSPHATE SERPL-MCNC: 2.5 MG/DL (ref 2.5–4.5)
PLATELET # BLD AUTO: 402 10*3/MM3 (ref 140–450)
PMV BLD AUTO: 9.3 FL (ref 6–12)
POTASSIUM SERPL-SCNC: 4.6 MMOL/L (ref 3.5–5.2)
PROT SERPL-MCNC: 6.2 G/DL (ref 6–8.5)
RBC # BLD AUTO: 2.94 10*6/MM3 (ref 3.77–5.28)
SODIUM SERPL-SCNC: 140 MMOL/L (ref 136–145)
WBC NRBC COR # BLD: 4.59 10*3/MM3 (ref 3.4–10.8)

## 2023-08-28 PROCEDURE — 25010000002 DENOSUMAB 120 MG/1.7ML SOLUTION: Performed by: INTERNAL MEDICINE

## 2023-08-28 PROCEDURE — 85025 COMPLETE CBC W/AUTO DIFF WBC: CPT

## 2023-08-28 PROCEDURE — 83735 ASSAY OF MAGNESIUM: CPT

## 2023-08-28 PROCEDURE — 80053 COMPREHEN METABOLIC PANEL: CPT

## 2023-08-28 PROCEDURE — 84100 ASSAY OF PHOSPHORUS: CPT

## 2023-08-28 PROCEDURE — 36415 COLL VENOUS BLD VENIPUNCTURE: CPT

## 2023-08-28 PROCEDURE — 96372 THER/PROPH/DIAG INJ SC/IM: CPT

## 2023-08-28 RX ADMIN — DENOSUMAB 120 MG: 120 INJECTION SUBCUTANEOUS at 15:44

## 2023-08-31 NOTE — ADDENDUM NOTE
Addended by: LARISA HANEY on: 2/17/2020 10:12 AM     Modules accepted: Orders     Statement Selected

## 2023-09-11 ENCOUNTER — LAB (OUTPATIENT)
Dept: LAB | Facility: HOSPITAL | Age: 78
End: 2023-09-11
Payer: MEDICARE

## 2023-09-11 ENCOUNTER — APPOINTMENT (OUTPATIENT)
Dept: ONCOLOGY | Facility: HOSPITAL | Age: 78
End: 2023-09-11
Payer: MEDICARE

## 2023-09-11 DIAGNOSIS — D61.810 ANTINEOPLASTIC CHEMOTHERAPY INDUCED PANCYTOPENIA: ICD-10-CM

## 2023-09-11 DIAGNOSIS — Z79.899 ENCOUNTER FOR LONG-TERM (CURRENT) USE OF OTHER MEDICATIONS: ICD-10-CM

## 2023-09-11 DIAGNOSIS — R97.8 OTHER ABNORMAL TUMOR MARKERS: ICD-10-CM

## 2023-09-11 DIAGNOSIS — C79.51 CANCER, METASTATIC TO BONE: ICD-10-CM

## 2023-09-11 DIAGNOSIS — C79.51 CARCINOMA OF LEFT BREAST METASTATIC TO BONE: ICD-10-CM

## 2023-09-11 DIAGNOSIS — C50.912 CARCINOMA OF LEFT BREAST METASTATIC TO BONE: ICD-10-CM

## 2023-09-11 DIAGNOSIS — T45.1X5A ANTINEOPLASTIC CHEMOTHERAPY INDUCED PANCYTOPENIA: ICD-10-CM

## 2023-09-11 LAB
ALBUMIN SERPL-MCNC: 4.5 G/DL (ref 3.5–5.2)
ALBUMIN/GLOB SERPL: 2.1 G/DL
ALP SERPL-CCNC: 53 U/L (ref 39–117)
ALT SERPL W P-5'-P-CCNC: 13 U/L (ref 1–33)
ANION GAP SERPL CALCULATED.3IONS-SCNC: 15.8 MMOL/L (ref 5–15)
AST SERPL-CCNC: 18 U/L (ref 1–32)
BASOPHILS # BLD AUTO: 0.03 10*3/MM3 (ref 0–0.2)
BASOPHILS NFR BLD AUTO: 1 % (ref 0–1.5)
BILIRUB SERPL-MCNC: 0.2 MG/DL (ref 0–1.2)
BUN SERPL-MCNC: 17 MG/DL (ref 8–23)
BUN/CREAT SERPL: 17.2 (ref 7–25)
CALCIUM SPEC-SCNC: 9.3 MG/DL (ref 8.6–10.5)
CANCER AG15-3 SERPL-ACNC: 209 U/ML
CHLORIDE SERPL-SCNC: 105 MMOL/L (ref 98–107)
CO2 SERPL-SCNC: 22.2 MMOL/L (ref 22–29)
CREAT SERPL-MCNC: 0.99 MG/DL (ref 0.6–1.1)
DEPRECATED RDW RBC AUTO: 62.8 FL (ref 37–54)
EGFRCR SERPLBLD CKD-EPI 2021: 58.8 ML/MIN/1.73
EOSINOPHIL # BLD AUTO: 0.03 10*3/MM3 (ref 0–0.4)
EOSINOPHIL NFR BLD AUTO: 1 % (ref 0.3–6.2)
ERYTHROCYTE [DISTWIDTH] IN BLOOD BY AUTOMATED COUNT: 16 % (ref 12.3–15.4)
GLOBULIN UR ELPH-MCNC: 2.1 GM/DL
GLUCOSE SERPL-MCNC: 96 MG/DL (ref 65–99)
HCT VFR BLD AUTO: 30.1 % (ref 34–46.6)
HGB BLD-MCNC: 10.2 G/DL (ref 12–15.9)
IMM GRANULOCYTES # BLD AUTO: 0.01 10*3/MM3 (ref 0–0.05)
IMM GRANULOCYTES NFR BLD AUTO: 0.3 % (ref 0–0.5)
LYMPHOCYTES # BLD AUTO: 1.41 10*3/MM3 (ref 0.7–3.1)
LYMPHOCYTES NFR BLD AUTO: 46.8 % (ref 19.6–45.3)
MCH RBC QN AUTO: 36.2 PG (ref 26.6–33)
MCHC RBC AUTO-ENTMCNC: 33.9 G/DL (ref 31.5–35.7)
MCV RBC AUTO: 106.7 FL (ref 79–97)
MONOCYTES # BLD AUTO: 0.33 10*3/MM3 (ref 0.1–0.9)
MONOCYTES NFR BLD AUTO: 11 % (ref 5–12)
NEUTROPHILS NFR BLD AUTO: 1.2 10*3/MM3 (ref 1.7–7)
NEUTROPHILS NFR BLD AUTO: 39.9 % (ref 42.7–76)
NRBC BLD AUTO-RTO: 0 /100 WBC (ref 0–0.2)
PLATELET # BLD AUTO: 252 10*3/MM3 (ref 140–450)
PMV BLD AUTO: 8.6 FL (ref 6–12)
POTASSIUM SERPL-SCNC: 4.5 MMOL/L (ref 3.5–5.2)
PROT SERPL-MCNC: 6.6 G/DL (ref 6–8.5)
RBC # BLD AUTO: 2.82 10*6/MM3 (ref 3.77–5.28)
SODIUM SERPL-SCNC: 143 MMOL/L (ref 136–145)
WBC NRBC COR # BLD: 3.01 10*3/MM3 (ref 3.4–10.8)

## 2023-09-11 PROCEDURE — 80053 COMPREHEN METABOLIC PANEL: CPT

## 2023-09-11 PROCEDURE — 86300 IMMUNOASSAY TUMOR CA 15-3: CPT | Performed by: INTERNAL MEDICINE

## 2023-09-11 PROCEDURE — 36415 COLL VENOUS BLD VENIPUNCTURE: CPT

## 2023-09-11 PROCEDURE — 85025 COMPLETE CBC W/AUTO DIFF WBC: CPT

## 2023-09-19 NOTE — PROGRESS NOTES
Subjective    Follow-up for metastatic breast cancer to bone      History of Present Illness    The patient return today for follow-up continuing Arimidex plus Ibrance and monthly Xgeva for metastatic ER positive breast cancer to bone.  She is tolerating the treatment well.  She has occasional nausea controlled with Zofran but causes constipation.  Pain is controlled with occasional Tylenol.  She denies weight loss.    Oncology history:  Mrs. Roberts is a jeffry 78 y.o. woman with a history of stage I breast cancer affecting the left breast ([place] 5/9, 1.5 cm, ER 90%, RI 70%, HER2 2+/negative FISH).  She underwent lumpectomy and 4 cycles of adjuvant chemotherapy with TC.  Her tumor was estrogen receptor positive and she underwent hormonal therapy with Arimidex between May 2009 in May 2014.    The patient saw her primary care provider on 3/21/2023 and complained of low back pain for which plain films were performed suspicious for malignancy.  Whole-body bone scan on 3/20/2023 showed multifocal uptake in the calvarium, ribs, cervical, thoracic and lumbar spine, sacrum, pelvis, humeri and proximal femora.  PET scan on 4/10/2023 showed multiple hypermetabolic bone lesions including the lumbar spine, thoracic spine, most numerous and intense in the pelvic bones and proximal femurs maximum SUV 5.7 pelvis and 6.4 proximal right femur.  The hypermetabolic foci are mixed lytic/sclerotic.  There is no hypermetabolic lymphadenopathy or visceral disease.  She has been started on anastrozole 1 mg daily and Ibrance 100 mg days 1-21 q. 28 days.    The patient is tolerating treatment well.  She denies overt nausea vomiting diarrhea more on the constipated side.  She denies cough or shortness of breath.  Her pain is fairly minimal mostly in the left hip with walking.  Previous back pain has improved.        Past Medical History:  Pertinent for hypertension, acid reflux, hyperlipidemia, hypothyroidism    Review of Systems  "  Constitutional: Negative.    Respiratory: Negative.     Cardiovascular: Negative.    Gastrointestinal:  Negative for abdominal pain.   Musculoskeletal:  Positive for arthralgias.   Skin: Negative.    Hematological: Negative.    Psychiatric/Behavioral:  Negative for sleep disturbance. The patient is not nervous/anxious.        Medications:  The current medication list was reviewed in the EMR    ALLERGIES:  No Known Allergies    Objective      Vitals:    09/25/23 1523   BP: 148/72   Pulse: 72   Resp: 16   Temp: 98.4 °F (36.9 °C)   TempSrc: Infrared   SpO2: 96%   Weight: 71.4 kg (157 lb 6.4 oz)   Height: 162.6 cm (64.02\")   PainSc: 0-No pain         9/25/2023     3:26 PM   Current Status   ECOG score 0       Physical Exam    CONSTITUTIONAL: pleasant well-developed adult woman  HEENT: no icterus, no thrush, moist membranes, resolution of the right mastoid nodule  LYMPH: no cervical or supraclavicular lad  MUSC: no edema, normal gait  NEURO: alert and oriented x3, normal strength  PSYCH: normal mood and affect for situation  SKIN: no pallor or rash  Exam otherwise unchanged-9/25/2023  RECENT LABS:  Results from last 7 days   Lab Units 09/25/23  1510   WBC 10*3/mm3 3.07*   NEUTROS ABS 10*3/mm3 1.67*   HEMOGLOBIN g/dL 9.5*   HEMATOCRIT % 27.6*   PLATELETS 10*3/mm3 452*     Results from last 7 days   Lab Units 09/25/23  1510   SODIUM mmol/L 141   POTASSIUM mmol/L 4.1   CHLORIDE mmol/L 108*   CO2 mmol/L 20.2*   BUN mg/dL 19   CREATININE mg/dL 1.11*   CALCIUM mg/dL 8.5*   ALBUMIN g/dL 4.4   BILIRUBIN mg/dL 0.2   ALK PHOS U/L 46   ALT (SGPT) U/L 11   AST (SGOT) U/L 18   GLUCOSE mg/dL 95   MAGNESIUM mg/dL 2.3      WBC   Date Value Ref Range Status   09/25/2023 3.07 (L) 3.40 - 10.80 10*3/mm3 Final   02/22/2023 9.32 3.40 - 10.80 10*3/mm3 Final     RBC   Date Value Ref Range Status   09/25/2023 2.57 (L) 3.77 - 5.28 10*6/mm3 Final   02/22/2023 4.24 3.77 - 5.28 10*6/mm3 Final     Hemoglobin   Date Value Ref Range Status "   09/25/2023 9.5 (L) 12.0 - 15.9 g/dL Final     Hematocrit   Date Value Ref Range Status   09/25/2023 27.6 (L) 34.0 - 46.6 % Final     MCV   Date Value Ref Range Status   09/25/2023 107.4 (H) 79.0 - 97.0 fL Final     MCH   Date Value Ref Range Status   09/25/2023 37.0 (H) 26.6 - 33.0 pg Final     MCHC   Date Value Ref Range Status   09/25/2023 34.4 31.5 - 35.7 g/dL Final     RDW   Date Value Ref Range Status   09/25/2023 14.6 12.3 - 15.4 % Final     RDW-SD   Date Value Ref Range Status   09/25/2023 58.3 (H) 37.0 - 54.0 fl Final     MPV   Date Value Ref Range Status   09/25/2023 8.6 6.0 - 12.0 fL Final     Platelets   Date Value Ref Range Status   09/25/2023 452 (H) 140 - 450 10*3/mm3 Final     Neutrophil %   Date Value Ref Range Status   09/25/2023 54.4 42.7 - 76.0 % Final     Lymphocyte %   Date Value Ref Range Status   09/25/2023 36.8 19.6 - 45.3 % Final     Monocyte %   Date Value Ref Range Status   09/25/2023 6.2 5.0 - 12.0 % Final     Eosinophil %   Date Value Ref Range Status   09/25/2023 0.7 0.3 - 6.2 % Final     Basophil %   Date Value Ref Range Status   09/25/2023 1.6 (H) 0.0 - 1.5 % Final     Immature Grans %   Date Value Ref Range Status   09/25/2023 0.3 0.0 - 0.5 % Final     Neutrophils, Absolute   Date Value Ref Range Status   09/25/2023 1.67 (L) 1.70 - 7.00 10*3/mm3 Final     Lymphocytes, Absolute   Date Value Ref Range Status   09/25/2023 1.13 0.70 - 3.10 10*3/mm3 Final     Monocytes, Absolute   Date Value Ref Range Status   09/25/2023 0.19 0.10 - 0.90 10*3/mm3 Final     Eosinophils, Absolute   Date Value Ref Range Status   09/25/2023 0.02 0.00 - 0.40 10*3/mm3 Final     Basophils, Absolute   Date Value Ref Range Status   09/25/2023 0.05 0.00 - 0.20 10*3/mm3 Final     Immature Grans, Absolute   Date Value Ref Range Status   09/25/2023 0.01 0.00 - 0.05 10*3/mm3 Final     nRBC   Date Value Ref Range Status   09/25/2023 0.0 0.0 - 0.2 /100 WBC Final           PET scan 4/10/2023:  IMPRESSION:  1. Multiple  hypermetabolic lytic and mixed lytic and sclerotic bone  metastases as described. The activity at the inferior aspect of the  right mastoid process may possibly represent a metastasis, but the  low-level activity at the adjacent subcutaneous fat is of uncertain  etiology. There is no definitive fluid at the right mastoid air cells to  suggest acute mastoiditis, but please correlate clinically and follow-up  as needed.  2. There is no evidence for metastatic lymphadenopathy or visceral  Metastases.    I personally reviewed the pet images from 4/10/2023-the patient has significant metastatic disease to bone most prominent in the lumbar spine and pelvic bones    Bilateral mammogram 11/9/2022: No evidence of malignancy    DEXA Bone Density Axial 4/25/2023 - IMPRESSION: Osteopenia at the hips    XR Femur 2 View Bilateral 4/26/2023 - IMPRESSION: As described.    Bone scan 8/14/2023-multiple areas of uptake consistent with metastatic disease with intensity improvement in several locations compared to 3/20/2023    Assessment & Plan   *history of stage I breast cancer affecting the left breast ([place] 5/9, 1.5 cm, ER 90%, NH 70%, HER2 2+/negative FISH).    She underwent lumpectomy and 4 cycles of adjuvant chemotherapy with TC.  Her tumor was estrogen receptor positive and she underwent hormonal therapy with Arimidex between May 2009 in May 2014.    *Radiographic studies consistent with metastatic disease to bone with sclerotic/lytic lesions involving spine (most prominent lumbar), scapula, pelvic bones, proximal femurs  Initiated Arimidex April 2023; Ibrance subsequently added 100 mg D1-21 q. 28 days  4/26/2023 CT-guided bone biopsy left iliac metastatic ductal adenocarcinoma of the breast ER 99% strongly positive, NH 31 to 40% weakly positive, HER2 2+/FISH negative; [employer] next generation sequencing available in the chart  CA 15-3 significantly elevated 764  CA 15-3 decreased to 295 on 6/5/2023  Repeat bone scan  8/14/2023-improved intensity uptake multiple locations, no new sites identified    *Pain of malignancy-mild at this point, using extra treatment Tylenol as needed    *Hypophosphatemia/hypocalcemia    *Mild neutropenia secondary to Ibrance    *Macrocytic anemia-hemoglobin 9.5    Oncology plan/recommendations:  Continue anastrozole 1 mg daily  Continue Ibrance 100 mg daily 1 through 21 q. 28 days   Continue Xgeva monthly with lab monitoring  She has extra-strength Tylenol or Ultram at home if needed for pain  Check ferritin iron profile B12 and folate for anemia evaluation  8-week practitioner visit  7.  Genetics referral                9/25/2023      CC:

## 2023-09-25 ENCOUNTER — OFFICE VISIT (OUTPATIENT)
Dept: ONCOLOGY | Facility: CLINIC | Age: 78
End: 2023-09-25

## 2023-09-25 ENCOUNTER — LAB (OUTPATIENT)
Dept: LAB | Facility: HOSPITAL | Age: 78
End: 2023-09-25
Payer: MEDICARE

## 2023-09-25 ENCOUNTER — INFUSION (OUTPATIENT)
Dept: ONCOLOGY | Facility: HOSPITAL | Age: 78
End: 2023-09-25

## 2023-09-25 VITALS
BODY MASS INDEX: 26.87 KG/M2 | WEIGHT: 157.4 LBS | TEMPERATURE: 98.4 F | OXYGEN SATURATION: 96 % | DIASTOLIC BLOOD PRESSURE: 72 MMHG | RESPIRATION RATE: 16 BRPM | HEART RATE: 72 BPM | HEIGHT: 64 IN | SYSTOLIC BLOOD PRESSURE: 148 MMHG

## 2023-09-25 DIAGNOSIS — Z79.899 ENCOUNTER FOR LONG-TERM (CURRENT) USE OF OTHER MEDICATIONS: ICD-10-CM

## 2023-09-25 DIAGNOSIS — C79.51 CANCER, METASTATIC TO BONE: ICD-10-CM

## 2023-09-25 DIAGNOSIS — D61.810 ANTINEOPLASTIC CHEMOTHERAPY INDUCED PANCYTOPENIA: ICD-10-CM

## 2023-09-25 DIAGNOSIS — T45.1X5A ANTINEOPLASTIC CHEMOTHERAPY INDUCED PANCYTOPENIA: ICD-10-CM

## 2023-09-25 DIAGNOSIS — C79.51 CANCER, METASTATIC TO BONE: Primary | ICD-10-CM

## 2023-09-25 DIAGNOSIS — Z79.899 ENCOUNTER FOR LONG-TERM (CURRENT) USE OF OTHER MEDICATIONS: Primary | ICD-10-CM

## 2023-09-25 LAB
ALBUMIN SERPL-MCNC: 4.4 G/DL (ref 3.5–5.2)
ALBUMIN/GLOB SERPL: 2.3 G/DL
ALP SERPL-CCNC: 46 U/L (ref 39–117)
ALT SERPL W P-5'-P-CCNC: 11 U/L (ref 1–33)
ANION GAP SERPL CALCULATED.3IONS-SCNC: 12.8 MMOL/L (ref 5–15)
AST SERPL-CCNC: 18 U/L (ref 1–32)
BASOPHILS # BLD AUTO: 0.05 10*3/MM3 (ref 0–0.2)
BASOPHILS NFR BLD AUTO: 1.6 % (ref 0–1.5)
BILIRUB SERPL-MCNC: 0.2 MG/DL (ref 0–1.2)
BUN SERPL-MCNC: 19 MG/DL (ref 8–23)
BUN/CREAT SERPL: 17.1 (ref 7–25)
CALCIUM SPEC-SCNC: 8.5 MG/DL (ref 8.6–10.5)
CHLORIDE SERPL-SCNC: 108 MMOL/L (ref 98–107)
CO2 SERPL-SCNC: 20.2 MMOL/L (ref 22–29)
CREAT SERPL-MCNC: 1.11 MG/DL (ref 0.6–1.1)
DEPRECATED RDW RBC AUTO: 58.3 FL (ref 37–54)
EGFRCR SERPLBLD CKD-EPI 2021: 51.3 ML/MIN/1.73
EOSINOPHIL # BLD AUTO: 0.02 10*3/MM3 (ref 0–0.4)
EOSINOPHIL NFR BLD AUTO: 0.7 % (ref 0.3–6.2)
ERYTHROCYTE [DISTWIDTH] IN BLOOD BY AUTOMATED COUNT: 14.6 % (ref 12.3–15.4)
GLOBULIN UR ELPH-MCNC: 1.9 GM/DL
GLUCOSE SERPL-MCNC: 95 MG/DL (ref 65–99)
HCT VFR BLD AUTO: 27.6 % (ref 34–46.6)
HGB BLD-MCNC: 9.5 G/DL (ref 12–15.9)
IMM GRANULOCYTES # BLD AUTO: 0.01 10*3/MM3 (ref 0–0.05)
IMM GRANULOCYTES NFR BLD AUTO: 0.3 % (ref 0–0.5)
LYMPHOCYTES # BLD AUTO: 1.13 10*3/MM3 (ref 0.7–3.1)
LYMPHOCYTES NFR BLD AUTO: 36.8 % (ref 19.6–45.3)
MAGNESIUM SERPL-MCNC: 2.3 MG/DL (ref 1.6–2.4)
MCH RBC QN AUTO: 37 PG (ref 26.6–33)
MCHC RBC AUTO-ENTMCNC: 34.4 G/DL (ref 31.5–35.7)
MCV RBC AUTO: 107.4 FL (ref 79–97)
MONOCYTES # BLD AUTO: 0.19 10*3/MM3 (ref 0.1–0.9)
MONOCYTES NFR BLD AUTO: 6.2 % (ref 5–12)
NEUTROPHILS NFR BLD AUTO: 1.67 10*3/MM3 (ref 1.7–7)
NEUTROPHILS NFR BLD AUTO: 54.4 % (ref 42.7–76)
NRBC BLD AUTO-RTO: 0 /100 WBC (ref 0–0.2)
PHOSPHATE SERPL-MCNC: 2.5 MG/DL (ref 2.5–4.5)
PLATELET # BLD AUTO: 452 10*3/MM3 (ref 140–450)
PMV BLD AUTO: 8.6 FL (ref 6–12)
POTASSIUM SERPL-SCNC: 4.1 MMOL/L (ref 3.5–5.2)
PROT SERPL-MCNC: 6.3 G/DL (ref 6–8.5)
RBC # BLD AUTO: 2.57 10*6/MM3 (ref 3.77–5.28)
SODIUM SERPL-SCNC: 141 MMOL/L (ref 136–145)
WBC NRBC COR # BLD: 3.07 10*3/MM3 (ref 3.4–10.8)

## 2023-09-25 PROCEDURE — 83540 ASSAY OF IRON: CPT

## 2023-09-25 PROCEDURE — 80053 COMPREHEN METABOLIC PANEL: CPT

## 2023-09-25 PROCEDURE — 84466 ASSAY OF TRANSFERRIN: CPT

## 2023-09-25 PROCEDURE — 96372 THER/PROPH/DIAG INJ SC/IM: CPT

## 2023-09-25 PROCEDURE — 82728 ASSAY OF FERRITIN: CPT

## 2023-09-25 PROCEDURE — 83735 ASSAY OF MAGNESIUM: CPT

## 2023-09-25 PROCEDURE — 36415 COLL VENOUS BLD VENIPUNCTURE: CPT

## 2023-09-25 PROCEDURE — 82746 ASSAY OF FOLIC ACID SERUM: CPT | Performed by: INTERNAL MEDICINE

## 2023-09-25 PROCEDURE — 82607 VITAMIN B-12: CPT | Performed by: INTERNAL MEDICINE

## 2023-09-25 PROCEDURE — 85025 COMPLETE CBC W/AUTO DIFF WBC: CPT

## 2023-09-25 PROCEDURE — 25010000002 DENOSUMAB 120 MG/1.7ML SOLUTION: Performed by: INTERNAL MEDICINE

## 2023-09-25 PROCEDURE — 84100 ASSAY OF PHOSPHORUS: CPT

## 2023-09-25 RX ADMIN — DENOSUMAB 120 MG: 120 INJECTION SUBCUTANEOUS at 16:00

## 2023-09-25 NOTE — NURSING NOTE
OK to give Xgeva today per Dr. Ford with calcium 8.5. Pt instructed to take 2 tums daily to help increase calcium. Pt v/u.

## 2023-09-26 LAB
FERRITIN SERPL-MCNC: 166 NG/ML (ref 13–150)
FOLATE SERPL-MCNC: 9.79 NG/ML (ref 4.78–24.2)
IRON 24H UR-MRATE: 67 MCG/DL (ref 37–145)
IRON SATN MFR SERPL: 26 % (ref 20–50)
TIBC SERPL-MCNC: 255 MCG/DL (ref 298–536)
TRANSFERRIN SERPL-MCNC: 182 MG/DL (ref 200–360)
VIT B12 BLD-MCNC: <150 PG/ML (ref 211–946)

## 2023-09-27 ENCOUNTER — TELEPHONE (OUTPATIENT)
Dept: ONCOLOGY | Facility: CLINIC | Age: 78
End: 2023-09-27
Payer: MEDICARE

## 2023-09-27 DIAGNOSIS — E53.8 B12 DEFICIENCY: Primary | ICD-10-CM

## 2023-09-27 RX ORDER — CYANOCOBALAMIN 1000 UG/ML
1000 INJECTION, SOLUTION INTRAMUSCULAR; SUBCUTANEOUS ONCE
Status: CANCELLED | OUTPATIENT
Start: 2023-10-02

## 2023-09-27 NOTE — TELEPHONE ENCOUNTER
----- Message from Rocky Ford MD sent at 9/27/2023  9:49 AM EDT -----  PIP her B12 very low-needs B12 1000 mcg IM daily x5 then weekly x4 then monthly

## 2023-09-28 ENCOUNTER — DOCUMENTATION (OUTPATIENT)
Dept: PHARMACY | Facility: HOSPITAL | Age: 78
End: 2023-09-28
Payer: MEDICARE

## 2023-09-28 ENCOUNTER — INFUSION (OUTPATIENT)
Dept: ONCOLOGY | Facility: HOSPITAL | Age: 78
End: 2023-09-28
Payer: MEDICARE

## 2023-09-28 DIAGNOSIS — E53.8 B12 DEFICIENCY: Primary | ICD-10-CM

## 2023-09-28 PROCEDURE — 96372 THER/PROPH/DIAG INJ SC/IM: CPT

## 2023-09-28 PROCEDURE — 25010000002 CYANOCOBALAMIN PER 1000 MCG: Performed by: INTERNAL MEDICINE

## 2023-09-28 RX ORDER — CYANOCOBALAMIN 1000 UG/ML
1000 INJECTION, SOLUTION INTRAMUSCULAR; SUBCUTANEOUS ONCE
Status: CANCELLED | OUTPATIENT
Start: 2023-09-29

## 2023-09-28 RX ORDER — CYANOCOBALAMIN 1000 UG/ML
1000 INJECTION, SOLUTION INTRAMUSCULAR; SUBCUTANEOUS ONCE
Status: COMPLETED | OUTPATIENT
Start: 2023-09-28 | End: 2023-09-28

## 2023-09-28 RX ADMIN — CYANOCOBALAMIN 1000 MCG: 1000 INJECTION, SOLUTION INTRAMUSCULAR at 15:36

## 2023-09-28 NOTE — PROGRESS NOTES
I received a fax notice from [employer] dated 9/28/23 stating that Ibrance has shipped to the patient.     Shira Bunn - Care Coordinator   9/28/2023  09:33 EDT

## 2023-09-29 ENCOUNTER — INFUSION (OUTPATIENT)
Dept: ONCOLOGY | Facility: HOSPITAL | Age: 78
End: 2023-09-29
Payer: MEDICARE

## 2023-09-29 DIAGNOSIS — E53.8 B12 DEFICIENCY: Primary | ICD-10-CM

## 2023-09-29 PROCEDURE — 25010000002 CYANOCOBALAMIN PER 1000 MCG: Performed by: INTERNAL MEDICINE

## 2023-09-29 PROCEDURE — 96372 THER/PROPH/DIAG INJ SC/IM: CPT

## 2023-09-29 RX ORDER — CYANOCOBALAMIN 1000 UG/ML
1000 INJECTION, SOLUTION INTRAMUSCULAR; SUBCUTANEOUS ONCE
Status: CANCELLED | OUTPATIENT
Start: 2023-09-30

## 2023-09-29 RX ORDER — CYANOCOBALAMIN 1000 UG/ML
1000 INJECTION, SOLUTION INTRAMUSCULAR; SUBCUTANEOUS ONCE
Status: COMPLETED | OUTPATIENT
Start: 2023-09-29 | End: 2023-09-29

## 2023-09-29 RX ADMIN — CYANOCOBALAMIN 1000 MCG: 1000 INJECTION, SOLUTION INTRAMUSCULAR at 09:44

## 2023-09-30 ENCOUNTER — INFUSION (OUTPATIENT)
Dept: ONCOLOGY | Facility: HOSPITAL | Age: 78
End: 2023-09-30
Payer: MEDICARE

## 2023-09-30 DIAGNOSIS — E53.8 B12 DEFICIENCY: Primary | ICD-10-CM

## 2023-09-30 PROCEDURE — 25010000002 CYANOCOBALAMIN PER 1000 MCG: Performed by: INTERNAL MEDICINE

## 2023-09-30 PROCEDURE — 96372 THER/PROPH/DIAG INJ SC/IM: CPT

## 2023-09-30 RX ORDER — CYANOCOBALAMIN 1000 UG/ML
1000 INJECTION, SOLUTION INTRAMUSCULAR; SUBCUTANEOUS ONCE
Status: CANCELLED | OUTPATIENT
Start: 2023-10-01

## 2023-09-30 RX ORDER — CYANOCOBALAMIN 1000 UG/ML
1000 INJECTION, SOLUTION INTRAMUSCULAR; SUBCUTANEOUS ONCE
Status: COMPLETED | OUTPATIENT
Start: 2023-09-30 | End: 2023-09-30

## 2023-09-30 RX ADMIN — CYANOCOBALAMIN 1000 MCG: 1000 INJECTION INTRAMUSCULAR; SUBCUTANEOUS at 13:38

## 2023-10-01 ENCOUNTER — INFUSION (OUTPATIENT)
Dept: ONCOLOGY | Facility: HOSPITAL | Age: 78
End: 2023-10-01
Payer: MEDICARE

## 2023-10-01 DIAGNOSIS — E53.8 B12 DEFICIENCY: Primary | ICD-10-CM

## 2023-10-01 PROCEDURE — 25010000002 CYANOCOBALAMIN PER 1000 MCG: Performed by: INTERNAL MEDICINE

## 2023-10-01 PROCEDURE — 96372 THER/PROPH/DIAG INJ SC/IM: CPT

## 2023-10-01 RX ORDER — CYANOCOBALAMIN 1000 UG/ML
1000 INJECTION, SOLUTION INTRAMUSCULAR; SUBCUTANEOUS ONCE
Status: CANCELLED | OUTPATIENT
Start: 2023-10-02

## 2023-10-01 RX ORDER — CYANOCOBALAMIN 1000 UG/ML
1000 INJECTION, SOLUTION INTRAMUSCULAR; SUBCUTANEOUS ONCE
Status: COMPLETED | OUTPATIENT
Start: 2023-10-01 | End: 2023-10-01

## 2023-10-01 RX ADMIN — CYANOCOBALAMIN 1000 MCG: 1000 INJECTION, SOLUTION INTRAMUSCULAR; SUBCUTANEOUS at 13:06

## 2023-10-02 ENCOUNTER — INFUSION (OUTPATIENT)
Dept: ONCOLOGY | Facility: HOSPITAL | Age: 78
End: 2023-10-02
Payer: MEDICARE

## 2023-10-02 DIAGNOSIS — E53.8 B12 DEFICIENCY: Primary | ICD-10-CM

## 2023-10-02 PROCEDURE — 96372 THER/PROPH/DIAG INJ SC/IM: CPT

## 2023-10-02 PROCEDURE — 25010000002 CYANOCOBALAMIN PER 1000 MCG: Performed by: INTERNAL MEDICINE

## 2023-10-02 RX ORDER — CYANOCOBALAMIN 1000 UG/ML
1000 INJECTION, SOLUTION INTRAMUSCULAR; SUBCUTANEOUS ONCE
Status: CANCELLED | OUTPATIENT
Start: 2023-10-11

## 2023-10-02 RX ORDER — CYANOCOBALAMIN 1000 UG/ML
1000 INJECTION, SOLUTION INTRAMUSCULAR; SUBCUTANEOUS ONCE
Status: COMPLETED | OUTPATIENT
Start: 2023-10-02 | End: 2023-10-02

## 2023-10-02 RX ADMIN — CYANOCOBALAMIN 1000 MCG: 1000 INJECTION, SOLUTION INTRAMUSCULAR at 14:15

## 2023-10-05 ENCOUNTER — TELEPHONE (OUTPATIENT)
Dept: ONCOLOGY | Facility: CLINIC | Age: 78
End: 2023-10-05
Payer: MEDICARE

## 2023-10-05 NOTE — TELEPHONE ENCOUNTER
"  Caller: Nedra Roberts \"Jeniffer\"    Relationship: Self    Best call back number: [phone]    What is the best time to reach you: ANYTIME    Who are you requesting to speak with (clinical staff, provider, specific staff member): CLINICAL    What was the call regarding: PATIENT WANTED TO KNOW IF SHE CAN GET THE FLU, COVID, RSV, AND SHINGLES VACCINES. PLEASE CALL TO ADVISE.     "

## 2023-10-05 NOTE — TELEPHONE ENCOUNTER
Informed patient that Dr. Ford is out of the office today and tomorrow but will return on Monday and I'll get back with her then.

## 2023-10-09 ENCOUNTER — INFUSION (OUTPATIENT)
Dept: ONCOLOGY | Facility: HOSPITAL | Age: 78
End: 2023-10-09
Payer: MEDICARE

## 2023-10-09 DIAGNOSIS — E53.8 B12 DEFICIENCY: Primary | ICD-10-CM

## 2023-10-09 PROCEDURE — 96372 THER/PROPH/DIAG INJ SC/IM: CPT

## 2023-10-09 PROCEDURE — 25010000002 CYANOCOBALAMIN PER 1000 MCG: Performed by: INTERNAL MEDICINE

## 2023-10-09 RX ORDER — CYANOCOBALAMIN 1000 UG/ML
1000 INJECTION, SOLUTION INTRAMUSCULAR; SUBCUTANEOUS ONCE
Status: COMPLETED | OUTPATIENT
Start: 2023-10-09 | End: 2023-10-09

## 2023-10-09 RX ORDER — CYANOCOBALAMIN 1000 UG/ML
1000 INJECTION, SOLUTION INTRAMUSCULAR; SUBCUTANEOUS ONCE
Status: CANCELLED | OUTPATIENT
Start: 2023-10-11

## 2023-10-09 RX ADMIN — CYANOCOBALAMIN 1000 MCG: 1000 INJECTION, SOLUTION INTRAMUSCULAR at 14:42

## 2023-10-09 NOTE — TELEPHONE ENCOUNTER
"Patient verbalized understanding of Dr. Ford's response: \"Ok to get vaccines.  May be better to get 1 or 2 at a time week apart or so\"  "

## 2023-10-16 ENCOUNTER — INFUSION (OUTPATIENT)
Dept: ONCOLOGY | Facility: HOSPITAL | Age: 78
End: 2023-10-16
Payer: MEDICARE

## 2023-10-16 ENCOUNTER — SPECIALTY PHARMACY (OUTPATIENT)
Dept: PHARMACY | Facility: HOSPITAL | Age: 78
End: 2023-10-16
Payer: MEDICARE

## 2023-10-16 DIAGNOSIS — E53.8 B12 DEFICIENCY: Primary | ICD-10-CM

## 2023-10-16 PROCEDURE — 25010000002 CYANOCOBALAMIN PER 1000 MCG: Performed by: INTERNAL MEDICINE

## 2023-10-16 PROCEDURE — 96372 THER/PROPH/DIAG INJ SC/IM: CPT

## 2023-10-16 RX ORDER — CYANOCOBALAMIN 1000 UG/ML
1000 INJECTION, SOLUTION INTRAMUSCULAR; SUBCUTANEOUS ONCE
Status: COMPLETED | OUTPATIENT
Start: 2023-10-16 | End: 2023-10-16

## 2023-10-16 RX ORDER — CYANOCOBALAMIN 1000 UG/ML
1000 INJECTION, SOLUTION INTRAMUSCULAR; SUBCUTANEOUS ONCE
Status: CANCELLED | OUTPATIENT
Start: 2023-10-23

## 2023-10-16 RX ORDER — CYANOCOBALAMIN 1000 UG/ML
1000 INJECTION, SOLUTION INTRAMUSCULAR; SUBCUTANEOUS ONCE
OUTPATIENT
Start: 2023-10-23

## 2023-10-16 RX ADMIN — CYANOCOBALAMIN 1000 MCG: 1000 INJECTION, SOLUTION INTRAMUSCULAR at 14:33

## 2023-10-18 RX ORDER — SIMVASTATIN 40 MG
40 TABLET ORAL NIGHTLY
Qty: 90 TABLET | Refills: 3 | Status: SHIPPED | OUTPATIENT
Start: 2023-10-18

## 2023-10-18 RX ORDER — LEVOTHYROXINE SODIUM 0.03 MG/1
25 TABLET ORAL DAILY
Qty: 90 TABLET | Refills: 0 | Status: SHIPPED | OUTPATIENT
Start: 2023-10-18

## 2023-10-23 ENCOUNTER — INFUSION (OUTPATIENT)
Dept: ONCOLOGY | Facility: HOSPITAL | Age: 78
End: 2023-10-23
Payer: MEDICARE

## 2023-10-23 ENCOUNTER — LAB (OUTPATIENT)
Dept: LAB | Facility: HOSPITAL | Age: 78
End: 2023-10-23
Payer: MEDICARE

## 2023-10-23 DIAGNOSIS — C79.51 CANCER, METASTATIC TO BONE: ICD-10-CM

## 2023-10-23 DIAGNOSIS — E53.8 B12 DEFICIENCY: Primary | ICD-10-CM

## 2023-10-23 DIAGNOSIS — Z79.899 ENCOUNTER FOR LONG-TERM (CURRENT) USE OF OTHER MEDICATIONS: ICD-10-CM

## 2023-10-23 LAB
ALBUMIN SERPL-MCNC: 4.2 G/DL (ref 3.5–5.2)
ALBUMIN/GLOB SERPL: 2.1 G/DL
ALP SERPL-CCNC: 55 U/L (ref 39–117)
ALT SERPL W P-5'-P-CCNC: 13 U/L (ref 1–33)
ANION GAP SERPL CALCULATED.3IONS-SCNC: 15.3 MMOL/L (ref 5–15)
AST SERPL-CCNC: 18 U/L (ref 1–32)
BASOPHILS # BLD AUTO: 0.04 10*3/MM3 (ref 0–0.2)
BASOPHILS NFR BLD AUTO: 1.2 % (ref 0–1.5)
BILIRUB SERPL-MCNC: 0.2 MG/DL (ref 0–1.2)
BUN SERPL-MCNC: 19 MG/DL (ref 8–23)
BUN/CREAT SERPL: 15.3 (ref 7–25)
CALCIUM SPEC-SCNC: 9.2 MG/DL (ref 8.6–10.5)
CANCER AG15-3 SERPL-ACNC: 178 U/ML
CHLORIDE SERPL-SCNC: 103 MMOL/L (ref 98–107)
CO2 SERPL-SCNC: 22.7 MMOL/L (ref 22–29)
CREAT SERPL-MCNC: 1.24 MG/DL (ref 0.6–1.1)
DEPRECATED RDW RBC AUTO: 50.2 FL (ref 37–54)
EGFRCR SERPLBLD CKD-EPI 2021: 44.9 ML/MIN/1.73
EOSINOPHIL # BLD AUTO: 0.04 10*3/MM3 (ref 0–0.4)
EOSINOPHIL NFR BLD AUTO: 1.2 % (ref 0.3–6.2)
ERYTHROCYTE [DISTWIDTH] IN BLOOD BY AUTOMATED COUNT: 12.9 % (ref 12.3–15.4)
GLOBULIN UR ELPH-MCNC: 2 GM/DL
GLUCOSE SERPL-MCNC: 123 MG/DL (ref 65–99)
HCT VFR BLD AUTO: 29.7 % (ref 34–46.6)
HGB BLD-MCNC: 10.4 G/DL (ref 12–15.9)
IMM GRANULOCYTES # BLD AUTO: 0.01 10*3/MM3 (ref 0–0.05)
IMM GRANULOCYTES NFR BLD AUTO: 0.3 % (ref 0–0.5)
LYMPHOCYTES # BLD AUTO: 1.26 10*3/MM3 (ref 0.7–3.1)
LYMPHOCYTES NFR BLD AUTO: 37.4 % (ref 19.6–45.3)
MAGNESIUM SERPL-MCNC: 2 MG/DL (ref 1.6–2.4)
MCH RBC QN AUTO: 37.7 PG (ref 26.6–33)
MCHC RBC AUTO-ENTMCNC: 35 G/DL (ref 31.5–35.7)
MCV RBC AUTO: 107.6 FL (ref 79–97)
MONOCYTES # BLD AUTO: 0.21 10*3/MM3 (ref 0.1–0.9)
MONOCYTES NFR BLD AUTO: 6.2 % (ref 5–12)
NEUTROPHILS NFR BLD AUTO: 1.81 10*3/MM3 (ref 1.7–7)
NEUTROPHILS NFR BLD AUTO: 53.7 % (ref 42.7–76)
NRBC BLD AUTO-RTO: 0 /100 WBC (ref 0–0.2)
PHOSPHATE SERPL-MCNC: 3.5 MG/DL (ref 2.5–4.5)
PLATELET # BLD AUTO: 397 10*3/MM3 (ref 140–450)
PMV BLD AUTO: 8.4 FL (ref 6–12)
POTASSIUM SERPL-SCNC: 4.3 MMOL/L (ref 3.5–5.2)
PROT SERPL-MCNC: 6.2 G/DL (ref 6–8.5)
RBC # BLD AUTO: 2.76 10*6/MM3 (ref 3.77–5.28)
SODIUM SERPL-SCNC: 141 MMOL/L (ref 136–145)
WBC NRBC COR # BLD: 3.37 10*3/MM3 (ref 3.4–10.8)

## 2023-10-23 PROCEDURE — 85025 COMPLETE CBC W/AUTO DIFF WBC: CPT

## 2023-10-23 PROCEDURE — 86300 IMMUNOASSAY TUMOR CA 15-3: CPT | Performed by: INTERNAL MEDICINE

## 2023-10-23 PROCEDURE — 96372 THER/PROPH/DIAG INJ SC/IM: CPT

## 2023-10-23 PROCEDURE — 80053 COMPREHEN METABOLIC PANEL: CPT

## 2023-10-23 PROCEDURE — 36415 COLL VENOUS BLD VENIPUNCTURE: CPT

## 2023-10-23 PROCEDURE — 83735 ASSAY OF MAGNESIUM: CPT

## 2023-10-23 PROCEDURE — 25010000002 DENOSUMAB 120 MG/1.7ML SOLUTION: Performed by: INTERNAL MEDICINE

## 2023-10-23 PROCEDURE — 84100 ASSAY OF PHOSPHORUS: CPT

## 2023-10-23 PROCEDURE — 25010000002 CYANOCOBALAMIN PER 1000 MCG: Performed by: INTERNAL MEDICINE

## 2023-10-23 RX ORDER — CYANOCOBALAMIN 1000 UG/ML
1000 INJECTION, SOLUTION INTRAMUSCULAR; SUBCUTANEOUS ONCE
Status: COMPLETED | OUTPATIENT
Start: 2023-10-23 | End: 2023-10-23

## 2023-10-23 RX ORDER — PANTOPRAZOLE SODIUM 40 MG/1
TABLET, DELAYED RELEASE ORAL
Qty: 180 TABLET | Refills: 1 | Status: SHIPPED | OUTPATIENT
Start: 2023-10-23

## 2023-10-23 RX ORDER — CYANOCOBALAMIN 1000 UG/ML
1000 INJECTION, SOLUTION INTRAMUSCULAR; SUBCUTANEOUS ONCE
OUTPATIENT
Start: 2023-10-30

## 2023-10-23 RX ADMIN — CYANOCOBALAMIN 1000 MCG: 1000 INJECTION, SOLUTION INTRAMUSCULAR at 15:17

## 2023-10-23 RX ADMIN — DENOSUMAB 120 MG: 120 INJECTION SUBCUTANEOUS at 15:17

## 2023-10-26 ENCOUNTER — DOCUMENTATION (OUTPATIENT)
Dept: PHARMACY | Facility: HOSPITAL | Age: 78
End: 2023-10-26
Payer: MEDICARE

## 2023-10-26 NOTE — PROGRESS NOTES
I received a fax notice from [employer] dated 10/25/2023 stating that Ibrance  has shipped to the patient.    Shira Bunn - Care Coordinator   10/26/2023  09:31 EDT

## 2023-11-06 ENCOUNTER — INFUSION (OUTPATIENT)
Dept: ONCOLOGY | Facility: HOSPITAL | Age: 78
End: 2023-11-06
Payer: MEDICARE

## 2023-11-06 DIAGNOSIS — E53.8 B12 DEFICIENCY: Primary | ICD-10-CM

## 2023-11-06 PROCEDURE — 25010000002 CYANOCOBALAMIN PER 1000 MCG: Performed by: INTERNAL MEDICINE

## 2023-11-06 PROCEDURE — 96372 THER/PROPH/DIAG INJ SC/IM: CPT

## 2023-11-06 RX ORDER — CYANOCOBALAMIN 1000 UG/ML
1000 INJECTION, SOLUTION INTRAMUSCULAR; SUBCUTANEOUS ONCE
OUTPATIENT
Start: 2023-11-13

## 2023-11-06 RX ORDER — CYANOCOBALAMIN 1000 UG/ML
1000 INJECTION, SOLUTION INTRAMUSCULAR; SUBCUTANEOUS ONCE
Status: COMPLETED | OUTPATIENT
Start: 2023-11-06 | End: 2023-11-06

## 2023-11-06 RX ADMIN — CYANOCOBALAMIN 1000 MCG: 1000 INJECTION, SOLUTION INTRAMUSCULAR at 13:19

## 2023-11-09 ENCOUNTER — SPECIALTY PHARMACY (OUTPATIENT)
Dept: PHARMACY | Facility: HOSPITAL | Age: 78
End: 2023-11-09
Payer: MEDICARE

## 2023-11-10 ENCOUNTER — SPECIALTY PHARMACY (OUTPATIENT)
Dept: PHARMACY | Facility: HOSPITAL | Age: 78
End: 2023-11-10
Payer: MEDICARE

## 2023-11-10 ENCOUNTER — HOSPITAL ENCOUNTER (OUTPATIENT)
Dept: MAMMOGRAPHY | Facility: HOSPITAL | Age: 78
Discharge: HOME | End: 2023-11-10
Admitting: INTERNAL MEDICINE
Payer: MEDICARE

## 2023-11-10 DIAGNOSIS — Z12.31 SCREENING MAMMOGRAM, ENCOUNTER FOR: ICD-10-CM

## 2023-11-10 PROCEDURE — 77063 BREAST TOMOSYNTHESIS BI: CPT

## 2023-11-10 PROCEDURE — 77067 SCR MAMMO BI INCL CAD: CPT

## 2023-11-10 NOTE — PROGRESS NOTES
Specialty Pharmacy Patient Management Program  Oncology 6-Month Clinical Assessment       Nedra Roberts is a 78 y.o. female with metastatic breast cancer seen today to assess adherence and side effects.    Reason for Outreach: Routine medication check-in .    Regimen: Ibrance 100 mg po daily for 21 days on, then 7 days off.     Specialty Pharmacy Goal   Goals Addressed Today        Specialty Pharmacy General Goal      Progression free survival  11/10/23 last dictation:  Repeat bone scan 8/14/2023-improved intensity uptake multiple locations, no new sites identified             Problem List   Problem list reviewed by Susana Ashford RPH on 11/10/2023 at  1:31 PM  Patient Active Problem List   Diagnosis Code    Essential hypertension I10    Gastritis K29.70    Hypothyroidism E03.9    Multiple-type hyperlipidemia E78.2    Healthcare maintenance Z00.00    History of breast cancer Z85.3    GERD (gastroesophageal reflux disease) K21.9    Esophagitis K20.90    Cancer, metastatic to bone C79.51    Encounter for long-term (current) use of other medications Z79.899    Antineoplastic chemotherapy induced pancytopenia D61.810, T45.1X5A    B12 deficiency E53.8       Medication Assessment for Specialty Medication(s):  Medication Assessment  Follow Up Clinical Assessment  Linked Medication(s) Assessed: Palbociclib  Therapeutic appropriateness: Appropriate  Medication tolerability: Tolerating with no to minimal ADRs  Medication plan: Continue therapy with normal follow-up  Quality of Life Improvement Scale: A good deal better  Comments on Quality of Life: She reported being please with her lab results.  Her main concern is some expressed anxiety about approval from [employer] for her 2024 Ibrance.  She was assured Shira Bunn CPhT is working on her behalf to make the transition for 2024.  Administration: as prescribed .   Patient can self administer medications: yes  Medication Follow-Up Plan: Next clinical assessment  Lab Review:  The labs listed below have been reviewed. No dose adjustments are needed for the oral specialty medication(s) based on the labs.    Lab Results   Component Value Date    GLUCOSE 123 (H) 10/23/2023    CALCIUM 9.2 10/23/2023     10/23/2023    K 4.3 10/23/2023    CO2 22.7 10/23/2023     10/23/2023    BUN 19 10/23/2023    CREATININE 1.24 (H) 10/23/2023    EGFRIFAFRI 68 02/21/2022    EGFRIFNONA 59 (L) 02/21/2022    BCR 15.3 10/23/2023    ANIONGAP 15.3 (H) 10/23/2023     Lab Results   Component Value Date    WBC 3.37 (L) 10/23/2023    RBC 2.76 (L) 10/23/2023    HGB 10.4 (L) 10/23/2023    HCT 29.7 (L) 10/23/2023    .6 (H) 10/23/2023    MCH 37.7 (H) 10/23/2023    MCHC 35.0 10/23/2023    RDW 12.9 10/23/2023    RDWSD 50.2 10/23/2023    MPV 8.4 10/23/2023     10/23/2023    NEUTRORELPCT 53.7 10/23/2023    LYMPHORELPCT 37.4 10/23/2023    MONORELPCT 6.2 10/23/2023    EOSRELPCT 1.2 10/23/2023    BASORELPCT 1.2 10/23/2023    AUTOIGPER 0.3 10/23/2023    NEUTROABS 1.81 10/23/2023    LYMPHSABS 1.26 10/23/2023    MONOSABS 0.21 10/23/2023    EOSABS 0.04 10/23/2023    BASOSABS 0.04 10/23/2023    AUTOIGNUM 0.01 10/23/2023    NRBC 0.0 10/23/2023     Drug-drug interactions  Completed medication reconciliation today to assess for drug interactions. Patient has had the following changes to medication list: addition of B12 and Xgrva injections; patient's chart has been updated to reflect changes.   Assessed medication list for interactions, no significant drug interactions noted.   Advised patient to call the clinic if any new medications are started so we can assess for drug-drug interactions.  Drug-food interactions discussed: eating grapefruit and drinking grapefruit juice  Vaccines are coordinated by the patient's oncologist and primary care provider.    Medications   Medicines reviewed by Susana Ashford RPH on 11/10/2023 at  1:31 PM  Prior to Admission medications    Medication Sig Start Date End Date Taking?  Authorizing Provider   anastrozole (ARIMIDEX) 1 MG tablet TAKE 1 TABLET BY MOUTH DAILY 6/15/23   Rocky Ford MD   aspirin 81 MG EC tablet Take 1 tablet by mouth Daily.    Ruma Sheldon MD   Calcium Carbonate Antacid (TUMS PO) Take  by mouth As Needed.    Ruma Sheldon MD   Cholecalciferol (Vitamin D3) 25 MCG (1000 UT) capsule  6/13/23   Ruma Sheldon MD   denosumab (XGEVA) 120 MG/1.7ML solution injection Inject  under the skin into the appropriate area as directed 1 (One) Time.    Ruma Sheldon MD   levothyroxine (SYNTHROID, LEVOTHROID) 25 MCG tablet TAKE 1 TABLET BY MOUTH DAILY 10/18/23   Prisca Church MD   losartan (COZAAR) 100 MG tablet TAKE 1 TABLET BY MOUTH EVERY DAY 7/20/23   Prisca Church MD   ondansetron (ZOFRAN) 8 MG tablet Take 1 tablet by mouth 3 (Three) Times a Day As Needed for Nausea or Vomiting. 5/22/23   Rocky Ford MD   Palbociclib 100 MG tablet tablet Take 1 tablet by mouth Daily. Take with or without food for 21 days on, then off for 7 days. 6/13/23   Rocky Ford MD   pantoprazole (PROTONIX) 40 MG EC tablet TAKE 1 TABLET BY MOUTH TWICE DAILY 10/23/23   Prisca Church MD   simvastatin (ZOCOR) 40 MG tablet TAKE 1 TABLET BY MOUTH EVERY NIGHT 10/18/23   Prisca Church MD   traMADol (ULTRAM) 50 MG tablet Take 1 tablet by mouth Every 6 (Six) Hours As Needed for Moderate Pain. 3/10/23   Prisca Church MD   levothyroxine (SYNTHROID, LEVOTHROID) 25 MCG tablet TAKE 1 TABLET BY MOUTH DAILY 7/20/23 10/18/23  Prisca Church MD   pantoprazole (PROTONIX) 40 MG EC tablet TAKE 1 TABLET BY MOUTH TWICE DAILY 7/20/23 10/23/23  Prisca Church MD   simvastatin (Zocor) 40 MG tablet Take 1 tablet by mouth Every Night. 11/22/22 10/18/23  Prisca Church MD       Allergies  Known allergies and reactions were discussed with the patient. The patient's chart has been reviewed for allergy information and updated as necessary.   No Known Allergies      Allergies  reviewed by Susaan Ashford RPH on 11/10/2023 at  1:31 PM    Hospitalizations and Urgent Care Visits Since Last Assessment:  Unplanned hospitalizations or inpatient admissions: no  ED Visits: no  Urgent Office Visits: no    Adherence Assessment:  Adherence Questions  Linked Medication(s) Assessed: Palbociclib  On average, how many doses/injections does the patient miss per month?: 0  What are the identified reasons for non-adherence or missed doses? : no problems identified  What is the estimated medication adherence level?: %  Based on the patient/caregiver response and refill history, does this patient require an MTP to track adherence improvements?: no    Quality of Life Assessment:  Quality of Life Assessment  Quality of Life Improvement Scale: A good deal better  Comments on Quality of Life: She reported being please with her lab results.  Her main concern is some expressed anxiety about approval from [employer] for her 2024 Ibrance.  She was assured Shira Bunn CPhT is working on her behalf to make the transition for 2024.  -- Quality of Life: 9/10    Financial Assessment:  Medication availability/affordability: Patient has had no issues obtaining medication from pharmacy.    Attestation:  I attest the patient was actively involved in and has agreed to the above plan of care. If the prescribed therapy is at any point deemed not appropriate based on the current or future assessments, a consultation will be initiated with the patient's specialty care provider to determine the best course of action. The revised plan of therapy will be documented along with any required assessments and/or additional patient education provided.       All questions addressed and patient had no additional concerns. Patient has pharmacy contact information.    Name/Credentials Susana Ashford RPh, MADHU    Telephone encounter    11/10/2023  13:34 EST

## 2023-11-17 ENCOUNTER — DOCUMENTATION (OUTPATIENT)
Dept: PHARMACY | Facility: HOSPITAL | Age: 78
End: 2023-11-17
Payer: MEDICARE

## 2023-11-17 NOTE — PROGRESS NOTES
I have faxed Ms. Roberts's 2024 Ibrance renewal application to [employer] using faxing# [phone].    I will call [phone] to confirm receipt of my fax.     Shira Bunn - Care Coordinator   11/17/2023  12:42 EST

## 2023-11-20 ENCOUNTER — INFUSION (OUTPATIENT)
Dept: ONCOLOGY | Facility: HOSPITAL | Age: 78
End: 2023-11-20
Payer: MEDICARE

## 2023-11-20 ENCOUNTER — LAB (OUTPATIENT)
Dept: LAB | Facility: HOSPITAL | Age: 78
End: 2023-11-20
Payer: MEDICARE

## 2023-11-20 ENCOUNTER — OFFICE VISIT (OUTPATIENT)
Dept: ONCOLOGY | Facility: CLINIC | Age: 78
End: 2023-11-20
Payer: MEDICARE

## 2023-11-20 VITALS
OXYGEN SATURATION: 96 % | HEIGHT: 64 IN | HEART RATE: 77 BPM | RESPIRATION RATE: 16 BRPM | TEMPERATURE: 97.3 F | SYSTOLIC BLOOD PRESSURE: 167 MMHG | DIASTOLIC BLOOD PRESSURE: 81 MMHG | WEIGHT: 157.7 LBS | BODY MASS INDEX: 26.92 KG/M2

## 2023-11-20 DIAGNOSIS — Z79.899 ENCOUNTER FOR LONG-TERM (CURRENT) USE OF OTHER MEDICATIONS: ICD-10-CM

## 2023-11-20 DIAGNOSIS — C79.51 CANCER, METASTATIC TO BONE: ICD-10-CM

## 2023-11-20 DIAGNOSIS — C50.912 CARCINOMA OF LEFT BREAST METASTATIC TO BONE: ICD-10-CM

## 2023-11-20 DIAGNOSIS — C79.51 CARCINOMA OF LEFT BREAST METASTATIC TO BONE: ICD-10-CM

## 2023-11-20 DIAGNOSIS — E53.8 B12 DEFICIENCY: Primary | ICD-10-CM

## 2023-11-20 LAB
ALBUMIN SERPL-MCNC: 4.2 G/DL (ref 3.5–5.2)
ALBUMIN/GLOB SERPL: 1.6 G/DL
ALP SERPL-CCNC: 60 U/L (ref 39–117)
ALT SERPL W P-5'-P-CCNC: 13 U/L (ref 1–33)
ANION GAP SERPL CALCULATED.3IONS-SCNC: 12.9 MMOL/L (ref 5–15)
AST SERPL-CCNC: 16 U/L (ref 1–32)
BASOPHILS # BLD AUTO: 0.04 10*3/MM3 (ref 0–0.2)
BASOPHILS NFR BLD AUTO: 1.4 % (ref 0–1.5)
BILIRUB SERPL-MCNC: <0.2 MG/DL (ref 0–1.2)
BUN SERPL-MCNC: 16 MG/DL (ref 8–23)
BUN/CREAT SERPL: 14.8 (ref 7–25)
CALCIUM SPEC-SCNC: 9.2 MG/DL (ref 8.6–10.5)
CANCER AG15-3 SERPL-ACNC: 171 U/ML
CHLORIDE SERPL-SCNC: 108 MMOL/L (ref 98–107)
CO2 SERPL-SCNC: 25.1 MMOL/L (ref 22–29)
CREAT SERPL-MCNC: 1.08 MG/DL (ref 0.6–1.1)
DEPRECATED RDW RBC AUTO: 48.8 FL (ref 37–54)
EGFRCR SERPLBLD CKD-EPI 2021: 53 ML/MIN/1.73
EOSINOPHIL # BLD AUTO: 0.05 10*3/MM3 (ref 0–0.4)
EOSINOPHIL NFR BLD AUTO: 1.8 % (ref 0.3–6.2)
ERYTHROCYTE [DISTWIDTH] IN BLOOD BY AUTOMATED COUNT: 12.5 % (ref 12.3–15.4)
GLOBULIN UR ELPH-MCNC: 2.6 GM/DL
GLUCOSE SERPL-MCNC: 109 MG/DL (ref 65–99)
HCT VFR BLD AUTO: 30.5 % (ref 34–46.6)
HGB BLD-MCNC: 10.4 G/DL (ref 12–15.9)
IMM GRANULOCYTES # BLD AUTO: 0 10*3/MM3 (ref 0–0.05)
IMM GRANULOCYTES NFR BLD AUTO: 0 % (ref 0–0.5)
LYMPHOCYTES # BLD AUTO: 1.25 10*3/MM3 (ref 0.7–3.1)
LYMPHOCYTES NFR BLD AUTO: 44 % (ref 19.6–45.3)
MAGNESIUM SERPL-MCNC: 2.1 MG/DL (ref 1.6–2.4)
MCH RBC QN AUTO: 36.7 PG (ref 26.6–33)
MCHC RBC AUTO-ENTMCNC: 34.1 G/DL (ref 31.5–35.7)
MCV RBC AUTO: 107.8 FL (ref 79–97)
MONOCYTES # BLD AUTO: 0.21 10*3/MM3 (ref 0.1–0.9)
MONOCYTES NFR BLD AUTO: 7.4 % (ref 5–12)
NEUTROPHILS NFR BLD AUTO: 1.29 10*3/MM3 (ref 1.7–7)
NEUTROPHILS NFR BLD AUTO: 45.4 % (ref 42.7–76)
NRBC BLD AUTO-RTO: 0 /100 WBC (ref 0–0.2)
PHOSPHATE SERPL-MCNC: 3.7 MG/DL (ref 2.5–4.5)
PLATELET # BLD AUTO: 423 10*3/MM3 (ref 140–450)
PMV BLD AUTO: 8.9 FL (ref 6–12)
POTASSIUM SERPL-SCNC: 4.2 MMOL/L (ref 3.5–5.2)
PROT SERPL-MCNC: 6.8 G/DL (ref 6–8.5)
RBC # BLD AUTO: 2.83 10*6/MM3 (ref 3.77–5.28)
SODIUM SERPL-SCNC: 146 MMOL/L (ref 136–145)
WBC NRBC COR # BLD AUTO: 2.84 10*3/MM3 (ref 3.4–10.8)

## 2023-11-20 PROCEDURE — 25010000002 CYANOCOBALAMIN PER 1000 MCG: Performed by: INTERNAL MEDICINE

## 2023-11-20 PROCEDURE — 83735 ASSAY OF MAGNESIUM: CPT

## 2023-11-20 PROCEDURE — 36415 COLL VENOUS BLD VENIPUNCTURE: CPT

## 2023-11-20 PROCEDURE — 86300 IMMUNOASSAY TUMOR CA 15-3: CPT | Performed by: NURSE PRACTITIONER

## 2023-11-20 PROCEDURE — 96372 THER/PROPH/DIAG INJ SC/IM: CPT

## 2023-11-20 PROCEDURE — 80053 COMPREHEN METABOLIC PANEL: CPT

## 2023-11-20 PROCEDURE — 25010000002 DENOSUMAB 120 MG/1.7ML SOLUTION: Performed by: NURSE PRACTITIONER

## 2023-11-20 PROCEDURE — 85025 COMPLETE CBC W/AUTO DIFF WBC: CPT

## 2023-11-20 PROCEDURE — 84100 ASSAY OF PHOSPHORUS: CPT

## 2023-11-20 RX ORDER — CYANOCOBALAMIN 1000 UG/ML
1000 INJECTION, SOLUTION INTRAMUSCULAR; SUBCUTANEOUS ONCE
OUTPATIENT
Start: 2023-12-25

## 2023-11-20 RX ORDER — CYANOCOBALAMIN 1000 UG/ML
1000 INJECTION, SOLUTION INTRAMUSCULAR; SUBCUTANEOUS ONCE
Status: CANCELLED | OUTPATIENT
Start: 2023-12-25

## 2023-11-20 RX ORDER — CYANOCOBALAMIN 1000 UG/ML
1000 INJECTION, SOLUTION INTRAMUSCULAR; SUBCUTANEOUS ONCE
Status: COMPLETED | OUTPATIENT
Start: 2023-11-20 | End: 2023-11-20

## 2023-11-20 RX ADMIN — CYANOCOBALAMIN 1000 MCG: 1000 INJECTION, SOLUTION INTRAMUSCULAR at 15:38

## 2023-11-20 RX ADMIN — DENOSUMAB 120 MG: 120 INJECTION SUBCUTANEOUS at 15:38

## 2023-11-20 NOTE — PROGRESS NOTES
Subjective    Follow-up for metastatic breast cancer to bone      History of Present Illness    The patient return today for follow-up continuing Arimidex plus Ibrance and monthly Xgeva she denies any nausea.  She is on day 14 of her Ibrance.  She does not need any nausea medication.  Her weight is stable.  She states overall she is feeling pretty well.    Oncology history:  Mrs. Roberts is a jeffry 78 y.o. woman with a history of stage I breast cancer affecting the left breast ([place] 5/9, 1.5 cm, ER 90%, [place] 70%, HER2 2+/negative FISH).  She underwent lumpectomy and 4 cycles of adjuvant chemotherapy with TC.  Her tumor was estrogen receptor positive and she underwent hormonal therapy with Arimidex between May 2009 in May 2014.    The patient saw her primary care provider on 3/21/2023 and complained of low back pain for which plain films were performed suspicious for malignancy.  Whole-body bone scan on 3/20/2023 showed multifocal uptake in the calvarium, ribs, cervical, thoracic and lumbar spine, sacrum, pelvis, humeri and proximal femora.  PET scan on 4/10/2023 showed multiple hypermetabolic bone lesions including the lumbar spine, thoracic spine, most numerous and intense in the pelvic bones and proximal femurs maximum SUV 5.7 pelvis and 6.4 proximal right femur.  The hypermetabolic foci are mixed lytic/sclerotic.  There is no hypermetabolic lymphadenopathy or visceral disease.  She has been started on anastrozole 1 mg daily and Ibrance 100 mg days 1-21 q. 28 days.    The patient is tolerating treatment well.  She denies overt nausea vomiting diarrhea more on the constipated side.  She denies cough or shortness of breath.  Her pain is fairly minimal mostly in the left hip with walking.  Previous back pain has improved.        Past Medical History:  Pertinent for hypertension, acid reflux, hyperlipidemia, hypothyroidism    Review of Systems   Constitutional: Negative.    Respiratory: Negative.    "  Cardiovascular: Negative.    Gastrointestinal:  Negative for abdominal pain.   Musculoskeletal:  Positive for arthralgias.   Skin: Negative.    Hematological: Negative.    Psychiatric/Behavioral:  Negative for sleep disturbance. The patient is not nervous/anxious.          Medications:  The current medication list was reviewed in the EMR    ALLERGIES:  No Known Allergies    Objective      Vitals:    11/20/23 1500   BP: 167/81   Pulse: 77   Resp: 16   Temp: 97.3 °F (36.3 °C)   TempSrc: Temporal   SpO2: 96%   Weight: 71.5 kg (157 lb 11.2 oz)   Height: 162.6 cm (64.02\")   PainSc: 0-No pain           11/20/2023     2:15 PM   Current Status   ECOG score 0       Physical Exam    CONSTITUTIONAL: pleasant well-developed adult woman  HEENT: no icterus, no thrush, moist membranes, resolution of the right mastoid nodule  LYMPH: no cervical or supraclavicular lad  MUSC: no edema, normal gait  NEURO: alert and oriented x3, normal strength  PSYCH: normal mood and affect for situation  SKIN: no pallor or rash  Exam otherwise unchanged-9/25/2023  RECENT LABS:  Results from last 7 days   Lab Units 11/20/23  1411   WBC 10*3/mm3 2.84*   NEUTROS ABS 10*3/mm3 1.29*   HEMOGLOBIN g/dL 10.4*   HEMATOCRIT % 30.5*   PLATELETS 10*3/mm3 423       Results from last 7 days   Lab Units 11/20/23  1411   SODIUM mmol/L 146*   POTASSIUM mmol/L 4.2   CHLORIDE mmol/L 108*   CO2 mmol/L 25.1   BUN mg/dL 16   CREATININE mg/dL 1.08   CALCIUM mg/dL 9.2   ALBUMIN g/dL 4.2   BILIRUBIN mg/dL <0.2   ALK PHOS U/L 60   ALT (SGPT) U/L 13   AST (SGOT) U/L 16   GLUCOSE mg/dL 109*   MAGNESIUM mg/dL 2.1        WBC   Date Value Ref Range Status   11/20/2023 2.84 (L) 3.40 - 10.80 10*3/mm3 Final   02/22/2023 9.32 3.40 - 10.80 10*3/mm3 Final     RBC   Date Value Ref Range Status   11/20/2023 2.83 (L) 3.77 - 5.28 10*6/mm3 Final   02/22/2023 4.24 3.77 - 5.28 10*6/mm3 Final     Hemoglobin   Date Value Ref Range Status   11/20/2023 10.4 (L) 12.0 - 15.9 g/dL Final "     Hematocrit   Date Value Ref Range Status   11/20/2023 30.5 (L) 34.0 - 46.6 % Final     MCV   Date Value Ref Range Status   11/20/2023 107.8 (H) 79.0 - 97.0 fL Final     MCH   Date Value Ref Range Status   11/20/2023 36.7 (H) 26.6 - 33.0 pg Final     MCHC   Date Value Ref Range Status   11/20/2023 34.1 31.5 - 35.7 g/dL Final     RDW   Date Value Ref Range Status   11/20/2023 12.5 12.3 - 15.4 % Final     RDW-SD   Date Value Ref Range Status   11/20/2023 48.8 37.0 - 54.0 fl Final     MPV   Date Value Ref Range Status   11/20/2023 8.9 6.0 - 12.0 fL Final     Platelets   Date Value Ref Range Status   11/20/2023 423 140 - 450 10*3/mm3 Final     Neutrophil %   Date Value Ref Range Status   11/20/2023 45.4 42.7 - 76.0 % Final     Lymphocyte %   Date Value Ref Range Status   11/20/2023 44.0 19.6 - 45.3 % Final     Monocyte %   Date Value Ref Range Status   11/20/2023 7.4 5.0 - 12.0 % Final     Eosinophil %   Date Value Ref Range Status   11/20/2023 1.8 0.3 - 6.2 % Final     Basophil %   Date Value Ref Range Status   11/20/2023 1.4 0.0 - 1.5 % Final     Immature Grans %   Date Value Ref Range Status   11/20/2023 0.0 0.0 - 0.5 % Final     Neutrophils, Absolute   Date Value Ref Range Status   11/20/2023 1.29 (L) 1.70 - 7.00 10*3/mm3 Final     Lymphocytes, Absolute   Date Value Ref Range Status   11/20/2023 1.25 0.70 - 3.10 10*3/mm3 Final     Monocytes, Absolute   Date Value Ref Range Status   11/20/2023 0.21 0.10 - 0.90 10*3/mm3 Final     Eosinophils, Absolute   Date Value Ref Range Status   11/20/2023 0.05 0.00 - 0.40 10*3/mm3 Final     Basophils, Absolute   Date Value Ref Range Status   11/20/2023 0.04 0.00 - 0.20 10*3/mm3 Final     Immature Grans, Absolute   Date Value Ref Range Status   11/20/2023 0.00 0.00 - 0.05 10*3/mm3 Final     nRBC   Date Value Ref Range Status   11/20/2023 0.0 0.0 - 0.2 /100 WBC Final           PET scan 4/10/2023:  IMPRESSION:  1. Multiple hypermetabolic lytic and mixed lytic and sclerotic  bone  metastases as described. The activity at the inferior aspect of the  right mastoid process may possibly represent a metastasis, but the  low-level activity at the adjacent subcutaneous fat is of uncertain  etiology. There is no definitive fluid at the right mastoid air cells to  suggest acute mastoiditis, but please correlate clinically and follow-up  as needed.  2. There is no evidence for metastatic lymphadenopathy or visceral  Metastases.    I personally reviewed the pet images from 4/10/2023-the patient has significant metastatic disease to bone most prominent in the lumbar spine and pelvic bones    Bilateral mammogram 11/9/2022: No evidence of malignancy    DEXA Bone Density Axial 4/25/2023 - IMPRESSION: Osteopenia at the hips    XR Femur 2 View Bilateral 4/26/2023 - IMPRESSION: As described.    Bone scan 8/14/2023-multiple areas of uptake consistent with metastatic disease with intensity improvement in several locations compared to 3/20/2023    Assessment & Plan   *history of stage I breast cancer affecting the left breast ([place] 5/9, 1.5 cm, ER 90%, CT 70%, HER2 2+/negative FISH).    She underwent lumpectomy and 4 cycles of adjuvant chemotherapy with TC.  Her tumor was estrogen receptor positive and she underwent hormonal therapy with Arimidex between May 2009 in May 2014.    *Radiographic studies consistent with metastatic disease to bone with sclerotic/lytic lesions involving spine (most prominent lumbar), scapula, pelvic bones, proximal femurs  Initiated Arimidex April 2023; Ibrance subsequently added 100 mg D1-21 q. 28 days  4/26/2023 CT-guided bone biopsy left iliac metastatic ductal adenocarcinoma of the breast ER 99% strongly positive, CT 31 to 40% weakly positive, HER2 2+/FISH negative; [facility] next generation sequencing available in the chart  CA 15-3 significantly elevated 764  CA 15-3 decreased to 295 on 6/5/2023  Repeat bone scan 8/14/2023-improved intensity uptake multiple locations, no  new sites identified  CA 15-3 reviewed today at 171, from 178.  Patient without any new areas of pain.    *Pain of malignancy-mild at this point, using extra treatment Tylenol as needed    *Hypophosphatemia/hypocalcemia  Levels unremarkable today.    *Mild neutropenia secondary to Ibrance, ANC 1.29 today    *Macrocytic anemia-hemoglobin 10.4    Oncology plan/recommendations:  Continue anastrozole 1 mg daily  Continue Ibrance 100 mg daily 1 through 21 q. 28 days   Proceed with Xgeva today and continue monthly  She has extra-strength Tylenol or Ultram at home if needed for pain  Continue B12 1000 mcg injection once monthly  Return in 4 weeks for labs and possible Xgeva and again in 8 weeks for the same along with review by Dr. Ford.                11/20/2023      CC:

## 2023-11-22 ENCOUNTER — SPECIALTY PHARMACY (OUTPATIENT)
Dept: PHARMACY | Facility: HOSPITAL | Age: 78
End: 2023-11-22
Payer: MEDICARE

## 2023-11-29 DIAGNOSIS — C79.51 CARCINOMA OF LEFT BREAST METASTATIC TO BONE: Primary | ICD-10-CM

## 2023-11-29 DIAGNOSIS — C50.912 CARCINOMA OF LEFT BREAST METASTATIC TO BONE: Primary | ICD-10-CM

## 2023-12-13 DIAGNOSIS — C79.51 MALIGNANT NEOPLASM METASTATIC TO BONE: ICD-10-CM

## 2023-12-13 RX ORDER — ANASTROZOLE 1 MG/1
1 TABLET ORAL DAILY
Qty: 30 TABLET | Refills: 5 | Status: SHIPPED | OUTPATIENT
Start: 2023-12-13

## 2023-12-18 ENCOUNTER — INFUSION (OUTPATIENT)
Dept: ONCOLOGY | Facility: HOSPITAL | Age: 78
End: 2023-12-18
Payer: MEDICARE

## 2023-12-18 ENCOUNTER — LAB (OUTPATIENT)
Dept: LAB | Facility: HOSPITAL | Age: 78
End: 2023-12-18
Payer: MEDICARE

## 2023-12-18 DIAGNOSIS — C79.51 CANCER, METASTATIC TO BONE: ICD-10-CM

## 2023-12-18 DIAGNOSIS — E53.8 B12 DEFICIENCY: Primary | ICD-10-CM

## 2023-12-18 DIAGNOSIS — Z79.899 ENCOUNTER FOR LONG-TERM (CURRENT) USE OF OTHER MEDICATIONS: ICD-10-CM

## 2023-12-18 LAB
ALBUMIN SERPL-MCNC: 4.4 G/DL (ref 3.5–5.2)
ALBUMIN/GLOB SERPL: 1.6 G/DL
ALP SERPL-CCNC: 62 U/L (ref 39–117)
ALT SERPL W P-5'-P-CCNC: 10 U/L (ref 1–33)
ANION GAP SERPL CALCULATED.3IONS-SCNC: 10.4 MMOL/L (ref 5–15)
AST SERPL-CCNC: 20 U/L (ref 1–32)
BASOPHILS # BLD AUTO: 0.05 10*3/MM3 (ref 0–0.2)
BASOPHILS NFR BLD AUTO: 1.6 % (ref 0–1.5)
BILIRUB SERPL-MCNC: 0.3 MG/DL (ref 0–1.2)
BUN SERPL-MCNC: 19 MG/DL (ref 8–23)
BUN/CREAT SERPL: 14.6 (ref 7–25)
CALCIUM SPEC-SCNC: 9.4 MG/DL (ref 8.6–10.5)
CANCER AG15-3 SERPL-ACNC: 185 U/ML
CHLORIDE SERPL-SCNC: 102 MMOL/L (ref 98–107)
CO2 SERPL-SCNC: 25.6 MMOL/L (ref 22–29)
CREAT SERPL-MCNC: 1.3 MG/DL (ref 0.57–1)
DEPRECATED RDW RBC AUTO: 49.1 FL (ref 37–54)
EGFRCR SERPLBLD CKD-EPI 2021: 42.4 ML/MIN/1.73
EOSINOPHIL # BLD AUTO: 0.04 10*3/MM3 (ref 0–0.4)
EOSINOPHIL NFR BLD AUTO: 1.3 % (ref 0.3–6.2)
ERYTHROCYTE [DISTWIDTH] IN BLOOD BY AUTOMATED COUNT: 12.4 % (ref 12.3–15.4)
GLOBULIN UR ELPH-MCNC: 2.7 GM/DL
GLUCOSE SERPL-MCNC: 97 MG/DL (ref 65–99)
HCT VFR BLD AUTO: 31.3 % (ref 34–46.6)
HGB BLD-MCNC: 10.8 G/DL (ref 12–15.9)
IMM GRANULOCYTES # BLD AUTO: 0.01 10*3/MM3 (ref 0–0.05)
IMM GRANULOCYTES NFR BLD AUTO: 0.3 % (ref 0–0.5)
LYMPHOCYTES # BLD AUTO: 1.07 10*3/MM3 (ref 0.7–3.1)
LYMPHOCYTES NFR BLD AUTO: 34 % (ref 19.6–45.3)
MAGNESIUM SERPL-MCNC: 2.2 MG/DL (ref 1.6–2.4)
MCH RBC QN AUTO: 37.1 PG (ref 26.6–33)
MCHC RBC AUTO-ENTMCNC: 34.5 G/DL (ref 31.5–35.7)
MCV RBC AUTO: 107.6 FL (ref 79–97)
MONOCYTES # BLD AUTO: 0.22 10*3/MM3 (ref 0.1–0.9)
MONOCYTES NFR BLD AUTO: 7 % (ref 5–12)
NEUTROPHILS NFR BLD AUTO: 1.76 10*3/MM3 (ref 1.7–7)
NEUTROPHILS NFR BLD AUTO: 55.8 % (ref 42.7–76)
NRBC BLD AUTO-RTO: 0 /100 WBC (ref 0–0.2)
PHOSPHATE SERPL-MCNC: 3.5 MG/DL (ref 2.5–4.5)
PLATELET # BLD AUTO: 368 10*3/MM3 (ref 140–450)
PMV BLD AUTO: 8.4 FL (ref 6–12)
POTASSIUM SERPL-SCNC: 4.9 MMOL/L (ref 3.5–5.2)
PROT SERPL-MCNC: 7.1 G/DL (ref 6–8.5)
RBC # BLD AUTO: 2.91 10*6/MM3 (ref 3.77–5.28)
SODIUM SERPL-SCNC: 138 MMOL/L (ref 136–145)
WBC NRBC COR # BLD AUTO: 3.15 10*3/MM3 (ref 3.4–10.8)

## 2023-12-18 PROCEDURE — 85025 COMPLETE CBC W/AUTO DIFF WBC: CPT

## 2023-12-18 PROCEDURE — 36415 COLL VENOUS BLD VENIPUNCTURE: CPT

## 2023-12-18 PROCEDURE — 96372 THER/PROPH/DIAG INJ SC/IM: CPT

## 2023-12-18 PROCEDURE — 25010000002 CYANOCOBALAMIN PER 1000 MCG: Performed by: INTERNAL MEDICINE

## 2023-12-18 PROCEDURE — 83735 ASSAY OF MAGNESIUM: CPT

## 2023-12-18 PROCEDURE — 25010000002 DENOSUMAB 120 MG/1.7ML SOLUTION: Performed by: INTERNAL MEDICINE

## 2023-12-18 PROCEDURE — 84100 ASSAY OF PHOSPHORUS: CPT

## 2023-12-18 PROCEDURE — 86300 IMMUNOASSAY TUMOR CA 15-3: CPT | Performed by: NURSE PRACTITIONER

## 2023-12-18 PROCEDURE — 80053 COMPREHEN METABOLIC PANEL: CPT

## 2023-12-18 RX ORDER — CYANOCOBALAMIN 1000 UG/ML
1000 INJECTION, SOLUTION INTRAMUSCULAR; SUBCUTANEOUS ONCE
OUTPATIENT
Start: 2023-12-25

## 2023-12-18 RX ORDER — CYANOCOBALAMIN 1000 UG/ML
1000 INJECTION, SOLUTION INTRAMUSCULAR; SUBCUTANEOUS ONCE
Status: COMPLETED | OUTPATIENT
Start: 2023-12-18 | End: 2023-12-18

## 2023-12-18 RX ADMIN — DENOSUMAB 120 MG: 120 INJECTION SUBCUTANEOUS at 11:10

## 2023-12-18 RX ADMIN — CYANOCOBALAMIN 1000 MCG: 1000 INJECTION, SOLUTION INTRAMUSCULAR at 11:10

## 2023-12-22 ENCOUNTER — SPECIALTY PHARMACY (OUTPATIENT)
Dept: PHARMACY | Facility: HOSPITAL | Age: 78
End: 2023-12-22
Payer: MEDICARE

## 2023-12-22 NOTE — PROGRESS NOTES
I received a fax notice from [employer] dated 12/22/2023 stating that Ibrance has shipped to the patient.    Shira Bunn - Care Coordinator   12/22/2023  09:37 EST

## 2024-01-09 NOTE — PROGRESS NOTES
Subjective    Follow-up for metastatic breast cancer to bone      History of Present Illness    The patient return today for follow-up continuing Arimidex plus Ibrance and monthly Xgeva for metastatic ER positive breast cancer to bone.  The patient is tolerating treatment well without uncontrolled nausea vomiting diarrhea skin rash or infectious complications.  She has mild back and hip pain, occasional mild shin pain controlled with Tylenol.  She has mild occasional dyspnea.  She denies pain in the chest.    Oncology history:  Mrs. Roberts is a jeffry 78 y.o. woman with a history of stage I breast cancer affecting the left breast ([place] 5/9, 1.5 cm, ER 90%, [place] 70%, HER2 2+/negative FISH).  She underwent lumpectomy and 4 cycles of adjuvant chemotherapy with TC.  Her tumor was estrogen receptor positive and she underwent hormonal therapy with Arimidex between May 2009 in May 2014.    The patient saw her primary care provider on 3/21/2023 and complained of low back pain for which plain films were performed suspicious for malignancy.  Whole-body bone scan on 3/20/2023 showed multifocal uptake in the calvarium, ribs, cervical, thoracic and lumbar spine, sacrum, pelvis, humeri and proximal femora.  PET scan on 4/10/2023 showed multiple hypermetabolic bone lesions including the lumbar spine, thoracic spine, most numerous and intense in the pelvic bones and proximal femurs maximum SUV 5.7 pelvis and 6.4 proximal right femur.  The hypermetabolic foci are mixed lytic/sclerotic.  There is no hypermetabolic lymphadenopathy or visceral disease.  She has been started on anastrozole 1 mg daily and Ibrance 100 mg days 1-21 q. 28 days.    The patient is tolerating treatment well.  She denies overt nausea vomiting diarrhea more on the constipated side.  She denies cough or shortness of breath.  Her pain is fairly minimal mostly in the left hip with walking.  Previous back pain has improved.        Past Medical History:   "Pertinent for hypertension, acid reflux, hyperlipidemia, hypothyroidism    Review of Systems   Constitutional: Negative.    Respiratory: Negative.     Cardiovascular: Negative.    Gastrointestinal:  Negative for abdominal pain.   Musculoskeletal:  Positive for arthralgias.   Skin: Negative.    Hematological: Negative.    Psychiatric/Behavioral:  Negative for sleep disturbance. The patient is not nervous/anxious.    ROS unchanged-1/15/2024      Medications:  The current medication list was reviewed in the EMR    ALLERGIES:  No Known Allergies    Objective      Vitals:    01/15/24 0954   BP: 167/86   Pulse: 83   Resp: 18   Temp: 97.4 °F (36.3 °C)   TempSrc: Temporal   SpO2: 94%   Weight: 71.4 kg (157 lb 6.4 oz)   Height: 162.6 cm (64.02\")   PainSc: 0-No pain           1/15/2024     9:45 AM   Current Status   ECOG score 0       Physical Exam    CONSTITUTIONAL: pleasant well-developed adult woman  HEENT: no icterus, no thrush, moist membranes, resolution of the right mastoid nodule  LYMPH: no cervical or supraclavicular lad  MUSC: no edema, normal gait  NEURO: alert and oriented x3, normal strength  PSYCH: normal mood and affect for situation  SKIN: no pallor or rash  Exam otherwise unchanged-1/15/2024  RECENT LABS:  Results from last 7 days   Lab Units 01/15/24  0951   WBC 10*3/mm3 2.94*   NEUTROS ABS 10*3/mm3 1.57*   HEMOGLOBIN g/dL 11.4*   HEMATOCRIT % 32.9*   PLATELETS 10*3/mm3 321              WBC   Date Value Ref Range Status   01/15/2024 2.94 (L) 3.40 - 10.80 10*3/mm3 Final   02/22/2023 9.32 3.40 - 10.80 10*3/mm3 Final     RBC   Date Value Ref Range Status   01/15/2024 3.04 (L) 3.77 - 5.28 10*6/mm3 Final   02/22/2023 4.24 3.77 - 5.28 10*6/mm3 Final     Hemoglobin   Date Value Ref Range Status   01/15/2024 11.4 (L) 12.0 - 15.9 g/dL Final     Hematocrit   Date Value Ref Range Status   01/15/2024 32.9 (L) 34.0 - 46.6 % Final     MCV   Date Value Ref Range Status   01/15/2024 108.2 (H) 79.0 - 97.0 fL Final     MCH "   Date Value Ref Range Status   01/15/2024 37.5 (H) 26.6 - 33.0 pg Final     MCHC   Date Value Ref Range Status   01/15/2024 34.7 31.5 - 35.7 g/dL Final     RDW   Date Value Ref Range Status   01/15/2024 12.6 12.3 - 15.4 % Final     RDW-SD   Date Value Ref Range Status   01/15/2024 49.3 37.0 - 54.0 fl Final     MPV   Date Value Ref Range Status   01/15/2024 8.8 6.0 - 12.0 fL Final     Platelets   Date Value Ref Range Status   01/15/2024 321 140 - 450 10*3/mm3 Final     Neutrophil %   Date Value Ref Range Status   01/15/2024 53.4 42.7 - 76.0 % Final     Lymphocyte %   Date Value Ref Range Status   01/15/2024 37.1 19.6 - 45.3 % Final     Monocyte %   Date Value Ref Range Status   01/15/2024 6.8 5.0 - 12.0 % Final     Eosinophil %   Date Value Ref Range Status   01/15/2024 1.0 0.3 - 6.2 % Final     Basophil %   Date Value Ref Range Status   01/15/2024 1.4 0.0 - 1.5 % Final     Immature Grans %   Date Value Ref Range Status   01/15/2024 0.3 0.0 - 0.5 % Final     Neutrophils, Absolute   Date Value Ref Range Status   01/15/2024 1.57 (L) 1.70 - 7.00 10*3/mm3 Final     Lymphocytes, Absolute   Date Value Ref Range Status   01/15/2024 1.09 0.70 - 3.10 10*3/mm3 Final     Monocytes, Absolute   Date Value Ref Range Status   01/15/2024 0.20 0.10 - 0.90 10*3/mm3 Final     Eosinophils, Absolute   Date Value Ref Range Status   01/15/2024 0.03 0.00 - 0.40 10*3/mm3 Final     Basophils, Absolute   Date Value Ref Range Status   01/15/2024 0.04 0.00 - 0.20 10*3/mm3 Final     Immature Grans, Absolute   Date Value Ref Range Status   01/15/2024 0.01 0.00 - 0.05 10*3/mm3 Final     nRBC   Date Value Ref Range Status   01/15/2024 0.0 0.0 - 0.2 /100 WBC Final           PET scan 4/10/2023:  IMPRESSION:  1. Multiple hypermetabolic lytic and mixed lytic and sclerotic bone  metastases as described. The activity at the inferior aspect of the  right mastoid process may possibly represent a metastasis, but the  low-level activity at the adjacent  subcutaneous fat is of uncertain  etiology. There is no definitive fluid at the right mastoid air cells to  suggest acute mastoiditis, but please correlate clinically and follow-up  as needed.  2. There is no evidence for metastatic lymphadenopathy or visceral  Metastases.        Assessment & Plan   *history of stage I breast cancer affecting the left breast ([place] 5/9, 1.5 cm, ER 90%, [place] 70%, HER2 2+/negative FISH).    She underwent lumpectomy and 4 cycles of adjuvant chemotherapy with TC.  Her tumor was estrogen receptor positive and she underwent hormonal therapy with Arimidex between May 2009 in May 2014.    *Radiographic studies consistent with metastatic disease to bone with sclerotic/lytic lesions involving spine (most prominent lumbar), scapula, pelvic bones, proximal femurs  Initiated Arimidex April 2023; Ibrance subsequently added 100 mg D1-21 q. 28 days  4/26/2023 CT-guided bone biopsy left iliac metastatic ductal adenocarcinoma of the breast ER 99% strongly positive, [place] 31 to 40% weakly positive, HER2 2+/FISH negative; [facility] next generation sequencing available in the chart  CA 15-3 significantly elevated 764  CA 15-3 decreased to 295 on 6/5/2023  Repeat bone scan 8/14/2023-improved intensity uptake multiple locations, no new sites identified    *Pain of malignancy-mild at this point, using extra treatment Tylenol as needed    *Hypophosphatemia/hypocalcemia    *Mild neutropenia secondary to Ibrance    *Macrocytic anemia-hemoglobin improved 11.4  9/25/2023-B12 undetectably low; IM replacement initiated  Hemoglobin improved 11.4    Oncology plan/recommendations:  Continue anastrozole 1 mg daily  Continue Ibrance 100 mg daily 1 through 21 q. 28 days   Continue Xgeva monthly with lab monitoring  She has extra-strength Tylenol or Ultram at home if needed for pain  Continue monthly B12 injection 1000 mcg  8-week practitioner visit with restaging PET  7.  Genetics referral                1/15/2024      CC:

## 2024-01-15 ENCOUNTER — INFUSION (OUTPATIENT)
Dept: ONCOLOGY | Facility: HOSPITAL | Age: 79
End: 2024-01-15
Payer: MEDICARE

## 2024-01-15 ENCOUNTER — OFFICE VISIT (OUTPATIENT)
Dept: ONCOLOGY | Facility: CLINIC | Age: 79
End: 2024-01-15
Payer: MEDICARE

## 2024-01-15 ENCOUNTER — LAB (OUTPATIENT)
Dept: LAB | Facility: HOSPITAL | Age: 79
End: 2024-01-15
Payer: MEDICARE

## 2024-01-15 VITALS
SYSTOLIC BLOOD PRESSURE: 167 MMHG | TEMPERATURE: 97.4 F | BODY MASS INDEX: 26.87 KG/M2 | DIASTOLIC BLOOD PRESSURE: 86 MMHG | HEART RATE: 83 BPM | WEIGHT: 157.4 LBS | HEIGHT: 64 IN | RESPIRATION RATE: 18 BRPM | OXYGEN SATURATION: 94 %

## 2024-01-15 DIAGNOSIS — C79.51 CANCER, METASTATIC TO BONE: ICD-10-CM

## 2024-01-15 DIAGNOSIS — Z79.899 ENCOUNTER FOR LONG-TERM (CURRENT) USE OF OTHER MEDICATIONS: ICD-10-CM

## 2024-01-15 DIAGNOSIS — E53.8 B12 DEFICIENCY: ICD-10-CM

## 2024-01-15 DIAGNOSIS — C50.912 CARCINOMA OF LEFT BREAST METASTATIC TO BONE: ICD-10-CM

## 2024-01-15 DIAGNOSIS — Z79.899 ENCOUNTER FOR LONG-TERM (CURRENT) USE OF OTHER MEDICATIONS: Primary | ICD-10-CM

## 2024-01-15 DIAGNOSIS — C79.51 CANCER, METASTATIC TO BONE: Primary | ICD-10-CM

## 2024-01-15 DIAGNOSIS — C79.51 CARCINOMA OF LEFT BREAST METASTATIC TO BONE: ICD-10-CM

## 2024-01-15 LAB
ALBUMIN SERPL-MCNC: 4.5 G/DL (ref 3.5–5.2)
ALBUMIN/GLOB SERPL: 1.5 G/DL
ALP SERPL-CCNC: 76 U/L (ref 39–117)
ALT SERPL W P-5'-P-CCNC: 9 U/L (ref 1–33)
ANION GAP SERPL CALCULATED.3IONS-SCNC: 12.7 MMOL/L (ref 5–15)
AST SERPL-CCNC: 18 U/L (ref 1–32)
BASOPHILS # BLD AUTO: 0.04 10*3/MM3 (ref 0–0.2)
BASOPHILS NFR BLD AUTO: 1.4 % (ref 0–1.5)
BILIRUB SERPL-MCNC: 0.3 MG/DL (ref 0–1.2)
BUN SERPL-MCNC: 21 MG/DL (ref 8–23)
BUN/CREAT SERPL: 15.6 (ref 7–25)
CALCIUM SPEC-SCNC: 10.4 MG/DL (ref 8.6–10.5)
CANCER AG15-3 SERPL-ACNC: 184 U/ML
CHLORIDE SERPL-SCNC: 105 MMOL/L (ref 98–107)
CO2 SERPL-SCNC: 26.3 MMOL/L (ref 22–29)
CREAT SERPL-MCNC: 1.35 MG/DL (ref 0.57–1)
DEPRECATED RDW RBC AUTO: 49.3 FL (ref 37–54)
EGFRCR SERPLBLD CKD-EPI 2021: 40.6 ML/MIN/1.73
EOSINOPHIL # BLD AUTO: 0.03 10*3/MM3 (ref 0–0.4)
EOSINOPHIL NFR BLD AUTO: 1 % (ref 0.3–6.2)
ERYTHROCYTE [DISTWIDTH] IN BLOOD BY AUTOMATED COUNT: 12.6 % (ref 12.3–15.4)
GLOBULIN UR ELPH-MCNC: 3 GM/DL
GLUCOSE SERPL-MCNC: 107 MG/DL (ref 65–99)
HCT VFR BLD AUTO: 32.9 % (ref 34–46.6)
HGB BLD-MCNC: 11.4 G/DL (ref 12–15.9)
IMM GRANULOCYTES # BLD AUTO: 0.01 10*3/MM3 (ref 0–0.05)
IMM GRANULOCYTES NFR BLD AUTO: 0.3 % (ref 0–0.5)
LYMPHOCYTES # BLD AUTO: 1.09 10*3/MM3 (ref 0.7–3.1)
LYMPHOCYTES NFR BLD AUTO: 37.1 % (ref 19.6–45.3)
MAGNESIUM SERPL-MCNC: 2.1 MG/DL (ref 1.6–2.4)
MCH RBC QN AUTO: 37.5 PG (ref 26.6–33)
MCHC RBC AUTO-ENTMCNC: 34.7 G/DL (ref 31.5–35.7)
MCV RBC AUTO: 108.2 FL (ref 79–97)
MONOCYTES # BLD AUTO: 0.2 10*3/MM3 (ref 0.1–0.9)
MONOCYTES NFR BLD AUTO: 6.8 % (ref 5–12)
NEUTROPHILS NFR BLD AUTO: 1.57 10*3/MM3 (ref 1.7–7)
NEUTROPHILS NFR BLD AUTO: 53.4 % (ref 42.7–76)
NRBC BLD AUTO-RTO: 0 /100 WBC (ref 0–0.2)
PHOSPHATE SERPL-MCNC: 4.5 MG/DL (ref 2.5–4.5)
PLATELET # BLD AUTO: 321 10*3/MM3 (ref 140–450)
PMV BLD AUTO: 8.8 FL (ref 6–12)
POTASSIUM SERPL-SCNC: 4.5 MMOL/L (ref 3.5–5.2)
PROT SERPL-MCNC: 7.5 G/DL (ref 6–8.5)
RBC # BLD AUTO: 3.04 10*6/MM3 (ref 3.77–5.28)
SODIUM SERPL-SCNC: 144 MMOL/L (ref 136–145)
WBC NRBC COR # BLD AUTO: 2.94 10*3/MM3 (ref 3.4–10.8)

## 2024-01-15 PROCEDURE — 96372 THER/PROPH/DIAG INJ SC/IM: CPT

## 2024-01-15 PROCEDURE — 83735 ASSAY OF MAGNESIUM: CPT

## 2024-01-15 PROCEDURE — 86300 IMMUNOASSAY TUMOR CA 15-3: CPT | Performed by: NURSE PRACTITIONER

## 2024-01-15 PROCEDURE — 25010000002 CYANOCOBALAMIN PER 1000 MCG: Performed by: INTERNAL MEDICINE

## 2024-01-15 PROCEDURE — 84100 ASSAY OF PHOSPHORUS: CPT

## 2024-01-15 PROCEDURE — 36415 COLL VENOUS BLD VENIPUNCTURE: CPT

## 2024-01-15 PROCEDURE — 25010000002 DENOSUMAB 120 MG/1.7ML SOLUTION: Performed by: INTERNAL MEDICINE

## 2024-01-15 PROCEDURE — 80053 COMPREHEN METABOLIC PANEL: CPT

## 2024-01-15 PROCEDURE — 85025 COMPLETE CBC W/AUTO DIFF WBC: CPT

## 2024-01-15 RX ORDER — CYANOCOBALAMIN 1000 UG/ML
1000 INJECTION, SOLUTION INTRAMUSCULAR; SUBCUTANEOUS ONCE
OUTPATIENT
Start: 2024-01-22

## 2024-01-15 RX ORDER — CYANOCOBALAMIN 1000 UG/ML
1000 INJECTION, SOLUTION INTRAMUSCULAR; SUBCUTANEOUS ONCE
Status: COMPLETED | OUTPATIENT
Start: 2024-01-15 | End: 2024-01-15

## 2024-01-15 RX ADMIN — DENOSUMAB 120 MG: 120 INJECTION SUBCUTANEOUS at 10:39

## 2024-01-15 RX ADMIN — CYANOCOBALAMIN 1000 MCG: 1000 INJECTION INTRAMUSCULAR; SUBCUTANEOUS at 10:42

## 2024-01-16 ENCOUNTER — SPECIALTY PHARMACY (OUTPATIENT)
Dept: PHARMACY | Facility: HOSPITAL | Age: 79
End: 2024-01-16
Payer: MEDICARE

## 2024-01-16 RX ORDER — LEVOTHYROXINE SODIUM 0.03 MG/1
25 TABLET ORAL DAILY
Qty: 90 TABLET | Refills: 0 | Status: SHIPPED | OUTPATIENT
Start: 2024-01-16

## 2024-01-25 ENCOUNTER — TELEPHONE (OUTPATIENT)
Dept: INTERNAL MEDICINE | Facility: CLINIC | Age: 79
End: 2024-01-25
Payer: MEDICARE

## 2024-01-25 NOTE — TELEPHONE ENCOUNTER
"    Caller: Nedra Roberts \"Jeniffer\"    Relationship to patient: Self    Best call back number: [phone]     Type of visit: ANNUAL PHYSICAL    Requested date: AFTER 2/28     If rescheduling, when is the original appointment: 1/30     Additional notes: PATIENT STATES SHE HAD HER PHYSICAL ON 2/27 LAST YEAR AND WANTS TO BE MOVED TO AFTER THAT. NO OPENINGS WITH DR SANDOVAL, PLEASE ADVISE.      "

## 2024-02-12 ENCOUNTER — INFUSION (OUTPATIENT)
Dept: ONCOLOGY | Facility: HOSPITAL | Age: 79
End: 2024-02-12
Payer: MEDICARE

## 2024-02-12 ENCOUNTER — LAB (OUTPATIENT)
Dept: LAB | Facility: HOSPITAL | Age: 79
End: 2024-02-12
Payer: MEDICARE

## 2024-02-12 DIAGNOSIS — C79.51 CANCER, METASTATIC TO BONE: ICD-10-CM

## 2024-02-12 DIAGNOSIS — Z79.899 ENCOUNTER FOR LONG-TERM (CURRENT) USE OF OTHER MEDICATIONS: ICD-10-CM

## 2024-02-12 DIAGNOSIS — E53.8 B12 DEFICIENCY: Primary | ICD-10-CM

## 2024-02-12 LAB
ALBUMIN SERPL-MCNC: 4.4 G/DL (ref 3.5–5.2)
ALBUMIN/GLOB SERPL: 1.6 G/DL
ALP SERPL-CCNC: 68 U/L (ref 39–117)
ALT SERPL W P-5'-P-CCNC: 10 U/L (ref 1–33)
ANION GAP SERPL CALCULATED.3IONS-SCNC: 11.6 MMOL/L (ref 5–15)
AST SERPL-CCNC: 23 U/L (ref 1–32)
BASOPHILS # BLD AUTO: 0.04 10*3/MM3 (ref 0–0.2)
BASOPHILS NFR BLD AUTO: 1.5 % (ref 0–1.5)
BILIRUB SERPL-MCNC: 0.3 MG/DL (ref 0–1.2)
BUN SERPL-MCNC: 16 MG/DL (ref 8–23)
BUN/CREAT SERPL: 12.5 (ref 7–25)
CALCIUM SPEC-SCNC: 9.9 MG/DL (ref 8.6–10.5)
CANCER AG15-3 SERPL-ACNC: 170 U/ML
CHLORIDE SERPL-SCNC: 104 MMOL/L (ref 98–107)
CO2 SERPL-SCNC: 26.4 MMOL/L (ref 22–29)
CREAT SERPL-MCNC: 1.28 MG/DL (ref 0.57–1)
DEPRECATED RDW RBC AUTO: 49 FL (ref 37–54)
EGFRCR SERPLBLD CKD-EPI 2021: 43.2 ML/MIN/1.73
EOSINOPHIL # BLD AUTO: 0.04 10*3/MM3 (ref 0–0.4)
EOSINOPHIL NFR BLD AUTO: 1.5 % (ref 0.3–6.2)
ERYTHROCYTE [DISTWIDTH] IN BLOOD BY AUTOMATED COUNT: 12.8 % (ref 12.3–15.4)
GLOBULIN UR ELPH-MCNC: 2.7 GM/DL
GLUCOSE SERPL-MCNC: 102 MG/DL (ref 65–99)
HCT VFR BLD AUTO: 31.9 % (ref 34–46.6)
HGB BLD-MCNC: 11.1 G/DL (ref 12–15.9)
IMM GRANULOCYTES # BLD AUTO: 0.01 10*3/MM3 (ref 0–0.05)
IMM GRANULOCYTES NFR BLD AUTO: 0.4 % (ref 0–0.5)
LYMPHOCYTES # BLD AUTO: 0.92 10*3/MM3 (ref 0.7–3.1)
LYMPHOCYTES NFR BLD AUTO: 34.5 % (ref 19.6–45.3)
MAGNESIUM SERPL-MCNC: 2.3 MG/DL (ref 1.6–2.4)
MCH RBC QN AUTO: 37.4 PG (ref 26.6–33)
MCHC RBC AUTO-ENTMCNC: 34.8 G/DL (ref 31.5–35.7)
MCV RBC AUTO: 107.4 FL (ref 79–97)
MONOCYTES # BLD AUTO: 0.13 10*3/MM3 (ref 0.1–0.9)
MONOCYTES NFR BLD AUTO: 4.9 % (ref 5–12)
NEUTROPHILS NFR BLD AUTO: 1.53 10*3/MM3 (ref 1.7–7)
NEUTROPHILS NFR BLD AUTO: 57.2 % (ref 42.7–76)
NRBC BLD AUTO-RTO: 0 /100 WBC (ref 0–0.2)
PHOSPHATE SERPL-MCNC: 3.9 MG/DL (ref 2.5–4.5)
PLATELET # BLD AUTO: 295 10*3/MM3 (ref 140–450)
PMV BLD AUTO: 8.5 FL (ref 6–12)
POTASSIUM SERPL-SCNC: 4.5 MMOL/L (ref 3.5–5.2)
PROT SERPL-MCNC: 7.1 G/DL (ref 6–8.5)
RBC # BLD AUTO: 2.97 10*6/MM3 (ref 3.77–5.28)
SODIUM SERPL-SCNC: 142 MMOL/L (ref 136–145)
WBC NRBC COR # BLD AUTO: 2.67 10*3/MM3 (ref 3.4–10.8)

## 2024-02-12 PROCEDURE — 96372 THER/PROPH/DIAG INJ SC/IM: CPT

## 2024-02-12 PROCEDURE — 25010000002 DENOSUMAB 120 MG/1.7ML SOLUTION: Performed by: INTERNAL MEDICINE

## 2024-02-12 PROCEDURE — 84100 ASSAY OF PHOSPHORUS: CPT

## 2024-02-12 PROCEDURE — 36415 COLL VENOUS BLD VENIPUNCTURE: CPT

## 2024-02-12 PROCEDURE — 80053 COMPREHEN METABOLIC PANEL: CPT

## 2024-02-12 PROCEDURE — 86300 IMMUNOASSAY TUMOR CA 15-3: CPT | Performed by: INTERNAL MEDICINE

## 2024-02-12 PROCEDURE — 83735 ASSAY OF MAGNESIUM: CPT

## 2024-02-12 PROCEDURE — 85025 COMPLETE CBC W/AUTO DIFF WBC: CPT

## 2024-02-12 PROCEDURE — 25010000002 CYANOCOBALAMIN PER 1000 MCG: Performed by: INTERNAL MEDICINE

## 2024-02-12 RX ORDER — CYANOCOBALAMIN 1000 UG/ML
1000 INJECTION, SOLUTION INTRAMUSCULAR; SUBCUTANEOUS ONCE
OUTPATIENT
Start: 2024-02-19

## 2024-02-12 RX ORDER — CYANOCOBALAMIN 1000 UG/ML
1000 INJECTION, SOLUTION INTRAMUSCULAR; SUBCUTANEOUS ONCE
Status: COMPLETED | OUTPATIENT
Start: 2024-02-12 | End: 2024-02-12

## 2024-02-12 RX ADMIN — CYANOCOBALAMIN 1000 MCG: 1000 INJECTION INTRAMUSCULAR; SUBCUTANEOUS at 10:40

## 2024-02-12 RX ADMIN — DENOSUMAB 120 MG: 120 INJECTION SUBCUTANEOUS at 10:40

## 2024-02-14 ENCOUNTER — SPECIALTY PHARMACY (OUTPATIENT)
Dept: PHARMACY | Facility: HOSPITAL | Age: 79
End: 2024-02-14
Payer: MEDICARE

## 2024-02-16 ENCOUNTER — SPECIALTY PHARMACY (OUTPATIENT)
Dept: PHARMACY | Facility: HOSPITAL | Age: 79
End: 2024-02-16
Payer: MEDICARE

## 2024-03-04 ENCOUNTER — HOSPITAL ENCOUNTER (OUTPATIENT)
Dept: PET IMAGING | Facility: HOSPITAL | Age: 79
Discharge: HOME | End: 2024-03-04
Payer: MEDICARE

## 2024-03-04 DIAGNOSIS — Z85.3 HISTORY OF BREAST CANCER: ICD-10-CM

## 2024-03-04 DIAGNOSIS — C79.51 CARCINOMA OF LEFT BREAST METASTATIC TO BONE: ICD-10-CM

## 2024-03-04 DIAGNOSIS — C50.912 CARCINOMA OF LEFT BREAST METASTATIC TO BONE: ICD-10-CM

## 2024-03-04 LAB — GLUCOSE BLDC GLUCOMTR-MCNC: 89 MG/DL (ref 70–130)

## 2024-03-04 PROCEDURE — 78815 PET IMAGE W/CT SKULL-THIGH: CPT

## 2024-03-04 PROCEDURE — A9552 F18 FDG: HCPCS | Performed by: INTERNAL MEDICINE

## 2024-03-04 PROCEDURE — 82948 REAGENT STRIP/BLOOD GLUCOSE: CPT

## 2024-03-04 PROCEDURE — 0 FLUDEOXYGLUCOSE F18 SOLUTION: Performed by: INTERNAL MEDICINE

## 2024-03-04 RX ADMIN — FLUDEOXYGLUCOSE F 18 1 DOSE: 200 INJECTION, SOLUTION INTRAVENOUS at 09:06

## 2024-03-05 ENCOUNTER — OFFICE VISIT (OUTPATIENT)
Dept: INTERNAL MEDICINE | Facility: CLINIC | Age: 79
End: 2024-03-05
Payer: MEDICARE

## 2024-03-05 VITALS
DIASTOLIC BLOOD PRESSURE: 69 MMHG | SYSTOLIC BLOOD PRESSURE: 132 MMHG | WEIGHT: 158 LBS | HEIGHT: 64 IN | HEART RATE: 77 BPM | BODY MASS INDEX: 26.98 KG/M2 | OXYGEN SATURATION: 95 %

## 2024-03-05 DIAGNOSIS — K21.9 GASTROESOPHAGEAL REFLUX DISEASE, UNSPECIFIED WHETHER ESOPHAGITIS PRESENT: ICD-10-CM

## 2024-03-05 DIAGNOSIS — C79.51 CANCER, METASTATIC TO BONE: ICD-10-CM

## 2024-03-05 DIAGNOSIS — I10 ESSENTIAL HYPERTENSION: ICD-10-CM

## 2024-03-05 DIAGNOSIS — E03.9 HYPOTHYROIDISM, UNSPECIFIED TYPE: ICD-10-CM

## 2024-03-05 DIAGNOSIS — J31.0 CHRONIC RHINITIS: ICD-10-CM

## 2024-03-05 DIAGNOSIS — Z00.00 HEALTHCARE MAINTENANCE: Primary | ICD-10-CM

## 2024-03-05 DIAGNOSIS — E78.2 MULTIPLE-TYPE HYPERLIPIDEMIA: ICD-10-CM

## 2024-03-05 DIAGNOSIS — E78.5 HYPERLIPIDEMIA, UNSPECIFIED HYPERLIPIDEMIA TYPE: ICD-10-CM

## 2024-03-05 RX ORDER — FAMOTIDINE 40 MG/1
40 TABLET, FILM COATED ORAL DAILY
Qty: 90 TABLET | Refills: 1 | Status: SHIPPED | OUTPATIENT
Start: 2024-03-05

## 2024-03-05 RX ORDER — ACETAMINOPHEN 160 MG
2000 TABLET,DISINTEGRATING ORAL DAILY
Qty: 90 EACH | Refills: 3 | Status: SHIPPED | OUTPATIENT
Start: 2024-03-05

## 2024-03-05 RX ORDER — FLUTICASONE PROPIONATE 50 MCG
2 SPRAY, SUSPENSION (ML) NASAL DAILY
Qty: 16 G | Refills: 5 | Status: SHIPPED | OUTPATIENT
Start: 2024-03-05

## 2024-03-05 NOTE — PROGRESS NOTES
Subjective    Follow-up for metastatic breast cancer to bone      History of Present Illness    The patient return today for follow-up continuing Arimidex plus Ibrance and monthly Xgeva for metastatic ER positive breast cancer to bone.  The patient is tolerating treatment well without uncontrolled nausea vomiting diarrhea skin rash or infectious complications.  She has mild back and hip pain usually when she walks a mile or so for exercise controlled with Tylenol.  She has mild occasional dyspnea.  She denies pain in the chest.    Oncology history:  Mrs. Roberts is a jeffry 78 y.o. woman with a history of stage I breast cancer affecting the left breast ([place] 5/9, 1.5 cm, ER 90%, NV 70%, HER2 2+/negative FISH).  She underwent lumpectomy and 4 cycles of adjuvant chemotherapy with TC.  Her tumor was estrogen receptor positive and she underwent hormonal therapy with Arimidex between May 2009 in May 2014.    The patient saw her primary care provider on 3/21/2023 and complained of low back pain for which plain films were performed suspicious for malignancy.  Whole-body bone scan on 3/20/2023 showed multifocal uptake in the calvarium, ribs, cervical, thoracic and lumbar spine, sacrum, pelvis, humeri and proximal femora.  PET scan on 4/10/2023 showed multiple hypermetabolic bone lesions including the lumbar spine, thoracic spine, most numerous and intense in the pelvic bones and proximal femurs maximum SUV 5.7 pelvis and 6.4 proximal right femur.  The hypermetabolic foci are mixed lytic/sclerotic.  There is no hypermetabolic lymphadenopathy or visceral disease.  She has been started on anastrozole 1 mg daily and Ibrance 100 mg days 1-21 q. 28 days.    The patient is tolerating treatment well.  She denies overt nausea vomiting diarrhea more on the constipated side.  She denies cough or shortness of breath.  Her pain is fairly minimal mostly in the left hip with walking.  Previous back pain has improved.    Repeat PET  "scan on 3/4/2024 showed resolution of hypermetabolic bone metastases-maximum SUV proximal right femur was 6.4 and 2.5, right iliac body previously 5.7 now 3.9, tiny focus L3 SUV 2.9, no new hypermetabolic activity.    Past Medical History:  Pertinent for hypertension, acid reflux, hyperlipidemia, hypothyroidism    Review of Systems   Constitutional: Negative.    Respiratory: Negative.     Cardiovascular: Negative.    Gastrointestinal:  Negative for abdominal pain.   Musculoskeletal:  Positive for arthralgias.   Skin: Negative.    Hematological: Negative.    Psychiatric/Behavioral:  Negative for sleep disturbance. The patient is not nervous/anxious.    ROS unchanged-3/11/2024      Medications:  The current medication list was reviewed in the EMR    ALLERGIES:  No Known Allergies    Objective      Vitals:    03/11/24 1005   BP: 154/79   Pulse: 65   Resp: 16   Temp: 97.7 °F (36.5 °C)   TempSrc: Temporal   SpO2: 98%   Weight: 71.2 kg (157 lb)   Height: 162.6 cm (64.02\")   PainSc: 0-No pain             3/11/2024     9:53 AM   Current Status   ECOG score 0       Physical Exam    CONSTITUTIONAL: pleasant well-developed adult woman  HEENT: no icterus, no thrush, moist membranes, resolution of the right mastoid nodule  LYMPH: no cervical or supraclavicular lad  MUSC: no edema, normal gait  NEURO: alert and oriented x3, normal strength  PSYCH: normal mood and affect for situation  SKIN: no pallor or rash  Exam otherwise unchanged-3/11/2024  RECENT LABS:  Results from last 7 days   Lab Units 03/11/24  0953   WBC 10*3/mm3 2.73*   NEUTROS ABS 10*3/mm3 1.65*   HEMOGLOBIN g/dL 10.4*   HEMATOCRIT % 29.8*   PLATELETS 10*3/mm3 462*                WBC   Date Value Ref Range Status   03/11/2024 2.73 (L) 3.40 - 10.80 10*3/mm3 Final   02/22/2023 9.32 3.40 - 10.80 10*3/mm3 Final     RBC   Date Value Ref Range Status   03/11/2024 2.79 (L) 3.77 - 5.28 10*6/mm3 Final   02/22/2023 4.24 3.77 - 5.28 10*6/mm3 Final     Hemoglobin   Date Value " Ref Range Status   03/11/2024 10.4 (L) 12.0 - 15.9 g/dL Final     Hematocrit   Date Value Ref Range Status   03/11/2024 29.8 (L) 34.0 - 46.6 % Final     MCV   Date Value Ref Range Status   03/11/2024 106.8 (H) 79.0 - 97.0 fL Final     MCH   Date Value Ref Range Status   03/11/2024 37.3 (H) 26.6 - 33.0 pg Final     MCHC   Date Value Ref Range Status   03/11/2024 34.9 31.5 - 35.7 g/dL Final     RDW   Date Value Ref Range Status   03/11/2024 13.1 12.3 - 15.4 % Final     RDW-SD   Date Value Ref Range Status   03/11/2024 51.2 37.0 - 54.0 fl Final     MPV   Date Value Ref Range Status   03/11/2024 8.3 6.0 - 12.0 fL Final     Platelets   Date Value Ref Range Status   03/11/2024 462 (H) 140 - 450 10*3/mm3 Final     Neutrophil %   Date Value Ref Range Status   03/11/2024 60.4 42.7 - 76.0 % Final     Lymphocyte %   Date Value Ref Range Status   03/11/2024 28.6 19.6 - 45.3 % Final     Monocyte %   Date Value Ref Range Status   03/11/2024 7.0 5.0 - 12.0 % Final     Eosinophil %   Date Value Ref Range Status   03/11/2024 1.8 0.3 - 6.2 % Final     Basophil %   Date Value Ref Range Status   03/11/2024 1.8 (H) 0.0 - 1.5 % Final     Immature Grans %   Date Value Ref Range Status   03/11/2024 0.4 0.0 - 0.5 % Final     Neutrophils, Absolute   Date Value Ref Range Status   03/11/2024 1.65 (L) 1.70 - 7.00 10*3/mm3 Final     Lymphocytes, Absolute   Date Value Ref Range Status   03/11/2024 0.78 0.70 - 3.10 10*3/mm3 Final     Monocytes, Absolute   Date Value Ref Range Status   03/11/2024 0.19 0.10 - 0.90 10*3/mm3 Final     Eosinophils, Absolute   Date Value Ref Range Status   03/11/2024 0.05 0.00 - 0.40 10*3/mm3 Final     Basophils, Absolute   Date Value Ref Range Status   03/11/2024 0.05 0.00 - 0.20 10*3/mm3 Final     Immature Grans, Absolute   Date Value Ref Range Status   03/11/2024 0.01 0.00 - 0.05 10*3/mm3 Final     nRBC   Date Value Ref Range Status   03/11/2024 0.0 0.0 - 0.2 /100 WBC Final           PET scan  4/10/2023:  IMPRESSION:  1. Multiple hypermetabolic lytic and mixed lytic and sclerotic bone  metastases as described. The activity at the inferior aspect of the  right mastoid process may possibly represent a metastasis, but the  low-level activity at the adjacent subcutaneous fat is of uncertain  etiology. There is no definitive fluid at the right mastoid air cells to  suggest acute mastoiditis, but please correlate clinically and follow-up  as needed.  2. There is no evidence for metastatic lymphadenopathy or visceral  Metastases.    NM PET/CT Skull Base to Mid Thigh 3/4/2024 - FINDINGS: Mediastinal blood pool has a maximal SUV of 2.5, previously 2.4.  1. There has been resolution of most of the hypermetabolic bone metastases. The maximal SUV at the proximal right femur was 6.4 and is  currently 2.5. Most intense residual activity is at the right iliac body with a maximal SUV of 3.9, previously 5.7. A tiny focus at L3 has a maximal SUV of 2.9, previously 5.0. There are no new abnormal foci of abnormal activity within the bones.  2. There is no hypermetabolic lymphadenopathy at the thorax, axilla, supraclavicular regions, neck, abdomen, or pelvis. There is no suspicious visceral activity. No new pulmonary opacities are seen.    Assessment & Plan   *history of stage I breast cancer affecting the left breast ([place] 5/9, 1.5 cm, ER 90%, NV 70%, HER2 2+/negative FISH).    She underwent lumpectomy and 4 cycles of adjuvant chemotherapy with TC.  Her tumor was estrogen receptor positive and she underwent hormonal therapy with Arimidex between May 2009 in May 2014.    *Radiographic studies consistent with metastatic disease to bone with sclerotic/lytic lesions involving spine (most prominent lumbar), scapula, pelvic bones, proximal femurs  Initiated Arimidex April 2023; Ibrance subsequently added 100 mg D1-21 q. 28 days  4/26/2023 CT-guided bone biopsy left iliac metastatic ductal adenocarcinoma of the breast ER 99%  strongly positive, IA 31 to 40% weakly positive, HER2 2+/FISH negative; [facility] next generation sequencing available in the chart  CA 15-3 significantly elevated 764  CA 15-3 decreased to 295 on 6/5/2023  Repeat bone scan 8/14/2023-improved intensity uptake multiple locations, no new sites identified  PET scan on 3/4/2024 showed resolution of hypermetabolic bone metastases-maximum SUV proximal right femur was 6.4 and 2.5, right iliac body previously 5.7 now 3.9, tiny focus L3 SUV 2.9, no new hypermetabolic activity; CA 15-3 170 on 2/12/2024    *Pain of malignancy-mild at this point, using extra treatment Tylenol as needed    *Hypophosphatemia/hypocalcemia    *Mild neutropenia secondary to Ibrance-ANC stable 1.65    *Macrocytic anemia-  9/25/2023-B12 undetectably low; IM replacement initiated  Hemoglobin 10.4    Oncology plan/recommendations:  Continue anastrozole 1 mg daily  Continue Ibrance 100 mg daily 1 through 21 q. 28 days   Continue Xgeva monthly with lab monitoring  She has extra-strength Tylenol or Ultram at home if needed for pain  Continue monthly B12 injection 1000 mcg  7.  Genetics referral-pending  8.  12-week practitioner visit  9.  Additional labs today for anemia assessment-B12 folic acid SPEP GABRIELE free light chain ratio reticulocyte count ferritin iron profile              3/11/2024      CC:

## 2024-03-05 NOTE — PROGRESS NOTES
The ABCs of the Annual Wellness Visit  Subsequent Medicare Wellness Visit    Subjective    Nedra Roberts is a 78 y.o. female who presents for a Subsequent Medicare Wellness Visit.    The following portions of the patient's history were reviewed and   updated as appropriate: allergies, current medications, past family history, past medical history, past social history, past surgical history, and problem list.    Compared to one year ago, the patient feels her physical   health is the same.    Compared to one year ago, the patient feels her mental   health is the same.although her spouse did pass away in September.     Recent Hospitalizations:  She was not  admitted within the past 365 days       Current Medical Providers:  Patient Care Team:  Prisca Church MD as PCP - General  Prisca Church MD as PCP - Family Medicine  Rocky Ford MD as Consulting Physician (Hematology and Oncology)  Aaron Tolliver MD as Referring Physician (General Surgery)    Outpatient Medications Prior to Visit   Medication Sig Dispense Refill    anastrozole (ARIMIDEX) 1 MG tablet TAKE 1 TABLET BY MOUTH DAILY 30 tablet 5    aspirin 81 MG EC tablet Take 1 tablet by mouth Daily.      Calcium Carbonate Antacid (TUMS PO) Take  by mouth As Needed.      denosumab (XGEVA) 120 MG/1.7ML solution injection Inject  under the skin into the appropriate area as directed 1 (One) Time.      levothyroxine (SYNTHROID, LEVOTHROID) 25 MCG tablet TAKE 1 TABLET BY MOUTH DAILY 90 tablet 0    losartan (COZAAR) 100 MG tablet TAKE 1 TABLET BY MOUTH EVERY DAY 90 tablet 3    ondansetron (ZOFRAN) 8 MG tablet Take 1 tablet by mouth 3 (Three) Times a Day As Needed for Nausea or Vomiting. 30 tablet 5    Palbociclib 100 MG tablet tablet Take 1 tablet by mouth Daily. Take with or without food for 21 days on, then off for 7 days. 21 tablet 5    pantoprazole (PROTONIX) 40 MG EC tablet TAKE 1 TABLET BY MOUTH TWICE DAILY 180 tablet 1    simvastatin (ZOCOR) 40 MG tablet  "TAKE 1 TABLET BY MOUTH EVERY NIGHT 90 tablet 3    Cholecalciferol (Vitamin D3) 25 MCG (1000 UT) capsule        No facility-administered medications prior to visit.       No opioid medication identified on active medication list. I have reviewed chart for other potential  high risk medication/s and harmful drug interactions in the elderly.        Aspirin is on active medication list. Aspirin use is indicated based on review of current medical condition/s. Pros and cons of this therapy have been discussed today. Benefits of this medication outweigh potential harm.  Patient has been encouraged to continue taking this medication.  .      Patient Active Problem List   Diagnosis    Essential hypertension    Gastritis    Hypothyroidism    Multiple-type hyperlipidemia    Healthcare maintenance    History of breast cancer    GERD (gastroesophageal reflux disease)    Esophagitis    Cancer, metastatic to bone    Encounter for long-term (current) use of other medications    Antineoplastic chemotherapy induced pancytopenia    B12 deficiency     Advance Care Planning   Advance Care Planning     Advance Directive is on file.  ACP discussion was held with the patient during this visit. Patient has an advance directive in EMR which is still valid.      Objective    Vitals:    03/05/24 1426   BP: 132/69   Pulse: 77   SpO2: 95%   Weight: 71.7 kg (158 lb)   Height: 162.6 cm (64.02\")     Estimated body mass index is 27.11 kg/m² as calculated from the following:    Height as of this encounter: 162.6 cm (64.02\").    Weight as of this encounter: 71.7 kg (158 lb).    BMI is >= 25 and <30. (Overweight) The following options were offered after discussion;: exercise counseling/recommendations and nutrition counseling/recommendations      Does the patient have evidence of cognitive impairment? No          HEALTH RISK ASSESSMENT    Smoking Status:  Social History     Tobacco Use   Smoking Status Former    Current packs/day: 0.00    Average " packs/day: 1 pack/day for 30.0 years (30.0 ttl pk-yrs)    Types: Cigarettes    Start date: 1972    Quit date: 2002    Years since quittin.1   Smokeless Tobacco Never     Alcohol Consumption:  Social History     Substance and Sexual Activity   Alcohol Use Yes    Alcohol/week: 7.0 standard drinks of alcohol    Types: 7 Glasses of wine per week    Comment: NIGHTLY     Fall Risk Screen:    DANIEL Fall Risk Assessment was completed, and patient is at LOW risk for falls.Assessment completed on:3/5/2024    Depression Screening:      3/5/2024     2:27 PM   PHQ-2/PHQ-9 Depression Screening   Little Interest or Pleasure in Doing Things 0-->not at all   Feeling Down, Depressed or Hopeless 0-->not at all   PHQ-9: Brief Depression Severity Measure Score 0       Health Habits and Functional and Cognitive Screening:      3/5/2024     2:29 PM   Functional & Cognitive Status   Do you have difficulty preparing food and eating? No   Do you have difficulty bathing yourself, getting dressed or grooming yourself? No   Do you have difficulty using the toilet? No   Do you have difficulty moving around from place to place? No   Do you have trouble with steps or getting out of a bed or a chair? No   Do you need help using the phone?  No   Are you deaf or do you have serious difficulty hearing?  No   Do you need help to go to places out of walking distance? No   Do you need help shopping? No   Do you need help preparing meals?  No   Do you need help with housework?  No   Do you need help with laundry? No   Do you need help taking your medications? No   Do you need help managing money? No   Do you ever drive or ride in a car without wearing a seat belt? No   Have you felt unusual stress, anger or loneliness in the last month? No   Who do you live with? Alone   If you need help, do you have trouble finding someone available to you? No   Have you been bothered in the last four weeks by sexual problems? No   Do you have difficulty  concentrating, remembering or making decisions? No       Age-appropriate Screening Schedule:  Refer to the list below for future screening recommendations based on patient's age, sex and/or medical conditions. Orders for these recommended tests are listed in the plan section. The patient has been provided with a written plan.    Health Maintenance   Topic Date Due    RSV Vaccine - Adults (1 - 1-dose 60+ series) Never done    TDAP/TD VACCINES (2 - Td or Tdap) 01/01/2022    LIPID PANEL  02/22/2024    MAMMOGRAM  11/10/2024    ANNUAL WELLNESS VISIT  03/05/2025    BMI FOLLOWUP  03/05/2025    COLORECTAL CANCER SCREENING  03/23/2025    DXA SCAN  04/25/2025    HEPATITIS C SCREENING  Completed    COVID-19 Vaccine  Completed    INFLUENZA VACCINE  Completed    Pneumococcal Vaccine 65+  Completed    ZOSTER VACCINE  Completed                  CMS Preventative Services Quick Reference  Risk Factors Identified During Encounter  Immunizations Discussed/Encouraged: Td and RSV (Respiratory Syncytial Virus)  The above risks/problems have been discussed with the patient.  Pertinent information has been shared with the patient in the After Visit Summary.  An After Visit Summary and PPPS were made available to the patient.    Follow Up:   Next Medicare Wellness visit to be scheduled in 1 year.       Additional E&M Note during same encounter follows:  Patient has multiple medical problems which are significant and separately identifiable that require additional work above and beyond the Medicare Wellness Visit.      Chief Complaint  Annual Exam  Breast cancer  Sinus congestion  Reflux  Htn  Hypeerlipidemia  Hypothyroid    Subjective        HPI  Nedra Roberts is also being seen today for follow up evaluation w acute needs. Patient has breast cancer. Mets to bone. She is on xgeva, arimidex, and ibrance. She has fairly mild pain that is controlled w/ tylenol.. she has tramadol available. Took on one occasion w/ only modest benefit.   Has  "hypothyroidism. Has not had recent testing. Hyperlipidemia. Last lipid tested one year ago. Has a history of carotid arterial stenosis. This is monitored.   Htn- bp normotensive when tested. She does not test at home.   Sinus drainage  Reflux after eating         Objective   Vital Signs:  /69   Pulse 77   Ht 162.6 cm (64.02\")   Wt 71.7 kg (158 lb)   SpO2 95%   BMI 27.11 kg/m²     Physical Exam  Vitals and nursing note reviewed.   Constitutional:       Appearance: Normal appearance.   HENT:      Head: Normocephalic and atraumatic.      Right Ear: Tympanic membrane normal.      Left Ear: Tympanic membrane normal.      Nose: Nose normal.      Mouth/Throat:      Mouth: Mucous membranes are moist.   Eyes:      Extraocular Movements: Extraocular movements intact.      Pupils: Pupils are equal, round, and reactive to light.   Cardiovascular:      Rate and Rhythm: Normal rate and regular rhythm.      Pulses: Normal pulses.   Pulmonary:      Effort: Pulmonary effort is normal.   Abdominal:      General: Abdomen is flat.      Palpations: Abdomen is soft.   Musculoskeletal:         General: Normal range of motion.      Cervical back: Normal range of motion.   Skin:     General: Skin is warm and dry.   Neurological:      General: No focal deficit present.      Mental Status: She is alert and oriented to person, place, and time.   Psychiatric:         Mood and Affect: Mood normal.         Behavior: Behavior normal.         Thought Content: Thought content normal.         Judgment: Judgment normal.          The following data was reviewed by: Prisca Church MD on 03/05/2024: mag, phos, cmp, cbc  CMP          12/18/2023    10:26 1/15/2024    09:51 2/12/2024    09:35   CMP   Glucose 97  107  102    BUN 19  21  16    Creatinine 1.30  1.35  1.28    EGFR 42.4  40.6  43.2    Sodium 138  144  142    Potassium 4.9  4.5  4.5    Chloride 102  105  104    Calcium 9.4  10.4  9.9    Total Protein 7.1  7.5  7.1    Albumin 4.4  4.5  " 4.4    Globulin 2.7  3.0  2.7    Total Bilirubin 0.3  0.3  0.3    Alkaline Phosphatase 62  76  68    AST (SGOT) 20  18  23    ALT (SGPT) 10  9  10    Albumin/Globulin Ratio 1.6  1.5  1.6    BUN/Creatinine Ratio 14.6  15.6  12.5    Anion Gap 10.4  12.7  11.6      CBC          12/18/2023    10:26 1/15/2024    09:51 2/12/2024    09:35   CBC   WBC 3.15  2.94  2.67    RBC 2.91  3.04  2.97    Hemoglobin 10.8  11.4  11.1    Hematocrit 31.3  32.9  31.9    .6  108.2  107.4    MCH 37.1  37.5  37.4    MCHC 34.5  34.7  34.8    RDW 12.4  12.6  12.8    Platelets 368  321  295      Data reviewed : Consultant notes oncology           Assessment and Plan   Diagnoses and all orders for this visit:    1. Healthcare maintenance (Primary)    2. Hypothyroidism, unspecified type  -     TSH  -     T4, Free  -     Lipid Panel With LDL / HDL Ratio  -     Comprehensive Metabolic Panel    3. Hyperlipidemia, unspecified hyperlipidemia type  -     TSH  -     T4, Free  -     Lipid Panel With LDL / HDL Ratio  -     Comprehensive Metabolic Panel    4. Cancer, metastatic to bone    5. Essential hypertension    6. Multiple-type hyperlipidemia    7. Chronic rhinitis    8. Gastroesophageal reflux disease, unspecified whether esophagitis present    Other orders  -     Cholecalciferol (Vitamin D3) 50 MCG (2000 UT) capsule; Take 1 capsule by mouth Daily.  Dispense: 90 each; Refill: 3  -     famotidine (Pepcid) 40 MG tablet; Take 1 tablet by mouth Daily.  Dispense: 90 tablet; Refill: 1  -     fluticasone (FLONASE) 50 MCG/ACT nasal spray; 2 sprays into the nostril(s) as directed by provider Daily.  Dispense: 16 g; Refill: 5    Patient to continue all hcm routinely. She will f/u w/ oncology and continue treatment for mets breast ca. She is to start vit d daily. For rhinitis will start flonase. Has reflux. Will start pepcid and continue pantoprazole. Last egd 2021. Consider repeat. She has hypothyroidism. She needs thyroid and lipid testing. This has  been ordered. D/w patient to ensure this is taken w/ next blood draw which is scheduled. She will monitor bp at home. F/u here in 6 months or pnr.          I spent 50 minutes caring for Nedra on this date of service. This time includes time spent by me in the following activities:preparing for the visit, reviewing tests, obtaining and/or reviewing a separately obtained history, performing a medically appropriate examination and/or evaluation , counseling and educating the patient/family/caregiver, ordering medications, tests, or procedures, referring and communicating with other health care professionals , documenting information in the medical record, and independently interpreting results and communicating that information with the patient/family/caregiver  Follow Up   Return in about 6 months (around 9/5/2024) for Recheck.  Patient was given instructions and counseling regarding her condition or for health maintenance advice. Please see specific information pulled into the AVS if appropriate.

## 2024-03-11 ENCOUNTER — OFFICE VISIT (OUTPATIENT)
Dept: ONCOLOGY | Facility: CLINIC | Age: 79
End: 2024-03-11
Payer: MEDICARE

## 2024-03-11 ENCOUNTER — LAB (OUTPATIENT)
Dept: LAB | Facility: HOSPITAL | Age: 79
End: 2024-03-11
Payer: MEDICARE

## 2024-03-11 ENCOUNTER — INFUSION (OUTPATIENT)
Dept: ONCOLOGY | Facility: HOSPITAL | Age: 79
End: 2024-03-11
Payer: MEDICARE

## 2024-03-11 VITALS
HEIGHT: 64 IN | SYSTOLIC BLOOD PRESSURE: 154 MMHG | OXYGEN SATURATION: 98 % | RESPIRATION RATE: 16 BRPM | BODY MASS INDEX: 26.8 KG/M2 | TEMPERATURE: 97.7 F | DIASTOLIC BLOOD PRESSURE: 79 MMHG | HEART RATE: 65 BPM | WEIGHT: 157 LBS

## 2024-03-11 DIAGNOSIS — C50.919 CARCINOMA OF BREAST METASTATIC TO BONE, UNSPECIFIED LATERALITY: Primary | ICD-10-CM

## 2024-03-11 DIAGNOSIS — C79.51 CARCINOMA OF BREAST METASTATIC TO BONE, UNSPECIFIED LATERALITY: Primary | ICD-10-CM

## 2024-03-11 DIAGNOSIS — C79.51 CANCER, METASTATIC TO BONE: ICD-10-CM

## 2024-03-11 DIAGNOSIS — Z79.899 ENCOUNTER FOR LONG-TERM (CURRENT) USE OF OTHER MEDICATIONS: ICD-10-CM

## 2024-03-11 DIAGNOSIS — C79.51 CARCINOMA OF BREAST METASTATIC TO BONE, UNSPECIFIED LATERALITY: ICD-10-CM

## 2024-03-11 DIAGNOSIS — E53.8 B12 DEFICIENCY: Primary | ICD-10-CM

## 2024-03-11 DIAGNOSIS — T45.1X5A ANTINEOPLASTIC CHEMOTHERAPY INDUCED PANCYTOPENIA: ICD-10-CM

## 2024-03-11 DIAGNOSIS — C50.919 CARCINOMA OF BREAST METASTATIC TO BONE, UNSPECIFIED LATERALITY: ICD-10-CM

## 2024-03-11 DIAGNOSIS — D61.810 ANTINEOPLASTIC CHEMOTHERAPY INDUCED PANCYTOPENIA: ICD-10-CM

## 2024-03-11 DIAGNOSIS — Z79.899 ENCOUNTER FOR LONG-TERM (CURRENT) USE OF OTHER MEDICATIONS: Primary | ICD-10-CM

## 2024-03-11 LAB
ALBUMIN SERPL-MCNC: 4.2 G/DL (ref 3.5–5.2)
ALBUMIN/GLOB SERPL: 1.8 G/DL
ALP SERPL-CCNC: 58 U/L (ref 39–117)
ALT SERPL W P-5'-P-CCNC: 7 U/L (ref 1–33)
ANION GAP SERPL CALCULATED.3IONS-SCNC: 10.2 MMOL/L (ref 5–15)
AST SERPL-CCNC: 16 U/L (ref 1–32)
BASOPHILS # BLD AUTO: 0.05 10*3/MM3 (ref 0–0.2)
BASOPHILS NFR BLD AUTO: 1.8 % (ref 0–1.5)
BILIRUB SERPL-MCNC: 0.3 MG/DL (ref 0–1.2)
BUN SERPL-MCNC: 16 MG/DL (ref 8–23)
BUN/CREAT SERPL: 13.1 (ref 7–25)
CALCIUM SPEC-SCNC: 9.8 MG/DL (ref 8.6–10.5)
CHLORIDE SERPL-SCNC: 105 MMOL/L (ref 98–107)
CO2 SERPL-SCNC: 24.8 MMOL/L (ref 22–29)
CREAT SERPL-MCNC: 1.22 MG/DL (ref 0.57–1)
DEPRECATED RDW RBC AUTO: 51.2 FL (ref 37–54)
EGFRCR SERPLBLD CKD-EPI 2021: 45.8 ML/MIN/1.73
EOSINOPHIL # BLD AUTO: 0.05 10*3/MM3 (ref 0–0.4)
EOSINOPHIL NFR BLD AUTO: 1.8 % (ref 0.3–6.2)
ERYTHROCYTE [DISTWIDTH] IN BLOOD BY AUTOMATED COUNT: 13.1 % (ref 12.3–15.4)
FERRITIN SERPL-MCNC: 143 NG/ML (ref 13–150)
FOLATE SERPL-MCNC: 14.3 NG/ML (ref 4.78–24.2)
GLOBULIN UR ELPH-MCNC: 2.4 GM/DL
GLUCOSE SERPL-MCNC: 95 MG/DL (ref 65–99)
HCT VFR BLD AUTO: 29.8 % (ref 34–46.6)
HGB BLD-MCNC: 10.4 G/DL (ref 12–15.9)
HGB RETIC QN AUTO: 39.6 PG (ref 29.8–36.1)
IMM GRANULOCYTES # BLD AUTO: 0.01 10*3/MM3 (ref 0–0.05)
IMM GRANULOCYTES NFR BLD AUTO: 0.4 % (ref 0–0.5)
IMM RETICS NFR: 13.7 % (ref 3–15.8)
IRON 24H UR-MRATE: 95 MCG/DL (ref 37–145)
IRON SATN MFR SERPL: 38 % (ref 20–50)
LYMPHOCYTES # BLD AUTO: 0.78 10*3/MM3 (ref 0.7–3.1)
LYMPHOCYTES NFR BLD AUTO: 28.6 % (ref 19.6–45.3)
MAGNESIUM SERPL-MCNC: 2 MG/DL (ref 1.6–2.4)
MCH RBC QN AUTO: 37.3 PG (ref 26.6–33)
MCHC RBC AUTO-ENTMCNC: 34.9 G/DL (ref 31.5–35.7)
MCV RBC AUTO: 106.8 FL (ref 79–97)
MONOCYTES # BLD AUTO: 0.19 10*3/MM3 (ref 0.1–0.9)
MONOCYTES NFR BLD AUTO: 7 % (ref 5–12)
NEUTROPHILS NFR BLD AUTO: 1.65 10*3/MM3 (ref 1.7–7)
NEUTROPHILS NFR BLD AUTO: 60.4 % (ref 42.7–76)
NRBC BLD AUTO-RTO: 0 /100 WBC (ref 0–0.2)
PHOSPHATE SERPL-MCNC: 3.3 MG/DL (ref 2.5–4.5)
PLATELET # BLD AUTO: 462 10*3/MM3 (ref 140–450)
PMV BLD AUTO: 8.3 FL (ref 6–12)
POTASSIUM SERPL-SCNC: 4.7 MMOL/L (ref 3.5–5.2)
PROT SERPL-MCNC: 6.6 G/DL (ref 6–8.5)
RBC # BLD AUTO: 2.79 10*6/MM3 (ref 3.77–5.28)
RETICS # AUTO: 0.04 10*6/MM3 (ref 0.02–0.13)
RETICS/RBC NFR AUTO: 1.32 % (ref 0.7–1.9)
SODIUM SERPL-SCNC: 140 MMOL/L (ref 136–145)
TIBC SERPL-MCNC: 251 MCG/DL (ref 298–536)
TRANSFERRIN SERPL-MCNC: 179 MG/DL (ref 200–360)
VIT B12 BLD-MCNC: 447 PG/ML (ref 211–946)
WBC NRBC COR # BLD AUTO: 2.73 10*3/MM3 (ref 3.4–10.8)

## 2024-03-11 PROCEDURE — 96372 THER/PROPH/DIAG INJ SC/IM: CPT

## 2024-03-11 PROCEDURE — 84466 ASSAY OF TRANSFERRIN: CPT

## 2024-03-11 PROCEDURE — 80053 COMPREHEN METABOLIC PANEL: CPT

## 2024-03-11 PROCEDURE — 82746 ASSAY OF FOLIC ACID SERUM: CPT | Performed by: INTERNAL MEDICINE

## 2024-03-11 PROCEDURE — 36415 COLL VENOUS BLD VENIPUNCTURE: CPT

## 2024-03-11 PROCEDURE — 82728 ASSAY OF FERRITIN: CPT

## 2024-03-11 PROCEDURE — 85046 RETICYTE/HGB CONCENTRATE: CPT

## 2024-03-11 PROCEDURE — 25010000002 CYANOCOBALAMIN PER 1000 MCG: Performed by: INTERNAL MEDICINE

## 2024-03-11 PROCEDURE — 25010000002 DENOSUMAB 120 MG/1.7ML SOLUTION: Performed by: INTERNAL MEDICINE

## 2024-03-11 PROCEDURE — 83540 ASSAY OF IRON: CPT

## 2024-03-11 PROCEDURE — 84100 ASSAY OF PHOSPHORUS: CPT

## 2024-03-11 PROCEDURE — 82607 VITAMIN B-12: CPT | Performed by: INTERNAL MEDICINE

## 2024-03-11 PROCEDURE — 85025 COMPLETE CBC W/AUTO DIFF WBC: CPT

## 2024-03-11 PROCEDURE — 83735 ASSAY OF MAGNESIUM: CPT

## 2024-03-11 RX ORDER — CYANOCOBALAMIN 1000 UG/ML
1000 INJECTION, SOLUTION INTRAMUSCULAR; SUBCUTANEOUS ONCE
OUTPATIENT
Start: 2024-03-18

## 2024-03-11 RX ORDER — SUCRALFATE 1 G/1
1 TABLET ORAL
Qty: 90 TABLET | Refills: 1 | Status: SHIPPED | OUTPATIENT
Start: 2024-03-11

## 2024-03-11 RX ORDER — SUCRALFATE 1 G/1
1 TABLET ORAL
Qty: 270 TABLET | OUTPATIENT
Start: 2024-03-11

## 2024-03-11 RX ORDER — CYANOCOBALAMIN 1000 UG/ML
1000 INJECTION, SOLUTION INTRAMUSCULAR; SUBCUTANEOUS ONCE
Status: COMPLETED | OUTPATIENT
Start: 2024-03-11 | End: 2024-03-11

## 2024-03-11 RX ADMIN — CYANOCOBALAMIN 1000 MCG: 1000 INJECTION INTRAMUSCULAR; SUBCUTANEOUS at 10:52

## 2024-03-11 RX ADMIN — DENOSUMAB 120 MG: 120 INJECTION SUBCUTANEOUS at 10:52

## 2024-03-12 ENCOUNTER — SPECIALTY PHARMACY (OUTPATIENT)
Dept: PHARMACY | Facility: HOSPITAL | Age: 79
End: 2024-03-12
Payer: MEDICARE

## 2024-03-12 ENCOUNTER — TELEPHONE (OUTPATIENT)
Dept: INTERNAL MEDICINE | Facility: CLINIC | Age: 79
End: 2024-03-12
Payer: MEDICARE

## 2024-03-12 LAB
ALBUMIN SERPL ELPH-MCNC: 4 G/DL (ref 2.9–4.4)
ALBUMIN/GLOB SERPL: 1.5 {RATIO} (ref 0.7–1.7)
ALPHA1 GLOB SERPL ELPH-MCNC: 0.2 G/DL (ref 0–0.4)
ALPHA2 GLOB SERPL ELPH-MCNC: 0.7 G/DL (ref 0.4–1)
B-GLOBULIN SERPL ELPH-MCNC: 0.9 G/DL (ref 0.7–1.3)
GAMMA GLOB SERPL ELPH-MCNC: 0.8 G/DL (ref 0.4–1.8)
GLOBULIN SER-MCNC: 2.7 G/DL (ref 2.2–3.9)
IGA SERPL-MCNC: 229 MG/DL (ref 64–422)
IGG SERPL-MCNC: 755 MG/DL (ref 586–1602)
IGM SERPL-MCNC: 83 MG/DL (ref 26–217)
INTERPRETATION SERPL IEP-IMP: ABNORMAL
KAPPA LC FREE SER-MCNC: 24.1 MG/L (ref 3.3–19.4)
KAPPA LC FREE/LAMBDA FREE SER: 1.55 {RATIO} (ref 0.26–1.65)
LABORATORY COMMENT REPORT: ABNORMAL
LAMBDA LC FREE SERPL-MCNC: 15.5 MG/L (ref 5.7–26.3)
M PROTEIN SERPL ELPH-MCNC: ABNORMAL G/DL
PROT SERPL-MCNC: 6.7 G/DL (ref 6–8.5)
T4 FREE SERPL-MCNC: 1.22 NG/DL (ref 0.82–1.77)
TSH SERPL DL<=0.005 MIU/L-ACNC: 1.14 UIU/ML (ref 0.45–4.5)

## 2024-03-12 NOTE — TELEPHONE ENCOUNTER
Pt informed    ----- Message from Prisca Church MD sent at 3/12/2024 12:25 PM EDT -----  Normal thyroid function  jw

## 2024-03-13 ENCOUNTER — SPECIALTY PHARMACY (OUTPATIENT)
Dept: PHARMACY | Facility: HOSPITAL | Age: 79
End: 2024-03-13
Payer: MEDICARE

## 2024-03-13 NOTE — PROGRESS NOTES
I received a fax notice from [facility] dated 3/13/2024 stating that Ibrance has shipped to the patient.    Shira Bunn - Care Coordinator   3/13/2024  09:32 EDT

## 2024-03-15 DIAGNOSIS — C79.51 MALIGNANT NEOPLASM METASTATIC TO BONE: ICD-10-CM

## 2024-03-15 RX ORDER — ANASTROZOLE 1 MG/1
1 TABLET ORAL DAILY
Qty: 30 TABLET | Refills: 5 | Status: SHIPPED | OUTPATIENT
Start: 2024-03-15

## 2024-04-08 ENCOUNTER — INFUSION (OUTPATIENT)
Dept: ONCOLOGY | Facility: HOSPITAL | Age: 79
End: 2024-04-08
Payer: MEDICARE

## 2024-04-08 ENCOUNTER — LAB (OUTPATIENT)
Dept: LAB | Facility: HOSPITAL | Age: 79
End: 2024-04-08
Payer: MEDICARE

## 2024-04-08 DIAGNOSIS — C79.51 CARCINOMA OF BREAST METASTATIC TO BONE, UNSPECIFIED LATERALITY: ICD-10-CM

## 2024-04-08 DIAGNOSIS — E53.8 B12 DEFICIENCY: Primary | ICD-10-CM

## 2024-04-08 DIAGNOSIS — C50.919 CARCINOMA OF BREAST METASTATIC TO BONE, UNSPECIFIED LATERALITY: ICD-10-CM

## 2024-04-08 DIAGNOSIS — Z79.899 ENCOUNTER FOR LONG-TERM (CURRENT) USE OF OTHER MEDICATIONS: ICD-10-CM

## 2024-04-08 LAB
ALBUMIN SERPL-MCNC: 4.5 G/DL (ref 3.5–5.2)
ALBUMIN/GLOB SERPL: 2.5 G/DL
ALP SERPL-CCNC: 51 U/L (ref 39–117)
ALT SERPL W P-5'-P-CCNC: 9 U/L (ref 1–33)
ANION GAP SERPL CALCULATED.3IONS-SCNC: 11.1 MMOL/L (ref 5–15)
AST SERPL-CCNC: 17 U/L (ref 1–32)
BASOPHILS # BLD AUTO: 0.06 10*3/MM3 (ref 0–0.2)
BASOPHILS NFR BLD AUTO: 2 % (ref 0–1.5)
BILIRUB SERPL-MCNC: 0.3 MG/DL (ref 0–1.2)
BUN SERPL-MCNC: 20 MG/DL (ref 8–23)
BUN/CREAT SERPL: 14 (ref 7–25)
CALCIUM SPEC-SCNC: 10 MG/DL (ref 8.6–10.5)
CANCER AG15-3 SERPL-ACNC: 135 U/ML
CHLORIDE SERPL-SCNC: 110 MMOL/L (ref 98–107)
CO2 SERPL-SCNC: 23.9 MMOL/L (ref 22–29)
CREAT SERPL-MCNC: 1.43 MG/DL (ref 0.57–1)
DEPRECATED RDW RBC AUTO: 50.9 FL (ref 37–54)
EGFRCR SERPLBLD CKD-EPI 2021: 37.9 ML/MIN/1.73
EOSINOPHIL # BLD AUTO: 0.04 10*3/MM3 (ref 0–0.4)
EOSINOPHIL NFR BLD AUTO: 1.4 % (ref 0.3–6.2)
ERYTHROCYTE [DISTWIDTH] IN BLOOD BY AUTOMATED COUNT: 13.1 % (ref 12.3–15.4)
GLOBULIN UR ELPH-MCNC: 1.8 GM/DL
GLUCOSE SERPL-MCNC: 123 MG/DL (ref 65–99)
HCT VFR BLD AUTO: 28.1 % (ref 34–46.6)
HGB BLD-MCNC: 9.7 G/DL (ref 12–15.9)
IMM GRANULOCYTES # BLD AUTO: 0.02 10*3/MM3 (ref 0–0.05)
IMM GRANULOCYTES NFR BLD AUTO: 0.7 % (ref 0–0.5)
LYMPHOCYTES # BLD AUTO: 0.73 10*3/MM3 (ref 0.7–3.1)
LYMPHOCYTES NFR BLD AUTO: 24.7 % (ref 19.6–45.3)
MAGNESIUM SERPL-MCNC: 1.9 MG/DL (ref 1.6–2.4)
MCH RBC QN AUTO: 36.9 PG (ref 26.6–33)
MCHC RBC AUTO-ENTMCNC: 34.5 G/DL (ref 31.5–35.7)
MCV RBC AUTO: 106.8 FL (ref 79–97)
MONOCYTES # BLD AUTO: 0.16 10*3/MM3 (ref 0.1–0.9)
MONOCYTES NFR BLD AUTO: 5.4 % (ref 5–12)
NEUTROPHILS NFR BLD AUTO: 1.94 10*3/MM3 (ref 1.7–7)
NEUTROPHILS NFR BLD AUTO: 65.8 % (ref 42.7–76)
NRBC BLD AUTO-RTO: 0 /100 WBC (ref 0–0.2)
PHOSPHATE SERPL-MCNC: 3.3 MG/DL (ref 2.5–4.5)
PLATELET # BLD AUTO: 342 10*3/MM3 (ref 140–450)
PMV BLD AUTO: 8.4 FL (ref 6–12)
POTASSIUM SERPL-SCNC: 4 MMOL/L (ref 3.5–5.2)
PROT SERPL-MCNC: 6.3 G/DL (ref 6–8.5)
RBC # BLD AUTO: 2.63 10*6/MM3 (ref 3.77–5.28)
SODIUM SERPL-SCNC: 145 MMOL/L (ref 136–145)
WBC NRBC COR # BLD AUTO: 2.95 10*3/MM3 (ref 3.4–10.8)

## 2024-04-08 PROCEDURE — 25010000002 CYANOCOBALAMIN PER 1000 MCG: Performed by: INTERNAL MEDICINE

## 2024-04-08 PROCEDURE — 80053 COMPREHEN METABOLIC PANEL: CPT

## 2024-04-08 PROCEDURE — 86300 IMMUNOASSAY TUMOR CA 15-3: CPT | Performed by: INTERNAL MEDICINE

## 2024-04-08 PROCEDURE — 36415 COLL VENOUS BLD VENIPUNCTURE: CPT

## 2024-04-08 PROCEDURE — 85025 COMPLETE CBC W/AUTO DIFF WBC: CPT

## 2024-04-08 PROCEDURE — 25010000002 DENOSUMAB 120 MG/1.7ML SOLUTION: Performed by: INTERNAL MEDICINE

## 2024-04-08 PROCEDURE — 96372 THER/PROPH/DIAG INJ SC/IM: CPT

## 2024-04-08 PROCEDURE — 84100 ASSAY OF PHOSPHORUS: CPT

## 2024-04-08 PROCEDURE — 83735 ASSAY OF MAGNESIUM: CPT

## 2024-04-08 RX ORDER — CYANOCOBALAMIN 1000 UG/ML
1000 INJECTION, SOLUTION INTRAMUSCULAR; SUBCUTANEOUS ONCE
OUTPATIENT
Start: 2024-04-15

## 2024-04-08 RX ORDER — CYANOCOBALAMIN 1000 UG/ML
1000 INJECTION, SOLUTION INTRAMUSCULAR; SUBCUTANEOUS ONCE
Status: COMPLETED | OUTPATIENT
Start: 2024-04-08 | End: 2024-04-08

## 2024-04-08 RX ADMIN — CYANOCOBALAMIN 1000 MCG: 1000 INJECTION INTRAMUSCULAR; SUBCUTANEOUS at 09:39

## 2024-04-08 RX ADMIN — DENOSUMAB 120 MG: 120 INJECTION SUBCUTANEOUS at 09:39

## 2024-04-10 ENCOUNTER — SPECIALTY PHARMACY (OUTPATIENT)
Dept: PHARMACY | Facility: HOSPITAL | Age: 79
End: 2024-04-10
Payer: MEDICARE

## 2024-04-10 DIAGNOSIS — C79.51 CANCER, METASTATIC TO BONE: ICD-10-CM

## 2024-04-10 NOTE — PROGRESS NOTES
Drug: Ibrance (palbociclib)  Strength: 100 mg  Directions: Take one tablet by mouth daily for 21 days on then 7 days off  Quantity: 21  Refills: 5  Pharmacy prescription sent to: [facility]    Completed independent double check on medication order/RX.  Gio Ortiz, PharmD, BCOP  Clinical Oncology Pharmacist

## 2024-04-10 NOTE — PROGRESS NOTES
Call rec from pt-She states she contacted [facility] to refill her Ibrance and was told they need a new rx. I have let pt know we will get a new rx sent today.    I have requested for a new rx to be sent to [facility].     Lor Hernandez, Pharmacy Technician  Specialty Pharmacy Technician

## 2024-04-10 NOTE — PROGRESS NOTES
Re: Refills of Oral Specialty Medication - Ibrance (palbociclib)    Drug-Drug Interactions: The current medication list was reviewed and there are no relevant drug-drug interactions with the specialty medication.  Medication Allergies: The patient has NKDA  Review of Labs/Dose Adjustments: NO DOSE CHANGE - I reviewed the most recent note and labs and the patient will continue without any dose changes.  I sent refills as described below.    Drug: Ibrance (palbociclib)  Strength: 100 mg  Directions: Take one tablet by mouth daily for 21 days on then 7 days off  Quantity: 21  Refills: 5  Pharmacy prescription sent to: [facility]    Name/Credentials: Katharine Jc, AlmaD, BCPS    4/10/2024  08:59 EDT

## 2024-04-11 ENCOUNTER — SPECIALTY PHARMACY (OUTPATIENT)
Dept: PHARMACY | Facility: HOSPITAL | Age: 79
End: 2024-04-11
Payer: MEDICARE

## 2024-04-11 NOTE — PROGRESS NOTES
I received a fax notice from [facility] dated 4/10/2024 stating that ibrance has shipped to the patient.      Shira Bunn - Care Coordinator   4/11/2024  09:27 EDT

## 2024-04-15 RX ORDER — LEVOTHYROXINE SODIUM 0.03 MG/1
25 TABLET ORAL DAILY
Qty: 90 TABLET | Refills: 0 | Status: SHIPPED | OUTPATIENT
Start: 2024-04-15

## 2024-05-03 ENCOUNTER — SPECIALTY PHARMACY (OUTPATIENT)
Dept: PHARMACY | Facility: HOSPITAL | Age: 79
End: 2024-05-03
Payer: MEDICARE

## 2024-05-03 NOTE — PROGRESS NOTES
Called patient for 6 month assessment, no answer. Left voicemail to return my call at direct line [phone].     Gio Ortiz, PharmD, [facility]  Clinical Oncology Pharmacist    ADDENDUM:    Patient returned call and assessment completed as follows:   Specialty Pharmacy Patient Management Program  Oncology 6-Month Clinical Assessment       Nedra Roberts is a 78 y.o. female with breast cancer called today to assess adherence and side effects.    Regimen: Ibrance 100 mg daily for 21 days on, then 7 days off    Reason for Outreach: Routine medication check-in .       Problem list reviewed by Saniya Ortiz RPH on 5/3/2024 at  3:45 PM  Medicines reviewed by Saniya Ortiz RPH on 5/3/2024 at  3:45 PM  Allergies reviewed by Saniya Ortiz RPH on 5/3/2024 at  3:43 PM     Goals Addressed Today        Specialty Pharmacy General Goal      Progression free survival  11/10/23 last dictation:  Repeat bone scan 8/14/2023-improved intensity uptake multiple locations, no new sites identified             Medication Assessment:  Medication Assessment  Follow Up Clinical Assessment  Linked Medication(s) Assessed: Palbociclib  Therapeutic appropriateness: Appropriate  Medication tolerability: Tolerating with no to minimal ADRs  Medication plan: Continue therapy with normal follow-up  Quality of Life Improvement Scale: 5-No change  Comments on Quality of Life: Doing well  Administration: Patient is taking every day at the same time  and as prescribed .   Patient can self administer medications: yes  Medication Follow-Up Plan: Next clinical assessment  Lab Review: The labs listed below have been reviewed. No dose adjustments are needed for the oral specialty medication(s) based on the labs.    Lab Results   Component Value Date    GLUCOSE 123 (H) 04/08/2024    CALCIUM 10.0 04/08/2024     04/08/2024    K 4.0 04/08/2024    CO2 23.9 04/08/2024     (H) 04/08/2024    BUN 20 04/08/2024    CREATININE 1.43 (H)  04/08/2024    EGFRIFAFRI 68 02/21/2022    EGFRIFNONA 59 (L) 02/21/2022    BCR 14.0 04/08/2024    ANIONGAP 11.1 04/08/2024     Lab Results   Component Value Date    WBC 2.95 (L) 04/08/2024    RBC 2.63 (L) 04/08/2024    HGB 9.7 (L) 04/08/2024    HCT 28.1 (L) 04/08/2024    .8 (H) 04/08/2024    MCH 36.9 (H) 04/08/2024    MCHC 34.5 04/08/2024    RDW 13.1 04/08/2024    RDWSD 50.9 04/08/2024    MPV 8.4 04/08/2024     04/08/2024    NEUTRORELPCT 65.8 04/08/2024    LYMPHORELPCT 24.7 04/08/2024    MONORELPCT 5.4 04/08/2024    EOSRELPCT 1.4 04/08/2024    BASORELPCT 2.0 (H) 04/08/2024    AUTOIGPER 0.7 (H) 04/08/2024    NEUTROABS 1.94 04/08/2024    LYMPHSABS 0.73 04/08/2024    MONOSABS 0.16 04/08/2024    EOSABS 0.04 04/08/2024    BASOSABS 0.06 04/08/2024    AUTOIGNUM 0.02 04/08/2024    NRBC 0.0 04/08/2024     Drug-drug interactions  Completed medication reconciliation today to assess for drug interactions. Patient denies starting or stopping any medications.    Assessed medication list for interactions, no significant drug interactions noted.   Advised patient to call the clinic if any new medications are started so we can assess for drug-drug interactions.  Drug-food interactions discussed:  none today  Vaccines are coordinated by the patient's oncologist and primary care provider.    Allergies  Known allergies and reactions were discussed with the patient. The patient's chart has been reviewed for allergy information and updated as necessary.   No Known Allergies     Hospitalizations and Urgent Care Visits Since Last Assessment:  Unplanned hospitalizations or inpatient admissions: no  ED Visits: no  Urgent Office Visits: no    Adherence Assessment:  Adherence Questions  Linked Medication(s) Assessed: Palbociclib  On average, how many doses/injections does the patient miss per month?: 0  What are the identified reasons for non-adherence or missed doses? : no problems identified  What is the estimated medication  adherence level?: %  Based on the patient/caregiver response and refill history, does this patient require an MTP to track adherence improvements?: no    Quality of Life Assessment:  Quality of Life Assessment  Quality of Life Improvement Scale: 5-No change  Comments on Quality of Life: Doing well  -- Quality of Life: 5/10    Financial Assessment:  Medication availability/affordability: Patient has had no issues obtaining medication from pharmacy.    Attestation     I attest the patient was actively involved in and has agreed to the above plan of care.  If the prescribed therapy is at any point deemed not appropriate based on the current or future assessments, a consultation will be initiated with the patient's specialty care provider to determine the best course of action. The revised plan of therapy will be documented along with any required assessments and/or additional patient education provided.       All questions addressed and patient had no additional concerns. Patient has pharmacy contact information.    Name/Credentials: Gio Ortiz, PharmD, BCOP  Clinical Oncology Pharmacist    5/3/2024  15:48 EDT

## 2024-05-06 ENCOUNTER — INFUSION (OUTPATIENT)
Dept: ONCOLOGY | Facility: HOSPITAL | Age: 79
End: 2024-05-06
Payer: MEDICARE

## 2024-05-06 ENCOUNTER — LAB (OUTPATIENT)
Dept: LAB | Facility: HOSPITAL | Age: 79
End: 2024-05-06
Payer: MEDICARE

## 2024-05-06 ENCOUNTER — TELEPHONE (OUTPATIENT)
Dept: ONCOLOGY | Facility: CLINIC | Age: 79
End: 2024-05-06
Payer: MEDICARE

## 2024-05-06 DIAGNOSIS — C50.919 CARCINOMA OF BREAST METASTATIC TO BONE, UNSPECIFIED LATERALITY: ICD-10-CM

## 2024-05-06 DIAGNOSIS — Z79.899 ENCOUNTER FOR LONG-TERM (CURRENT) USE OF OTHER MEDICATIONS: ICD-10-CM

## 2024-05-06 DIAGNOSIS — C79.51 CARCINOMA OF BREAST METASTATIC TO BONE, UNSPECIFIED LATERALITY: ICD-10-CM

## 2024-05-06 DIAGNOSIS — E53.8 B12 DEFICIENCY: Primary | ICD-10-CM

## 2024-05-06 LAB
ALBUMIN SERPL-MCNC: 4.4 G/DL (ref 3.5–5.2)
ALBUMIN/GLOB SERPL: 1.8 G/DL
ALP SERPL-CCNC: 57 U/L (ref 39–117)
ALT SERPL W P-5'-P-CCNC: 11 U/L (ref 1–33)
ANION GAP SERPL CALCULATED.3IONS-SCNC: 11 MMOL/L (ref 5–15)
AST SERPL-CCNC: 21 U/L (ref 1–32)
BASOPHILS # BLD AUTO: 0.03 10*3/MM3 (ref 0–0.2)
BASOPHILS NFR BLD AUTO: 1.1 % (ref 0–1.5)
BILIRUB SERPL-MCNC: 0.4 MG/DL (ref 0–1.2)
BUN SERPL-MCNC: 24 MG/DL (ref 8–23)
BUN/CREAT SERPL: 14.6 (ref 7–25)
CALCIUM SPEC-SCNC: 10.1 MG/DL (ref 8.6–10.5)
CHLORIDE SERPL-SCNC: 106 MMOL/L (ref 98–107)
CO2 SERPL-SCNC: 26 MMOL/L (ref 22–29)
CREAT SERPL-MCNC: 1.64 MG/DL (ref 0.57–1)
DEPRECATED RDW RBC AUTO: 52.3 FL (ref 37–54)
EGFRCR SERPLBLD CKD-EPI 2021: 32.1 ML/MIN/1.73
EOSINOPHIL # BLD AUTO: 0.03 10*3/MM3 (ref 0–0.4)
EOSINOPHIL NFR BLD AUTO: 1.1 % (ref 0.3–6.2)
ERYTHROCYTE [DISTWIDTH] IN BLOOD BY AUTOMATED COUNT: 13.2 % (ref 12.3–15.4)
GLOBULIN UR ELPH-MCNC: 2.5 GM/DL
GLUCOSE SERPL-MCNC: 115 MG/DL (ref 65–99)
HCT VFR BLD AUTO: 30.6 % (ref 34–46.6)
HGB BLD-MCNC: 10.3 G/DL (ref 12–15.9)
IMM GRANULOCYTES # BLD AUTO: 0.01 10*3/MM3 (ref 0–0.05)
IMM GRANULOCYTES NFR BLD AUTO: 0.4 % (ref 0–0.5)
LYMPHOCYTES # BLD AUTO: 0.83 10*3/MM3 (ref 0.7–3.1)
LYMPHOCYTES NFR BLD AUTO: 30.5 % (ref 19.6–45.3)
MAGNESIUM SERPL-MCNC: 2 MG/DL (ref 1.6–2.4)
MCH RBC QN AUTO: 36.4 PG (ref 26.6–33)
MCHC RBC AUTO-ENTMCNC: 33.7 G/DL (ref 31.5–35.7)
MCV RBC AUTO: 108.1 FL (ref 79–97)
MONOCYTES # BLD AUTO: 0.21 10*3/MM3 (ref 0.1–0.9)
MONOCYTES NFR BLD AUTO: 7.7 % (ref 5–12)
NEUTROPHILS NFR BLD AUTO: 1.61 10*3/MM3 (ref 1.7–7)
NEUTROPHILS NFR BLD AUTO: 59.2 % (ref 42.7–76)
NRBC BLD AUTO-RTO: 0 /100 WBC (ref 0–0.2)
PHOSPHATE SERPL-MCNC: 3.2 MG/DL (ref 2.5–4.5)
PLATELET # BLD AUTO: 332 10*3/MM3 (ref 140–450)
PMV BLD AUTO: 8.6 FL (ref 6–12)
POTASSIUM SERPL-SCNC: 4.5 MMOL/L (ref 3.5–5.2)
PROT SERPL-MCNC: 6.9 G/DL (ref 6–8.5)
RBC # BLD AUTO: 2.83 10*6/MM3 (ref 3.77–5.28)
SODIUM SERPL-SCNC: 143 MMOL/L (ref 136–145)
WBC NRBC COR # BLD AUTO: 2.72 10*3/MM3 (ref 3.4–10.8)

## 2024-05-06 PROCEDURE — 96372 THER/PROPH/DIAG INJ SC/IM: CPT

## 2024-05-06 PROCEDURE — 80053 COMPREHEN METABOLIC PANEL: CPT

## 2024-05-06 PROCEDURE — 83735 ASSAY OF MAGNESIUM: CPT

## 2024-05-06 PROCEDURE — 25010000002 DENOSUMAB 120 MG/1.7ML SOLUTION: Performed by: INTERNAL MEDICINE

## 2024-05-06 PROCEDURE — 25010000002 CYANOCOBALAMIN PER 1000 MCG: Performed by: INTERNAL MEDICINE

## 2024-05-06 PROCEDURE — 84100 ASSAY OF PHOSPHORUS: CPT

## 2024-05-06 PROCEDURE — 85025 COMPLETE CBC W/AUTO DIFF WBC: CPT

## 2024-05-06 RX ORDER — CYANOCOBALAMIN 1000 UG/ML
1000 INJECTION, SOLUTION INTRAMUSCULAR; SUBCUTANEOUS ONCE
Status: COMPLETED | OUTPATIENT
Start: 2024-05-06 | End: 2024-05-06

## 2024-05-06 RX ORDER — CYANOCOBALAMIN 1000 UG/ML
1000 INJECTION, SOLUTION INTRAMUSCULAR; SUBCUTANEOUS ONCE
OUTPATIENT
Start: 2024-05-13

## 2024-05-06 RX ADMIN — DENOSUMAB 120 MG: 120 INJECTION SUBCUTANEOUS at 10:59

## 2024-05-06 RX ADMIN — CYANOCOBALAMIN 1000 MCG: 1000 INJECTION INTRAMUSCULAR; SUBCUTANEOUS at 10:59

## 2024-05-09 ENCOUNTER — SPECIALTY PHARMACY (OUTPATIENT)
Dept: PHARMACY | Facility: HOSPITAL | Age: 79
End: 2024-05-09
Payer: MEDICARE

## 2024-05-09 ENCOUNTER — TELEPHONE (OUTPATIENT)
Dept: ONCOLOGY | Facility: CLINIC | Age: 79
End: 2024-05-09
Payer: MEDICARE

## 2024-05-14 ENCOUNTER — SPECIALTY PHARMACY (OUTPATIENT)
Dept: PHARMACY | Facility: HOSPITAL | Age: 79
End: 2024-05-14
Payer: MEDICARE

## 2024-05-14 NOTE — PROGRESS NOTES
The Prior Authorization for Ibrance is approved from 2/13/24 to 5/14/25.     Shira Bunn - Care Coordinator   5/14/2024  14:14 EDT

## 2024-06-01 NOTE — PROGRESS NOTES
Subjective    Follow-up for metastatic breast cancer to bone    History of Present Illness    The patient return today for follow-up continuing Arimidex plus Ibrance and monthly Xgeva for metastatic ER positive breast cancer to bone.  She is due to start her off week of Ibrance next Tuesday.  She continues to tolerate medications well without any significant side effects.  She does have some stiffness in the mornings, however she takes a dose of Tylenol and typically stiffness resolves after she gets up and moves around.  She has not required any Ultram.  She has been intentionally trying to lose weight, and her weight is down to 151 pounds today.  Has an adequate appetite.  Bowels moving regularly.  Denies fever, chills, nausea, vomiting.  Denies hot flashes.  No new concerns today.    Oncology history:  Mrs. Roberts is a jeffry 79 y.o. woman with a history of stage I breast cancer affecting the left breast ([place] 5/9, 1.5 cm, ER 90%, [place] 70%, HER2 2+/negative FISH).  She underwent lumpectomy and 4 cycles of adjuvant chemotherapy with TC.  Her tumor was estrogen receptor positive and she underwent hormonal therapy with Arimidex between May 2009 in May 2014.    The patient saw her primary care provider on 3/21/2023 and complained of low back pain for which plain films were performed suspicious for malignancy.  Whole-body bone scan on 3/20/2023 showed multifocal uptake in the calvarium, ribs, cervical, thoracic and lumbar spine, sacrum, pelvis, humeri and proximal femora.  PET scan on 4/10/2023 showed multiple hypermetabolic bone lesions including the lumbar spine, thoracic spine, most numerous and intense in the pelvic bones and proximal femurs maximum SUV 5.7 pelvis and 6.4 proximal right femur.  The hypermetabolic foci are mixed lytic/sclerotic.  There is no hypermetabolic lymphadenopathy or visceral disease.  She has been started on anastrozole 1 mg daily and Ibrance 100 mg days 1-21 q. 28 days.    The patient  "is tolerating treatment well.  She denies overt nausea vomiting diarrhea more on the constipated side.  She denies cough or shortness of breath.  Her pain is fairly minimal mostly in the left hip with walking.  Previous back pain has improved.    Repeat PET scan on 3/4/2024 showed resolution of hypermetabolic bone metastases-maximum SUV proximal right femur was 6.4 and 2.5, right iliac body previously 5.7 now 3.9, tiny focus L3 SUV 2.9, no new hypermetabolic activity.    Past Medical History:  Pertinent for hypertension, acid reflux, hyperlipidemia, hypothyroidism    Review of Systems   Constitutional: Negative.    Respiratory: Negative.     Cardiovascular: Negative.    Gastrointestinal:  Negative for abdominal pain.   Musculoskeletal:  Positive for arthralgias.   Skin: Negative.    Hematological: Negative.    Psychiatric/Behavioral:  Negative for sleep disturbance. The patient is not nervous/anxious.    ROS unchanged- 06/03/24      Medications:  The current medication list was reviewed in the EMR    ALLERGIES:  No Known Allergies    Objective      Vitals:    06/03/24 1015   BP: 129/77   Pulse: 68   Temp: 97.7 °F (36.5 °C)   TempSrc: Oral   SpO2: 99%   Weight: 68.5 kg (151 lb)   Height: 162.6 cm (64.02\")   PainSc: 0-No pain               6/3/2024    10:15 AM   Current Status   ECOG score 0     Physical Exam    CONSTITUTIONAL: pleasant well-developed adult woman  HEENT: no icterus, no thrush, moist membranes, resolution of the right mastoid nodule  LYMPH: no cervical or supraclavicular lad  MUSC: no edema, normal gait  NEURO: alert and oriented x3, normal strength  PSYCH: normal mood and affect for situation  SKIN: no pallor or rash  Exam otherwise unchanged- 06/03/24    RECENT LABS:  Results from last 7 days   Lab Units 06/03/24  1009   WBC 10*3/mm3 3.69   NEUTROS ABS 10*3/mm3 2.63   HEMOGLOBIN g/dL 10.3*   HEMATOCRIT % 30.5*   PLATELETS 10*3/mm3 382                  WBC   Date Value Ref Range Status   06/03/2024 " 3.69 3.40 - 10.80 10*3/mm3 Final   02/22/2023 9.32 3.40 - 10.80 10*3/mm3 Final     RBC   Date Value Ref Range Status   06/03/2024 2.80 (L) 3.77 - 5.28 10*6/mm3 Final   02/22/2023 4.24 3.77 - 5.28 10*6/mm3 Final     Hemoglobin   Date Value Ref Range Status   06/03/2024 10.3 (L) 12.0 - 15.9 g/dL Final     Hematocrit   Date Value Ref Range Status   06/03/2024 30.5 (L) 34.0 - 46.6 % Final     MCV   Date Value Ref Range Status   06/03/2024 108.9 (H) 79.0 - 97.0 fL Final     MCH   Date Value Ref Range Status   06/03/2024 36.8 (H) 26.6 - 33.0 pg Final     MCHC   Date Value Ref Range Status   06/03/2024 33.8 31.5 - 35.7 g/dL Final     RDW   Date Value Ref Range Status   06/03/2024 13.0 12.3 - 15.4 % Final     RDW-SD   Date Value Ref Range Status   06/03/2024 51.8 37.0 - 54.0 fl Final     MPV   Date Value Ref Range Status   06/03/2024 8.7 6.0 - 12.0 fL Final     Platelets   Date Value Ref Range Status   06/03/2024 382 140 - 450 10*3/mm3 Final     Neutrophil %   Date Value Ref Range Status   06/03/2024 71.3 42.7 - 76.0 % Final     Lymphocyte %   Date Value Ref Range Status   06/03/2024 21.4 19.6 - 45.3 % Final     Monocyte %   Date Value Ref Range Status   06/03/2024 4.9 (L) 5.0 - 12.0 % Final     Eosinophil %   Date Value Ref Range Status   06/03/2024 0.8 0.3 - 6.2 % Final     Basophil %   Date Value Ref Range Status   06/03/2024 1.1 0.0 - 1.5 % Final     Immature Grans %   Date Value Ref Range Status   06/03/2024 0.5 0.0 - 0.5 % Final     Neutrophils, Absolute   Date Value Ref Range Status   06/03/2024 2.63 1.70 - 7.00 10*3/mm3 Final     Lymphocytes, Absolute   Date Value Ref Range Status   06/03/2024 0.79 0.70 - 3.10 10*3/mm3 Final     Monocytes, Absolute   Date Value Ref Range Status   06/03/2024 0.18 0.10 - 0.90 10*3/mm3 Final     Eosinophils, Absolute   Date Value Ref Range Status   06/03/2024 0.03 0.00 - 0.40 10*3/mm3 Final     Basophils, Absolute   Date Value Ref Range Status   06/03/2024 0.04 0.00 - 0.20 10*3/mm3  Final     Immature Grans, Absolute   Date Value Ref Range Status   06/03/2024 0.02 0.00 - 0.05 10*3/mm3 Final     nRBC   Date Value Ref Range Status   06/03/2024 0.0 0.0 - 0.2 /100 WBC Final           PET scan 4/10/2023:  IMPRESSION:  1. Multiple hypermetabolic lytic and mixed lytic and sclerotic bone  metastases as described. The activity at the inferior aspect of the  right mastoid process may possibly represent a metastasis, but the  low-level activity at the adjacent subcutaneous fat is of uncertain  etiology. There is no definitive fluid at the right mastoid air cells to  suggest acute mastoiditis, but please correlate clinically and follow-up  as needed.  2. There is no evidence for metastatic lymphadenopathy or visceral  Metastases.    NM PET/CT Skull Base to Mid Thigh 3/4/2024 - FINDINGS: Mediastinal blood pool has a maximal SUV of 2.5, previously 2.4.  1. There has been resolution of most of the hypermetabolic bone metastases. The maximal SUV at the proximal right femur was 6.4 and is  currently 2.5. Most intense residual activity is at the right iliac body with a maximal SUV of 3.9, previously 5.7. A tiny focus at L3 has a maximal SUV of 2.9, previously 5.0. There are no new abnormal foci of abnormal activity within the bones.  2. There is no hypermetabolic lymphadenopathy at the thorax, axilla, supraclavicular regions, neck, abdomen, or pelvis. There is no suspicious visceral activity. No new pulmonary opacities are seen.    Assessment & Plan   *history of stage I breast cancer affecting the left breast ([place] 5/9, 1.5 cm, ER 90%, [place] 70%, HER2 2+/negative FISH).    She underwent lumpectomy and 4 cycles of adjuvant chemotherapy with TC.  Her tumor was estrogen receptor positive and she underwent hormonal therapy with Arimidex between May 2009 in May 2014.    *Radiographic studies consistent with metastatic disease to bone with sclerotic/lytic lesions involving spine (most prominent lumbar), scapula,  pelvic bones, proximal femurs  Initiated Arimidex April 2023; Ibrance subsequently added 100 mg D1-21 q. 28 days  4/26/2023 CT-guided bone biopsy left iliac metastatic ductal adenocarcinoma of the breast ER 99% strongly positive, OH 31 to 40% weakly positive, HER2 2+/FISH negative; Elly next generation sequencing available in the chart  CA 15-3 significantly elevated 764  CA 15-3 decreased to 295 on 6/5/2023  Repeat bone scan 8/14/2023-improved intensity uptake multiple locations, no new sites identified  PET scan on 3/4/2024 showed resolution of hypermetabolic bone metastases-maximum SUV proximal right femur was 6.4 and 2.5, right iliac body previously 5.7 now 3.9, tiny focus L3 SUV 2.9, no new hypermetabolic activity; CA 15-3 170 on 2/12/2024  Continues Ibrance 100 mg daily days 1-21 every 28 days and anastrozole daily.  Tolerating well without any difficulties.    *Pain of malignancy-mild at this point, using extra treatment Tylenol as needed  Nedra Roberts reports a pain score of 0.  Given her pain assessment as noted, treatment options were discussed and the following options were decided upon as a follow-up plan to address the patient's pain: continuation of current treatment plan for pain.    *Hypophosphatemia/hypocalcemia    *Mild neutropenia secondary to Ibrance-ANC today normal at 2.63.    *Macrocytic anemia-  9/25/2023-B12 undetectably low; IM replacement initiated  Hemoglobin 10.3.    Oncology plan/recommendations:   Continue anastrozole 1 mg daily.  Refill sent to pharmacy today.  Continue Ibrance 100 mg daily 1 through 21 q. 28 days.  Due to start her off week next Tuesday.  Continue Xgeva monthly with lab monitoring, given today   She has extra-strength Tylenol or Ultram at home if needed for pain.  Currently only utilizing Tylenol.  Continue monthly B12 injection 1000 mcg  7.  Genetics referral  8. Monthly Xgeva until MD follow up.  9. 12 weeks labs and MD with Xgeva.        The patient is on high  risk medication that requires close monitoring for toxicity.    CC:

## 2024-06-03 ENCOUNTER — INFUSION (OUTPATIENT)
Dept: ONCOLOGY | Facility: HOSPITAL | Age: 79
End: 2024-06-03
Payer: MEDICARE

## 2024-06-03 ENCOUNTER — LAB (OUTPATIENT)
Dept: LAB | Facility: HOSPITAL | Age: 79
End: 2024-06-03
Payer: MEDICARE

## 2024-06-03 ENCOUNTER — OFFICE VISIT (OUTPATIENT)
Dept: ONCOLOGY | Facility: CLINIC | Age: 79
End: 2024-06-03
Payer: MEDICARE

## 2024-06-03 VITALS
HEART RATE: 68 BPM | TEMPERATURE: 97.7 F | WEIGHT: 151 LBS | OXYGEN SATURATION: 99 % | DIASTOLIC BLOOD PRESSURE: 77 MMHG | SYSTOLIC BLOOD PRESSURE: 129 MMHG | BODY MASS INDEX: 25.78 KG/M2 | HEIGHT: 64 IN

## 2024-06-03 DIAGNOSIS — Z79.899 ENCOUNTER FOR LONG-TERM (CURRENT) USE OF OTHER MEDICATIONS: Primary | ICD-10-CM

## 2024-06-03 DIAGNOSIS — C50.919 CARCINOMA OF BREAST METASTATIC TO BONE, UNSPECIFIED LATERALITY: ICD-10-CM

## 2024-06-03 DIAGNOSIS — C79.51 MALIGNANT NEOPLASM METASTATIC TO BONE: ICD-10-CM

## 2024-06-03 DIAGNOSIS — E53.8 B12 DEFICIENCY: Primary | ICD-10-CM

## 2024-06-03 DIAGNOSIS — E53.8 B12 DEFICIENCY: ICD-10-CM

## 2024-06-03 DIAGNOSIS — C79.51 CANCER, METASTATIC TO BONE: ICD-10-CM

## 2024-06-03 DIAGNOSIS — C79.51 CARCINOMA OF BREAST METASTATIC TO BONE, UNSPECIFIED LATERALITY: ICD-10-CM

## 2024-06-03 DIAGNOSIS — Z79.899 HIGH RISK MEDICATION USE: ICD-10-CM

## 2024-06-03 LAB
ALBUMIN SERPL-MCNC: 4.4 G/DL (ref 3.5–5.2)
ALBUMIN/GLOB SERPL: 1.8 G/DL
ALP SERPL-CCNC: 62 U/L (ref 39–117)
ALT SERPL W P-5'-P-CCNC: 11 U/L (ref 1–33)
ANION GAP SERPL CALCULATED.3IONS-SCNC: 11.3 MMOL/L (ref 5–15)
AST SERPL-CCNC: 18 U/L (ref 1–32)
BASOPHILS # BLD AUTO: 0.04 10*3/MM3 (ref 0–0.2)
BASOPHILS NFR BLD AUTO: 1.1 % (ref 0–1.5)
BILIRUB SERPL-MCNC: 0.4 MG/DL (ref 0–1.2)
BUN SERPL-MCNC: 20 MG/DL (ref 8–23)
BUN/CREAT SERPL: 15.7 (ref 7–25)
CALCIUM SPEC-SCNC: 10.4 MG/DL (ref 8.6–10.5)
CANCER AG15-3 SERPL-ACNC: 145 U/ML
CHLORIDE SERPL-SCNC: 103 MMOL/L (ref 98–107)
CO2 SERPL-SCNC: 24.7 MMOL/L (ref 22–29)
CREAT SERPL-MCNC: 1.27 MG/DL (ref 0.57–1)
DEPRECATED RDW RBC AUTO: 51.8 FL (ref 37–54)
EGFRCR SERPLBLD CKD-EPI 2021: 43.4 ML/MIN/1.73
EOSINOPHIL # BLD AUTO: 0.03 10*3/MM3 (ref 0–0.4)
EOSINOPHIL NFR BLD AUTO: 0.8 % (ref 0.3–6.2)
ERYTHROCYTE [DISTWIDTH] IN BLOOD BY AUTOMATED COUNT: 13 % (ref 12.3–15.4)
GLOBULIN UR ELPH-MCNC: 2.5 GM/DL
GLUCOSE SERPL-MCNC: 106 MG/DL (ref 65–99)
HCT VFR BLD AUTO: 30.5 % (ref 34–46.6)
HGB BLD-MCNC: 10.3 G/DL (ref 12–15.9)
IMM GRANULOCYTES # BLD AUTO: 0.02 10*3/MM3 (ref 0–0.05)
IMM GRANULOCYTES NFR BLD AUTO: 0.5 % (ref 0–0.5)
LYMPHOCYTES # BLD AUTO: 0.79 10*3/MM3 (ref 0.7–3.1)
LYMPHOCYTES NFR BLD AUTO: 21.4 % (ref 19.6–45.3)
MAGNESIUM SERPL-MCNC: 1.8 MG/DL (ref 1.6–2.4)
MCH RBC QN AUTO: 36.8 PG (ref 26.6–33)
MCHC RBC AUTO-ENTMCNC: 33.8 G/DL (ref 31.5–35.7)
MCV RBC AUTO: 108.9 FL (ref 79–97)
MONOCYTES # BLD AUTO: 0.18 10*3/MM3 (ref 0.1–0.9)
MONOCYTES NFR BLD AUTO: 4.9 % (ref 5–12)
NEUTROPHILS NFR BLD AUTO: 2.63 10*3/MM3 (ref 1.7–7)
NEUTROPHILS NFR BLD AUTO: 71.3 % (ref 42.7–76)
NRBC BLD AUTO-RTO: 0 /100 WBC (ref 0–0.2)
PHOSPHATE SERPL-MCNC: 4.2 MG/DL (ref 2.5–4.5)
PLATELET # BLD AUTO: 382 10*3/MM3 (ref 140–450)
PMV BLD AUTO: 8.7 FL (ref 6–12)
POTASSIUM SERPL-SCNC: 4.7 MMOL/L (ref 3.5–5.2)
PROT SERPL-MCNC: 6.9 G/DL (ref 6–8.5)
RBC # BLD AUTO: 2.8 10*6/MM3 (ref 3.77–5.28)
SODIUM SERPL-SCNC: 139 MMOL/L (ref 136–145)
WBC NRBC COR # BLD AUTO: 3.69 10*3/MM3 (ref 3.4–10.8)

## 2024-06-03 PROCEDURE — 3074F SYST BP LT 130 MM HG: CPT | Performed by: NURSE PRACTITIONER

## 2024-06-03 PROCEDURE — 1160F RVW MEDS BY RX/DR IN RCRD: CPT | Performed by: NURSE PRACTITIONER

## 2024-06-03 PROCEDURE — 25010000002 DENOSUMAB 120 MG/1.7ML SOLUTION: Performed by: NURSE PRACTITIONER

## 2024-06-03 PROCEDURE — 84100 ASSAY OF PHOSPHORUS: CPT

## 2024-06-03 PROCEDURE — 85025 COMPLETE CBC W/AUTO DIFF WBC: CPT

## 2024-06-03 PROCEDURE — 3078F DIAST BP <80 MM HG: CPT | Performed by: NURSE PRACTITIONER

## 2024-06-03 PROCEDURE — 99214 OFFICE O/P EST MOD 30 MIN: CPT | Performed by: NURSE PRACTITIONER

## 2024-06-03 PROCEDURE — 96372 THER/PROPH/DIAG INJ SC/IM: CPT

## 2024-06-03 PROCEDURE — 80053 COMPREHEN METABOLIC PANEL: CPT

## 2024-06-03 PROCEDURE — 1126F AMNT PAIN NOTED NONE PRSNT: CPT | Performed by: NURSE PRACTITIONER

## 2024-06-03 PROCEDURE — 25010000002 CYANOCOBALAMIN PER 1000 MCG: Performed by: INTERNAL MEDICINE

## 2024-06-03 PROCEDURE — 1159F MED LIST DOCD IN RCRD: CPT | Performed by: NURSE PRACTITIONER

## 2024-06-03 PROCEDURE — 86300 IMMUNOASSAY TUMOR CA 15-3: CPT | Performed by: INTERNAL MEDICINE

## 2024-06-03 PROCEDURE — 36415 COLL VENOUS BLD VENIPUNCTURE: CPT

## 2024-06-03 PROCEDURE — 83735 ASSAY OF MAGNESIUM: CPT

## 2024-06-03 RX ORDER — CYANOCOBALAMIN 1000 UG/ML
1000 INJECTION, SOLUTION INTRAMUSCULAR; SUBCUTANEOUS ONCE
OUTPATIENT
Start: 2024-07-01

## 2024-06-03 RX ORDER — ANASTROZOLE 1 MG/1
1 TABLET ORAL DAILY
Qty: 30 TABLET | Refills: 5 | Status: SHIPPED | OUTPATIENT
Start: 2024-06-03

## 2024-06-03 RX ORDER — CYANOCOBALAMIN 1000 UG/ML
1000 INJECTION, SOLUTION INTRAMUSCULAR; SUBCUTANEOUS ONCE
Status: COMPLETED | OUTPATIENT
Start: 2024-06-03 | End: 2024-06-03

## 2024-06-03 RX ADMIN — DENOSUMAB 120 MG: 120 INJECTION SUBCUTANEOUS at 10:57

## 2024-06-03 RX ADMIN — CYANOCOBALAMIN 1000 MCG: 1000 INJECTION INTRAMUSCULAR; SUBCUTANEOUS at 10:55

## 2024-06-06 ENCOUNTER — SPECIALTY PHARMACY (OUTPATIENT)
Dept: PHARMACY | Facility: HOSPITAL | Age: 79
End: 2024-06-06
Payer: MEDICARE

## 2024-06-06 NOTE — PROGRESS NOTES
I received a fax notice from [facility] dated 6/6/24 stating that Ibrance  has shipped to the patient.    Shira Bunn - Care Coordinator   6/6/2024  16:01 EDT

## 2024-06-07 ENCOUNTER — SPECIALTY PHARMACY (OUTPATIENT)
Dept: PHARMACY | Facility: HOSPITAL | Age: 79
End: 2024-06-07
Payer: MEDICARE

## 2024-06-07 NOTE — PROGRESS NOTES
Specialty Pharmacy Note: Ibrance (palbociclib)    Nedra Roberts is a 79 y.o. female with breast cancer was seen 6/3/24 by APRN. Per provider dictation, no changes to oral oncology regimen Ibrance (palbociclib).  Labs Review: The CMP and CBC from 6/3/24 have been reviewed. No dose adjustments are needed for the oral specialty medication(s) based on the labs.    Specialty pharmacy will continue to follow patient.    Katharine Jc, PharmD, BCPS  6/7/2024  10:22 EDT

## 2024-07-01 ENCOUNTER — INFUSION (OUTPATIENT)
Dept: ONCOLOGY | Facility: HOSPITAL | Age: 79
End: 2024-07-01
Payer: MEDICARE

## 2024-07-01 ENCOUNTER — LAB (OUTPATIENT)
Dept: LAB | Facility: HOSPITAL | Age: 79
End: 2024-07-01
Payer: MEDICARE

## 2024-07-01 DIAGNOSIS — C50.919 CARCINOMA OF BREAST METASTATIC TO BONE, UNSPECIFIED LATERALITY: ICD-10-CM

## 2024-07-01 DIAGNOSIS — C79.51 CARCINOMA OF BREAST METASTATIC TO BONE, UNSPECIFIED LATERALITY: ICD-10-CM

## 2024-07-01 DIAGNOSIS — Z79.899 ENCOUNTER FOR LONG-TERM (CURRENT) USE OF OTHER MEDICATIONS: ICD-10-CM

## 2024-07-01 DIAGNOSIS — Z79.899 ENCOUNTER FOR LONG-TERM (CURRENT) USE OF OTHER MEDICATIONS: Primary | ICD-10-CM

## 2024-07-01 DIAGNOSIS — E53.8 B12 DEFICIENCY: ICD-10-CM

## 2024-07-01 LAB
ALBUMIN SERPL-MCNC: 4.6 G/DL (ref 3.5–5.2)
ALBUMIN/GLOB SERPL: 1.9 G/DL
ALP SERPL-CCNC: 61 U/L (ref 39–117)
ALT SERPL W P-5'-P-CCNC: 12 U/L (ref 1–33)
ANION GAP SERPL CALCULATED.3IONS-SCNC: 9.2 MMOL/L (ref 5–15)
AST SERPL-CCNC: 20 U/L (ref 1–32)
BASOPHILS # BLD AUTO: 0.04 10*3/MM3 (ref 0–0.2)
BASOPHILS NFR BLD AUTO: 1.6 % (ref 0–1.5)
BILIRUB SERPL-MCNC: 0.3 MG/DL (ref 0–1.2)
BUN SERPL-MCNC: 19 MG/DL (ref 8–23)
BUN/CREAT SERPL: 15 (ref 7–25)
CALCIUM SPEC-SCNC: 10.6 MG/DL (ref 8.6–10.5)
CHLORIDE SERPL-SCNC: 104 MMOL/L (ref 98–107)
CO2 SERPL-SCNC: 26.8 MMOL/L (ref 22–29)
CREAT SERPL-MCNC: 1.27 MG/DL (ref 0.57–1)
DEPRECATED RDW RBC AUTO: 52.9 FL (ref 37–54)
EGFRCR SERPLBLD CKD-EPI 2021: 43.4 ML/MIN/1.73
EOSINOPHIL # BLD AUTO: 0.04 10*3/MM3 (ref 0–0.4)
EOSINOPHIL NFR BLD AUTO: 1.6 % (ref 0.3–6.2)
ERYTHROCYTE [DISTWIDTH] IN BLOOD BY AUTOMATED COUNT: 13.4 % (ref 12.3–15.4)
GLOBULIN UR ELPH-MCNC: 2.4 GM/DL
GLUCOSE SERPL-MCNC: 100 MG/DL (ref 65–99)
HCT VFR BLD AUTO: 30.5 % (ref 34–46.6)
HGB BLD-MCNC: 10.3 G/DL (ref 12–15.9)
IMM GRANULOCYTES # BLD AUTO: 0.01 10*3/MM3 (ref 0–0.05)
IMM GRANULOCYTES NFR BLD AUTO: 0.4 % (ref 0–0.5)
LYMPHOCYTES # BLD AUTO: 0.82 10*3/MM3 (ref 0.7–3.1)
LYMPHOCYTES NFR BLD AUTO: 32.4 % (ref 19.6–45.3)
MAGNESIUM SERPL-MCNC: 2.1 MG/DL (ref 1.6–2.4)
MCH RBC QN AUTO: 36.1 PG (ref 26.6–33)
MCHC RBC AUTO-ENTMCNC: 33.8 G/DL (ref 31.5–35.7)
MCV RBC AUTO: 107 FL (ref 79–97)
MONOCYTES # BLD AUTO: 0.16 10*3/MM3 (ref 0.1–0.9)
MONOCYTES NFR BLD AUTO: 6.3 % (ref 5–12)
NEUTROPHILS NFR BLD AUTO: 1.46 10*3/MM3 (ref 1.7–7)
NEUTROPHILS NFR BLD AUTO: 57.7 % (ref 42.7–76)
NRBC BLD AUTO-RTO: 0 /100 WBC (ref 0–0.2)
PHOSPHATE SERPL-MCNC: 4.6 MG/DL (ref 2.5–4.5)
PLATELET # BLD AUTO: 412 10*3/MM3 (ref 140–450)
PMV BLD AUTO: 8.5 FL (ref 6–12)
POTASSIUM SERPL-SCNC: 4.5 MMOL/L (ref 3.5–5.2)
PROT SERPL-MCNC: 7 G/DL (ref 6–8.5)
RBC # BLD AUTO: 2.85 10*6/MM3 (ref 3.77–5.28)
SODIUM SERPL-SCNC: 140 MMOL/L (ref 136–145)
WBC NRBC COR # BLD AUTO: 2.53 10*3/MM3 (ref 3.4–10.8)

## 2024-07-01 PROCEDURE — 96372 THER/PROPH/DIAG INJ SC/IM: CPT

## 2024-07-01 PROCEDURE — 25010000002 DENOSUMAB 120 MG/1.7ML SOLUTION: Performed by: INTERNAL MEDICINE

## 2024-07-01 PROCEDURE — 25010000002 CYANOCOBALAMIN PER 1000 MCG: Performed by: INTERNAL MEDICINE

## 2024-07-01 PROCEDURE — 83735 ASSAY OF MAGNESIUM: CPT

## 2024-07-01 PROCEDURE — 80053 COMPREHEN METABOLIC PANEL: CPT

## 2024-07-01 PROCEDURE — 36415 COLL VENOUS BLD VENIPUNCTURE: CPT

## 2024-07-01 PROCEDURE — 84100 ASSAY OF PHOSPHORUS: CPT

## 2024-07-01 PROCEDURE — 85025 COMPLETE CBC W/AUTO DIFF WBC: CPT

## 2024-07-01 RX ORDER — CYANOCOBALAMIN 1000 UG/ML
1000 INJECTION, SOLUTION INTRAMUSCULAR; SUBCUTANEOUS ONCE
Status: COMPLETED | OUTPATIENT
Start: 2024-07-01 | End: 2024-07-01

## 2024-07-01 RX ORDER — CYANOCOBALAMIN 1000 UG/ML
1000 INJECTION, SOLUTION INTRAMUSCULAR; SUBCUTANEOUS ONCE
OUTPATIENT
Start: 2024-07-29

## 2024-07-01 RX ADMIN — DENOSUMAB 120 MG: 120 INJECTION SUBCUTANEOUS at 10:28

## 2024-07-01 RX ADMIN — CYANOCOBALAMIN 1000 MCG: 1000 INJECTION, SOLUTION INTRAMUSCULAR at 10:28

## 2024-07-01 NOTE — PROGRESS NOTES
Calcium today is 10.6.  Patient states that she is taking Tums 4-6 every day.  Instructed patient to cut this back to 2 a day, v/u.

## 2024-07-03 ENCOUNTER — SPECIALTY PHARMACY (OUTPATIENT)
Dept: PHARMACY | Facility: HOSPITAL | Age: 79
End: 2024-07-03
Payer: MEDICARE

## 2024-07-03 NOTE — PROGRESS NOTES
I received a fax notice from [facility] dated 7/2/24 stating that Ibrance has shipped to the patient.    Shira Bunn - Care Coordinator   7/3/2024  15:06 EDT

## 2024-07-15 RX ORDER — LEVOTHYROXINE SODIUM 0.03 MG/1
25 TABLET ORAL DAILY
Qty: 90 TABLET | Refills: 0 | Status: SHIPPED | OUTPATIENT
Start: 2024-07-15

## 2024-07-15 RX ORDER — LOSARTAN POTASSIUM 100 MG/1
TABLET ORAL
Qty: 90 TABLET | Refills: 3 | Status: SHIPPED | OUTPATIENT
Start: 2024-07-15

## 2024-07-29 ENCOUNTER — INFUSION (OUTPATIENT)
Dept: ONCOLOGY | Facility: HOSPITAL | Age: 79
End: 2024-07-29
Payer: MEDICARE

## 2024-07-29 ENCOUNTER — LAB (OUTPATIENT)
Dept: LAB | Facility: HOSPITAL | Age: 79
End: 2024-07-29
Payer: MEDICARE

## 2024-07-29 DIAGNOSIS — C79.51 CARCINOMA OF BREAST METASTATIC TO BONE, UNSPECIFIED LATERALITY: ICD-10-CM

## 2024-07-29 DIAGNOSIS — Z79.899 ENCOUNTER FOR LONG-TERM (CURRENT) USE OF OTHER MEDICATIONS: ICD-10-CM

## 2024-07-29 DIAGNOSIS — E53.8 B12 DEFICIENCY: ICD-10-CM

## 2024-07-29 DIAGNOSIS — C50.919 CARCINOMA OF BREAST METASTATIC TO BONE, UNSPECIFIED LATERALITY: ICD-10-CM

## 2024-07-29 DIAGNOSIS — Z79.899 ENCOUNTER FOR LONG-TERM (CURRENT) USE OF OTHER MEDICATIONS: Primary | ICD-10-CM

## 2024-07-29 LAB
ALBUMIN SERPL-MCNC: 4.1 G/DL (ref 3.5–5.2)
ALBUMIN/GLOB SERPL: 1.7 G/DL
ALP SERPL-CCNC: 61 U/L (ref 39–117)
ALT SERPL W P-5'-P-CCNC: <5 U/L (ref 1–33)
ANION GAP SERPL CALCULATED.3IONS-SCNC: 7.7 MMOL/L (ref 5–15)
AST SERPL-CCNC: 17 U/L (ref 1–32)
BASOPHILS # BLD AUTO: 0.04 10*3/MM3 (ref 0–0.2)
BASOPHILS NFR BLD AUTO: 1.4 % (ref 0–1.5)
BILIRUB SERPL-MCNC: 0.3 MG/DL (ref 0–1.2)
BUN SERPL-MCNC: 23 MG/DL (ref 8–23)
BUN/CREAT SERPL: 16.2 (ref 7–25)
CALCIUM SPEC-SCNC: 9.5 MG/DL (ref 8.6–10.5)
CHLORIDE SERPL-SCNC: 108 MMOL/L (ref 98–107)
CO2 SERPL-SCNC: 24.3 MMOL/L (ref 22–29)
CREAT SERPL-MCNC: 1.42 MG/DL (ref 0.57–1)
DEPRECATED RDW RBC AUTO: 52.9 FL (ref 37–54)
EGFRCR SERPLBLD CKD-EPI 2021: 37.9 ML/MIN/1.73
EOSINOPHIL # BLD AUTO: 0.05 10*3/MM3 (ref 0–0.4)
EOSINOPHIL NFR BLD AUTO: 1.7 % (ref 0.3–6.2)
ERYTHROCYTE [DISTWIDTH] IN BLOOD BY AUTOMATED COUNT: 13.4 % (ref 12.3–15.4)
GLOBULIN UR ELPH-MCNC: 2.4 GM/DL
GLUCOSE SERPL-MCNC: 121 MG/DL (ref 65–99)
HCT VFR BLD AUTO: 28.6 % (ref 34–46.6)
HGB BLD-MCNC: 9.8 G/DL (ref 12–15.9)
IMM GRANULOCYTES # BLD AUTO: 0.01 10*3/MM3 (ref 0–0.05)
IMM GRANULOCYTES NFR BLD AUTO: 0.3 % (ref 0–0.5)
LYMPHOCYTES # BLD AUTO: 0.84 10*3/MM3 (ref 0.7–3.1)
LYMPHOCYTES NFR BLD AUTO: 28.7 % (ref 19.6–45.3)
MAGNESIUM SERPL-MCNC: 2.3 MG/DL (ref 1.6–2.4)
MCH RBC QN AUTO: 37.3 PG (ref 26.6–33)
MCHC RBC AUTO-ENTMCNC: 34.3 G/DL (ref 31.5–35.7)
MCV RBC AUTO: 108.7 FL (ref 79–97)
MONOCYTES # BLD AUTO: 0.17 10*3/MM3 (ref 0.1–0.9)
MONOCYTES NFR BLD AUTO: 5.8 % (ref 5–12)
NEUTROPHILS NFR BLD AUTO: 1.82 10*3/MM3 (ref 1.7–7)
NEUTROPHILS NFR BLD AUTO: 62.1 % (ref 42.7–76)
NRBC BLD AUTO-RTO: 0 /100 WBC (ref 0–0.2)
PHOSPHATE SERPL-MCNC: 3 MG/DL (ref 2.5–4.5)
PLATELET # BLD AUTO: 312 10*3/MM3 (ref 140–450)
PMV BLD AUTO: 9 FL (ref 6–12)
POTASSIUM SERPL-SCNC: 4.2 MMOL/L (ref 3.5–5.2)
PROT SERPL-MCNC: 6.5 G/DL (ref 6–8.5)
RBC # BLD AUTO: 2.63 10*6/MM3 (ref 3.77–5.28)
SODIUM SERPL-SCNC: 140 MMOL/L (ref 136–145)
WBC NRBC COR # BLD AUTO: 2.93 10*3/MM3 (ref 3.4–10.8)

## 2024-07-29 PROCEDURE — 25010000002 DENOSUMAB 120 MG/1.7ML SOLUTION: Performed by: INTERNAL MEDICINE

## 2024-07-29 PROCEDURE — 96372 THER/PROPH/DIAG INJ SC/IM: CPT

## 2024-07-29 PROCEDURE — 84100 ASSAY OF PHOSPHORUS: CPT

## 2024-07-29 PROCEDURE — 85025 COMPLETE CBC W/AUTO DIFF WBC: CPT

## 2024-07-29 PROCEDURE — 36415 COLL VENOUS BLD VENIPUNCTURE: CPT

## 2024-07-29 PROCEDURE — 80053 COMPREHEN METABOLIC PANEL: CPT

## 2024-07-29 PROCEDURE — 83735 ASSAY OF MAGNESIUM: CPT

## 2024-07-29 PROCEDURE — 25010000002 CYANOCOBALAMIN PER 1000 MCG: Performed by: INTERNAL MEDICINE

## 2024-07-29 RX ORDER — CYANOCOBALAMIN 1000 UG/ML
1000 INJECTION, SOLUTION INTRAMUSCULAR; SUBCUTANEOUS ONCE
OUTPATIENT
Start: 2024-08-26

## 2024-07-29 RX ORDER — CYANOCOBALAMIN 1000 UG/ML
1000 INJECTION, SOLUTION INTRAMUSCULAR; SUBCUTANEOUS ONCE
Status: COMPLETED | OUTPATIENT
Start: 2024-07-29 | End: 2024-07-29

## 2024-07-29 RX ADMIN — CYANOCOBALAMIN 1000 MCG: 1000 INJECTION, SOLUTION INTRAMUSCULAR at 10:26

## 2024-07-29 RX ADMIN — DENOSUMAB 120 MG: 120 INJECTION SUBCUTANEOUS at 10:26

## 2024-08-01 ENCOUNTER — SPECIALTY PHARMACY (OUTPATIENT)
Dept: PHARMACY | Facility: HOSPITAL | Age: 79
End: 2024-08-01
Payer: MEDICARE

## 2024-08-01 NOTE — PROGRESS NOTES
I received a fax notice from [facility] dated 8/1/24 stating that Ibrance  has shipped to the patient.    Shira Bunn - Care Coordinator   8/1/2024  17:00 EDT

## 2024-08-21 ENCOUNTER — TELEPHONE (OUTPATIENT)
Dept: INTERNAL MEDICINE | Facility: CLINIC | Age: 79
End: 2024-08-21
Payer: MEDICARE

## 2024-08-21 ENCOUNTER — OFFICE VISIT (OUTPATIENT)
Dept: INTERNAL MEDICINE | Facility: CLINIC | Age: 79
End: 2024-08-21
Payer: MEDICARE

## 2024-08-21 ENCOUNTER — HOSPITAL ENCOUNTER (OUTPATIENT)
Dept: CARDIOLOGY | Facility: HOSPITAL | Age: 79
Discharge: HOME | End: 2024-08-21
Admitting: INTERNAL MEDICINE
Payer: MEDICARE

## 2024-08-21 VITALS
OXYGEN SATURATION: 97 % | HEIGHT: 64 IN | HEART RATE: 67 BPM | SYSTOLIC BLOOD PRESSURE: 120 MMHG | WEIGHT: 152 LBS | DIASTOLIC BLOOD PRESSURE: 82 MMHG | BODY MASS INDEX: 25.95 KG/M2

## 2024-08-21 DIAGNOSIS — M79.671 RIGHT FOOT PAIN: ICD-10-CM

## 2024-08-21 DIAGNOSIS — L03.115 CELLULITIS OF RIGHT LOWER EXTREMITY: ICD-10-CM

## 2024-08-21 DIAGNOSIS — M79.89 RIGHT LEG SWELLING: Primary | ICD-10-CM

## 2024-08-21 DIAGNOSIS — M79.89 RIGHT LEG SWELLING: ICD-10-CM

## 2024-08-21 LAB
BH CV LOWER VASCULAR LEFT COMMON FEMORAL AUGMENT: NORMAL
BH CV LOWER VASCULAR LEFT COMMON FEMORAL COMPETENT: NORMAL
BH CV LOWER VASCULAR LEFT COMMON FEMORAL COMPRESS: NORMAL
BH CV LOWER VASCULAR LEFT COMMON FEMORAL PHASIC: NORMAL
BH CV LOWER VASCULAR LEFT COMMON FEMORAL SPONT: NORMAL
BH CV LOWER VASCULAR RIGHT COMMON FEMORAL AUGMENT: NORMAL
BH CV LOWER VASCULAR RIGHT COMMON FEMORAL COMPETENT: NORMAL
BH CV LOWER VASCULAR RIGHT COMMON FEMORAL COMPRESS: NORMAL
BH CV LOWER VASCULAR RIGHT COMMON FEMORAL PHASIC: NORMAL
BH CV LOWER VASCULAR RIGHT COMMON FEMORAL SPONT: NORMAL
BH CV LOWER VASCULAR RIGHT DISTAL FEMORAL COMPRESS: NORMAL
BH CV LOWER VASCULAR RIGHT GASTRONEMIUS COMPRESS: NORMAL
BH CV LOWER VASCULAR RIGHT GREATER SAPH AK COMPRESS: NORMAL
BH CV LOWER VASCULAR RIGHT GREATER SAPH BK COMPRESS: NORMAL
BH CV LOWER VASCULAR RIGHT LESSER SAPH COMPRESS: NORMAL
BH CV LOWER VASCULAR RIGHT MID FEMORAL AUGMENT: NORMAL
BH CV LOWER VASCULAR RIGHT MID FEMORAL COMPETENT: NORMAL
BH CV LOWER VASCULAR RIGHT MID FEMORAL COMPRESS: NORMAL
BH CV LOWER VASCULAR RIGHT MID FEMORAL PHASIC: NORMAL
BH CV LOWER VASCULAR RIGHT MID FEMORAL SPONT: NORMAL
BH CV LOWER VASCULAR RIGHT PERONEAL COMPRESS: NORMAL
BH CV LOWER VASCULAR RIGHT POPLITEAL AUGMENT: NORMAL
BH CV LOWER VASCULAR RIGHT POPLITEAL COMPETENT: NORMAL
BH CV LOWER VASCULAR RIGHT POPLITEAL COMPRESS: NORMAL
BH CV LOWER VASCULAR RIGHT POPLITEAL PHASIC: NORMAL
BH CV LOWER VASCULAR RIGHT POPLITEAL SPONT: NORMAL
BH CV LOWER VASCULAR RIGHT POSTERIOR TIBIAL COMPRESS: NORMAL
BH CV LOWER VASCULAR RIGHT PROFUNDA FEMORAL COMPRESS: NORMAL
BH CV LOWER VASCULAR RIGHT PROXIMAL FEMORAL COMPRESS: NORMAL
BH CV LOWER VASCULAR RIGHT SAPHENOFEMORAL JUNCTION COMPRESS: NORMAL
BH CV VAS POP FLUID COLLECTED: 1
BH CV VAS PRELIMINARY FINDINGS SCRIPTING: 1

## 2024-08-21 PROCEDURE — 1159F MED LIST DOCD IN RCRD: CPT | Performed by: INTERNAL MEDICINE

## 2024-08-21 PROCEDURE — 1125F AMNT PAIN NOTED PAIN PRSNT: CPT | Performed by: INTERNAL MEDICINE

## 2024-08-21 PROCEDURE — 1160F RVW MEDS BY RX/DR IN RCRD: CPT | Performed by: INTERNAL MEDICINE

## 2024-08-21 PROCEDURE — 3079F DIAST BP 80-89 MM HG: CPT | Performed by: INTERNAL MEDICINE

## 2024-08-21 PROCEDURE — 3074F SYST BP LT 130 MM HG: CPT | Performed by: INTERNAL MEDICINE

## 2024-08-21 PROCEDURE — 93971 EXTREMITY STUDY: CPT

## 2024-08-21 PROCEDURE — 99214 OFFICE O/P EST MOD 30 MIN: CPT | Performed by: INTERNAL MEDICINE

## 2024-08-21 RX ORDER — CEPHALEXIN 500 MG/1
500 CAPSULE ORAL 3 TIMES DAILY
Qty: 21 CAPSULE | Refills: 0 | Status: SHIPPED | OUTPATIENT
Start: 2024-08-21

## 2024-08-21 NOTE — PROGRESS NOTES
Subjective     Nedra Roberts is a 79 y.o. female who presents with   Chief Complaint   Patient presents with    Foot Swelling    Foot Pain       History of Present Illness     C/o swelling in the right foot.  Redness and warmth is associated.  No fever.  No injury.  Throbbing is associated.     Review of Systems   Respiratory: Negative.     Cardiovascular: Negative.        The following portions of the patient's history were reviewed and updated as appropriate: allergies, current medications and problem list.    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 30 tablet 5    aspirin 81 MG EC tablet Take 1 tablet by mouth Daily.      Calcium Carbonate Antacid (TUMS PO) Take  by mouth As Needed.      Cholecalciferol (Vitamin D3) 50 MCG (2000 UT) capsule Take 1 capsule by mouth Daily. 90 each 3    denosumab (XGEVA) 120 MG/1.7ML solution injection Inject  under the skin into the appropriate area as directed 1 (One) Time.      fluticasone (FLONASE) 50 MCG/ACT nasal spray 2 sprays into the nostril(s) as directed by provider Daily. 16 g 5    levothyroxine (SYNTHROID, LEVOTHROID) 25 MCG tablet TAKE 1 TABLET BY MOUTH DAILY 90 tablet 0    losartan (COZAAR) 100 MG tablet TAKE 1 TABLET BY MOUTH EVERY DAY 90 tablet 3    ondansetron (ZOFRAN) 8 MG tablet Take 1 tablet by mouth  "3 (Three) Times a Day As Needed for Nausea or Vomiting. 30 tablet 5    Palbociclib 100 MG tablet tablet Take 1 tablet by mouth Daily. Take with or without food for 21 days on, then off for 7 days. 21 tablet 5    pantoprazole (PROTONIX) 40 MG EC tablet TAKE 1 TABLET BY MOUTH TWICE DAILY 180 tablet 1    simvastatin (ZOCOR) 40 MG tablet TAKE 1 TABLET BY MOUTH EVERY NIGHT 90 tablet 3    sucralfate (CARAFATE) 1 g tablet Take 1 tablet by mouth 3 (Three) Times a Day Before Meals. 90 tablet 1     No current facility-administered medications on file prior to visit.       Objective     /82   Pulse 67   Ht 162.6 cm (64.02\")   Wt 68.9 kg (152 lb)   SpO2 97%   BMI 26.08 kg/m²     Physical Exam  Constitutional:       Appearance: She is well-developed.   HENT:      Head: Normocephalic and atraumatic.   Cardiovascular:      Comments: Right foot is swollen.    Pulmonary:      Effort: Pulmonary effort is normal.   Skin:     Comments: Warmth and redness of the right foot.     Neurological:      Mental Status: She is alert and oriented to person, place, and time.   Psychiatric:         Behavior: Behavior normal.         Assessment & Plan   Diagnoses and all orders for this visit:    1. Right leg swelling (Primary)  -     Duplex Venous Lower Extremity - Right CAR; Future    2. Cellulitis of right lower extremity    3. Right foot pain    Other orders  -     cephalexin (Keflex) 500 MG capsule; Take 1 capsule by mouth 3 (Three) Times a Day.  Dispense: 21 capsule; Refill: 0        Discussion    Patient presents with right leg swelling with associated redness and warmth.  Treat for suspected cellulitis.  Given asymmetric swelling of the RLE, STAT venous doppler is ordered to rule out DVT.  The patient is instructed to let our office know if not feeling better over the next several days or if there is any change in symptoms.  I will see her back in one week to reevaluate.           Future Appointments   Date Time Provider " Department Center   8/26/2024  9:30 AM LAB CHAIR 1 [facility]  LAB [facility] [place]   8/26/2024 10:00 AM Judith Nguyen APRN [facility] CBC [facility] [place]   8/26/2024 10:30 AM INJECTION CHAIR [facility]  INFUS [facility]   8/28/2024  9:30 AM Helen Munoz MD [facility] PC [facility]   9/6/2024 10:30 AM Prisca Church MD [facility]   2/7/2025  9:00 AM [facility] OP VAS VEIN [facility] OV[facility]   2/7/2025  9:30 AM Yuli Berg APRN [facility] VS [facility]   3/6/2025  8:30 AM [facility]   3/10/2025  1:15 PM Prisca Church MD [facility]

## 2024-08-21 NOTE — TELEPHONE ENCOUNTER
CALLED AND SPOKE WITH PATIENT- NO INJURY AND SHE WAS WORRIED ABOUT BLOOD CLOT.  SCHEDULED FOR TODAY TO SEE DR BARRERA.

## 2024-08-26 ENCOUNTER — INFUSION (OUTPATIENT)
Dept: ONCOLOGY | Facility: HOSPITAL | Age: 79
End: 2024-08-26
Payer: MEDICARE

## 2024-08-26 ENCOUNTER — OFFICE VISIT (OUTPATIENT)
Dept: ONCOLOGY | Facility: CLINIC | Age: 79
End: 2024-08-26
Payer: MEDICARE

## 2024-08-26 ENCOUNTER — LAB (OUTPATIENT)
Dept: LAB | Facility: HOSPITAL | Age: 79
End: 2024-08-26
Payer: MEDICARE

## 2024-08-26 VITALS
HEART RATE: 78 BPM | HEIGHT: 64 IN | DIASTOLIC BLOOD PRESSURE: 71 MMHG | RESPIRATION RATE: 16 BRPM | WEIGHT: 153.5 LBS | OXYGEN SATURATION: 94 % | SYSTOLIC BLOOD PRESSURE: 139 MMHG | BODY MASS INDEX: 26.21 KG/M2 | TEMPERATURE: 97.5 F

## 2024-08-26 DIAGNOSIS — C50.919 CARCINOMA OF BREAST METASTATIC TO BONE, UNSPECIFIED LATERALITY: ICD-10-CM

## 2024-08-26 DIAGNOSIS — C79.51 CARCINOMA OF BREAST METASTATIC TO BONE, UNSPECIFIED LATERALITY: ICD-10-CM

## 2024-08-26 DIAGNOSIS — M79.671 FOOT PAIN, RIGHT: Primary | ICD-10-CM

## 2024-08-26 DIAGNOSIS — Z79.899 ENCOUNTER FOR LONG-TERM (CURRENT) USE OF OTHER MEDICATIONS: ICD-10-CM

## 2024-08-26 DIAGNOSIS — E53.8 B12 DEFICIENCY: ICD-10-CM

## 2024-08-26 DIAGNOSIS — Z79.899 ENCOUNTER FOR LONG-TERM (CURRENT) USE OF OTHER MEDICATIONS: Primary | ICD-10-CM

## 2024-08-26 LAB
ALBUMIN SERPL-MCNC: 4.2 G/DL (ref 3.5–5.2)
ALBUMIN/GLOB SERPL: 1.6 G/DL
ALP SERPL-CCNC: 63 U/L (ref 39–117)
ALT SERPL W P-5'-P-CCNC: 5 U/L (ref 1–33)
ANION GAP SERPL CALCULATED.3IONS-SCNC: 10.4 MMOL/L (ref 5–15)
AST SERPL-CCNC: 18 U/L (ref 1–32)
BASOPHILS # BLD AUTO: 0.04 10*3/MM3 (ref 0–0.2)
BASOPHILS NFR BLD AUTO: 1.5 % (ref 0–1.5)
BILIRUB SERPL-MCNC: 0.3 MG/DL (ref 0–1.2)
BUN SERPL-MCNC: 21 MG/DL (ref 8–23)
BUN/CREAT SERPL: 15.8 (ref 7–25)
CALCIUM SPEC-SCNC: 10.3 MG/DL (ref 8.6–10.5)
CHLORIDE SERPL-SCNC: 106 MMOL/L (ref 98–107)
CO2 SERPL-SCNC: 26.6 MMOL/L (ref 22–29)
CREAT SERPL-MCNC: 1.33 MG/DL (ref 0.57–1)
DEPRECATED RDW RBC AUTO: 52.2 FL (ref 37–54)
EGFRCR SERPLBLD CKD-EPI 2021: 41 ML/MIN/1.73
EOSINOPHIL # BLD AUTO: 0.01 10*3/MM3 (ref 0–0.4)
EOSINOPHIL NFR BLD AUTO: 0.4 % (ref 0.3–6.2)
ERYTHROCYTE [DISTWIDTH] IN BLOOD BY AUTOMATED COUNT: 13.3 % (ref 12.3–15.4)
GLOBULIN UR ELPH-MCNC: 2.6 GM/DL
GLUCOSE SERPL-MCNC: 138 MG/DL (ref 65–99)
HCT VFR BLD AUTO: 28.9 % (ref 34–46.6)
HGB BLD-MCNC: 10 G/DL (ref 12–15.9)
IMM GRANULOCYTES # BLD AUTO: 0.01 10*3/MM3 (ref 0–0.05)
IMM GRANULOCYTES NFR BLD AUTO: 0.4 % (ref 0–0.5)
LYMPHOCYTES # BLD AUTO: 0.68 10*3/MM3 (ref 0.7–3.1)
LYMPHOCYTES NFR BLD AUTO: 25.8 % (ref 19.6–45.3)
MAGNESIUM SERPL-MCNC: 2 MG/DL (ref 1.6–2.4)
MCH RBC QN AUTO: 37.2 PG (ref 26.6–33)
MCHC RBC AUTO-ENTMCNC: 34.6 G/DL (ref 31.5–35.7)
MCV RBC AUTO: 107.4 FL (ref 79–97)
MONOCYTES # BLD AUTO: 0.1 10*3/MM3 (ref 0.1–0.9)
MONOCYTES NFR BLD AUTO: 3.8 % (ref 5–12)
NEUTROPHILS NFR BLD AUTO: 1.8 10*3/MM3 (ref 1.7–7)
NEUTROPHILS NFR BLD AUTO: 68.1 % (ref 42.7–76)
NRBC BLD AUTO-RTO: 0 /100 WBC (ref 0–0.2)
PHOSPHATE SERPL-MCNC: 3.4 MG/DL (ref 2.5–4.5)
PLATELET # BLD AUTO: 426 10*3/MM3 (ref 140–450)
PMV BLD AUTO: 8.6 FL (ref 6–12)
POTASSIUM SERPL-SCNC: 4.4 MMOL/L (ref 3.5–5.2)
PROT SERPL-MCNC: 6.8 G/DL (ref 6–8.5)
RBC # BLD AUTO: 2.69 10*6/MM3 (ref 3.77–5.28)
SODIUM SERPL-SCNC: 143 MMOL/L (ref 136–145)
URATE SERPL-MCNC: 5.8 MG/DL (ref 2.4–5.7)
WBC NRBC COR # BLD AUTO: 2.64 10*3/MM3 (ref 3.4–10.8)

## 2024-08-26 PROCEDURE — 3078F DIAST BP <80 MM HG: CPT | Performed by: NURSE PRACTITIONER

## 2024-08-26 PROCEDURE — 3075F SYST BP GE 130 - 139MM HG: CPT | Performed by: NURSE PRACTITIONER

## 2024-08-26 PROCEDURE — 85025 COMPLETE CBC W/AUTO DIFF WBC: CPT

## 2024-08-26 PROCEDURE — 25010000002 CYANOCOBALAMIN PER 1000 MCG: Performed by: INTERNAL MEDICINE

## 2024-08-26 PROCEDURE — 84550 ASSAY OF BLOOD/URIC ACID: CPT | Performed by: NURSE PRACTITIONER

## 2024-08-26 PROCEDURE — 99214 OFFICE O/P EST MOD 30 MIN: CPT | Performed by: NURSE PRACTITIONER

## 2024-08-26 PROCEDURE — 84100 ASSAY OF PHOSPHORUS: CPT

## 2024-08-26 PROCEDURE — 80053 COMPREHEN METABOLIC PANEL: CPT

## 2024-08-26 PROCEDURE — 25010000002 DENOSUMAB 120 MG/1.7ML SOLUTION: Performed by: INTERNAL MEDICINE

## 2024-08-26 PROCEDURE — 1126F AMNT PAIN NOTED NONE PRSNT: CPT | Performed by: NURSE PRACTITIONER

## 2024-08-26 PROCEDURE — 83735 ASSAY OF MAGNESIUM: CPT

## 2024-08-26 PROCEDURE — 96372 THER/PROPH/DIAG INJ SC/IM: CPT

## 2024-08-26 PROCEDURE — 36415 COLL VENOUS BLD VENIPUNCTURE: CPT

## 2024-08-26 RX ORDER — CYANOCOBALAMIN 1000 UG/ML
1000 INJECTION, SOLUTION INTRAMUSCULAR; SUBCUTANEOUS ONCE
Status: COMPLETED | OUTPATIENT
Start: 2024-08-26 | End: 2024-08-26

## 2024-08-26 RX ORDER — CYANOCOBALAMIN 1000 UG/ML
1000 INJECTION, SOLUTION INTRAMUSCULAR; SUBCUTANEOUS ONCE
OUTPATIENT
Start: 2024-09-23

## 2024-08-26 RX ADMIN — CYANOCOBALAMIN 1000 MCG: 1000 INJECTION, SOLUTION INTRAMUSCULAR at 10:30

## 2024-08-26 RX ADMIN — DENOSUMAB 120 MG: 120 INJECTION SUBCUTANEOUS at 10:30

## 2024-08-26 NOTE — PROGRESS NOTES
Subjective    Follow-up for metastatic breast cancer to bone    History of Present Illness    The patient return today for follow-up continuing Arimidex plus Ibrance and monthly Xgeva for metastatic ER positive breast cancer to bone.  She is currently midcycle of her Ibrance reports.  She has had new pain in her right foot since the last visit.  This has been red and swollen and she sought care with primary care.  They did perform a Doppler that was negative for blood clot but does show fluid in the popliteal fossa area.  She was placed on an antibiotic and states she has had some improvement in her symptoms though not resolution.  She does not have a history of gout however will add a uric acid level on today.  She denies any numbness in this foot and has not noticed temperature changes.  She denies uncontrolled nausea, bowel changes, or any other new pain.    Oncology history:  Mrs. Roberts is a jeffry 79 y.o. woman with a history of stage I breast cancer affecting the left breast ([place] 5/9, 1.5 cm, ER 90%, AL 70%, HER2 2+/negative FISH).  She underwent lumpectomy and 4 cycles of adjuvant chemotherapy with TC.  Her tumor was estrogen receptor positive and she underwent hormonal therapy with Arimidex between May 2009 in May 2014.    The patient saw her primary care provider on 3/21/2023 and complained of low back pain for which plain films were performed suspicious for malignancy.  Whole-body bone scan on 3/20/2023 showed multifocal uptake in the calvarium, ribs, cervical, thoracic and lumbar spine, sacrum, pelvis, humeri and proximal femora.  PET scan on 4/10/2023 showed multiple hypermetabolic bone lesions including the lumbar spine, thoracic spine, most numerous and intense in the pelvic bones and proximal femurs maximum SUV 5.7 pelvis and 6.4 proximal right femur.  The hypermetabolic foci are mixed lytic/sclerotic.  There is no hypermetabolic lymphadenopathy or visceral disease.  She has been started on  "anastrozole 1 mg daily and Ibrance 100 mg days 1-21 q. 28 days.    The patient is tolerating treatment well.  She denies overt nausea vomiting diarrhea more on the constipated side.  She denies cough or shortness of breath.  Her pain is fairly minimal mostly in the left hip with walking.  Previous back pain has improved.    Repeat PET scan on 3/4/2024 showed resolution of hypermetabolic bone metastases-maximum SUV proximal right femur was 6.4 and 2.5, right iliac body previously 5.7 now 3.9, tiny focus L3 SUV 2.9, no new hypermetabolic activity.    Past Medical History:  Pertinent for hypertension, acid reflux, hyperlipidemia, hypothyroidism    Review of Systems   Constitutional: Negative.    Respiratory: Negative.     Cardiovascular: Negative.    Gastrointestinal:  Negative for abdominal pain.   Musculoskeletal:  Positive for arthralgias.   Skin: Negative.    Hematological: Negative.    Psychiatric/Behavioral:  Negative for sleep disturbance. The patient is not nervous/anxious.    ROS unchanged- 08/26/24      Medications:  The current medication list was reviewed in the EMR    ALLERGIES:  No Known Allergies    Objective      Vitals:    08/26/24 0943   BP: 139/71   Pulse: 78   Resp: 16   Temp: 97.5 °F (36.4 °C)   TempSrc: Oral   SpO2: 94%   Weight: 69.6 kg (153 lb 8 oz)   Height: 162.6 cm (64.02\")   PainSc: 0-No pain               6/3/2024    10:15 AM   Current Status   ECOG score 0     Physical Exam    CONSTITUTIONAL: pleasant well-developed adult woman  HEENT: no icterus, no thrush, moist membranes  LYMPH: no cervical or supraclavicular lad  MUSC: Right foot trace edema with redness noted, no temperature change between right and left foot or toes, normal gait  NEURO: alert and oriented x3, normal strength  PSYCH: normal mood and affect for situation  SKIN: no pallor or rash, see musculoskeletal  Exam otherwise unchanged- 08/26/24    RECENT LABS:  Results from last 7 days   Lab Units 08/26/24  0930   WBC 10*3/mm3 " 2.64*   NEUTROS ABS 10*3/mm3 1.80   HEMOGLOBIN g/dL 10.0*   HEMATOCRIT % 28.9*   PLATELETS 10*3/mm3 426                  WBC   Date Value Ref Range Status   08/26/2024 2.64 (L) 3.40 - 10.80 10*3/mm3 Final   02/22/2023 9.32 3.40 - 10.80 10*3/mm3 Final     RBC   Date Value Ref Range Status   08/26/2024 2.69 (L) 3.77 - 5.28 10*6/mm3 Final   02/22/2023 4.24 3.77 - 5.28 10*6/mm3 Final     Hemoglobin   Date Value Ref Range Status   08/26/2024 10.0 (L) 12.0 - 15.9 g/dL Final     Hematocrit   Date Value Ref Range Status   08/26/2024 28.9 (L) 34.0 - 46.6 % Final     MCV   Date Value Ref Range Status   08/26/2024 107.4 (H) 79.0 - 97.0 fL Final     MCH   Date Value Ref Range Status   08/26/2024 37.2 (H) 26.6 - 33.0 pg Final     MCHC   Date Value Ref Range Status   08/26/2024 34.6 31.5 - 35.7 g/dL Final     RDW   Date Value Ref Range Status   08/26/2024 13.3 12.3 - 15.4 % Final     RDW-SD   Date Value Ref Range Status   08/26/2024 52.2 37.0 - 54.0 fl Final     MPV   Date Value Ref Range Status   08/26/2024 8.6 6.0 - 12.0 fL Final     Platelets   Date Value Ref Range Status   08/26/2024 426 140 - 450 10*3/mm3 Final     Neutrophil %   Date Value Ref Range Status   08/26/2024 68.1 42.7 - 76.0 % Final     Lymphocyte %   Date Value Ref Range Status   08/26/2024 25.8 19.6 - 45.3 % Final     Monocyte %   Date Value Ref Range Status   08/26/2024 3.8 (L) 5.0 - 12.0 % Final     Eosinophil %   Date Value Ref Range Status   08/26/2024 0.4 0.3 - 6.2 % Final     Basophil %   Date Value Ref Range Status   08/26/2024 1.5 0.0 - 1.5 % Final     Immature Grans %   Date Value Ref Range Status   08/26/2024 0.4 0.0 - 0.5 % Final     Neutrophils, Absolute   Date Value Ref Range Status   08/26/2024 1.80 1.70 - 7.00 10*3/mm3 Final     Lymphocytes, Absolute   Date Value Ref Range Status   08/26/2024 0.68 (L) 0.70 - 3.10 10*3/mm3 Final     Monocytes, Absolute   Date Value Ref Range Status   08/26/2024 0.10 0.10 - 0.90 10*3/mm3 Final     Eosinophils,  Absolute   Date Value Ref Range Status   08/26/2024 0.01 0.00 - 0.40 10*3/mm3 Final     Basophils, Absolute   Date Value Ref Range Status   08/26/2024 0.04 0.00 - 0.20 10*3/mm3 Final     Immature Grans, Absolute   Date Value Ref Range Status   08/26/2024 0.01 0.00 - 0.05 10*3/mm3 Final     nRBC   Date Value Ref Range Status   08/26/2024 0.0 0.0 - 0.2 /100 WBC Final           PET scan 4/10/2023:  IMPRESSION:  1. Multiple hypermetabolic lytic and mixed lytic and sclerotic bone  metastases as described. The activity at the inferior aspect of the  right mastoid process may possibly represent a metastasis, but the  low-level activity at the adjacent subcutaneous fat is of uncertain  etiology. There is no definitive fluid at the right mastoid air cells to  suggest acute mastoiditis, but please correlate clinically and follow-up  as needed.  2. There is no evidence for metastatic lymphadenopathy or visceral  Metastases.    NM PET/CT Skull Base to Mid Thigh 3/4/2024 - FINDINGS: Mediastinal blood pool has a maximal SUV of 2.5, previously 2.4.  1. There has been resolution of most of the hypermetabolic bone metastases. The maximal SUV at the proximal right femur was 6.4 and is  currently 2.5. Most intense residual activity is at the right iliac body with a maximal SUV of 3.9, previously 5.7. A tiny focus at L3 has a maximal SUV of 2.9, previously 5.0. There are no new abnormal foci of abnormal activity within the bones.  2. There is no hypermetabolic lymphadenopathy at the thorax, axilla, supraclavicular regions, neck, abdomen, or pelvis. There is no suspicious visceral activity. No new pulmonary opacities are seen.    Assessment & Plan   *history of stage I breast cancer affecting the left breast ([place] 5/9, 1.5 cm, ER 90%, NE 70%, HER2 2+/negative FISH).    She underwent lumpectomy and 4 cycles of adjuvant chemotherapy with TC.  Her tumor was estrogen receptor positive and she underwent hormonal therapy with Arimidex  between May 2009 in May 2014.    *Radiographic studies consistent with metastatic disease to bone with sclerotic/lytic lesions involving spine (most prominent lumbar), scapula, pelvic bones, proximal femurs  Initiated Arimidex April 2023; Ibrance subsequently added 100 mg D1-21 q. 28 days  4/26/2023 CT-guided bone biopsy left iliac metastatic ductal adenocarcinoma of the breast ER 99% strongly positive, VT 31 to 40% weakly positive, HER2 2+/FISH negative; [facility] next generation sequencing available in the chart  CA 15-3 significantly elevated 764  CA 15-3 decreased to 295 on 6/5/2023  Repeat bone scan 8/14/2023-improved intensity uptake multiple locations, no new sites identified  PET scan on 3/4/2024 showed resolution of hypermetabolic bone metastases-maximum SUV proximal right femur was 6.4 and 2.5, right iliac body previously 5.7 now 3.9, tiny focus L3 SUV 2.9, no new hypermetabolic activity; CA 15-3 170 on 2/12/2024 8/26/2024 continue Ibrance 100 mg daily days 1 through 21 every 28 days and anastrozole daily.  Having some redness and trace edema in right foot for which she has been followed by primary care.  They did perform a Doppler that was negative for blood clot though does show some popliteal fluid collection.  She has been on Keflex and states her symptoms are slightly improved.  She follows up with them later today.  I did add a uric acid level today for further evaluation as well which primary care can also review since we already had the blood in lab today.    *Pain of malignancy-mild at this point, using extra treatment Tylenol as needed  Nedra Roberts reports a pain score of 0.  Given her pain assessment as noted, treatment options were discussed and the following options were decided upon as a follow-up plan to address the patient's pain: continuation of current treatment plan for pain.    *Hypophosphatemia/hypocalcemia    *Mild neutropenia secondary to Ibrance-ANC today normal at  1.8.    *Macrocytic anemia-  9/25/2023-B12 undetectably low; IM replacement initiated  Hemoglobin 10    Oncology plan/recommendations:   Continue anastrozole 1 mg daily  Ibrance 100 mg daily 1 through 21 every 28 days  Proceed with Xgeva today and continue monthly  She has extra-strength Tylenol or Ultram at home if needed for pain.  Currently only utilizing Tylenol.  Continue monthly B12 injection 1000 mcg  7.  Genetics referral  8.  Follow-up with Dr. Shepherd in 4 weeks with labs and Xgeva        The patient is on high risk medication that requires close monitoring for toxicity.    CC:

## 2024-08-27 ENCOUNTER — SPECIALTY PHARMACY (OUTPATIENT)
Dept: PHARMACY | Facility: HOSPITAL | Age: 79
End: 2024-08-27
Payer: MEDICARE

## 2024-08-27 NOTE — PROGRESS NOTES
Specialty Pharmacy Note: Ibrance (palbociclib)    Nedra Roberts is a 79 y.o. female with metastatic breast cancer was seen 8/26/24 by APRN. Per provider dictation, no changes to oral oncology regimen Ibrance (palbociclib) 100 mg po daily for 21 days on, then 7 days off.  Labs Review: The CMP and CBC from 8/26/24 have been reviewed. No dose adjustments are needed for the oral specialty medication(s) based on the labs.    Specialty pharmacy will continue to follow patient.    Jyothi Ashford Rph, MADHU  8/27/2024  08:31 EDT

## 2024-08-28 ENCOUNTER — OFFICE VISIT (OUTPATIENT)
Dept: INTERNAL MEDICINE | Facility: CLINIC | Age: 79
End: 2024-08-28
Payer: MEDICARE

## 2024-08-28 VITALS
BODY MASS INDEX: 26.12 KG/M2 | OXYGEN SATURATION: 97 % | HEIGHT: 64 IN | SYSTOLIC BLOOD PRESSURE: 124 MMHG | WEIGHT: 153 LBS | HEART RATE: 93 BPM | DIASTOLIC BLOOD PRESSURE: 64 MMHG

## 2024-08-28 DIAGNOSIS — G89.29 CHRONIC PAIN OF RIGHT KNEE: ICD-10-CM

## 2024-08-28 DIAGNOSIS — M71.21 SYNOVIAL CYST OF RIGHT POPLITEAL SPACE: ICD-10-CM

## 2024-08-28 DIAGNOSIS — M25.561 CHRONIC PAIN OF RIGHT KNEE: ICD-10-CM

## 2024-08-28 DIAGNOSIS — R22.41 LOCALIZED SWELLING OF RIGHT FOOT: Primary | ICD-10-CM

## 2024-08-28 PROCEDURE — 3074F SYST BP LT 130 MM HG: CPT | Performed by: INTERNAL MEDICINE

## 2024-08-28 PROCEDURE — 3078F DIAST BP <80 MM HG: CPT | Performed by: INTERNAL MEDICINE

## 2024-08-28 PROCEDURE — 1125F AMNT PAIN NOTED PAIN PRSNT: CPT | Performed by: INTERNAL MEDICINE

## 2024-08-28 PROCEDURE — 1160F RVW MEDS BY RX/DR IN RCRD: CPT | Performed by: INTERNAL MEDICINE

## 2024-08-28 PROCEDURE — 99213 OFFICE O/P EST LOW 20 MIN: CPT | Performed by: INTERNAL MEDICINE

## 2024-08-28 PROCEDURE — 1159F MED LIST DOCD IN RCRD: CPT | Performed by: INTERNAL MEDICINE

## 2024-08-28 NOTE — PROGRESS NOTES
Shon Roberts is a 79 y.o. female who presents with   Chief Complaint   Patient presents with    Edema       History of Present Illness     The following data was reviewed by: Helen Munoz MD on 08/28/2024:  Common labs          7/1/2024    09:29 7/29/2024    09:42 8/26/2024    09:30   Common Labs   Glucose 100  121  138    BUN 19  23  21    Creatinine 1.27  1.42  1.33    Sodium 140  140  143    Potassium 4.5  4.2  4.4    Chloride 104  108  106    Calcium 10.6  9.5  10.3    Albumin 4.6  4.1  4.2    Total Bilirubin 0.3  0.3  0.3    Alkaline Phosphatase 61  61  63    AST (SGOT) 20  17  18    ALT (SGPT) 12  <5  5    WBC 2.53  2.93  2.64    Hemoglobin 10.3  9.8  10.0    Hematocrit 30.5  28.6  28.9    Platelets 412  312  426    Uric Acid   5.8      Data reviewed : Radiologic studies doppler      Review of Systems   Musculoskeletal:  Positive for arthralgias.   Neurological:  Negative for numbness.       The following portions of the patient's history were reviewed and updated as appropriate: allergies, current medications and problem list.    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 30 tablet 5    aspirin 81 MG EC tablet Take 1 tablet by mouth Daily.      Calcium Carbonate Antacid (TUMS PO) Take  by mouth As Needed.      cephalexin  "(Keflex) 500 MG capsule Take 1 capsule by mouth 3 (Three) Times a Day. 21 capsule 0    Cholecalciferol (Vitamin D3) 50 MCG (2000 UT) capsule Take 1 capsule by mouth Daily. 90 each 3    denosumab (XGEVA) 120 MG/1.7ML solution injection Inject  under the skin into the appropriate area as directed 1 (One) Time.      levothyroxine (SYNTHROID, LEVOTHROID) 25 MCG tablet TAKE 1 TABLET BY MOUTH DAILY 90 tablet 0    losartan (COZAAR) 100 MG tablet TAKE 1 TABLET BY MOUTH EVERY DAY 90 tablet 3    ondansetron (ZOFRAN) 8 MG tablet Take 1 tablet by mouth 3 (Three) Times a Day As Needed for Nausea or Vomiting. 30 tablet 5    Palbociclib 100 MG tablet tablet Take 1 tablet by mouth Daily. Take with or without food for 21 days on, then off for 7 days. 21 tablet 5    pantoprazole (PROTONIX) 40 MG EC tablet TAKE 1 TABLET BY MOUTH TWICE DAILY 180 tablet 1    simvastatin (ZOCOR) 40 MG tablet TAKE 1 TABLET BY MOUTH EVERY NIGHT 90 tablet 3     No current facility-administered medications on file prior to visit.       Objective     /64   Pulse 93   Ht 162.6 cm (64.02\")   Wt 69.4 kg (153 lb)   SpO2 97%   BMI 26.25 kg/m²     Physical Exam  Constitutional:       Appearance: She is well-developed.   HENT:      Head: Normocephalic and atraumatic.   Cardiovascular:      Comments: Right foot edema mild  Pulmonary:      Effort: Pulmonary effort is normal.   Neurological:      Mental Status: She is alert and oriented to person, place, and time.   Psychiatric:         Behavior: Behavior normal.         Assessment & Plan   Diagnoses and all orders for this visit:    1. Localized swelling of right foot (Primary)    2. Chronic pain of right knee  -     Ambulatory Referral to Orthopedic Surgery    3. Synovial cyst of right popliteal space        Discussion    F/u right foot swelling which is mild.  Negative for DVT on doppler.   Baker's cyst on US.  She see Dr. Michelle for right knee pain.  I think swelling in foot is actually coming from " inflammation and swelling of the right knee.  She is going to see Dr. Michelle in f/u for consideration of repeat steroid injection.  She is also encouraged to wear compression stockings.         Future Appointments   Date Time Provider Department Center   9/6/2024 10:30 AM Prisca Church MD [facility]   9/23/2024  9:30 AM LAB CHAIR 5 [facility]  LAB [facility] [place]   9/23/2024 10:00 AM Rocky Ford MD [facility] CBC [facility] [place]   9/23/2024 10:45 AM INJECTION CHAIR [facility] INFUS [facility]   2/7/2025  9:00 AM [facility] OP VAS VEIN [facility]   2/7/2025  9:30 AM Yuli Berg APRN [facility] VS [facility]   3/6/2025  8:30 AM [facility]   3/10/2025  1:15 PM Prisca Church MD [facility]

## 2024-09-06 ENCOUNTER — OFFICE VISIT (OUTPATIENT)
Dept: INTERNAL MEDICINE | Facility: CLINIC | Age: 79
End: 2024-09-06
Payer: MEDICARE

## 2024-09-06 VITALS
HEIGHT: 64 IN | SYSTOLIC BLOOD PRESSURE: 122 MMHG | HEART RATE: 76 BPM | OXYGEN SATURATION: 96 % | BODY MASS INDEX: 26.12 KG/M2 | DIASTOLIC BLOOD PRESSURE: 74 MMHG | WEIGHT: 153 LBS

## 2024-09-06 DIAGNOSIS — I10 ESSENTIAL HYPERTENSION: ICD-10-CM

## 2024-09-06 DIAGNOSIS — M79.671 RIGHT FOOT PAIN: ICD-10-CM

## 2024-09-06 DIAGNOSIS — C79.51 CARCINOMA OF BREAST METASTATIC TO BONE, UNSPECIFIED LATERALITY: ICD-10-CM

## 2024-09-06 DIAGNOSIS — K21.00 GASTROESOPHAGEAL REFLUX DISEASE WITH ESOPHAGITIS WITHOUT HEMORRHAGE: Primary | ICD-10-CM

## 2024-09-06 DIAGNOSIS — E78.2 MULTIPLE-TYPE HYPERLIPIDEMIA: ICD-10-CM

## 2024-09-06 DIAGNOSIS — N28.9 RENAL INSUFFICIENCY: ICD-10-CM

## 2024-09-06 DIAGNOSIS — C50.919 CARCINOMA OF BREAST METASTATIC TO BONE, UNSPECIFIED LATERALITY: ICD-10-CM

## 2024-09-06 PROCEDURE — 3074F SYST BP LT 130 MM HG: CPT | Performed by: INTERNAL MEDICINE

## 2024-09-06 PROCEDURE — 99214 OFFICE O/P EST MOD 30 MIN: CPT | Performed by: INTERNAL MEDICINE

## 2024-09-06 PROCEDURE — 1125F AMNT PAIN NOTED PAIN PRSNT: CPT | Performed by: INTERNAL MEDICINE

## 2024-09-06 PROCEDURE — 3078F DIAST BP <80 MM HG: CPT | Performed by: INTERNAL MEDICINE

## 2024-09-06 RX ORDER — FAMOTIDINE 40 MG/1
40 TABLET, FILM COATED ORAL DAILY
Qty: 90 TABLET | Refills: 3 | Status: SHIPPED | OUTPATIENT
Start: 2024-09-06

## 2024-09-06 RX ORDER — LOSARTAN POTASSIUM 50 MG/1
50 TABLET ORAL DAILY
Qty: 90 TABLET | Refills: 3 | Status: SHIPPED | OUTPATIENT
Start: 2024-09-06

## 2024-09-06 NOTE — PROGRESS NOTES
Chief Complaint   Patient presents with    Hypertension    Hyperlipidemia    breast cancer    right foot pain    Heartburn       Hypertension    Hyperlipidemia       Nedra Roberts is a 79 y.o. female presents for follow up evaluation. In general patient is doing well. Had recent right side leg swelling. She was sent for ultrasuound and this is negative. She has a synovial cyst. Follows w ortho. Notes some heel tenderness at this time.   Has htn. No recent testing at home but she is normotensvie in md offices.   She  breast cancer metastatic to bone. She is tolerating xgeva and imbrance at this time. Pain first noted in low back. She takes tylenol es for this w ggod benefit.   Patient notes requirig 1-2 tabs tums daily. This is most beneficial when she drinks wine. Taking omeprazole daily as well.         The following portions of the patient's history were reviewed and updated as appropriate: allergies, current medications, past family history, past medical history, past social history, past surgical history and problem list.  Current Outpatient Medications on File Prior to Visit   Medication Sig Dispense Refill    anastrozole (ARIMIDEX) 1 MG tablet Take 1 tablet by mouth Daily. 30 tablet 5    aspirin 81 MG EC tablet Take 1 tablet by mouth Daily.      Calcium Carbonate Antacid (TUMS PO) Take  by mouth As Needed.      Cholecalciferol (Vitamin D3) 50 MCG (2000 UT) capsule Take 1 capsule by mouth Daily. 90 each 3    denosumab (XGEVA) 120 MG/1.7ML solution injection Inject  under the skin into the appropriate area as directed 1 (One) Time.      levothyroxine (SYNTHROID, LEVOTHROID) 25 MCG tablet TAKE 1 TABLET BY MOUTH DAILY 90 tablet 0    losartan (COZAAR) 100 MG tablet TAKE 1 TABLET BY MOUTH EVERY DAY 90 tablet 3    Palbociclib 100 MG tablet tablet Take 1 tablet by mouth Daily. Take with or without food for 21 days on, then off for 7 days. 21 tablet 5    pantoprazole (PROTONIX) 40 MG EC tablet TAKE 1 TABLET BY MOUTH  TWICE DAILY 180 tablet 1    simvastatin (ZOCOR) 40 MG tablet TAKE 1 TABLET BY MOUTH EVERY NIGHT 90 tablet 3    cephalexin (Keflex) 500 MG capsule Take 1 capsule by mouth 3 (Three) Times a Day. (Patient not taking: Reported on 9/6/2024) 21 capsule 0    ondansetron (ZOFRAN) 8 MG tablet Take 1 tablet by mouth 3 (Three) Times a Day As Needed for Nausea or Vomiting. 30 tablet 5     No current facility-administered medications on file prior to visit.     Review of Systems   Constitutional: Negative.    HENT: Negative.     Eyes: Negative.    Respiratory: Negative.     Cardiovascular: Negative.    Gastrointestinal: Negative.    Endocrine: Negative.    Genitourinary: Negative.    Musculoskeletal: Negative.    Skin: Negative.    Allergic/Immunologic: Negative.    Neurological: Negative.    Hematological: Negative.    Psychiatric/Behavioral: Negative.         Objective   Physical Exam  Vitals and nursing note reviewed.   Constitutional:       Appearance: Normal appearance. She is well-developed.   HENT:      Head: Normocephalic and atraumatic.      Right Ear: Tympanic membrane and external ear normal.      Left Ear: Tympanic membrane and external ear normal.      Nose: Nose normal.      Mouth/Throat:      Mouth: Mucous membranes are moist.   Eyes:      Extraocular Movements: Extraocular movements intact.      Pupils: Pupils are equal, round, and reactive to light.   Cardiovascular:      Rate and Rhythm: Normal rate and regular rhythm.      Pulses: Normal pulses.      Heart sounds: Normal heart sounds.   Pulmonary:      Effort: Pulmonary effort is normal. No respiratory distress.      Breath sounds: Normal breath sounds.   Abdominal:      General: Abdomen is flat.      Palpations: Abdomen is soft.   Musculoskeletal:         General: Normal range of motion.      Cervical back: Normal range of motion and neck supple.      Comments: No focal tenderness , swelling, or acute abnormality right foot     Skin:     General: Skin is warm  "and dry.   Neurological:      General: No focal deficit present.      Mental Status: She is alert and oriented to person, place, and time.   Psychiatric:         Mood and Affect: Mood normal.         Behavior: Behavior normal.         Thought Content: Thought content normal.         Judgment: Judgment normal.          /74   Pulse 76   Ht 162.6 cm (64\")   Wt 69.4 kg (153 lb)   SpO2 96%   BMI 26.26 kg/m²     Assessment & Plan   Diagnoses and all orders for this visit:    Gastroesophageal reflux disease with esophagitis without hemorrhage    Essential hypertension    Multiple-type hyperlipidemia    Carcinoma of breast metastatic to bone, unspecified laterality    Right foot pain    Renal insufficiency  -     Ambulatory Referral to Nephrology    Other orders  -     famotidine (PEPCID) 40 MG tablet; Take 1 tablet by mouth Daily.      Patient w mackenzie. Will add pepcid to omeprazole. To minimize triggers such as wine.   Has right foot pain. This is migratory. At this time displays discomfort at insertion site for plantar fascia. Will try ice, stretching, voltaren gel, supportive footwear. If no improvement can refer to PT. Will image if discomfort becomes consistently localized given h/o mets breast ca. She will continue current meds fo lipid and bp regulation. However, bp is so well regulated will try reducing losartan to 50 mg.   Patient has renal insufficiency. Will refer to nephrology for further eval and treatment. Will work to reduce omeprazole in the future. Possibly med related with PPI and ctx..            Answers submitted by the patient for this visit:  Other (Submitted on 8/30/2024)  Please describe your symptoms.: Routine check-up. Also checking swollen right foot seen previously by Dr Munoz.  Have you had these symptoms before?: Yes  How long have you been having these symptoms?: 1-2 weeks  Please list any medications you are currently taking for this condition.: Finished an antibiotic.  Please " describe any probable cause for these symptoms. : ?? Maybe drainage from sore knee.  Primary Reason for Visit (Submitted on 8/30/2024)  What is the primary reason for your visit?: Other

## 2024-09-17 ENCOUNTER — TELEPHONE (OUTPATIENT)
Dept: INTERNAL MEDICINE | Facility: CLINIC | Age: 79
End: 2024-09-17
Payer: MEDICARE

## 2024-09-23 ENCOUNTER — LAB (OUTPATIENT)
Dept: LAB | Facility: HOSPITAL | Age: 79
End: 2024-09-23
Payer: MEDICARE

## 2024-09-23 ENCOUNTER — OFFICE VISIT (OUTPATIENT)
Dept: ONCOLOGY | Facility: CLINIC | Age: 79
End: 2024-09-23
Payer: MEDICARE

## 2024-09-23 ENCOUNTER — INFUSION (OUTPATIENT)
Dept: ONCOLOGY | Facility: HOSPITAL | Age: 79
End: 2024-09-23
Payer: MEDICARE

## 2024-09-23 VITALS
OXYGEN SATURATION: 98 % | HEART RATE: 60 BPM | DIASTOLIC BLOOD PRESSURE: 72 MMHG | RESPIRATION RATE: 19 BRPM | HEIGHT: 64 IN | WEIGHT: 149 LBS | SYSTOLIC BLOOD PRESSURE: 130 MMHG | BODY MASS INDEX: 25.44 KG/M2

## 2024-09-23 DIAGNOSIS — D61.810 ANTINEOPLASTIC CHEMOTHERAPY INDUCED PANCYTOPENIA: ICD-10-CM

## 2024-09-23 DIAGNOSIS — C79.51 CARCINOMA OF BREAST METASTATIC TO BONE, UNSPECIFIED LATERALITY: ICD-10-CM

## 2024-09-23 DIAGNOSIS — C50.919 CARCINOMA OF BREAST METASTATIC TO BONE, UNSPECIFIED LATERALITY: ICD-10-CM

## 2024-09-23 DIAGNOSIS — C79.51 CARCINOMA OF BREAST METASTATIC TO BONE, UNSPECIFIED LATERALITY: Primary | ICD-10-CM

## 2024-09-23 DIAGNOSIS — Z79.899 ENCOUNTER FOR LONG-TERM (CURRENT) USE OF OTHER MEDICATIONS: Primary | ICD-10-CM

## 2024-09-23 DIAGNOSIS — Z79.899 ENCOUNTER FOR LONG-TERM (CURRENT) USE OF OTHER MEDICATIONS: ICD-10-CM

## 2024-09-23 DIAGNOSIS — E53.8 B12 DEFICIENCY: ICD-10-CM

## 2024-09-23 DIAGNOSIS — Z12.31 ENCOUNTER FOR SCREENING MAMMOGRAM FOR MALIGNANT NEOPLASM OF BREAST: ICD-10-CM

## 2024-09-23 DIAGNOSIS — C50.919 CARCINOMA OF BREAST METASTATIC TO BONE, UNSPECIFIED LATERALITY: Primary | ICD-10-CM

## 2024-09-23 DIAGNOSIS — T45.1X5A ANTINEOPLASTIC CHEMOTHERAPY INDUCED PANCYTOPENIA: ICD-10-CM

## 2024-09-23 PROBLEM — Z85.3 HISTORY OF BREAST CANCER: Status: RESOLVED | Noted: 2017-10-30 | Resolved: 2024-09-23

## 2024-09-23 LAB
ALBUMIN SERPL-MCNC: 4.5 G/DL (ref 3.5–5.2)
ALBUMIN/GLOB SERPL: 1.7 G/DL
ALP SERPL-CCNC: 68 U/L (ref 39–117)
ALT SERPL W P-5'-P-CCNC: 6 U/L (ref 1–33)
ANION GAP SERPL CALCULATED.3IONS-SCNC: 10.4 MMOL/L (ref 5–15)
AST SERPL-CCNC: 17 U/L (ref 1–32)
BASOPHILS # BLD AUTO: 0.04 10*3/MM3 (ref 0–0.2)
BASOPHILS NFR BLD AUTO: 1.2 % (ref 0–1.5)
BILIRUB SERPL-MCNC: 0.3 MG/DL (ref 0–1.2)
BUN SERPL-MCNC: 24 MG/DL (ref 8–23)
BUN/CREAT SERPL: 16.2 (ref 7–25)
CALCIUM SPEC-SCNC: 10 MG/DL (ref 8.6–10.5)
CANCER AG15-3 SERPL-ACNC: 138 U/ML
CHLORIDE SERPL-SCNC: 107 MMOL/L (ref 98–107)
CO2 SERPL-SCNC: 25.6 MMOL/L (ref 22–29)
CREAT SERPL-MCNC: 1.48 MG/DL (ref 0.57–1)
DEPRECATED RDW RBC AUTO: 51.5 FL (ref 37–54)
EGFRCR SERPLBLD CKD-EPI 2021: 36.1 ML/MIN/1.73
EOSINOPHIL # BLD AUTO: 0.02 10*3/MM3 (ref 0–0.4)
EOSINOPHIL NFR BLD AUTO: 0.6 % (ref 0.3–6.2)
ERYTHROCYTE [DISTWIDTH] IN BLOOD BY AUTOMATED COUNT: 13.2 % (ref 12.3–15.4)
GLOBULIN UR ELPH-MCNC: 2.7 GM/DL
GLUCOSE SERPL-MCNC: 113 MG/DL (ref 65–99)
HCT VFR BLD AUTO: 29.5 % (ref 34–46.6)
HGB BLD-MCNC: 10 G/DL (ref 12–15.9)
IMM GRANULOCYTES # BLD AUTO: 0.01 10*3/MM3 (ref 0–0.05)
IMM GRANULOCYTES NFR BLD AUTO: 0.3 % (ref 0–0.5)
LYMPHOCYTES # BLD AUTO: 0.96 10*3/MM3 (ref 0.7–3.1)
LYMPHOCYTES NFR BLD AUTO: 29.4 % (ref 19.6–45.3)
MAGNESIUM SERPL-MCNC: 2 MG/DL (ref 1.6–2.4)
MCH RBC QN AUTO: 36.4 PG (ref 26.6–33)
MCHC RBC AUTO-ENTMCNC: 33.9 G/DL (ref 31.5–35.7)
MCV RBC AUTO: 107.3 FL (ref 79–97)
MONOCYTES # BLD AUTO: 0.16 10*3/MM3 (ref 0.1–0.9)
MONOCYTES NFR BLD AUTO: 4.9 % (ref 5–12)
NEUTROPHILS NFR BLD AUTO: 2.07 10*3/MM3 (ref 1.7–7)
NEUTROPHILS NFR BLD AUTO: 63.6 % (ref 42.7–76)
NRBC BLD AUTO-RTO: 0 /100 WBC (ref 0–0.2)
PHOSPHATE SERPL-MCNC: 3.4 MG/DL (ref 2.5–4.5)
PLATELET # BLD AUTO: 353 10*3/MM3 (ref 140–450)
PMV BLD AUTO: 8.4 FL (ref 6–12)
POTASSIUM SERPL-SCNC: 5 MMOL/L (ref 3.5–5.2)
PROT SERPL-MCNC: 7.2 G/DL (ref 6–8.5)
RBC # BLD AUTO: 2.75 10*6/MM3 (ref 3.77–5.28)
SODIUM SERPL-SCNC: 143 MMOL/L (ref 136–145)
WBC NRBC COR # BLD AUTO: 3.26 10*3/MM3 (ref 3.4–10.8)

## 2024-09-23 PROCEDURE — 3075F SYST BP GE 130 - 139MM HG: CPT | Performed by: INTERNAL MEDICINE

## 2024-09-23 PROCEDURE — 3078F DIAST BP <80 MM HG: CPT | Performed by: INTERNAL MEDICINE

## 2024-09-23 PROCEDURE — G2211 COMPLEX E/M VISIT ADD ON: HCPCS | Performed by: INTERNAL MEDICINE

## 2024-09-23 PROCEDURE — 84100 ASSAY OF PHOSPHORUS: CPT

## 2024-09-23 PROCEDURE — 86300 IMMUNOASSAY TUMOR CA 15-3: CPT | Performed by: INTERNAL MEDICINE

## 2024-09-23 PROCEDURE — 99214 OFFICE O/P EST MOD 30 MIN: CPT | Performed by: INTERNAL MEDICINE

## 2024-09-23 PROCEDURE — 80053 COMPREHEN METABOLIC PANEL: CPT

## 2024-09-23 PROCEDURE — 25010000002 DENOSUMAB 120 MG/1.7ML SOLUTION: Performed by: INTERNAL MEDICINE

## 2024-09-23 PROCEDURE — 83735 ASSAY OF MAGNESIUM: CPT

## 2024-09-23 PROCEDURE — 1125F AMNT PAIN NOTED PAIN PRSNT: CPT | Performed by: INTERNAL MEDICINE

## 2024-09-23 PROCEDURE — 36415 COLL VENOUS BLD VENIPUNCTURE: CPT

## 2024-09-23 PROCEDURE — 25010000002 CYANOCOBALAMIN PER 1000 MCG: Performed by: INTERNAL MEDICINE

## 2024-09-23 PROCEDURE — 85025 COMPLETE CBC W/AUTO DIFF WBC: CPT

## 2024-09-23 PROCEDURE — 96372 THER/PROPH/DIAG INJ SC/IM: CPT

## 2024-09-23 RX ORDER — CYANOCOBALAMIN 1000 UG/ML
1000 INJECTION, SOLUTION INTRAMUSCULAR; SUBCUTANEOUS ONCE
OUTPATIENT
Start: 2024-10-21

## 2024-09-23 RX ORDER — CYANOCOBALAMIN 1000 UG/ML
1000 INJECTION, SOLUTION INTRAMUSCULAR; SUBCUTANEOUS ONCE
Status: COMPLETED | OUTPATIENT
Start: 2024-09-23 | End: 2024-09-23

## 2024-09-23 RX ORDER — SUCRALFATE 1 G/1
1 TABLET ORAL 3 TIMES DAILY PRN
Qty: 90 TABLET | Refills: 1 | Status: SHIPPED | OUTPATIENT
Start: 2024-09-23

## 2024-09-23 RX ADMIN — CYANOCOBALAMIN 1000 MCG: 1000 INJECTION, SOLUTION INTRAMUSCULAR at 10:28

## 2024-09-23 RX ADMIN — DENOSUMAB 120 MG: 120 INJECTION SUBCUTANEOUS at 10:28

## 2024-09-24 ENCOUNTER — SPECIALTY PHARMACY (OUTPATIENT)
Dept: PHARMACY | Facility: HOSPITAL | Age: 79
End: 2024-09-24
Payer: MEDICARE

## 2024-09-25 DIAGNOSIS — C79.51 CANCER, METASTATIC TO BONE: ICD-10-CM

## 2024-09-26 ENCOUNTER — SPECIALTY PHARMACY (OUTPATIENT)
Dept: PHARMACY | Facility: HOSPITAL | Age: 79
End: 2024-09-26
Payer: MEDICARE

## 2024-10-14 RX ORDER — LEVOTHYROXINE SODIUM 25 UG/1
25 TABLET ORAL DAILY
Qty: 90 TABLET | Refills: 0 | Status: SHIPPED | OUTPATIENT
Start: 2024-10-14 | End: 2024-10-21

## 2024-10-14 RX ORDER — PANTOPRAZOLE SODIUM 40 MG/1
TABLET, DELAYED RELEASE ORAL
Qty: 180 TABLET | Refills: 1 | OUTPATIENT
Start: 2024-10-14

## 2024-10-14 RX ORDER — SIMVASTATIN 40 MG
40 TABLET ORAL NIGHTLY
Qty: 90 TABLET | Refills: 3 | Status: SHIPPED | OUTPATIENT
Start: 2024-10-14

## 2024-10-15 ENCOUNTER — HOSPITAL ENCOUNTER (OUTPATIENT)
Dept: NUCLEAR MEDICINE | Facility: HOSPITAL | Age: 79
Discharge: HOME | End: 2024-10-15
Payer: MEDICARE

## 2024-10-15 DIAGNOSIS — T45.1X5A ANTINEOPLASTIC CHEMOTHERAPY INDUCED PANCYTOPENIA: ICD-10-CM

## 2024-10-15 DIAGNOSIS — D61.810 ANTINEOPLASTIC CHEMOTHERAPY INDUCED PANCYTOPENIA: ICD-10-CM

## 2024-10-15 DIAGNOSIS — E53.8 B12 DEFICIENCY: ICD-10-CM

## 2024-10-15 DIAGNOSIS — C79.51 CARCINOMA OF BREAST METASTATIC TO BONE, UNSPECIFIED LATERALITY: ICD-10-CM

## 2024-10-15 DIAGNOSIS — C50.919 CARCINOMA OF BREAST METASTATIC TO BONE, UNSPECIFIED LATERALITY: ICD-10-CM

## 2024-10-15 PROCEDURE — 0 TECHNETIUM MEDRONATE KIT: Performed by: INTERNAL MEDICINE

## 2024-10-15 PROCEDURE — A9503 TC99M MEDRONATE: HCPCS | Performed by: INTERNAL MEDICINE

## 2024-10-15 PROCEDURE — 78306 BONE IMAGING WHOLE BODY: CPT

## 2024-10-15 RX ORDER — TC 99M MEDRONATE 20 MG/10ML
22 INJECTION, POWDER, LYOPHILIZED, FOR SOLUTION INTRAVENOUS
Status: COMPLETED | OUTPATIENT
Start: 2024-10-15 | End: 2024-10-15

## 2024-10-15 RX ADMIN — Medication 22 MILLICURIE: at 09:07

## 2024-10-15 NOTE — PROGRESS NOTES
Subjective    Follow-up for metastatic breast cancer to bone      History of Present Illness    The patient return today for follow-up continuing Arimidex plus Ibrance and monthly Xgeva for metastatic ER positive breast cancer to bone.  The patient complains of acid reflux which is called some mild weight loss.  She tried Carafate but made her nauseous.  She is on famotidine and Tums.  She denies uncontrolled pain shortness of breath.    Oncology history:  Mrs. Roberts is a jeffry 79 y.o. woman with a history of stage I breast cancer affecting the left breast (Brady 5/9, 1.5 cm, ER 90%, NH 70%, HER2 2+/negative FISH).  She underwent lumpectomy and 4 cycles of adjuvant chemotherapy with TC.  Her tumor was estrogen receptor positive and she underwent hormonal therapy with Arimidex between May 2009 in May 2014.    The patient saw her primary care provider on 3/21/2023 and complained of low back pain for which plain films were performed suspicious for malignancy.  Whole-body bone scan on 3/20/2023 showed multifocal uptake in the calvarium, ribs, cervical, thoracic and lumbar spine, sacrum, pelvis, humeri and proximal femora.  PET scan on 4/10/2023 showed multiple hypermetabolic bone lesions including the lumbar spine, thoracic spine, most numerous and intense in the pelvic bones and proximal femurs maximum SUV 5.7 pelvis and 6.4 proximal right femur.  The hypermetabolic foci are mixed lytic/sclerotic.  There is no hypermetabolic lymphadenopathy or visceral disease.  She has been started on anastrozole 1 mg daily and Ibrance 100 mg days 1-21 q. 28 days.    The patient is tolerating treatment well.  She denies overt nausea vomiting diarrhea more on the constipated side.  She denies cough or shortness of breath.  Her pain is fairly minimal mostly in the left hip with walking.  Previous back pain has improved.    Repeat PET scan on 3/4/2024 showed resolution of hypermetabolic bone metastases-maximum SUV proximal right  "femur was 6.4 and 2.5, right iliac body previously 5.7 now 3.9, tiny focus L3 SUV 2.9, no new hypermetabolic activity.    Past Medical History:  Pertinent for hypertension, acid reflux, hyperlipidemia, hypothyroidism    Review of Systems   Constitutional:  Positive for activity change.   Respiratory: Negative.     Cardiovascular: Negative.    Gastrointestinal:  Negative for abdominal pain.        Acid reflux   Musculoskeletal:  Positive for arthralgias.   Skin: Negative.    Hematological: Negative.    Psychiatric/Behavioral:  Negative for sleep disturbance. The patient is not nervous/anxious.    ROS unchanged-10/21/2024      Medications:  The current medication list was reviewed in the EMR    ALLERGIES:  No Known Allergies    Objective      Vitals:    10/21/24 1141   BP: 127/76   Pulse: 77   Resp: 16   Temp: 98 °F (36.7 °C)   TempSrc: Oral   SpO2: 98%   Weight: 67 kg (147 lb 12.8 oz)   Height: 162.6 cm (64.02\")   PainSc: 0-No pain                 10/21/2024    11:44 AM   Current Status   ECOG score 0       Physical Exam    CONSTITUTIONAL: pleasant well-developed adult woman  HEENT: no icterus, no thrush, moist membranes, resolution of the right mastoid nodule, hard of hearing  LYMPH: no cervical or supraclavicular lad  MUSC: no edema, normal gait  NEURO: alert and oriented x3, normal strength  PSYCH: normal mood and affect for situation  SKIN: no pallor or rash  Exam unchanged-10/21/2024  RECENT LABS:  Results from last 7 days   Lab Units 10/21/24  1119   WBC 10*3/mm3 2.48*   NEUTROS ABS 10*3/mm3 1.43*   HEMOGLOBIN g/dL 9.8*   HEMATOCRIT % 28.8*   PLATELETS 10*3/mm3 358             Results from last 7 days   Lab Units 10/21/24  1119   SODIUM mmol/L 140   POTASSIUM mmol/L 4.6   CHLORIDE mmol/L 105   CO2 mmol/L 25.7   BUN mg/dL 19   CREATININE mg/dL 1.26*   CALCIUM mg/dL 10.4   ALBUMIN g/dL 4.2   BILIRUBIN mg/dL 0.3   ALK PHOS U/L 62   ALT (SGPT) U/L 9   AST (SGOT) U/L 17   GLUCOSE mg/dL 103*   MAGNESIUM mg/dL 2.1 "          WBC   Date Value Ref Range Status   10/21/2024 2.48 (L) 3.40 - 10.80 10*3/mm3 Final   02/22/2023 9.32 3.40 - 10.80 10*3/mm3 Final     RBC   Date Value Ref Range Status   10/21/2024 2.64 (L) 3.77 - 5.28 10*6/mm3 Final   02/22/2023 4.24 3.77 - 5.28 10*6/mm3 Final     Hemoglobin   Date Value Ref Range Status   10/21/2024 9.8 (L) 12.0 - 15.9 g/dL Final     Hematocrit   Date Value Ref Range Status   10/21/2024 28.8 (L) 34.0 - 46.6 % Final     MCV   Date Value Ref Range Status   10/21/2024 109.1 (H) 79.0 - 97.0 fL Final     MCH   Date Value Ref Range Status   10/21/2024 37.1 (H) 26.6 - 33.0 pg Final     MCHC   Date Value Ref Range Status   10/21/2024 34.0 31.5 - 35.7 g/dL Final     RDW   Date Value Ref Range Status   10/21/2024 13.7 12.3 - 15.4 % Final     RDW-SD   Date Value Ref Range Status   10/21/2024 53.8 37.0 - 54.0 fl Final     MPV   Date Value Ref Range Status   10/21/2024 8.9 6.0 - 12.0 fL Final     Platelets   Date Value Ref Range Status   10/21/2024 358 140 - 450 10*3/mm3 Final     Neutrophil %   Date Value Ref Range Status   10/21/2024 57.7 42.7 - 76.0 % Final     Lymphocyte %   Date Value Ref Range Status   10/21/2024 33.5 19.6 - 45.3 % Final     Monocyte %   Date Value Ref Range Status   10/21/2024 6.0 5.0 - 12.0 % Final     Eosinophil %   Date Value Ref Range Status   10/21/2024 0.8 0.3 - 6.2 % Final     Basophil %   Date Value Ref Range Status   10/21/2024 1.6 (H) 0.0 - 1.5 % Final     Immature Grans %   Date Value Ref Range Status   10/21/2024 0.4 0.0 - 0.5 % Final     Neutrophils, Absolute   Date Value Ref Range Status   10/21/2024 1.43 (L) 1.70 - 7.00 10*3/mm3 Final     Lymphocytes, Absolute   Date Value Ref Range Status   10/21/2024 0.83 0.70 - 3.10 10*3/mm3 Final     Monocytes, Absolute   Date Value Ref Range Status   10/21/2024 0.15 0.10 - 0.90 10*3/mm3 Final     Eosinophils, Absolute   Date Value Ref Range Status   10/21/2024 0.02 0.00 - 0.40 10*3/mm3 Final     Basophils, Absolute   Date  Value Ref Range Status   10/21/2024 0.04 0.00 - 0.20 10*3/mm3 Final     Immature Grans, Absolute   Date Value Ref Range Status   10/21/2024 0.01 0.00 - 0.05 10*3/mm3 Final     nRBC   Date Value Ref Range Status   10/21/2024 0.0 0.0 - 0.2 /100 WBC Final           PET scan 4/10/2023:  IMPRESSION:  1. Multiple hypermetabolic lytic and mixed lytic and sclerotic bone  metastases as described. The activity at the inferior aspect of the  right mastoid process may possibly represent a metastasis, but the  low-level activity at the adjacent subcutaneous fat is of uncertain  etiology. There is no definitive fluid at the right mastoid air cells to  suggest acute mastoiditis, but please correlate clinically and follow-up  as needed.  2. There is no evidence for metastatic lymphadenopathy or visceral  Metastases.    NM PET/CT Skull Base to Mid Thigh 3/4/2024 - FINDINGS: Mediastinal blood pool has a maximal SUV of 2.5, previously 2.4.  1. There has been resolution of most of the hypermetabolic bone metastases. The maximal SUV at the proximal right femur was 6.4 and is  currently 2.5. Most intense residual activity is at the right iliac body with a maximal SUV of 3.9, previously 5.7. A tiny focus at L3 has a maximal SUV of 2.9, previously 5.0. There are no new abnormal foci of abnormal activity within the bones.  2. There is no hypermetabolic lymphadenopathy at the thorax, axilla, supraclavicular regions, neck, abdomen, or pelvis. There is no suspicious visceral activity. No new pulmonary opacities are seen.    NM Bone Scan Whole Body (10/15/2024 12:33 PM)       Assessment & Plan   *history of stage I breast cancer affecting the left breast (Brady 5/9, 1.5 cm, ER 90%, CO 70%, HER2 2+/negative FISH).    She underwent lumpectomy and 4 cycles of adjuvant chemotherapy with TC.  Her tumor was estrogen receptor positive and she underwent hormonal therapy with Arimidex between May 2009 in May 2014.    *Radiographic studies  consistent with metastatic disease to bone with sclerotic/lytic lesions involving spine (most prominent lumbar), scapula, pelvic bones, proximal femurs  Initiated Arimidex April 2023; Ibrance subsequently added 100 mg D1-21 q. 28 days  4/26/2023 CT-guided bone biopsy left iliac metastatic ductal adenocarcinoma of the breast ER 99% strongly positive, ND 31 to 40% weakly positive, HER2 2+/FISH negative; [facility] next generation sequencing available in the chart  CA 15-3 significantly elevated 764  CA 15-3 decreased to 295 on 6/5/2023  Repeat bone scan 8/14/2023-improved intensity uptake multiple locations, no new sites identified  PET scan on 3/4/2024 showed resolution of hypermetabolic bone metastases-maximum SUV proximal right femur was 6.4 and 2.5, right iliac body previously 5.7 now 3.9, tiny focus L3 SUV 2.9, no new hypermetabolic activity; CA 15-3 170 on 2/12/2024  10/15/2024-bone scan similar osseous metastatic disease compared to 2/20/2023; CT chest abdomen pelvis 10/17/2024-unchanged osseous metastatic disease in the axial and proximal appendicular skeleton; no soft tissue metastatic disease; CA 15-3 138    *Pain of malignancy-mild at this point, using extra treatment Tylenol as needed    *Hypophosphatemia/hypocalcemia-on oral replacement    *Mild neutropenia secondary to Ibrance-ANC stable 1.43    *Macrocytic anemia-  9/25/2023-B12 undetectably low; IM replacement initiated  3/11/2024-negative SPEP/GABRIELE, B12 447, folate 14, ferritin 143, iron sat 38%  Hemoglobin 9.8    *Acid reflux-poor response to Carafate    Oncology plan/recommendations:  Continue anastrozole 1 mg daily  Continue Ibrance 100 mg daily 1 through 21 q. 28 days   Continue Xgeva monthly with lab monitoring  She has extra-strength Tylenol or Ultram at home if needed for pain  Continue monthly B12 injection 1000 mcg  7.  I recommended to discontinue famotidine and try PPI for acid reflux  8.  3-month MD visit CBC CMP CA 15-3               10/21/2024      CC:

## 2024-10-17 ENCOUNTER — HOSPITAL ENCOUNTER (OUTPATIENT)
Dept: PET IMAGING | Facility: HOSPITAL | Age: 79
Discharge: HOME | End: 2024-10-17
Admitting: INTERNAL MEDICINE
Payer: MEDICARE

## 2024-10-17 DIAGNOSIS — T45.1X5A ANTINEOPLASTIC CHEMOTHERAPY INDUCED PANCYTOPENIA: ICD-10-CM

## 2024-10-17 DIAGNOSIS — E53.8 B12 DEFICIENCY: ICD-10-CM

## 2024-10-17 DIAGNOSIS — C50.919 CARCINOMA OF BREAST METASTATIC TO BONE, UNSPECIFIED LATERALITY: ICD-10-CM

## 2024-10-17 DIAGNOSIS — D61.810 ANTINEOPLASTIC CHEMOTHERAPY INDUCED PANCYTOPENIA: ICD-10-CM

## 2024-10-17 DIAGNOSIS — C79.51 CARCINOMA OF BREAST METASTATIC TO BONE, UNSPECIFIED LATERALITY: ICD-10-CM

## 2024-10-17 PROCEDURE — 71250 CT THORAX DX C-: CPT

## 2024-10-17 PROCEDURE — 74176 CT ABD & PELVIS W/O CONTRAST: CPT

## 2024-10-17 PROCEDURE — 0 DIATRIZOATE MEGLUMINE & SODIUM PER 1 ML: Performed by: INTERNAL MEDICINE

## 2024-10-17 RX ORDER — DIATRIZOATE MEGLUMINE AND DIATRIZOATE SODIUM 660; 100 MG/ML; MG/ML
30 SOLUTION ORAL; RECTAL
Status: COMPLETED | OUTPATIENT
Start: 2024-10-17 | End: 2024-10-17

## 2024-10-17 RX ADMIN — DIATRIZOATE MEGLUMINE AND DIATRIZOATE SODIUM 30 ML: 660; 100 LIQUID ORAL; RECTAL at 08:38

## 2024-10-21 ENCOUNTER — LAB (OUTPATIENT)
Dept: LAB | Facility: HOSPITAL | Age: 79
End: 2024-10-21
Payer: MEDICARE

## 2024-10-21 ENCOUNTER — OFFICE VISIT (OUTPATIENT)
Dept: ONCOLOGY | Facility: CLINIC | Age: 79
End: 2024-10-21
Payer: MEDICARE

## 2024-10-21 ENCOUNTER — INFUSION (OUTPATIENT)
Dept: ONCOLOGY | Facility: HOSPITAL | Age: 79
End: 2024-10-21
Payer: MEDICARE

## 2024-10-21 VITALS
HEART RATE: 77 BPM | RESPIRATION RATE: 16 BRPM | SYSTOLIC BLOOD PRESSURE: 127 MMHG | TEMPERATURE: 98 F | BODY MASS INDEX: 25.23 KG/M2 | DIASTOLIC BLOOD PRESSURE: 76 MMHG | HEIGHT: 64 IN | WEIGHT: 147.8 LBS | OXYGEN SATURATION: 98 %

## 2024-10-21 DIAGNOSIS — K21.00 GASTROESOPHAGEAL REFLUX DISEASE WITH ESOPHAGITIS WITHOUT HEMORRHAGE: ICD-10-CM

## 2024-10-21 DIAGNOSIS — C79.51 CARCINOMA OF BREAST METASTATIC TO BONE, UNSPECIFIED LATERALITY: ICD-10-CM

## 2024-10-21 DIAGNOSIS — C50.919 CARCINOMA OF BREAST METASTATIC TO BONE, UNSPECIFIED LATERALITY: ICD-10-CM

## 2024-10-21 DIAGNOSIS — C50.919 CARCINOMA OF BREAST METASTATIC TO BONE, UNSPECIFIED LATERALITY: Primary | ICD-10-CM

## 2024-10-21 DIAGNOSIS — C79.51 CARCINOMA OF BREAST METASTATIC TO BONE, UNSPECIFIED LATERALITY: Primary | ICD-10-CM

## 2024-10-21 DIAGNOSIS — E53.8 B12 DEFICIENCY: ICD-10-CM

## 2024-10-21 DIAGNOSIS — Z79.899 ENCOUNTER FOR LONG-TERM (CURRENT) USE OF OTHER MEDICATIONS: ICD-10-CM

## 2024-10-21 LAB
ALBUMIN SERPL-MCNC: 4.2 G/DL (ref 3.5–5.2)
ALBUMIN/GLOB SERPL: 1.6 G/DL
ALP SERPL-CCNC: 62 U/L (ref 39–117)
ALT SERPL W P-5'-P-CCNC: 9 U/L (ref 1–33)
ANION GAP SERPL CALCULATED.3IONS-SCNC: 9.3 MMOL/L (ref 5–15)
AST SERPL-CCNC: 17 U/L (ref 1–32)
BASOPHILS # BLD AUTO: 0.04 10*3/MM3 (ref 0–0.2)
BASOPHILS NFR BLD AUTO: 1.6 % (ref 0–1.5)
BILIRUB SERPL-MCNC: 0.3 MG/DL (ref 0–1.2)
BUN SERPL-MCNC: 19 MG/DL (ref 8–23)
BUN/CREAT SERPL: 15.1 (ref 7–25)
CALCIUM SPEC-SCNC: 10.4 MG/DL (ref 8.6–10.5)
CHLORIDE SERPL-SCNC: 105 MMOL/L (ref 98–107)
CO2 SERPL-SCNC: 25.7 MMOL/L (ref 22–29)
CREAT SERPL-MCNC: 1.26 MG/DL (ref 0.57–1)
DEPRECATED RDW RBC AUTO: 53.8 FL (ref 37–54)
EGFRCR SERPLBLD CKD-EPI 2021: 43.8 ML/MIN/1.73
EOSINOPHIL # BLD AUTO: 0.02 10*3/MM3 (ref 0–0.4)
EOSINOPHIL NFR BLD AUTO: 0.8 % (ref 0.3–6.2)
ERYTHROCYTE [DISTWIDTH] IN BLOOD BY AUTOMATED COUNT: 13.7 % (ref 12.3–15.4)
GLOBULIN UR ELPH-MCNC: 2.6 GM/DL
GLUCOSE SERPL-MCNC: 103 MG/DL (ref 65–99)
HCT VFR BLD AUTO: 28.8 % (ref 34–46.6)
HGB BLD-MCNC: 9.8 G/DL (ref 12–15.9)
IMM GRANULOCYTES # BLD AUTO: 0.01 10*3/MM3 (ref 0–0.05)
IMM GRANULOCYTES NFR BLD AUTO: 0.4 % (ref 0–0.5)
LYMPHOCYTES # BLD AUTO: 0.83 10*3/MM3 (ref 0.7–3.1)
LYMPHOCYTES NFR BLD AUTO: 33.5 % (ref 19.6–45.3)
MAGNESIUM SERPL-MCNC: 2.1 MG/DL (ref 1.6–2.4)
MCH RBC QN AUTO: 37.1 PG (ref 26.6–33)
MCHC RBC AUTO-ENTMCNC: 34 G/DL (ref 31.5–35.7)
MCV RBC AUTO: 109.1 FL (ref 79–97)
MONOCYTES # BLD AUTO: 0.15 10*3/MM3 (ref 0.1–0.9)
MONOCYTES NFR BLD AUTO: 6 % (ref 5–12)
NEUTROPHILS NFR BLD AUTO: 1.43 10*3/MM3 (ref 1.7–7)
NEUTROPHILS NFR BLD AUTO: 57.7 % (ref 42.7–76)
NRBC BLD AUTO-RTO: 0 /100 WBC (ref 0–0.2)
PHOSPHATE SERPL-MCNC: 4.1 MG/DL (ref 2.5–4.5)
PLATELET # BLD AUTO: 358 10*3/MM3 (ref 140–450)
PMV BLD AUTO: 8.9 FL (ref 6–12)
POTASSIUM SERPL-SCNC: 4.6 MMOL/L (ref 3.5–5.2)
PROT SERPL-MCNC: 6.8 G/DL (ref 6–8.5)
RBC # BLD AUTO: 2.64 10*6/MM3 (ref 3.77–5.28)
SODIUM SERPL-SCNC: 140 MMOL/L (ref 136–145)
WBC NRBC COR # BLD AUTO: 2.48 10*3/MM3 (ref 3.4–10.8)

## 2024-10-21 PROCEDURE — 25010000002 CYANOCOBALAMIN PER 1000 MCG: Performed by: INTERNAL MEDICINE

## 2024-10-21 PROCEDURE — 84100 ASSAY OF PHOSPHORUS: CPT

## 2024-10-21 PROCEDURE — 85025 COMPLETE CBC W/AUTO DIFF WBC: CPT

## 2024-10-21 PROCEDURE — 96372 THER/PROPH/DIAG INJ SC/IM: CPT

## 2024-10-21 PROCEDURE — 83735 ASSAY OF MAGNESIUM: CPT

## 2024-10-21 PROCEDURE — 36415 COLL VENOUS BLD VENIPUNCTURE: CPT

## 2024-10-21 PROCEDURE — 25010000002 DENOSUMAB 120 MG/1.7ML SOLUTION: Performed by: INTERNAL MEDICINE

## 2024-10-21 PROCEDURE — 80053 COMPREHEN METABOLIC PANEL: CPT

## 2024-10-21 RX ORDER — CYANOCOBALAMIN 1000 UG/ML
1000 INJECTION, SOLUTION INTRAMUSCULAR; SUBCUTANEOUS ONCE
OUTPATIENT
Start: 2024-11-18

## 2024-10-21 RX ORDER — LEVOTHYROXINE SODIUM 25 UG/1
25 TABLET ORAL DAILY
Qty: 90 TABLET | Refills: 0 | Status: SHIPPED | OUTPATIENT
Start: 2024-10-21

## 2024-10-21 RX ORDER — CYANOCOBALAMIN 1000 UG/ML
1000 INJECTION, SOLUTION INTRAMUSCULAR; SUBCUTANEOUS ONCE
Status: COMPLETED | OUTPATIENT
Start: 2024-10-21 | End: 2024-10-21

## 2024-10-21 RX ADMIN — DENOSUMAB 120 MG: 120 INJECTION SUBCUTANEOUS at 12:17

## 2024-10-21 RX ADMIN — CYANOCOBALAMIN 1000 MCG: 1000 INJECTION, SOLUTION INTRAMUSCULAR at 12:16

## 2024-10-22 ENCOUNTER — SPECIALTY PHARMACY (OUTPATIENT)
Dept: PHARMACY | Facility: HOSPITAL | Age: 79
End: 2024-10-22
Payer: MEDICARE

## 2024-10-22 NOTE — PROGRESS NOTES
Specialty Pharmacy Note: Ibrance (palbociclib)    Nedra Roberts is a 79 y.o. female with metastatic breast cancer was seen 10/21/24 by Dr. Ford. Per provider dictation, no changes to oral oncology regimen Ibrance (palbociclib) 100 mg po daily for 21 days on, then 7 days off.  Labs Review: The CMP and CBC from 10/21/24 have been reviewed. No dose adjustments are needed for the oral specialty medication(s) based on the labs.    Specialty pharmacy will continue to follow patient.    Jyothi Ashford Rph, BCOP  10/22/2024  08:39 EDT

## 2024-10-23 ENCOUNTER — SPECIALTY PHARMACY (OUTPATIENT)
Dept: PHARMACY | Facility: HOSPITAL | Age: 79
End: 2024-10-23
Payer: MEDICARE

## 2024-10-23 NOTE — PROGRESS NOTES
I received a shipment notice from [email or web address] dated 10/23/2024 stating that the Pfizer Patient Assistance Program has shipped Ibrance to the address provided by the patient. It should arrive in 1-2 buisness days. If there are any questions, call [phone] M-F from 8am to 8pm NILESH Bunn - Care Coordinator   10/23/2024  16:07 EDT

## 2024-10-23 NOTE — PROGRESS NOTES
Specialty Pharmacy Patient Management Program  Oncology Reassessment     Nedra Roberts was referred by an their provider to the Oncology Patient Management program offered by [facility] for ER/WV positive, HER 2 negative metastatic breast cancer. A follow-up outreach was conducted, including assessment of continued therapy appropriateness, medication adherence, and side effect incidence and management for Ibrance (palbociclib).    Changes to Insurance Coverage or Financial Support  No changes, she will call if any changes with the new year    Relevant Past Medical History and Comorbidities  Relevant medical history and concomitant health conditions were discussed with the patient. The patient's chart has been reviewed for relevant past medical history and comorbid health conditions and updated as necessary.   Past Medical History:   Diagnosis Date    Breast cancer 2009    Stage I left breast    Drug therapy     Gastritis     GERD (gastroesophageal reflux disease)     H/O lumpectomy     HL (hearing loss)     Hyperlipidemia     Hypertension     Hypothyroidism      Social History     Socioeconomic History    Marital status:      Spouse name: Rocky   Tobacco Use    Smoking status: Former     Current packs/day: 0.00     Average packs/day: 1 pack/day for 40.0 years (40.0 ttl pk-yrs)     Types: Cigarettes     Start date: 1962     Quit date: 2002     Years since quittin.8    Smokeless tobacco: Never   Vaping Use    Vaping status: Never Used   Substance and Sexual Activity    Alcohol use: Yes     Alcohol/week: 1.0 standard drink of alcohol     Types: 1 Glasses of wine per week     Comment: NIGHTLY    Drug use: Never    Sexual activity: Not Currently     Partners: Male     Birth control/protection: Post-menopausal     Problem list reviewed by Shannan Lacey on 10/23/2024 at  2:46 PM    Hospitalizations and Urgent Care Since Last Assessment  ED Visits, Admissions, or  Hospitalizations: none  Urgent Office Visits: patient reports that she did have a visit with her PCP in September for foot swelling which has since resolved completely    Allergies  Known allergies and reactions were discussed with the patient. The patient's chart has been reviewed for allergy information and updated as necessary.   No Known Allergies  Allergies reviewed by Shannan Lacey on 10/23/2024 at  2:36 PM    Relevant Laboratory Values  Relevant laboratory values were discussed with the patient. The following specialty medication dose adjustment(s) are recommended: No dose adjustments are needed for the oral specialty medication(s) based on the labs.    Lab Results   Component Value Date    GLUCOSE 103 (H) 10/21/2024    CALCIUM 10.4 10/21/2024     10/21/2024    K 4.6 10/21/2024    CO2 25.7 10/21/2024     10/21/2024    BUN 19 10/21/2024    CREATININE 1.26 (H) 10/21/2024    EGFRIFAFRI 68 02/21/2022    EGFRIFNONA 59 (L) 02/21/2022    BCR 15.1 10/21/2024    ANIONGAP 9.3 10/21/2024     Lab Results   Component Value Date    WBC 2.48 (L) 10/21/2024    RBC 2.64 (L) 10/21/2024    HGB 9.8 (L) 10/21/2024    HCT 28.8 (L) 10/21/2024    .1 (H) 10/21/2024    MCH 37.1 (H) 10/21/2024    MCHC 34.0 10/21/2024    RDW 13.7 10/21/2024    RDWSD 53.8 10/21/2024    MPV 8.9 10/21/2024     10/21/2024    NEUTRORELPCT 57.7 10/21/2024    LYMPHORELPCT 33.5 10/21/2024    MONORELPCT 6.0 10/21/2024    EOSRELPCT 0.8 10/21/2024    BASORELPCT 1.6 (H) 10/21/2024    AUTOIGPER 0.4 10/21/2024    NEUTROABS 1.43 (L) 10/21/2024    LYMPHSABS 0.83 10/21/2024    MONOSABS 0.15 10/21/2024    EOSABS 0.02 10/21/2024    BASOSABS 0.04 10/21/2024    AUTOIGNUM 0.01 10/21/2024    NRBC 0.0 10/21/2024       Current Medication List  This medication list has been reviewed with the patient and evaluated for any interactions or necessary modifications/recommendations, and updated to include all prescription medications, OTC medications, and  supplements the patient is currently taking.  This list reflects what is contained in the patient's profile, which has also been marked as reviewed to communicate to other providers it is the most up to date version of the patient's current medication therapy.     Current Outpatient Medications:     anastrozole (ARIMIDEX) 1 MG tablet, Take 1 tablet by mouth Daily., Disp: 30 tablet, Rfl: 5    aspirin 81 MG EC tablet, Take 1 tablet by mouth Daily., Disp: , Rfl:     Calcium Carbonate Antacid (TUMS PO), Take  by mouth As Needed., Disp: , Rfl:     Cholecalciferol (Vitamin D3) 50 MCG (2000 UT) capsule, Take 1 capsule by mouth Daily., Disp: 90 each, Rfl: 3    denosumab (XGEVA) 120 MG/1.7ML solution injection, Inject  under the skin into the appropriate area as directed 1 (One) Time., Disp: , Rfl:     levothyroxine (SYNTHROID, LEVOTHROID) 25 MCG tablet, TAKE 1 TABLET BY MOUTH DAILY, Disp: 90 tablet, Rfl: 0    losartan (Cozaar) 50 MG tablet, Take 1 tablet by mouth Daily., Disp: 90 tablet, Rfl: 3    omeprazole (priLOSEC) 20 MG capsule, Take 1 capsule by mouth Daily., Disp: , Rfl:     Palbociclib 100 MG tablet tablet, Take 1 tablet by mouth Daily. Take with or without food for 21 days on, then off for 7 days., Disp: 21 tablet, Rfl: 5    simvastatin (ZOCOR) 40 MG tablet, TAKE 1 TABLET BY MOUTH EVERY NIGHT, Disp: 90 tablet, Rfl: 3  No current facility-administered medications for this visit.    Medicines reviewed by Shannan Lacey on 10/23/2024 at  2:39 PM    Drug Interactions  Assessed medication list for interactions, no significant drug interactions noted.   Advised patient to call the clinic if any new medications are started so we can assess for drug-drug interactions.  Drug-food interactions discussed: eating grapefruit and drinking grapefruit juice    Adverse Drug Reactions  Medication tolerability: Tolerating with no to minimal ADRs  Medication plan: Continue therapy with normal follow-up  Plan for ADR Management: no  adverse effects at this time.    Adherence, Self-Administration, and Current Therapy Problems  Adherence related to the patient's specialty therapy was discussed with the patient. The Adherence segment of this outreach has been reviewed and updated.     Adherence Questions  Linked Medication(s) Assessed: Palbociclib  On average, how many doses/injections does the patient miss per month?: 0  What are the identified reasons for non-adherence or missed doses? : no problems identified  What is the estimated medication adherence level?: %  Based on the patient/caregiver response and refill history, does this patient require an MTP to track adherence improvements?: no    Additional Barriers to Patient Self-Administration: none  Methods for Supporting Patient Self-Administration: patient utilizes the Ibrance packaging for compliance and reports no missed doses  Patient has had no issues obtaining medication from pharmacy.    Open Medication Therapy Problems  No medication therapy recommendations to display    Goals of Therapy  Goals related to the patient's specialty therapy were discussed with the patient. The Patient Goals segment of this outreach has been reviewed and updated.   Goals Addressed Today        Specialty Pharmacy General Goal      Progression free survival- monitor scans and Ca 15-3  11/10/23 last dictation:  Repeat bone scan 8/14/2023-improved intensity uptake multiple locations, no new sites identified   10/15/2024-bone scan similar osseous metastatic disease compared to 2/20/2023; CT chest abdomen pelvis 10/17/2024-unchanged osseous metastatic disease in the axial and proximal appendicular skeleton; no soft tissue metastatic disease; CA 15-3 138               Quality of Life Assessment   Quality of Life related to the patient's enrollment in the patient management program and services provided was discussed with the patient. The QOL segment of this outreach has been reviewed and updated.  Quality  of Life Improvement Scale: 9-A good deal better    Discussed aforementioned material with patient over the phone.     Reassessment Plan & Follow-Up  1. Medication Therapy Changes: no changes to medication therapy at this time  2. Related Plans, Therapy Recommendations, or Issues to Be Addressed: none  3. Pharmacist to perform regular assessments no more than (6) months from the previous assessment.   4. Care Coordinator to set up future refill outreaches, coordinate prescription delivery, and escalate clinical questions to pharmacist.    Attestation  Therapeutic appropriateness: Appropriate   I attest the patient was actively involved in and has agreed to the above plan of care.  If the prescribed therapy is at any point deemed not appropriate based on the current or future assessments, a consultation will be initiated with the patient's specialty care provider to determine the best course of action. The revised plan of therapy will be documented along with any required assessments and/or additional patient education provided.     Shannan LEO, PharmD  Clinical Specialty Pharmacist, Oncology  10/23/2024  14:48 EDT

## 2024-11-12 ENCOUNTER — HOSPITAL ENCOUNTER (OUTPATIENT)
Dept: MAMMOGRAPHY | Facility: HOSPITAL | Age: 79
Discharge: HOME | End: 2024-11-12
Admitting: INTERNAL MEDICINE
Payer: MEDICARE

## 2024-11-12 DIAGNOSIS — C79.51 CARCINOMA OF BREAST METASTATIC TO BONE, UNSPECIFIED LATERALITY: ICD-10-CM

## 2024-11-12 DIAGNOSIS — T45.1X5A ANTINEOPLASTIC CHEMOTHERAPY INDUCED PANCYTOPENIA: ICD-10-CM

## 2024-11-12 DIAGNOSIS — C50.919 CARCINOMA OF BREAST METASTATIC TO BONE, UNSPECIFIED LATERALITY: ICD-10-CM

## 2024-11-12 DIAGNOSIS — D61.810 ANTINEOPLASTIC CHEMOTHERAPY INDUCED PANCYTOPENIA: ICD-10-CM

## 2024-11-12 DIAGNOSIS — Z12.31 ENCOUNTER FOR SCREENING MAMMOGRAM FOR MALIGNANT NEOPLASM OF BREAST: ICD-10-CM

## 2024-11-12 DIAGNOSIS — E53.8 B12 DEFICIENCY: ICD-10-CM

## 2024-11-12 PROCEDURE — 77063 BREAST TOMOSYNTHESIS BI: CPT

## 2024-11-12 PROCEDURE — 77067 SCR MAMMO BI INCL CAD: CPT

## 2024-11-13 PROBLEM — Z79.69 NEED FOR PROPHYLACTIC CHEMOTHERAPY: Status: ACTIVE | Noted: 2023-05-17

## 2024-11-18 ENCOUNTER — INFUSION (OUTPATIENT)
Dept: ONCOLOGY | Facility: HOSPITAL | Age: 79
End: 2024-11-18
Payer: MEDICARE

## 2024-11-18 ENCOUNTER — LAB (OUTPATIENT)
Dept: LAB | Facility: HOSPITAL | Age: 79
End: 2024-11-18
Payer: MEDICARE

## 2024-11-18 DIAGNOSIS — E53.8 B12 DEFICIENCY: ICD-10-CM

## 2024-11-18 DIAGNOSIS — C50.919 CARCINOMA OF BREAST METASTATIC TO BONE, UNSPECIFIED LATERALITY: ICD-10-CM

## 2024-11-18 DIAGNOSIS — C79.51 CARCINOMA OF BREAST METASTATIC TO BONE, UNSPECIFIED LATERALITY: ICD-10-CM

## 2024-11-18 DIAGNOSIS — Z79.899 NEED FOR PROPHYLACTIC CHEMOTHERAPY: ICD-10-CM

## 2024-11-18 DIAGNOSIS — Z79.899 NEED FOR PROPHYLACTIC CHEMOTHERAPY: Primary | ICD-10-CM

## 2024-11-18 LAB
ALBUMIN SERPL-MCNC: 4.6 G/DL (ref 3.5–5.2)
ALBUMIN/GLOB SERPL: 1.7 G/DL
ALP SERPL-CCNC: 63 U/L (ref 39–117)
ALT SERPL W P-5'-P-CCNC: 10 U/L (ref 1–33)
ANION GAP SERPL CALCULATED.3IONS-SCNC: 10.2 MMOL/L (ref 5–15)
AST SERPL-CCNC: 18 U/L (ref 1–32)
BASOPHILS # BLD AUTO: 0.04 10*3/MM3 (ref 0–0.2)
BASOPHILS NFR BLD AUTO: 1.4 % (ref 0–1.5)
BILIRUB SERPL-MCNC: 0.2 MG/DL (ref 0–1.2)
BUN SERPL-MCNC: 27 MG/DL (ref 8–23)
BUN/CREAT SERPL: 24.1 (ref 7–25)
CALCIUM SPEC-SCNC: 10.4 MG/DL (ref 8.6–10.5)
CHLORIDE SERPL-SCNC: 104 MMOL/L (ref 98–107)
CO2 SERPL-SCNC: 28.8 MMOL/L (ref 22–29)
CREAT SERPL-MCNC: 1.12 MG/DL (ref 0.57–1)
DEPRECATED RDW RBC AUTO: 55.4 FL (ref 37–54)
EGFRCR SERPLBLD CKD-EPI 2021: 50.4 ML/MIN/1.73
EOSINOPHIL # BLD AUTO: 0.03 10*3/MM3 (ref 0–0.4)
EOSINOPHIL NFR BLD AUTO: 1.1 % (ref 0.3–6.2)
ERYTHROCYTE [DISTWIDTH] IN BLOOD BY AUTOMATED COUNT: 13.8 % (ref 12.3–15.4)
GLOBULIN UR ELPH-MCNC: 2.7 GM/DL
GLUCOSE SERPL-MCNC: 120 MG/DL (ref 65–99)
HCT VFR BLD AUTO: 30.6 % (ref 34–46.6)
HGB BLD-MCNC: 10.4 G/DL (ref 12–15.9)
IMM GRANULOCYTES # BLD AUTO: 0.01 10*3/MM3 (ref 0–0.05)
IMM GRANULOCYTES NFR BLD AUTO: 0.4 % (ref 0–0.5)
LYMPHOCYTES # BLD AUTO: 0.74 10*3/MM3 (ref 0.7–3.1)
LYMPHOCYTES NFR BLD AUTO: 26.3 % (ref 19.6–45.3)
MAGNESIUM SERPL-MCNC: 2 MG/DL (ref 1.6–2.4)
MCH RBC QN AUTO: 37.7 PG (ref 26.6–33)
MCHC RBC AUTO-ENTMCNC: 34 G/DL (ref 31.5–35.7)
MCV RBC AUTO: 110.9 FL (ref 79–97)
MONOCYTES # BLD AUTO: 0.18 10*3/MM3 (ref 0.1–0.9)
MONOCYTES NFR BLD AUTO: 6.4 % (ref 5–12)
NEUTROPHILS NFR BLD AUTO: 1.81 10*3/MM3 (ref 1.7–7)
NEUTROPHILS NFR BLD AUTO: 64.4 % (ref 42.7–76)
NRBC BLD AUTO-RTO: 0 /100 WBC (ref 0–0.2)
PHOSPHATE SERPL-MCNC: 4 MG/DL (ref 2.5–4.5)
PLATELET # BLD AUTO: 416 10*3/MM3 (ref 140–450)
PMV BLD AUTO: 8.7 FL (ref 6–12)
POTASSIUM SERPL-SCNC: 4.8 MMOL/L (ref 3.5–5.2)
PROT SERPL-MCNC: 7.3 G/DL (ref 6–8.5)
RBC # BLD AUTO: 2.76 10*6/MM3 (ref 3.77–5.28)
SODIUM SERPL-SCNC: 143 MMOL/L (ref 136–145)
WBC NRBC COR # BLD AUTO: 2.81 10*3/MM3 (ref 3.4–10.8)

## 2024-11-18 PROCEDURE — 96372 THER/PROPH/DIAG INJ SC/IM: CPT

## 2024-11-18 PROCEDURE — 25010000002 CYANOCOBALAMIN PER 1000 MCG: Performed by: INTERNAL MEDICINE

## 2024-11-18 PROCEDURE — 85025 COMPLETE CBC W/AUTO DIFF WBC: CPT

## 2024-11-18 PROCEDURE — 25010000002 DENOSUMAB 120 MG/1.7ML SOLUTION: Performed by: INTERNAL MEDICINE

## 2024-11-18 PROCEDURE — 83735 ASSAY OF MAGNESIUM: CPT

## 2024-11-18 PROCEDURE — 84100 ASSAY OF PHOSPHORUS: CPT

## 2024-11-18 PROCEDURE — 36415 COLL VENOUS BLD VENIPUNCTURE: CPT

## 2024-11-18 PROCEDURE — 80053 COMPREHEN METABOLIC PANEL: CPT

## 2024-11-18 RX ORDER — CYANOCOBALAMIN 1000 UG/ML
1000 INJECTION, SOLUTION INTRAMUSCULAR; SUBCUTANEOUS ONCE
Status: COMPLETED | OUTPATIENT
Start: 2024-11-18 | End: 2024-11-18

## 2024-11-18 RX ORDER — CYANOCOBALAMIN 1000 UG/ML
1000 INJECTION, SOLUTION INTRAMUSCULAR; SUBCUTANEOUS ONCE
OUTPATIENT
Start: 2024-12-16

## 2024-11-18 RX ADMIN — DENOSUMAB 120 MG: 120 INJECTION SUBCUTANEOUS at 11:38

## 2024-11-18 RX ADMIN — CYANOCOBALAMIN 1000 MCG: 1000 INJECTION, SOLUTION INTRAMUSCULAR at 11:38

## 2024-11-25 ENCOUNTER — SPECIALTY PHARMACY (OUTPATIENT)
Age: 79
End: 2024-11-25
Payer: MEDICARE

## 2024-11-25 NOTE — PROGRESS NOTES
I received a shipment notice from [facility] dated 11/25/24 stating that the Pfizer Patient Assistance Program has shipped Ibrance to the address provided by the patient. It should arrive in 1-2 buisness days. If there are any questions, call [phone] M-F from 8am to 8pm NILESH Bunn - Care Coordinator   11/25/2024  16:34 EST

## 2024-12-02 ENCOUNTER — SPECIALTY PHARMACY (OUTPATIENT)
Dept: PHARMACY | Facility: HOSPITAL | Age: 79
End: 2024-12-02
Payer: MEDICARE

## 2024-12-02 ENCOUNTER — TELEPHONE (OUTPATIENT)
Dept: ONCOLOGY | Facility: CLINIC | Age: 79
End: 2024-12-02
Payer: MEDICARE

## 2024-12-02 DIAGNOSIS — C79.51 MALIGNANT NEOPLASM METASTATIC TO BONE: ICD-10-CM

## 2024-12-02 RX ORDER — ANASTROZOLE 1 MG/1
1 TABLET ORAL DAILY
Qty: 30 TABLET | Refills: 5 | Status: SHIPPED | OUTPATIENT
Start: 2024-12-02

## 2024-12-02 NOTE — TELEPHONE ENCOUNTER
----- Message from Shira QUINN sent at 12/2/2024 10:16 AM EST -----  Good morning Avery     Mrs. Roberts has requested that an anastrozole refill be sent into her [facility].     Will you send that in for her?    Thank you

## 2024-12-02 NOTE — PROGRESS NOTES
I have received all components of Mrs. Roberts's Pfizer 2025 renewal application for free Ibrance.    The remaining component is the requirement that Mrs. Roberts apply for the Medicare Prescription Payment Plan. Once in enrolled, if Ibrance is still unaffordable, she will need to submit an attestation stating such - this requirement can not be met until 2025.    Shira Bunn - Care Coordinator   12/2/2024  10:31 EST

## 2024-12-16 ENCOUNTER — INFUSION (OUTPATIENT)
Dept: ONCOLOGY | Facility: HOSPITAL | Age: 79
End: 2024-12-16
Payer: MEDICARE

## 2024-12-16 ENCOUNTER — LAB (OUTPATIENT)
Dept: LAB | Facility: HOSPITAL | Age: 79
End: 2024-12-16
Payer: MEDICARE

## 2024-12-16 DIAGNOSIS — C79.51 CARCINOMA OF BREAST METASTATIC TO BONE, UNSPECIFIED LATERALITY: ICD-10-CM

## 2024-12-16 DIAGNOSIS — C50.919 CARCINOMA OF BREAST METASTATIC TO BONE, UNSPECIFIED LATERALITY: ICD-10-CM

## 2024-12-16 DIAGNOSIS — E53.8 B12 DEFICIENCY: ICD-10-CM

## 2024-12-16 DIAGNOSIS — Z79.899 NEED FOR PROPHYLACTIC CHEMOTHERAPY: ICD-10-CM

## 2024-12-16 DIAGNOSIS — Z79.899 NEED FOR PROPHYLACTIC CHEMOTHERAPY: Primary | ICD-10-CM

## 2024-12-16 LAB
ALBUMIN SERPL-MCNC: 4.1 G/DL (ref 3.5–5.2)
ALBUMIN/GLOB SERPL: 1.5 G/DL
ALP SERPL-CCNC: 61 U/L (ref 39–117)
ALT SERPL W P-5'-P-CCNC: 10 U/L (ref 1–33)
ANION GAP SERPL CALCULATED.3IONS-SCNC: 8.3 MMOL/L (ref 5–15)
AST SERPL-CCNC: 15 U/L (ref 1–32)
BASOPHILS # BLD AUTO: 0.03 10*3/MM3 (ref 0–0.2)
BASOPHILS NFR BLD AUTO: 1.4 % (ref 0–1.5)
BILIRUB SERPL-MCNC: 0.2 MG/DL (ref 0–1.2)
BUN SERPL-MCNC: 20 MG/DL (ref 8–23)
BUN/CREAT SERPL: 15.9 (ref 7–25)
CALCIUM SPEC-SCNC: 9.7 MG/DL (ref 8.6–10.5)
CHLORIDE SERPL-SCNC: 106 MMOL/L (ref 98–107)
CO2 SERPL-SCNC: 25.7 MMOL/L (ref 22–29)
CREAT SERPL-MCNC: 1.26 MG/DL (ref 0.57–1)
DEPRECATED RDW RBC AUTO: 54.2 FL (ref 37–54)
EGFRCR SERPLBLD CKD-EPI 2021: 43.8 ML/MIN/1.73
EOSINOPHIL # BLD AUTO: 0.02 10*3/MM3 (ref 0–0.4)
EOSINOPHIL NFR BLD AUTO: 1 % (ref 0.3–6.2)
ERYTHROCYTE [DISTWIDTH] IN BLOOD BY AUTOMATED COUNT: 13.2 % (ref 12.3–15.4)
GLOBULIN UR ELPH-MCNC: 2.7 GM/DL
GLUCOSE SERPL-MCNC: 103 MG/DL (ref 65–99)
HCT VFR BLD AUTO: 29.4 % (ref 34–46.6)
HGB BLD-MCNC: 9.9 G/DL (ref 12–15.9)
IMM GRANULOCYTES # BLD AUTO: 0.01 10*3/MM3 (ref 0–0.05)
IMM GRANULOCYTES NFR BLD AUTO: 0.5 % (ref 0–0.5)
LYMPHOCYTES # BLD AUTO: 0.63 10*3/MM3 (ref 0.7–3.1)
LYMPHOCYTES NFR BLD AUTO: 30.3 % (ref 19.6–45.3)
MAGNESIUM SERPL-MCNC: 2.1 MG/DL (ref 1.6–2.4)
MCH RBC QN AUTO: 37.8 PG (ref 26.6–33)
MCHC RBC AUTO-ENTMCNC: 33.7 G/DL (ref 31.5–35.7)
MCV RBC AUTO: 112.2 FL (ref 79–97)
MONOCYTES # BLD AUTO: 0.16 10*3/MM3 (ref 0.1–0.9)
MONOCYTES NFR BLD AUTO: 7.7 % (ref 5–12)
NEUTROPHILS NFR BLD AUTO: 1.23 10*3/MM3 (ref 1.7–7)
NEUTROPHILS NFR BLD AUTO: 59.1 % (ref 42.7–76)
NRBC BLD AUTO-RTO: 0 /100 WBC (ref 0–0.2)
PHOSPHATE SERPL-MCNC: 3.4 MG/DL (ref 2.5–4.5)
PLATELET # BLD AUTO: 402 10*3/MM3 (ref 140–450)
PMV BLD AUTO: 8.8 FL (ref 6–12)
POTASSIUM SERPL-SCNC: 4.7 MMOL/L (ref 3.5–5.2)
PROT SERPL-MCNC: 6.8 G/DL (ref 6–8.5)
RBC # BLD AUTO: 2.62 10*6/MM3 (ref 3.77–5.28)
SODIUM SERPL-SCNC: 140 MMOL/L (ref 136–145)
WBC NRBC COR # BLD AUTO: 2.08 10*3/MM3 (ref 3.4–10.8)

## 2024-12-16 PROCEDURE — 85025 COMPLETE CBC W/AUTO DIFF WBC: CPT

## 2024-12-16 PROCEDURE — 96372 THER/PROPH/DIAG INJ SC/IM: CPT

## 2024-12-16 PROCEDURE — 25010000002 CYANOCOBALAMIN PER 1000 MCG: Performed by: INTERNAL MEDICINE

## 2024-12-16 PROCEDURE — 84100 ASSAY OF PHOSPHORUS: CPT

## 2024-12-16 PROCEDURE — 36415 COLL VENOUS BLD VENIPUNCTURE: CPT

## 2024-12-16 PROCEDURE — 83735 ASSAY OF MAGNESIUM: CPT

## 2024-12-16 PROCEDURE — 25010000002 DENOSUMAB 120 MG/1.7ML SOLUTION: Performed by: INTERNAL MEDICINE

## 2024-12-16 PROCEDURE — 80053 COMPREHEN METABOLIC PANEL: CPT

## 2024-12-16 RX ORDER — CYANOCOBALAMIN 1000 UG/ML
1000 INJECTION, SOLUTION INTRAMUSCULAR; SUBCUTANEOUS ONCE
OUTPATIENT
Start: 2025-01-13

## 2024-12-16 RX ORDER — CYANOCOBALAMIN 1000 UG/ML
1000 INJECTION, SOLUTION INTRAMUSCULAR; SUBCUTANEOUS ONCE
Status: COMPLETED | OUTPATIENT
Start: 2024-12-16 | End: 2024-12-16

## 2024-12-16 RX ADMIN — DENOSUMAB 120 MG: 120 INJECTION SUBCUTANEOUS at 11:22

## 2024-12-16 RX ADMIN — CYANOCOBALAMIN 1000 MCG: 1000 INJECTION, SOLUTION INTRAMUSCULAR at 11:22

## 2024-12-18 ENCOUNTER — SPECIALTY PHARMACY (OUTPATIENT)
Dept: PHARMACY | Facility: HOSPITAL | Age: 79
End: 2024-12-18
Payer: MEDICARE

## 2024-12-18 NOTE — PROGRESS NOTES
I received a shipment notice from [facility] dated 12/18/2024 stating that the Pfizer Patient Assistance Program has shipped Ibrance to the address provided by the patient. It should arrive in 1-2 buisness days. If there are any questions, call [phone] M-F from 8am to 8pm NILESH Bunn - Care Coordinator   12/18/2024  13:12 EST

## 2025-01-09 ENCOUNTER — SPECIALTY PHARMACY (OUTPATIENT)
Dept: PHARMACY | Facility: HOSPITAL | Age: 80
End: 2025-01-09
Payer: MEDICARE

## 2025-01-09 NOTE — PROGRESS NOTES
I have secured a  andrei that allows us to convert her Ibrance to [facility].    I contacted Mrs. Roberts to coordinate a refill shipment of Ibrance. She has about a month and a half worth of medicine on-hand so we agreed that I would call back on Februrary 3rd to coordinate the next refill shipment.     [employer] ID: [id number]  Amount: $15,000  From 12/04/2024 to 12/03/2025  BIN:[id number]  PCN:[id number]  GRP: [id number]  Pharmacy Card ID: [id number]    Shira Bunn - Care Coordinator   1/9/2025  11:38 EST

## 2025-01-13 ENCOUNTER — OFFICE VISIT (OUTPATIENT)
Dept: ONCOLOGY | Facility: CLINIC | Age: 80
End: 2025-01-13
Payer: MEDICARE

## 2025-01-13 ENCOUNTER — INFUSION (OUTPATIENT)
Dept: ONCOLOGY | Facility: HOSPITAL | Age: 80
End: 2025-01-13
Payer: MEDICARE

## 2025-01-13 ENCOUNTER — SPECIALTY PHARMACY (OUTPATIENT)
Dept: ONCOLOGY | Facility: HOSPITAL | Age: 80
End: 2025-01-13
Payer: MEDICARE

## 2025-01-13 ENCOUNTER — SPECIALTY PHARMACY (OUTPATIENT)
Dept: PHARMACY | Facility: HOSPITAL | Age: 80
End: 2025-01-13
Payer: MEDICARE

## 2025-01-13 ENCOUNTER — LAB (OUTPATIENT)
Dept: LAB | Facility: HOSPITAL | Age: 80
End: 2025-01-13
Payer: MEDICARE

## 2025-01-13 VITALS
WEIGHT: 148.5 LBS | DIASTOLIC BLOOD PRESSURE: 78 MMHG | HEART RATE: 65 BPM | BODY MASS INDEX: 25.35 KG/M2 | RESPIRATION RATE: 16 BRPM | TEMPERATURE: 97.6 F | SYSTOLIC BLOOD PRESSURE: 146 MMHG | OXYGEN SATURATION: 98 % | HEIGHT: 64 IN

## 2025-01-13 DIAGNOSIS — Z79.899 NEED FOR PROPHYLACTIC CHEMOTHERAPY: ICD-10-CM

## 2025-01-13 DIAGNOSIS — C50.919 CARCINOMA OF BREAST METASTATIC TO BONE, UNSPECIFIED LATERALITY: ICD-10-CM

## 2025-01-13 DIAGNOSIS — E53.8 B12 DEFICIENCY: ICD-10-CM

## 2025-01-13 DIAGNOSIS — C79.51 CARCINOMA OF BREAST METASTATIC TO BONE, UNSPECIFIED LATERALITY: ICD-10-CM

## 2025-01-13 DIAGNOSIS — Z79.899 NEED FOR PROPHYLACTIC CHEMOTHERAPY: Primary | ICD-10-CM

## 2025-01-13 DIAGNOSIS — Z79.899 HIGH RISK MEDICATION USE: ICD-10-CM

## 2025-01-13 DIAGNOSIS — C79.51 CARCINOMA OF BREAST METASTATIC TO BONE, UNSPECIFIED LATERALITY: Primary | ICD-10-CM

## 2025-01-13 DIAGNOSIS — C79.51 MALIGNANT NEOPLASM METASTATIC TO BONE: ICD-10-CM

## 2025-01-13 DIAGNOSIS — C50.919 CARCINOMA OF BREAST METASTATIC TO BONE, UNSPECIFIED LATERALITY: Primary | ICD-10-CM

## 2025-01-13 DIAGNOSIS — D64.9 ANEMIA, UNSPECIFIED TYPE: ICD-10-CM

## 2025-01-13 LAB
ALBUMIN SERPL-MCNC: 4.1 G/DL (ref 3.5–5.2)
ALBUMIN/GLOB SERPL: 1.6 G/DL
ALP SERPL-CCNC: 60 U/L (ref 39–117)
ALT SERPL W P-5'-P-CCNC: 8 U/L (ref 1–33)
ANION GAP SERPL CALCULATED.3IONS-SCNC: 9.5 MMOL/L (ref 5–15)
AST SERPL-CCNC: 17 U/L (ref 1–32)
BASOPHILS # BLD AUTO: 0.05 10*3/MM3 (ref 0–0.2)
BASOPHILS NFR BLD AUTO: 1.9 % (ref 0–1.5)
BILIRUB SERPL-MCNC: 0.2 MG/DL (ref 0–1.2)
BUN SERPL-MCNC: 20 MG/DL (ref 8–23)
BUN/CREAT SERPL: 16.4 (ref 7–25)
CALCIUM SPEC-SCNC: 8.8 MG/DL (ref 8.6–10.5)
CANCER AG15-3 SERPL-ACNC: 131 U/ML
CHLORIDE SERPL-SCNC: 107 MMOL/L (ref 98–107)
CO2 SERPL-SCNC: 23.5 MMOL/L (ref 22–29)
CREAT SERPL-MCNC: 1.22 MG/DL (ref 0.57–1)
DEPRECATED RDW RBC AUTO: 51.2 FL (ref 37–54)
EGFRCR SERPLBLD CKD-EPI 2021: 45.5 ML/MIN/1.73
EOSINOPHIL # BLD AUTO: 0.02 10*3/MM3 (ref 0–0.4)
EOSINOPHIL NFR BLD AUTO: 0.8 % (ref 0.3–6.2)
ERYTHROCYTE [DISTWIDTH] IN BLOOD BY AUTOMATED COUNT: 12.9 % (ref 12.3–15.4)
FERRITIN SERPL-MCNC: 133 NG/ML (ref 13–150)
GLOBULIN UR ELPH-MCNC: 2.6 GM/DL
GLUCOSE SERPL-MCNC: 88 MG/DL (ref 65–99)
HCT VFR BLD AUTO: 29.4 % (ref 34–46.6)
HGB BLD-MCNC: 9.9 G/DL (ref 12–15.9)
IMM GRANULOCYTES # BLD AUTO: 0.01 10*3/MM3 (ref 0–0.05)
IMM GRANULOCYTES NFR BLD AUTO: 0.4 % (ref 0–0.5)
IRON 24H UR-MRATE: 79 MCG/DL (ref 37–145)
IRON SATN MFR SERPL: 28 % (ref 20–50)
LYMPHOCYTES # BLD AUTO: 0.77 10*3/MM3 (ref 0.7–3.1)
LYMPHOCYTES NFR BLD AUTO: 28.9 % (ref 19.6–45.3)
MAGNESIUM SERPL-MCNC: 2.4 MG/DL (ref 1.6–2.4)
MCH RBC QN AUTO: 36.7 PG (ref 26.6–33)
MCHC RBC AUTO-ENTMCNC: 33.7 G/DL (ref 31.5–35.7)
MCV RBC AUTO: 108.9 FL (ref 79–97)
MONOCYTES # BLD AUTO: 0.19 10*3/MM3 (ref 0.1–0.9)
MONOCYTES NFR BLD AUTO: 7.1 % (ref 5–12)
NEUTROPHILS NFR BLD AUTO: 1.62 10*3/MM3 (ref 1.7–7)
NEUTROPHILS NFR BLD AUTO: 60.9 % (ref 42.7–76)
NRBC BLD AUTO-RTO: 0 /100 WBC (ref 0–0.2)
PHOSPHATE SERPL-MCNC: 2.8 MG/DL (ref 2.5–4.5)
PLATELET # BLD AUTO: 396 10*3/MM3 (ref 140–450)
PMV BLD AUTO: 8.6 FL (ref 6–12)
POTASSIUM SERPL-SCNC: 4.5 MMOL/L (ref 3.5–5.2)
PROT SERPL-MCNC: 6.7 G/DL (ref 6–8.5)
RBC # BLD AUTO: 2.7 10*6/MM3 (ref 3.77–5.28)
SODIUM SERPL-SCNC: 140 MMOL/L (ref 136–145)
TIBC SERPL-MCNC: 279 MCG/DL (ref 298–536)
TRANSFERRIN SERPL-MCNC: 187 MG/DL (ref 200–360)
VIT B12 BLD-MCNC: 485 PG/ML (ref 211–946)
WBC NRBC COR # BLD AUTO: 2.66 10*3/MM3 (ref 3.4–10.8)

## 2025-01-13 PROCEDURE — 25010000002 DENOSUMAB 120 MG/1.7ML SOLUTION: Performed by: NURSE PRACTITIONER

## 2025-01-13 PROCEDURE — 82728 ASSAY OF FERRITIN: CPT | Performed by: NURSE PRACTITIONER

## 2025-01-13 PROCEDURE — 84466 ASSAY OF TRANSFERRIN: CPT | Performed by: NURSE PRACTITIONER

## 2025-01-13 PROCEDURE — 86300 IMMUNOASSAY TUMOR CA 15-3: CPT | Performed by: NURSE PRACTITIONER

## 2025-01-13 PROCEDURE — 80053 COMPREHEN METABOLIC PANEL: CPT

## 2025-01-13 PROCEDURE — 83735 ASSAY OF MAGNESIUM: CPT

## 2025-01-13 PROCEDURE — 82607 VITAMIN B-12: CPT | Performed by: NURSE PRACTITIONER

## 2025-01-13 PROCEDURE — 96372 THER/PROPH/DIAG INJ SC/IM: CPT

## 2025-01-13 PROCEDURE — 85025 COMPLETE CBC W/AUTO DIFF WBC: CPT

## 2025-01-13 PROCEDURE — 25010000002 CYANOCOBALAMIN PER 1000 MCG: Performed by: INTERNAL MEDICINE

## 2025-01-13 PROCEDURE — 36415 COLL VENOUS BLD VENIPUNCTURE: CPT

## 2025-01-13 PROCEDURE — 83540 ASSAY OF IRON: CPT | Performed by: NURSE PRACTITIONER

## 2025-01-13 PROCEDURE — 84100 ASSAY OF PHOSPHORUS: CPT

## 2025-01-13 RX ORDER — CYANOCOBALAMIN 1000 UG/ML
1000 INJECTION, SOLUTION INTRAMUSCULAR; SUBCUTANEOUS ONCE
Status: COMPLETED | OUTPATIENT
Start: 2025-01-13 | End: 2025-01-13

## 2025-01-13 RX ORDER — CYANOCOBALAMIN 1000 UG/ML
1000 INJECTION, SOLUTION INTRAMUSCULAR; SUBCUTANEOUS ONCE
OUTPATIENT
Start: 2025-02-10

## 2025-01-13 RX ADMIN — DENOSUMAB 120 MG: 120 INJECTION SUBCUTANEOUS at 11:21

## 2025-01-13 RX ADMIN — CYANOCOBALAMIN 1000 MCG: 1000 INJECTION, SOLUTION INTRAMUSCULAR at 11:21

## 2025-01-13 NOTE — PROGRESS NOTES
Specialty Pharmacy Patient Management Program  Per Protocol Prescription Order or Refill       Requested Prescriptions     Signed Prescriptions Disp Refills    Palbociclib (Ibrance) 100 MG capsule capsule 21 capsule 5     Sig: Take 1 capsule by mouth Daily. Take for 21 days on, then 7 days off.     Prescription orders above were sent to [facility] per Collaborative Care Agreement Protocol.     Completed independent double check on medication order/RX.    Gio Ortiz PharmD, BCOP  Clinical Specialty Pharmacist, Oncology  1/13/2025  11:37 EST

## 2025-01-13 NOTE — PROGRESS NOTES
Specialty Pharmacy Patient Management Program  Per Protocol Prescription Order or Refill     Patient will be filling or currently fills medications at  [facility]  and is enrolled in the Patient Management Program.    Requested Prescriptions     Signed Prescriptions Disp Refills    Palbociclib (Ibrance) 100 MG capsule capsule 21 capsule 5     Sig: Take 1 capsule by mouth Daily. Take for 21 days on, then 7 days off.     Prescription orders above were sent to the pharmacy per Collaborative Care Agreement Protocol.     Last Office Visit: today  Next Office Visit: SILVINO Ashford Rph, BCOP  Clinical Specialty Pharmacist, Oncology  1/13/2025  11:31 EST

## 2025-01-13 NOTE — PROGRESS NOTES
Specialty Pharmacy Patient Management Program  Oncology Initial Assessment     Nedra Roberts was referred by their provider to the Oncology Patient Management program offered by [facility] for ER/WA+, HER 2 - metastatic breast cancer on 01/13/25.  An initial outreach was conducted, including assessment of therapy appropriateness and specialty medication education for Palbociclib ( Ibrance). The patient was introduced to services offered by [facility], including: regular assessments, refill coordination, curbside pick-up or mail order delivery options, prior authorization maintenance, and financial assistance programs as applicable. The patient was also provided with contact information for the pharmacy team.     Goal of chemotherapy: disease control    Treatment Medication(s) / Frequency and Dosing    Ibrance 100 mg po daily for 21 days on, then 7 days off- todays education reflects a change from external to internal fill    Number of cycles: until disease progression or unacceptable toxicity    Start date of oral specialty medication:  4/23    Follow-up Testing to be determined after TBD cycles by MD.     Items for home use: Imodium AD (for diarrhea), Acetaminophen or Tylenol (for fever and/or pain), and Thermometer     Rx written for: [] Nausea    [] Pre-Chemo   ondansetron 8 mg by mouth every 8 hours as needed for nausea    Completing Pharmacist: Susana Ashford RPH             Date/time: 01/13/2025 11:06 EST     Insurance Coverage & Financial Support  $0 copay with [employer] and [employer] fill     Relevant Past Medical History and Comorbidities  Relevant medical history and concomitant health conditions were discussed with the patient. The patient's chart has been reviewed for relevant past medical history and comorbid conditions and updated as necessary.  Past Medical History:   Diagnosis Date    Breast cancer 01/2009    Stage I left breast    Drug therapy      Gastritis     GERD (gastroesophageal reflux disease)     H/O lumpectomy     HL (hearing loss)     Hyperlipidemia     Hypertension     Hypothyroidism      Social History     Socioeconomic History    Marital status:      Spouse name: Rocky   Tobacco Use    Smoking status: Former     Current packs/day: 0.00     Average packs/day: 1 pack/day for 40.0 years (40.0 ttl pk-yrs)     Types: Cigarettes     Start date: 1962     Quit date: 2002     Years since quittin.0    Smokeless tobacco: Never   Vaping Use    Vaping status: Never Used   Substance and Sexual Activity    Alcohol use: Yes     Alcohol/week: 1.0 standard drink of alcohol     Types: 1 Glasses of wine per week     Comment: NIGHTLY    Drug use: Never    Sexual activity: Not Currently     Partners: Male     Birth control/protection: Post-menopausal       Problem list reviewed by Susana Ashford RPH on 2025 at 11:06 AM    Allergies  Known allergies and reactions were discussed with the patient. The patient's chart has been reviewed for  allergy information and updated as necessary.   No Known Allergies    Allergies reviewed by Susana Ashford RPH on 2025 at 11:06 AM    Relevant Laboratory Values  Relevant laboratory values were discussed with the patient. The following specialty medication dose adjustment(s) are recommended: none at this time  Lab Results   Component Value Date    GLUCOSE 103 (H) 2024    CALCIUM 9.7 2024     2024    K 4.7 2024    CO2 25.7 2024     2024    BUN 20 2024    CREATININE 1.26 (H) 2024    EGFRIFAFRI 68 2022    EGFRIFNONA 59 (L) 2022    BCR 15.9 2024    ANIONGAP 8.3 2024     Lab Results   Component Value Date    WBC 2.66 (L) 2025    RBC 2.70 (L) 2025    HGB 9.9 (L) 2025    HCT 29.4 (L) 2025    .9 (H) 2025    MCH 36.7 (H) 2025    MCHC 33.7 2025    RDW 12.9 2025    KHADARWSD  51.2 01/13/2025    MPV 8.6 01/13/2025     01/13/2025    NEUTRORELPCT 60.9 01/13/2025    LYMPHORELPCT 28.9 01/13/2025    MONORELPCT 7.1 01/13/2025    EOSRELPCT 0.8 01/13/2025    BASORELPCT 1.9 (H) 01/13/2025    AUTOIGPER 0.4 01/13/2025    NEUTROABS 1.62 (L) 01/13/2025    LYMPHSABS 0.77 01/13/2025    MONOSABS 0.19 01/13/2025    EOSABS 0.02 01/13/2025    BASOSABS 0.05 01/13/2025    AUTOIGNUM 0.01 01/13/2025    NRBC 0.0 01/13/2025       Current Medication List  This medication list has been reviewed with the patient and evaluated for any interactions or necessary modifications/recommendations, and updated to include all prescription medications, OTC medications, and supplements the patient is currently taking.  This list reflects what is contained in the patient's profile, which has also been marked as reviewed to communicate to other providers it is the most up to date version of the patient's current medication therapy.     Current Outpatient Medications:     anastrozole (ARIMIDEX) 1 MG tablet, Take 1 tablet by mouth Daily., Disp: 30 tablet, Rfl: 5    aspirin 81 MG EC tablet, Take 1 tablet by mouth Daily., Disp: , Rfl:     Calcium Carbonate Antacid (TUMS PO), Take  by mouth As Needed., Disp: , Rfl:     Cholecalciferol (Vitamin D3) 50 MCG (2000 UT) capsule, Take 1 capsule by mouth Daily., Disp: 90 each, Rfl: 3    denosumab (XGEVA) 120 MG/1.7ML solution injection, Inject  under the skin into the appropriate area as directed 1 (One) Time., Disp: , Rfl:     levothyroxine (SYNTHROID, LEVOTHROID) 25 MCG tablet, TAKE 1 TABLET BY MOUTH DAILY, Disp: 90 tablet, Rfl: 0    losartan (Cozaar) 50 MG tablet, Take 1 tablet by mouth Daily., Disp: 90 tablet, Rfl: 3    omeprazole (priLOSEC) 20 MG capsule, Take 1 capsule by mouth Daily., Disp: , Rfl:     Palbociclib 100 MG tablet tablet, Take 1 tablet by mouth Daily. Take with or without food for 21 days on, then off for 7 days., Disp: 21 tablet, Rfl: 5    simvastatin (ZOCOR) 40 MG  tablet, TAKE 1 TABLET BY MOUTH EVERY NIGHT, Disp: 90 tablet, Rfl: 3  No current facility-administered medications for this visit.    Facility-Administered Medications Ordered in Other Visits:     cyanocobalamin injection 1,000 mcg, 1,000 mcg, Intramuscular, Once, Rocky Ford MD    Medicines reviewed by Susana Ashford [facility] on 1/13/2025 at 11:06 AM    Drug Interactions  Reviewed concomitant medications, allergies, labs, comorbidities/medical history, quality of life( she reported that medication is working well for her), and immunization history.   Drug-drug interactions noted and discussed during education: level d ddi per Up To Date with Xgeva and Ibrance- can cause enhanced immunosuppression- ANC today is 1.62 noted. Reminded the patient to let us know before making any changes or starting any new prescription or OTC medications so we can first assess drug interactions.  Drug-food interactions noted and discussed during education: Patient was instructed to avoid eating grapefruit and drinking grapefruit juice    Initial Education Provided for Specialty Medication  The patient has been provided with the following education and any applicable administration techniques (i.e. self-injection) have been demonstrated for the therapies indicated. All questions and concerns have been addressed prior to the patient receiving the medication, and the patient has verbalized comprehension of the education and any materials provided. Additional patient education shall be provided and documented upon request by the patient, provider, or payer.    Provided patient with:   Education sheets about the medication, 24-hour clinic phone number and my contact information and instructions to call should additional questions arise.     Medication Education Sheets Provided:   Oral Specialty Medication: Ibrance (palbociclib)      TOPICS COMMENTS   Storage and Handling of Oral Specialty Medication Store in the original container, in a  dry location out of direct sunlight, and out of reach of children or pets. Store at room temperature.  Discussed safe handling and what to do with any unused medication.   Administration of Oral Specialty Medication Take with or without food at the same time(s) each day.   Adherence to Oral Specialty Regimen and Handling Missed Doses Patient is likely to have good treatment adherence; reinforced the importance of adherence. Reviewed how to address missed doses and to let us know of any missed doses.   Anemia: role of RBC, cause, s/s, ways to manage, role of transfusion Reviewed the role of RBC and the use of transfusions if hemoglobin decreases too much.  Patient to notify us if they experience shortness of breath, dizziness, or palpitations.  Also let patient know they could feel more tired than usual and to try to stay active, but rest if they need to.    Thrombocytopenia: role of platelet, cause, s/s, ways to prevent bleeding, things to avoid, when to seek help Reviewed the role of platelets in blood clotting and when to call clinic (bloody nose that bleeds for 5 mins despite pressure, a cut that won't stop bleeding despite pressure, gums that bleed excessively with brushing or flossing). Recommended using an electric razor, soft bristle toothbrush, and blowing your nose gently.    Neutropenia: role of WBC, cause, infection precautions, s/s of infection, when to call MD Reviewed the role of WBC, good infection prevention practices, and when to call the clinic (temperature 100.4F, sore throat, burning urination, etc)  COVID Vaccines:    Nausea/Vomiting: cause, use of antiemetics, dietary changes, when to call MD Emetic risk: Low-Minimal  PRN home meds:  patient has been taking Ibrance for 2 years- no complaints of nausea    Instructed the patient to take a dose of the PRN medication at the first onset of nausea and if it's not working to call us for additional medications.  Also provided non-drug measures to  mitigate nausea.   Mouth Sores: causes, oral care, ways to manage Mouth sores can be prevented by making a mouth wash mixture of salt, baking soda, and water. The patient was instructed to swish and spit four times daily after meals and before bedtime.  Use of a soft bristle toothbrush was recommended.  The patient was instructed to avoid alcohol-containing OTC mouthwashes.    Alopecia: cause, ways to manage, resources Discussed the possibility of hair loss with the patient. Informed patient that they could request a prescription for a wig if desired and most of the cost is usually covered by insurance. Recommended covering the head with a hat and/or protecting the skin on the head with SPF 30 or higher.    Organ Toxicities: cause, s/s, need for diagnostic tests, labs, when to notify MD Discussed potential effects on organ systems, monitoring, diagnostic tests, labs, and when to notify their MD. Discussed the signs/symptoms of the following: hepatotoxicity and lung changes   Miscellaneous Financial Issues: $0 copay with [employer] and [employer]  Lab Draws:  as directed per Dr Ford   Infertility and Sexuality:  causes, fertility preservation options, sexuality changes, ways to manage, importance of birth control Oral Oncology Therapy: Reviewed safe sex practices and the importance of minimizing exposure to body fluids while on oral oncology therapy.   Home Care: how to manage bodily fluids Counseled on management of soiled linens and proper flush technique.  Discussed how to manage all the side effects at home and advised when to contact the MD office       Adherence and Self-Administration  Adherence related to the patient's specialty therapy was discussed with the patient. The Adherence segment of this outreach has been reviewed and updated.     Is there a concern with patient's ability to self administer the medication correctly and without issue?: No  Were any potential barriers to adherence identified  during the initial assessment or patient education?: No  Are there any concerns regarding the patient's understanding of the importance of medication adherence?: No  Methods for Supporting Patient Adherence and/or Self-Administration:  pharmacy calls  Expected duration of therapy: Until disease progression or intolerable toxicity    Open Medication Therapy Problems  No medication therapy recommendations to display    Goals of Therapy  Goals related to the patient's specialty therapy were discussed with the patient. The Patient Goals segment of this outreach has been reviewed and updated.   Goals Addressed Today        Specialty Pharmacy General Goal      Progression free survival- monitor scans and Ca 15-3  11/10/23 last dictation:  Repeat bone scan 8/14/2023-improved intensity uptake multiple locations, no new sites identified   10/15/2024-bone scan similar osseous metastatic disease compared to 2/20/2023; CT chest abdomen pelvis 10/17/2024-unchanged osseous metastatic disease in the axial and proximal appendicular skeleton; no soft tissue metastatic disease; CA 15-3 138             Wrap up  Discussed aforementioned material with patient in person, face-to-face, in clinic.   Chemo consents/CCA were signed in April 2023  Medication availability: patient will receive medication from [facility] pharmacy on: 2/4/25  Patient expressed understanding.   Patient demonstrates ability to self-administer medication. No barriers to adherence identified.  All questions and concerns addressed.     Reassessment Plan & Follow-Up  1. Medication Therapy Changes: She reports recent dose reduction of Losartan to 50 mg and she is now using Prilosec for GERD  2. Related Plans, Therapy Recommendations, or Therapy Problems to Be Addressed: none at this time  3. Pharmacist to perform regular assessments no more than (6) months from the previous assessment.  4. Care Coordinator to set up future refill outreaches, coordinate prescription  delivery, and escalate clinical questions to pharmacist.  5. Welcome information and patient satisfaction survey to be sent by specialty pharmacy team with patient's initial fill.    Attestation  Therapeutic appropriateness: Appropriate   I attest the patient was actively involved in and has agreed to the above plan of care. If the prescribed therapy is at any point deemed not appropriate based on the current or future assessments, a consultation will be initiated with the patient's specialty care provider to determine the best course of action. The revised plan of therapy will be documented along with any required assessments and/or additional patient education provided.     Jyothi Ashford Rph, [facility]  Clinical Specialty Pharmacist, Oncology  1/13/2025  11:06 EST

## 2025-01-14 NOTE — PROGRESS NOTES
Specialty Pharmacy Note: Ibrance (palbociclib)    Nedra Roberts is a 79 y.o. female with metastatic breast cancer was seen 1/13/25 by APRN. Per provider dictation, no changes to oral oncology regimen Ibrance (palbociclib) 100 mg po daily for 21 days on, then 7 days off  Labs Review: The CMP and CBC from 1/13/25 have been reviewed. No dose adjustments are needed for the oral specialty medication(s) based on the labs.    Specialty pharmacy will continue to follow patient.    Jyothi Ashford Rph, BCOP  1/14/2025  11:26 EST

## 2025-01-17 ENCOUNTER — SPECIALTY PHARMACY (OUTPATIENT)
Dept: PHARMACY | Facility: HOSPITAL | Age: 80
End: 2025-01-17
Payer: MEDICARE

## 2025-01-20 ENCOUNTER — SPECIALTY PHARMACY (OUTPATIENT)
Dept: PHARMACY | Facility: HOSPITAL | Age: 80
End: 2025-01-20
Payer: MEDICARE

## 2025-01-27 RX ORDER — LEVOTHYROXINE SODIUM 25 UG/1
25 TABLET ORAL DAILY
Qty: 90 TABLET | Refills: 0 | Status: SHIPPED | OUTPATIENT
Start: 2025-01-27

## 2025-02-06 ENCOUNTER — SPECIALTY PHARMACY (OUTPATIENT)
Dept: PHARMACY | Facility: HOSPITAL | Age: 80
End: 2025-02-06
Payer: MEDICARE

## 2025-02-06 NOTE — PROGRESS NOTES
" Specialty Pharmacy Patient Management Program  Hematology / Oncology Initial Fill Outreach      Nedra \"Jeniffer\" was contacted today regarding initial fill of her Ibrance medication through [facility]. This is not a new medication for her. Rx has been transferred to [facility] for filling in 2025.    Specialty medication(s) and dose(s) confirmed: Yes  Ibrance 100 mg daily for 21 days on then 7 days off.  Next cycle will start on 2/26/2025.    Delivery Questions      Flowsheet Row Most Recent Value   Delivery method FedEx   Delivery address verified with patient/caregiver? Yes  [Ship to home address]   Delivery address Home  [Ship to home address-Ship 2/10 for delivery 2/11-$0 copay with insurance and [employer]-Address confirmed]   Number of medications in delivery 1   Medication(s) being filled and delivered Palbociclib (IBRANCE)  [100 mg]   Doses left of specialty medications 12 days then starts off week. Next cycle starts 2/26/2025   Copay verified? Yes   Copay amount $0 copay with insurance and [employer]   Copay form of payment No copayment ($0)   Ship Date 2/10/2025   Delivery Date Selection 02/11/25   Signature Required No          Ibrance delivery coordinated with pt for 2/11/2025 to her home address. $0 copay with [employer]. This is her first fill through [facility]. No questions or concerns to report to MTM Team today. She reports no missed doses since last delivery. PDC is 0%-Prior to this fill she was receiving her Ibrance through [employer]s free drug program.    Follow-Up: 21 Days    Lor Hernandez, Pharmacy Technician  2/6/2025  10:06 EST    "

## 2025-02-07 ENCOUNTER — OFFICE VISIT (OUTPATIENT)
Age: 80
End: 2025-02-07
Payer: MEDICARE

## 2025-02-07 ENCOUNTER — HOSPITAL ENCOUNTER (OUTPATIENT)
Facility: HOSPITAL | Age: 80
Discharge: HOME | End: 2025-02-07
Payer: MEDICARE

## 2025-02-07 VITALS
HEIGHT: 64 IN | WEIGHT: 150.3 LBS | OXYGEN SATURATION: 95 % | SYSTOLIC BLOOD PRESSURE: 144 MMHG | RESPIRATION RATE: 16 BRPM | HEART RATE: 91 BPM | DIASTOLIC BLOOD PRESSURE: 80 MMHG | BODY MASS INDEX: 25.66 KG/M2

## 2025-02-07 DIAGNOSIS — I65.23 BILATERAL CAROTID ARTERY STENOSIS: ICD-10-CM

## 2025-02-07 DIAGNOSIS — I65.23 CAROTID STENOSIS, BILATERAL: Primary | ICD-10-CM

## 2025-02-07 PROCEDURE — 93880 EXTRACRANIAL BILAT STUDY: CPT

## 2025-02-07 RX ORDER — PANTOPRAZOLE SODIUM 20 MG/1
20 TABLET, DELAYED RELEASE ORAL
COMMUNITY

## 2025-02-07 RX ORDER — SENNOSIDES 8.6 MG
650 CAPSULE ORAL
COMMUNITY

## 2025-02-07 NOTE — PROGRESS NOTES
Chief Complaint  Carotid Artery Disease    Subjective        Nedra Roberts presents to [facility] VASCULAR SURGERY  HPI   Nedra Roberts is a 79 y.o. female that has been followed in our office for carotid artery stenosis. She returns today in follow up along with a carotid duplex. She  reports she has been doing well without hospitalizations or surgeries. She continues to undergo for metastatic breast cancer.  She denies any symptoms consistent with CVA, TIA, or amaurosis fugax.  Her biggest complaint is fatigue.    Review of Systems   Constitutional:  Negative for fever.   Eyes:  Negative for visual disturbance.   Cardiovascular:  Negative for leg swelling.   Gastrointestinal:  Negative for abdominal pain.   Musculoskeletal:  Negative for back pain.   Skin:  Negative for color change, pallor and wound.   Neurological:  Negative for dizziness, facial asymmetry, speech difficulty and weakness.        Nedra Roberts  reports that she quit smoking about 23 years ago. Her smoking use included cigarettes. She started smoking about 63 years ago. She has a 40 pack-year smoking history. She has never used smokeless tobacco..        Objective   Vital Signs:  Vitals:    02/07/25 0923   BP: 144/80   Pulse: 91   Resp: 16   SpO2: 95%      Body mass index is 25.79 kg/m².           Physical Exam  Vitals reviewed.   Constitutional:       Appearance: Normal appearance.   HENT:      Head: Normocephalic.   Cardiovascular:      Rate and Rhythm: Normal rate and regular rhythm.      Pulses:           Dorsalis pedis pulses are 3+ on the right side and 3+ on the left side.        Posterior tibial pulses are 3+ on the right side and 3+ on the left side.   Pulmonary:      Effort: Pulmonary effort is normal.   Skin:     General: Skin is warm.   Neurological:      General: No focal deficit present.      Mental Status: She is alert and oriented to person, place, and time.   Psychiatric:         Mood and Affect: Mood normal.           Result Review :      Previous carotid duplex: Less than 50% stenosis on the right and a 50 to 69% stenosis on the left    Carotid duplex from today: Duplex Carotid Ultrasound CAR (02/07/2025 09:20)                    Assessment and Plan     Diagnoses and all orders for this visit:    1. Carotid stenosis, bilateral (Primary)  -     Duplex Carotid Ultrasound CAR; Future             Patient present today for follow up of carotid artery stenosis.  Today, her duplex is unchanged.  She complains of fatigue and we discussed this could be a side effect of her breast cancer medication. She is to continue her antiplatelet agent which is aspirin. She is on a statin for cholesterol control.  We discussed adequate blood pressure control. She will return in 1 year along with a repeat carotid artery duplex.    Follow Up     Return in about 1 year (around 2/7/2026) for carotid duplex.  Patient was given instructions and counseling regarding her condition or for health maintenance advice. Please see specific information pulled into the AVS if appropriate.     ANGELA Sprague

## 2025-02-10 ENCOUNTER — LAB (OUTPATIENT)
Dept: LAB | Facility: HOSPITAL | Age: 80
End: 2025-02-10
Payer: MEDICARE

## 2025-02-10 ENCOUNTER — INFUSION (OUTPATIENT)
Dept: ONCOLOGY | Facility: HOSPITAL | Age: 80
End: 2025-02-10
Payer: MEDICARE

## 2025-02-10 DIAGNOSIS — Z79.899 NEED FOR PROPHYLACTIC CHEMOTHERAPY: Primary | ICD-10-CM

## 2025-02-10 DIAGNOSIS — C79.51 CARCINOMA OF BREAST METASTATIC TO BONE, UNSPECIFIED LATERALITY: ICD-10-CM

## 2025-02-10 DIAGNOSIS — C50.919 CARCINOMA OF BREAST METASTATIC TO BONE, UNSPECIFIED LATERALITY: ICD-10-CM

## 2025-02-10 DIAGNOSIS — E53.8 B12 DEFICIENCY: ICD-10-CM

## 2025-02-10 DIAGNOSIS — K21.00 GASTROESOPHAGEAL REFLUX DISEASE WITH ESOPHAGITIS WITHOUT HEMORRHAGE: ICD-10-CM

## 2025-02-10 DIAGNOSIS — Z79.899 NEED FOR PROPHYLACTIC CHEMOTHERAPY: ICD-10-CM

## 2025-02-10 LAB
ALBUMIN SERPL-MCNC: 4.4 G/DL (ref 3.5–5.2)
ALBUMIN/GLOB SERPL: 1.8 G/DL
ALP SERPL-CCNC: 61 U/L (ref 39–117)
ALT SERPL W P-5'-P-CCNC: 8 U/L (ref 1–33)
ANION GAP SERPL CALCULATED.3IONS-SCNC: 8 MMOL/L (ref 5–15)
AST SERPL-CCNC: 17 U/L (ref 1–32)
BASOPHILS # BLD AUTO: 0.05 10*3/MM3 (ref 0–0.2)
BASOPHILS NFR BLD AUTO: 1.7 % (ref 0–1.5)
BH CV XLRA MEAS LEFT CAROTID BULB EDV: 19.2 CM/SEC
BH CV XLRA MEAS LEFT CAROTID BULB PSV: 69.7 CM/SEC
BH CV XLRA MEAS LEFT DIST CCA EDV: 24.1 CM/SEC
BH CV XLRA MEAS LEFT DIST CCA PSV: 79.6 CM/SEC
BH CV XLRA MEAS LEFT DIST ICA EDV: -41 CM/SEC
BH CV XLRA MEAS LEFT DIST ICA PSV: -93.8 CM/SEC
BH CV XLRA MEAS LEFT ICA/CCA RATIO: 1.5
BH CV XLRA MEAS LEFT MID CCA EDV: 26.7 CM/SEC
BH CV XLRA MEAS LEFT MID CCA PSV: 89.5 CM/SEC
BH CV XLRA MEAS LEFT MID ICA EDV: -27.3 CM/SEC
BH CV XLRA MEAS LEFT MID ICA PSV: -100 CM/SEC
BH CV XLRA MEAS LEFT PROX CCA EDV: 25.2 CM/SEC
BH CV XLRA MEAS LEFT PROX CCA PSV: 90.4 CM/SEC
BH CV XLRA MEAS LEFT PROX ECA EDV: -13.2 CM/SEC
BH CV XLRA MEAS LEFT PROX ECA PSV: -56.2 CM/SEC
BH CV XLRA MEAS LEFT PROX ICA EDV: -35.3 CM/SEC
BH CV XLRA MEAS LEFT PROX ICA PSV: -119.2 CM/SEC
BH CV XLRA MEAS LEFT PROX SCLA PSV: 130.6 CM/SEC
BH CV XLRA MEAS LEFT VERTEBRAL A EDV: 21.7 CM/SEC
BH CV XLRA MEAS LEFT VERTEBRAL A PSV: 43.8 CM/SEC
BH CV XLRA MEAS RIGHT CAROTID BULB EDV: -40.6 CM/SEC
BH CV XLRA MEAS RIGHT CAROTID BULB PSV: -138.7 CM/SEC
BH CV XLRA MEAS RIGHT DIST CCA EDV: 28 CM/SEC
BH CV XLRA MEAS RIGHT DIST CCA PSV: 79.6 CM/SEC
BH CV XLRA MEAS RIGHT DIST ICA EDV: -34.3 CM/SEC
BH CV XLRA MEAS RIGHT DIST ICA PSV: -109.3 CM/SEC
BH CV XLRA MEAS RIGHT ICA/CCA RATIO: 2.48
BH CV XLRA MEAS RIGHT MID CCA EDV: 20.3 CM/SEC
BH CV XLRA MEAS RIGHT MID CCA PSV: 81.2 CM/SEC
BH CV XLRA MEAS RIGHT MID ICA EDV: -38.4 CM/SEC
BH CV XLRA MEAS RIGHT MID ICA PSV: -143.8 CM/SEC
BH CV XLRA MEAS RIGHT PROX CCA EDV: 19.9 CM/SEC
BH CV XLRA MEAS RIGHT PROX CCA PSV: 89.5 CM/SEC
BH CV XLRA MEAS RIGHT PROX ECA EDV: -7.7 CM/SEC
BH CV XLRA MEAS RIGHT PROX ECA PSV: -53.6 CM/SEC
BH CV XLRA MEAS RIGHT PROX ICA EDV: -60 CM/SEC
BH CV XLRA MEAS RIGHT PROX ICA PSV: -197.6 CM/SEC
BH CV XLRA MEAS RIGHT PROX SCLA PSV: 101.1 CM/SEC
BH CV XLRA MEAS RIGHT VERTEBRAL A EDV: 20.1 CM/SEC
BH CV XLRA MEAS RIGHT VERTEBRAL A PSV: 71.3 CM/SEC
BILIRUB SERPL-MCNC: 0.2 MG/DL (ref 0–1.2)
BUN SERPL-MCNC: 18 MG/DL (ref 8–23)
BUN/CREAT SERPL: 14.5 (ref 7–25)
CALCIUM SPEC-SCNC: 10.5 MG/DL (ref 8.6–10.5)
CANCER AG15-3 SERPL-ACNC: 139 U/ML
CHLORIDE SERPL-SCNC: 102 MMOL/L (ref 98–107)
CO2 SERPL-SCNC: 28 MMOL/L (ref 22–29)
CREAT SERPL-MCNC: 1.24 MG/DL (ref 0.57–1)
DEPRECATED RDW RBC AUTO: 49.6 FL (ref 37–54)
EGFRCR SERPLBLD CKD-EPI 2021: 44.6 ML/MIN/1.73
EOSINOPHIL # BLD AUTO: 0.03 10*3/MM3 (ref 0–0.4)
EOSINOPHIL NFR BLD AUTO: 1 % (ref 0.3–6.2)
ERYTHROCYTE [DISTWIDTH] IN BLOOD BY AUTOMATED COUNT: 12.5 % (ref 12.3–15.4)
GLOBULIN UR ELPH-MCNC: 2.5 GM/DL
GLUCOSE SERPL-MCNC: 101 MG/DL (ref 65–99)
HCT VFR BLD AUTO: 31.1 % (ref 34–46.6)
HGB BLD-MCNC: 10.3 G/DL (ref 12–15.9)
IMM GRANULOCYTES # BLD AUTO: 0.01 10*3/MM3 (ref 0–0.05)
IMM GRANULOCYTES NFR BLD AUTO: 0.3 % (ref 0–0.5)
LEFT ARM BP: NORMAL MMHG
LYMPHOCYTES # BLD AUTO: 0.96 10*3/MM3 (ref 0.7–3.1)
LYMPHOCYTES NFR BLD AUTO: 33 % (ref 19.6–45.3)
MAGNESIUM SERPL-MCNC: 2 MG/DL (ref 1.6–2.4)
MCH RBC QN AUTO: 36.1 PG (ref 26.6–33)
MCHC RBC AUTO-ENTMCNC: 33.1 G/DL (ref 31.5–35.7)
MCV RBC AUTO: 109.1 FL (ref 79–97)
MONOCYTES # BLD AUTO: 0.21 10*3/MM3 (ref 0.1–0.9)
MONOCYTES NFR BLD AUTO: 7.2 % (ref 5–12)
NEUTROPHILS NFR BLD AUTO: 1.65 10*3/MM3 (ref 1.7–7)
NEUTROPHILS NFR BLD AUTO: 56.8 % (ref 42.7–76)
NRBC BLD AUTO-RTO: 0 /100 WBC (ref 0–0.2)
PHOSPHATE SERPL-MCNC: 4.3 MG/DL (ref 2.5–4.5)
PLATELET # BLD AUTO: 344 10*3/MM3 (ref 140–450)
PMV BLD AUTO: 8.5 FL (ref 6–12)
POTASSIUM SERPL-SCNC: 4.6 MMOL/L (ref 3.5–5.2)
PROT SERPL-MCNC: 6.9 G/DL (ref 6–8.5)
RBC # BLD AUTO: 2.85 10*6/MM3 (ref 3.77–5.28)
RIGHT ARM BP: NORMAL MMHG
SODIUM SERPL-SCNC: 138 MMOL/L (ref 136–145)
WBC NRBC COR # BLD AUTO: 2.91 10*3/MM3 (ref 3.4–10.8)

## 2025-02-10 PROCEDURE — 96372 THER/PROPH/DIAG INJ SC/IM: CPT

## 2025-02-10 PROCEDURE — 25010000002 DENOSUMAB 120 MG/1.7ML SOLUTION: Performed by: INTERNAL MEDICINE

## 2025-02-10 PROCEDURE — 83735 ASSAY OF MAGNESIUM: CPT | Performed by: INTERNAL MEDICINE

## 2025-02-10 PROCEDURE — 80053 COMPREHEN METABOLIC PANEL: CPT | Performed by: INTERNAL MEDICINE

## 2025-02-10 PROCEDURE — 84100 ASSAY OF PHOSPHORUS: CPT | Performed by: INTERNAL MEDICINE

## 2025-02-10 PROCEDURE — 36415 COLL VENOUS BLD VENIPUNCTURE: CPT

## 2025-02-10 PROCEDURE — 86300 IMMUNOASSAY TUMOR CA 15-3: CPT | Performed by: INTERNAL MEDICINE

## 2025-02-10 PROCEDURE — 25010000002 CYANOCOBALAMIN PER 1000 MCG: Performed by: INTERNAL MEDICINE

## 2025-02-10 PROCEDURE — 85025 COMPLETE CBC W/AUTO DIFF WBC: CPT

## 2025-02-10 RX ORDER — CYANOCOBALAMIN 1000 UG/ML
1000 INJECTION, SOLUTION INTRAMUSCULAR; SUBCUTANEOUS ONCE
Status: COMPLETED | OUTPATIENT
Start: 2025-02-10 | End: 2025-02-10

## 2025-02-10 RX ORDER — CYANOCOBALAMIN 1000 UG/ML
1000 INJECTION, SOLUTION INTRAMUSCULAR; SUBCUTANEOUS ONCE
OUTPATIENT
Start: 2025-03-10

## 2025-02-10 RX ADMIN — CYANOCOBALAMIN 1000 MCG: 1000 INJECTION, SOLUTION INTRAMUSCULAR at 13:49

## 2025-02-10 RX ADMIN — DENOSUMAB 120 MG: 120 INJECTION SUBCUTANEOUS at 13:47

## 2025-02-10 NOTE — NURSING NOTE
Okay to give Xgeva per Dr. Ford today without CMP resulted. CMP machine down and having to take labs over to hospital. Pt agreeable to plan. Xgeva and B12 administered.

## 2025-02-17 ENCOUNTER — SPECIALTY PHARMACY (OUTPATIENT)
Dept: PHARMACY | Facility: HOSPITAL | Age: 80
End: 2025-02-17
Payer: MEDICARE

## 2025-02-21 RX ORDER — ACETAMINOPHEN 160 MG
2000 TABLET,DISINTEGRATING ORAL DAILY
Qty: 90 CAPSULE | Refills: 1 | Status: SHIPPED | OUTPATIENT
Start: 2025-02-21

## 2025-02-27 DIAGNOSIS — E03.9 ACQUIRED HYPOTHYROIDISM: ICD-10-CM

## 2025-02-27 DIAGNOSIS — I10 ESSENTIAL HYPERTENSION: Primary | ICD-10-CM

## 2025-02-27 DIAGNOSIS — E78.2 MULTIPLE-TYPE HYPERLIPIDEMIA: ICD-10-CM

## 2025-03-05 ENCOUNTER — SPECIALTY PHARMACY (OUTPATIENT)
Dept: PHARMACY | Facility: HOSPITAL | Age: 80
End: 2025-03-05
Payer: MEDICARE

## 2025-03-05 NOTE — PROGRESS NOTES
" Specialty Pharmacy Patient Management Program  Oncology / Hematology Refill Outreach      Nedra \"Jeniffer\" was contacted today regarding refills of her medication(s).    Specialty medication(s) and dose(s) confirmed: Ibrance    Refill Questions      Flowsheet Row Most Recent Value   Changes to allergies? No   Changes to medications? No   New conditions or infections since last clinic visit No   Unplanned office visit, urgent care, ED, or hospital admission in the last 4 weeks  No   How does patient/caregiver feel medication is working? Good   Financial problems or insurance changes  No   Since the previous refill, were any specialty medication doses or scheduled injections missed or delayed?  No   Does this patient require a clinical escalation to a pharmacist? No          Delivery Questions      Flowsheet Row Most Recent Value   Delivery method FedEx   Delivery address verified with patient/caregiver? Yes   Delivery address Home  [Ready to count (Y) Insurance verified (Y) Epic home address verified (Y) Ship date: (3/6/2025) Delivery date: (3/7/2025) Signature required: (N)]   Other address preferred N/A   Number of medications in delivery 1   Medication(s) being filled and delivered Palbociclib (IBRANCE)   Doses left of specialty medications 5   Copay verified? Yes   Copay amount $0   Copay form of payment No copayment ($0)   Delivery Date Selection 03/07/25   Signature Required No            Follow-Up: 21 D [USUALLY TIME FRAME UNTIL NEXT REFILL OUTREACH FOLLOW-UP (APPROX) Ex. 25d, 85d, etc. ]    Shira Bunn, Pharmacy Technician  3/5/2025  11:10 EST    "

## 2025-03-08 LAB
ALBUMIN SERPL-MCNC: 4.4 G/DL (ref 3.5–5.2)
ALBUMIN/GLOB SERPL: 1.9 G/DL
ALP SERPL-CCNC: 59 U/L (ref 39–117)
ALT SERPL-CCNC: 9 U/L (ref 1–33)
APPEARANCE UR: CLEAR
AST SERPL-CCNC: 17 U/L (ref 1–32)
BACTERIA #/AREA URNS HPF: NORMAL /[HPF]
BACTERIA UR CULT: NORMAL
BACTERIA UR CULT: NORMAL
BASOPHILS # BLD MANUAL: 0.06 10*3/MM3 (ref 0–0.2)
BASOPHILS NFR BLD MANUAL: 2 % (ref 0–1.5)
BILIRUB SERPL-MCNC: 0.2 MG/DL (ref 0–1.2)
BILIRUB UR QL STRIP: NEGATIVE
BUN SERPL-MCNC: 18 MG/DL (ref 8–23)
BUN/CREAT SERPL: 15 (ref 7–25)
CALCIUM SERPL-MCNC: 10.8 MG/DL (ref 8.6–10.5)
CASTS URNS QL MICRO: NORMAL /LPF
CHLORIDE SERPL-SCNC: 102 MMOL/L (ref 98–107)
CHOLEST SERPL-MCNC: 169 MG/DL (ref 0–200)
CO2 SERPL-SCNC: 28.3 MMOL/L (ref 22–29)
COLOR UR: YELLOW
CREAT SERPL-MCNC: 1.2 MG/DL (ref 0.57–1)
DIFFERENTIAL COMMENT: ABNORMAL
EGFRCR SERPLBLD CKD-EPI 2021: 46.4 ML/MIN/1.73
EOSINOPHIL # BLD MANUAL: 0.03 10*3/MM3 (ref 0–0.4)
EOSINOPHIL NFR BLD MANUAL: 1 % (ref 0.3–6.2)
EPI CELLS #/AREA URNS HPF: NORMAL /HPF (ref 0–10)
ERYTHROCYTE [DISTWIDTH] IN BLOOD BY AUTOMATED COUNT: 13.7 % (ref 12.3–15.4)
GLOBULIN SER CALC-MCNC: 2.3 GM/DL
GLUCOSE SERPL-MCNC: 94 MG/DL (ref 65–99)
GLUCOSE UR QL STRIP: NEGATIVE
HCT VFR BLD AUTO: 32.9 % (ref 34–46.6)
HDLC SERPL-MCNC: 55 MG/DL (ref 40–60)
HGB BLD-MCNC: 10.6 G/DL (ref 12–15.9)
HGB UR QL STRIP: NEGATIVE
KETONES UR QL STRIP: NEGATIVE
LDLC SERPL CALC-MCNC: 80 MG/DL (ref 0–100)
LEUKOCYTE ESTERASE UR QL STRIP: ABNORMAL
LYMPHOCYTES # BLD MANUAL: 0.77 10*3/MM3 (ref 0.7–3.1)
LYMPHOCYTES NFR BLD MANUAL: 25.3 % (ref 19.6–45.3)
MCH RBC QN AUTO: 35.9 PG (ref 26.6–33)
MCHC RBC AUTO-ENTMCNC: 32.2 G/DL (ref 31.5–35.7)
MCV RBC AUTO: 111.5 FL (ref 79–97)
MICRO URNS: ABNORMAL
MONOCYTES # BLD MANUAL: 0.06 10*3/MM3 (ref 0.1–0.9)
MONOCYTES NFR BLD MANUAL: 2 % (ref 5–12)
NEUTROPHILS # BLD MANUAL: 2.13 10*3/MM3 (ref 1.7–7)
NEUTROPHILS NFR BLD MANUAL: 69.7 % (ref 42.7–76)
NITRITE UR QL STRIP: NEGATIVE
NRBC BLD AUTO-RTO: 0 /100 WBC (ref 0–0.2)
PH UR STRIP: 7 [PH] (ref 5–7.5)
PLATELET # BLD AUTO: 444 10*3/MM3 (ref 140–450)
PLATELET BLD QL SMEAR: ABNORMAL
POTASSIUM SERPL-SCNC: 4.7 MMOL/L (ref 3.5–5.2)
PROT SERPL-MCNC: 6.7 G/DL (ref 6–8.5)
PROT UR QL STRIP: NEGATIVE
RBC # BLD AUTO: 2.95 10*6/MM3 (ref 3.77–5.28)
RBC #/AREA URNS HPF: NORMAL /HPF (ref 0–2)
RBC MORPH BLD: ABNORMAL
SODIUM SERPL-SCNC: 140 MMOL/L (ref 136–145)
SP GR UR STRIP: 1.01 (ref 1–1.03)
TRIGL SERPL-MCNC: 206 MG/DL (ref 0–150)
TSH SERPL DL<=0.005 MIU/L-ACNC: 0.91 UIU/ML (ref 0.27–4.2)
URINALYSIS REFLEX: ABNORMAL
UROBILINOGEN UR STRIP-MCNC: 0.2 MG/DL (ref 0.2–1)
VLDLC SERPL CALC-MCNC: 34 MG/DL (ref 5–40)
WBC # BLD AUTO: 3.06 10*3/MM3 (ref 3.4–10.8)
WBC #/AREA URNS HPF: NORMAL /HPF (ref 0–5)

## 2025-03-10 ENCOUNTER — LAB (OUTPATIENT)
Dept: LAB | Facility: HOSPITAL | Age: 80
End: 2025-03-10
Payer: MEDICARE

## 2025-03-10 ENCOUNTER — INFUSION (OUTPATIENT)
Dept: ONCOLOGY | Facility: HOSPITAL | Age: 80
End: 2025-03-10
Payer: MEDICARE

## 2025-03-10 ENCOUNTER — OFFICE VISIT (OUTPATIENT)
Dept: INTERNAL MEDICINE | Facility: CLINIC | Age: 80
End: 2025-03-10
Payer: MEDICARE

## 2025-03-10 VITALS
BODY MASS INDEX: 25.51 KG/M2 | OXYGEN SATURATION: 98 % | TEMPERATURE: 97.7 F | HEIGHT: 64 IN | HEART RATE: 71 BPM | WEIGHT: 149.4 LBS | DIASTOLIC BLOOD PRESSURE: 78 MMHG | SYSTOLIC BLOOD PRESSURE: 122 MMHG | RESPIRATION RATE: 10 BRPM

## 2025-03-10 DIAGNOSIS — C50.919 CARCINOMA OF BREAST METASTATIC TO BONE, UNSPECIFIED LATERALITY: ICD-10-CM

## 2025-03-10 DIAGNOSIS — Z79.899 NEED FOR PROPHYLACTIC CHEMOTHERAPY: Primary | ICD-10-CM

## 2025-03-10 DIAGNOSIS — C79.51 CARCINOMA OF BREAST METASTATIC TO BONE, UNSPECIFIED LATERALITY: ICD-10-CM

## 2025-03-10 DIAGNOSIS — I10 ESSENTIAL HYPERTENSION: ICD-10-CM

## 2025-03-10 DIAGNOSIS — G47.33 OSA (OBSTRUCTIVE SLEEP APNEA): ICD-10-CM

## 2025-03-10 DIAGNOSIS — E53.8 B12 DEFICIENCY: ICD-10-CM

## 2025-03-10 DIAGNOSIS — E03.9 ACQUIRED HYPOTHYROIDISM: ICD-10-CM

## 2025-03-10 DIAGNOSIS — Z79.899 NEED FOR PROPHYLACTIC CHEMOTHERAPY: ICD-10-CM

## 2025-03-10 DIAGNOSIS — Z00.00 HEALTHCARE MAINTENANCE: Primary | ICD-10-CM

## 2025-03-10 DIAGNOSIS — R53.83 OTHER FATIGUE: ICD-10-CM

## 2025-03-10 LAB
ALBUMIN SERPL-MCNC: 4.3 G/DL (ref 3.5–5.2)
ALBUMIN/GLOB SERPL: 1.6 G/DL
ALP SERPL-CCNC: 56 U/L (ref 39–117)
ALT SERPL W P-5'-P-CCNC: 9 U/L (ref 1–33)
ANION GAP SERPL CALCULATED.3IONS-SCNC: 7.1 MMOL/L (ref 5–15)
AST SERPL-CCNC: 17 U/L (ref 1–32)
BASOPHILS # BLD AUTO: 0.04 10*3/MM3 (ref 0–0.2)
BASOPHILS NFR BLD AUTO: 1.4 % (ref 0–1.5)
BILIRUB SERPL-MCNC: 0.3 MG/DL (ref 0–1.2)
BUN SERPL-MCNC: 20 MG/DL (ref 8–23)
BUN/CREAT SERPL: 13.4 (ref 7–25)
CALCIUM SPEC-SCNC: 10 MG/DL (ref 8.6–10.5)
CHLORIDE SERPL-SCNC: 102 MMOL/L (ref 98–107)
CO2 SERPL-SCNC: 28.9 MMOL/L (ref 22–29)
CREAT SERPL-MCNC: 1.49 MG/DL (ref 0.57–1)
DEPRECATED RDW RBC AUTO: 50 FL (ref 37–54)
EGFRCR SERPLBLD CKD-EPI 2021: 35.8 ML/MIN/1.73
EOSINOPHIL # BLD AUTO: 0.03 10*3/MM3 (ref 0–0.4)
EOSINOPHIL NFR BLD AUTO: 1.1 % (ref 0.3–6.2)
ERYTHROCYTE [DISTWIDTH] IN BLOOD BY AUTOMATED COUNT: 12.6 % (ref 12.3–15.4)
GLOBULIN UR ELPH-MCNC: 2.7 GM/DL
GLUCOSE SERPL-MCNC: 87 MG/DL (ref 65–99)
HCT VFR BLD AUTO: 31.2 % (ref 34–46.6)
HGB BLD-MCNC: 10.6 G/DL (ref 12–15.9)
IMM GRANULOCYTES # BLD AUTO: 0.01 10*3/MM3 (ref 0–0.05)
IMM GRANULOCYTES NFR BLD AUTO: 0.4 % (ref 0–0.5)
LYMPHOCYTES # BLD AUTO: 0.84 10*3/MM3 (ref 0.7–3.1)
LYMPHOCYTES NFR BLD AUTO: 30.3 % (ref 19.6–45.3)
MAGNESIUM SERPL-MCNC: 2.1 MG/DL (ref 1.6–2.4)
MCH RBC QN AUTO: 36.8 PG (ref 26.6–33)
MCHC RBC AUTO-ENTMCNC: 34 G/DL (ref 31.5–35.7)
MCV RBC AUTO: 108.3 FL (ref 79–97)
MONOCYTES # BLD AUTO: 0.19 10*3/MM3 (ref 0.1–0.9)
MONOCYTES NFR BLD AUTO: 6.9 % (ref 5–12)
NEUTROPHILS NFR BLD AUTO: 1.66 10*3/MM3 (ref 1.7–7)
NEUTROPHILS NFR BLD AUTO: 59.9 % (ref 42.7–76)
NRBC BLD AUTO-RTO: 0 /100 WBC (ref 0–0.2)
PHOSPHATE SERPL-MCNC: 3.6 MG/DL (ref 2.5–4.5)
PLATELET # BLD AUTO: 504 10*3/MM3 (ref 140–450)
PMV BLD AUTO: 8.6 FL (ref 6–12)
POTASSIUM SERPL-SCNC: 4.5 MMOL/L (ref 3.5–5.2)
PROT SERPL-MCNC: 7 G/DL (ref 6–8.5)
RBC # BLD AUTO: 2.88 10*6/MM3 (ref 3.77–5.28)
SODIUM SERPL-SCNC: 138 MMOL/L (ref 136–145)
WBC NRBC COR # BLD AUTO: 2.77 10*3/MM3 (ref 3.4–10.8)

## 2025-03-10 PROCEDURE — 85025 COMPLETE CBC W/AUTO DIFF WBC: CPT

## 2025-03-10 PROCEDURE — 25010000002 DENOSUMAB 120 MG/1.7ML SOLUTION: Performed by: INTERNAL MEDICINE

## 2025-03-10 PROCEDURE — 84100 ASSAY OF PHOSPHORUS: CPT | Performed by: INTERNAL MEDICINE

## 2025-03-10 PROCEDURE — 36415 COLL VENOUS BLD VENIPUNCTURE: CPT

## 2025-03-10 PROCEDURE — 80053 COMPREHEN METABOLIC PANEL: CPT | Performed by: INTERNAL MEDICINE

## 2025-03-10 PROCEDURE — 83735 ASSAY OF MAGNESIUM: CPT | Performed by: INTERNAL MEDICINE

## 2025-03-10 PROCEDURE — 96372 THER/PROPH/DIAG INJ SC/IM: CPT

## 2025-03-10 PROCEDURE — 25010000002 CYANOCOBALAMIN PER 1000 MCG: Performed by: INTERNAL MEDICINE

## 2025-03-10 RX ORDER — CYANOCOBALAMIN 1000 UG/ML
1000 INJECTION, SOLUTION INTRAMUSCULAR; SUBCUTANEOUS ONCE
OUTPATIENT
Start: 2025-04-07

## 2025-03-10 RX ORDER — CYANOCOBALAMIN 1000 UG/ML
1000 INJECTION, SOLUTION INTRAMUSCULAR; SUBCUTANEOUS ONCE
Status: COMPLETED | OUTPATIENT
Start: 2025-03-10 | End: 2025-03-10

## 2025-03-10 RX ADMIN — CYANOCOBALAMIN 1000 MCG: 1000 INJECTION, SOLUTION INTRAMUSCULAR at 11:47

## 2025-03-10 RX ADMIN — DENOSUMAB 120 MG: 120 INJECTION SUBCUTANEOUS at 11:47

## 2025-03-10 NOTE — ASSESSMENT & PLAN NOTE
Hypertension is stable and controlled  Continue current treatment regimen.  Blood pressure will be reassessed in 6 months.  DASH emphasized

## 2025-03-10 NOTE — ASSESSMENT & PLAN NOTE
To continue all routine hcm. Encoruaged healthy nutrtion, movement, sleep, and mental health care.

## 2025-03-10 NOTE — PROGRESS NOTES
Per Dr Ford, okay to give Xgeva today without lab results, due to patient has another appt this afternoon.

## 2025-03-10 NOTE — LETTER
[facility]  Vaccine Consent Form    Patient Name:  Nedra Roberts  Patient :  1945        Screening Checklist  The following questions should be completed prior to vaccination. If you answer “yes” to any question, it does not necessarily mean you should not be vaccinated. It just means we may need to clarify or ask more questions. If a question is unclear, please ask your healthcare provider to explain it.    Yes No   Any fever or moderate to severe illness today (mild illness and/or antibiotic treatment are not contraindications)?     Do you have a history of a serious reaction to any previous vaccinations, such as anaphylaxis, encephalopathy within 7 days, Guillain-Ranger syndrome within 6 weeks, seizure?     Have you received any live vaccine(s) (e.g MMR, JAIME) or any other vaccines in the last month (to ensure duplicate doses aren't given)?     Do you have an anaphylactic allergy to latex (DTaP, DTaP-IPV, Hep A, Hep B, MenB, RV, Td, Tdap), baker’s yeast (Hep B, HPV), polysorbates (RSV, nirsevimab, PCV 20, Rotavirrus, Tdap, Shingrix), or gelatin (JAIME, MMR)?     Do you have an anaphylactic allergy to neomycin (Rabies, JAIME, MMR, IPV, Hep A), polymyxin B (IPV), or streptomycin (IPV)?      Any cancer, leukemia, AIDS, or other immune system disorder? (JAIME, MMR, RV)     Do you have a parent, brother, or sister with an immune system problem (if immune competence of vaccine recipient clinically verified, can proceed)? (MMR, JAIME)     Any recent steroid treatments for >2 weeks, chemotherapy, or radiation treatment? (JAIME, MMR)     Have you received antibody-containing blood transfusions or IVIG in the past 11 months (recommended interval is dependent on product)? (MMR, JAIME)     Have you taken antiviral drugs (acyclovir, famciclovir, valacyclovir for JAIME) in the last 24 or 48 hours, respectively?      Are you pregnant or planning to become pregnant within 1 month? (JAIME, MMR, HPV, IPV, MenB, Abrexvy; For Hep B-  "refer to Engerix-B; For RSV - Abrysvo is indicated for 32-36 weeks of pregnancy from September to January)     For infants, have you ever been told your child has had intussusception or a medical emergency involving obstruction of the intestine (Rotavirus)? If not for an infant, can skip this question.         *Ordering Physicians/APC should be consulted if \"yes\" is checked by the patient or guardian above.  I have received, read, and understand the Vaccine Information Statement (VIS) for each vaccine ordered.  I have considered my or my child's health status as well as the health status of my close contacts.  I have taken the opportunity to discuss my vaccine questions with my or my child's health care provider.   I have requested that the ordered vaccine(s) be given to me or my child.  I understand the benefits and risks of the vaccines.  I understand that I should remain in the clinic for 15 minutes after receiving the vaccine(s).  _________________________________________________________  Signature of Patient or Parent/Legal Guardian ____________________  Date   "

## 2025-03-10 NOTE — PROGRESS NOTES
Subjective   The ABCs of the Annual Wellness Visit  Medicare Wellness Visit      Nedra Roberts is a 79 y.o. patient who presents for a Medicare Wellness Visit.    The following portions of the patient's history were reviewed and   updated as appropriate: allergies, current medications, past family history, past medical history, past social history, past surgical history, and problem list.  Following w nephrology ckd    Compared to one year ago, the patient's physical   health is the same. Or better  Compared to one year ago, the patient's mental   health is the same. Or better    Recent Hospitalizations:  She was not admitted to the hospital during the last year.     Current Medical Providers:  Patient Care Team:  Prisca Church MD as PCP - General  Prisca Church MD as PCP - Family Medicine  Rocky Ford MD as Consulting Physician (Hematology and Oncology)  Aaron Tolliver MD as Referring Physician (General Surgery)    Outpatient Medications Prior to Visit   Medication Sig Dispense Refill    acetaminophen (TYLENOL) 650 MG 8 hr tablet Take 1 tablet by mouth.      anastrozole (ARIMIDEX) 1 MG tablet Take 1 tablet by mouth Daily. 30 tablet 5    aspirin 81 MG EC tablet Take 1 tablet by mouth Daily.      Calcium Carbonate Antacid (TUMS PO) Take  by mouth As Needed.      Cholecalciferol (Vitamin D3) 50 MCG (2000 UT) capsule TAKE 1 CAPSULE BY MOUTH ONCE DAILY 90 capsule 1    denosumab (XGEVA) 120 MG/1.7ML solution injection Inject  under the skin into the appropriate area as directed 1 (One) Time.      levothyroxine (SYNTHROID, LEVOTHROID) 25 MCG tablet TAKE 1 TABLET BY MOUTH DAILY 90 tablet 0    losartan (Cozaar) 50 MG tablet Take 1 tablet by mouth Daily. 90 tablet 3    Palbociclib (Ibrance) 100 MG capsule capsule Take 1 capsule by mouth Daily for 21 days on, then 7 days off in each 28-day cycle. 21 capsule 5    pantoprazole (PROTONIX) 20 MG EC tablet Take 1 tablet by mouth.      simvastatin (ZOCOR) 40 MG  "tablet TAKE 1 TABLET BY MOUTH EVERY NIGHT 90 tablet 3    thiamine (VITAMIN B-1) 100 MG tablet  tablet Take 1 tablet by mouth Daily.       No facility-administered medications prior to visit.     No opioid medication identified on active medication list. I have reviewed chart for other potential  high risk medication/s and harmful drug interactions in the elderly.      Aspirin is on active medication list. Aspirin use is indicated based on review of current medical condition/s. Pros and cons of this therapy have been discussed today. Benefits of this medication outweigh potential harm.  Patient has been encouraged to continue taking this medication.  .      Patient Active Problem List   Diagnosis    Essential hypertension    Gastritis    Hypothyroidism    Multiple-type hyperlipidemia    Healthcare maintenance    GERD (gastroesophageal reflux disease)    Esophagitis    Breast cancer metastasized to bone    Need for prophylactic chemotherapy    Antineoplastic chemotherapy induced pancytopenia    B12 deficiency     Advance Care Planning Advance Directive is on file.  ACP discussion was held with the patient during this visit. Patient has an advance directive in EMR which is still valid.             Objective   Vitals:    03/10/25 1322   BP: 122/78   BP Location: Left arm   Patient Position: Sitting   Cuff Size: Adult   Pulse: 71   Resp: 10   Temp: 97.7 °F (36.5 °C)   TempSrc: Oral   SpO2: 98%   Weight: 67.8 kg (149 lb 6.4 oz)   Height: 162.6 cm (64\")   PainSc: 0-No pain       Estimated body mass index is 25.64 kg/m² as calculated from the following:    Height as of this encounter: 162.6 cm (64\").    Weight as of this encounter: 67.8 kg (149 lb 6.4 oz).    BMI is >= 25 and <30. (Overweight) The following options were offered after discussion;: exercise counseling/recommendations and nutrition counseling/recommendations           Does the patient have evidence of cognitive impairment? No  Lab Results   Component Value " Date    CHLPL 169 2025    TRIG 206 (H) 2025    HDL 55 2025    LDL 80 2025    VLDL 34 2025                                                                                                Health  Risk Assessment    Smoking Status:  Social History     Tobacco Use   Smoking Status Former    Current packs/day: 0.00    Average packs/day: 1 pack/day for 40.0 years (40.0 ttl pk-yrs)    Types: Cigarettes    Start date: 1962    Quit date: 2002    Years since quittin.2   Smokeless Tobacco Never     Alcohol Consumption:  Social History     Substance and Sexual Activity   Alcohol Use Yes    Alcohol/week: 1.0 standard drink of alcohol    Types: 1 Glasses of wine per week    Comment: NIGHTLY       Fall Risk Screen  STEADI Fall Risk Assessment was completed, and patient is at LOW risk for falls.Assessment completed on:3/10/2025    Depression Screening   Little interest or pleasure in doing things? Not at all   Feeling down, depressed, or hopeless? Not at all   PHQ-2 Total Score 0      Health Habits and Functional and Cognitive Screening:      3/3/2025     9:03 AM   Functional & Cognitive Status   Do you have difficulty preparing food and eating? No   Do you have difficulty bathing yourself, getting dressed or grooming yourself? No   Do you have difficulty using the toilet? No   Do you have difficulty moving around from place to place? No   Do you have trouble with steps or getting out of a bed or a chair? No   Current Diet Well Balanced Diet   Dental Exam Not up to date   Eye Exam Up to date   Exercise (times per week) 2 times per week   Current Exercises Include House Cleaning;Walking;Yard Work   Do you need help using the phone?  No   Are you deaf or do you have serious difficulty hearing?  Yes   Do you need help to go to places out of walking distance? No   Do you need help shopping? No   Do you need help preparing meals?  No   Do you need help with housework?  No   Do you need help with  laundry? No   Do you need help taking your medications? No   Do you need help managing money? No   Do you ever drive or ride in a car without wearing a seat belt? No   Have you felt unusual stress, anger or loneliness in the last month? No   Who do you live with? Alone   If you need help, do you have trouble finding someone available to you? No   Have you been bothered in the last four weeks by sexual problems? No   Do you have difficulty concentrating, remembering or making decisions? No           Age-appropriate Screening Schedule:  Refer to the list below for future screening recommendations based on patient's age, sex and/or medical conditions. Orders for these recommended tests are listed in the plan section. The patient has been provided with a written plan.    Health Maintenance List  Health Maintenance   Topic Date Due    RSV Vaccine - Adults (1 - 1-dose 75+ series) Never done    TDAP/TD VACCINES (2 - Td or Tdap) 01/01/2022    BMI FOLLOWUP  03/05/2025    COLORECTAL CANCER SCREENING  03/23/2025    DXA SCAN  04/25/2025    COVID-19 Vaccine (7 - 2024-25 season) 03/18/2025    LIPID PANEL  03/06/2026    ANNUAL WELLNESS VISIT  03/10/2026    HEPATITIS C SCREENING  Completed    INFLUENZA VACCINE  Completed    Pneumococcal Vaccine 50+  Completed    ZOSTER VACCINE  Completed    MAMMOGRAM  Discontinued    LUNG CANCER SCREENING  Discontinued                                                                                                                                                CMS Preventative Services Quick Reference  Risk Factors Identified During Encounter  Immunizations Discussed/Encouraged: Td    The above risks/problems have been discussed with the patient.  Pertinent information has been shared with the patient in the After Visit Summary.  An After Visit Summary and PPPS were made available to the patient.    Follow Up:   Next Medicare Wellness visit to be scheduled in 1 year.         Additional E&M Note  "during same encounter follows:  Patient has additional, significant, and separately identifiable condition(s)/problem(s) that require work above and beyond the Medicare Wellness Visit     Chief Complaint  Medicare Wellness-subsequent  Breast cancer  ROBERTO CARLOS  Hypothyroidism  Hyperlipidemia  Hypertension  Mackenzie    Subjective   HPI  Jeniffer is also being seen today for an annual adult preventative physical exam.  and Jeniffer is also being seen today for additional medical problem/s.  Patient has breast cancer. Tolerating xgeva, arimidex, and ibrance.   Patient has mackenzie. Notes return of symptoms when off omeprazole. She tried famotidine w/out benefit  Has very mild ckd. She is not consistent with hydration.   Has roberto carlos- has not used cpap for at least 2 years. She feels fatigue throughout the day.                 Objective   Vital Signs:  /78 (BP Location: Left arm, Patient Position: Sitting, Cuff Size: Adult)   Pulse 71   Temp 97.7 °F (36.5 °C) (Oral)   Resp 10   Ht 162.6 cm (64\")   Wt 67.8 kg (149 lb 6.4 oz)   SpO2 98%   BMI 25.64 kg/m²   BMI is >= 25 and <30. (Overweight) The following options were offered after discussion;: exercise counseling/recommendations and nutrition counseling/recommendations    Physical Exam  Vitals and nursing note reviewed.   Constitutional:       Appearance: Normal appearance. She is well-developed.   HENT:      Head: Normocephalic and atraumatic.      Right Ear: Tympanic membrane and external ear normal.      Left Ear: Tympanic membrane and external ear normal.      Nose: Nose normal.      Mouth/Throat:      Mouth: Mucous membranes are moist.   Eyes:      Extraocular Movements: Extraocular movements intact.      Pupils: Pupils are equal, round, and reactive to light.   Cardiovascular:      Rate and Rhythm: Normal rate and regular rhythm.      Pulses: Normal pulses.      Heart sounds: Normal heart sounds.   Pulmonary:      Effort: Pulmonary effort is normal. No respiratory distress.      " Breath sounds: Normal breath sounds.   Abdominal:      General: Abdomen is flat.      Palpations: Abdomen is soft.   Musculoskeletal:         General: Normal range of motion.      Cervical back: Normal range of motion and neck supple.   Skin:     General: Skin is warm and dry.   Neurological:      General: No focal deficit present.      Mental Status: She is alert and oriented to person, place, and time.   Psychiatric:         Mood and Affect: Mood normal.         Behavior: Behavior normal.         Thought Content: Thought content normal.         Judgment: Judgment normal.         The following data was reviewed by: Prisca Church MD on 03/10/2025:  Data reviewed : Consultant notes oncology  and    Common labs          2/10/2025    12:32 3/6/2025    08:32 3/10/2025    11:28   Common Labs   Glucose 101  94  87    BUN 18  18  20    Creatinine 1.24  1.20  1.49    Sodium 138  140  138    Potassium 4.6  4.7  4.5    Chloride 102  102  102    Calcium 10.5  10.8  10.0    Albumin 4.4  4.4  4.3    Total Bilirubin 0.2  0.2  0.3    Alkaline Phosphatase 61  59  56    AST (SGOT) 17  17  17    ALT (SGPT) 8  9  9    WBC 2.91  3.06  2.77    Hemoglobin 10.3  10.6  10.6    Hematocrit 31.1  32.9  31.2    Platelets 344  444  504    Total Cholesterol  169     Triglycerides  206     HDL Cholesterol  55     LDL Cholesterol   80            Assessment and Plan Additional age appropriate preventative wellness advice topics were discussed during today's preventative wellness exam(some topics already addressed during AWV portion of the note above):    Physical Activity: Advised cardiovascular activity 150 minutes per week as tolerated. (example brisk walk for 30 minutes, 5 days a week).     Nutrition: Discussed nutrition plan with patient. Information shared in after visit summary. Goal is for a well balanced diet to enhance overall health.     Healthy Weight: Discussed current and goal BMI with patient. Steps to attain this goal discussed.  Information shared in after visit summary.     Injury Prevention Discussion:  Information shared in after visit summary.           Healthcare maintenance  To continue all routine hcm. Encoruaged healthy nutrtion, movement, sleep, and mental health care.        Essential hypertension  Hypertension is stable and controlled  Continue current treatment regimen.  Blood pressure will be reassessed in 6 months.  DASH emphasized       Acquired hypothyroidism  Synthroid daily. Reveiwed thyroid testing w patient       Carcinoma of breast metastatic to bone, unspecified laterality  Continue current meds w routine f/u w oncology       Other fatigue  H/o paty. Encouraged repeat testing as she is off of cpap related to machine. She needed updated cpap v other w updated parameters       PATY (obstructive sleep apnea)  See above. F/u sleep med to discuss. Patient to investigate prior physcican and schedule.     Orders:    Ambulatory Referral to Sleep Medicine          I spent 50 minutes caring for Nedra on this date of service. This time includes time spent by me in the following activities:preparing for the visit, reviewing tests, obtaining and/or reviewing a separately obtained history, performing a medically appropriate examination and/or evaluation , counseling and educating the patient/family/caregiver, ordering medications, tests, or procedures, referring and communicating with other health care professionals , documenting information in the medical record, and independently interpreting results and communicating that information with the patient/family/caregiver  Follow Up   Return in about 6 months (around 9/10/2025) for Recheck.  Patient was given instructions and counseling regarding her condition or for health maintenance advice. Please see specific information pulled into the AVS if appropriate.

## 2025-03-28 ENCOUNTER — SPECIALTY PHARMACY (OUTPATIENT)
Dept: PHARMACY | Facility: HOSPITAL | Age: 80
End: 2025-03-28
Payer: MEDICARE

## 2025-03-28 NOTE — PROGRESS NOTES
I contacted Ms. Roberts to coordinate a refill shipment of Ibrance. She has 12 tablets on-hand so we agreed that I would call back on 4/8 to coordinate the next refill shipment.     Shira Bunn - Care Coordinator   3/28/2025  10:30 EDT

## 2025-03-31 ENCOUNTER — OFFICE VISIT (OUTPATIENT)
Dept: SLEEP MEDICINE | Facility: HOSPITAL | Age: 80
End: 2025-03-31
Payer: MEDICARE

## 2025-03-31 VITALS — WEIGHT: 148 LBS | HEART RATE: 101 BPM | OXYGEN SATURATION: 95 % | HEIGHT: 64 IN | BODY MASS INDEX: 25.27 KG/M2

## 2025-03-31 DIAGNOSIS — E78.2 MULTIPLE-TYPE HYPERLIPIDEMIA: ICD-10-CM

## 2025-03-31 DIAGNOSIS — T45.1X5A ANTINEOPLASTIC CHEMOTHERAPY INDUCED PANCYTOPENIA: ICD-10-CM

## 2025-03-31 DIAGNOSIS — C79.51 CARCINOMA OF BREAST METASTATIC TO BONE, UNSPECIFIED LATERALITY: ICD-10-CM

## 2025-03-31 DIAGNOSIS — Z72.821 INADEQUATE SLEEP HYGIENE: ICD-10-CM

## 2025-03-31 DIAGNOSIS — I10 ESSENTIAL HYPERTENSION: ICD-10-CM

## 2025-03-31 DIAGNOSIS — D61.810 ANTINEOPLASTIC CHEMOTHERAPY INDUCED PANCYTOPENIA: ICD-10-CM

## 2025-03-31 DIAGNOSIS — C50.919 CARCINOMA OF BREAST METASTATIC TO BONE, UNSPECIFIED LATERALITY: ICD-10-CM

## 2025-03-31 DIAGNOSIS — G47.33 OSA (OBSTRUCTIVE SLEEP APNEA): Primary | ICD-10-CM

## 2025-03-31 PROCEDURE — G2211 COMPLEX E/M VISIT ADD ON: HCPCS | Performed by: PSYCHIATRY & NEUROLOGY

## 2025-03-31 PROCEDURE — 1160F RVW MEDS BY RX/DR IN RCRD: CPT | Performed by: PSYCHIATRY & NEUROLOGY

## 2025-03-31 PROCEDURE — 1159F MED LIST DOCD IN RCRD: CPT | Performed by: PSYCHIATRY & NEUROLOGY

## 2025-03-31 PROCEDURE — G0463 HOSPITAL OUTPT CLINIC VISIT: HCPCS

## 2025-03-31 PROCEDURE — 99204 OFFICE O/P NEW MOD 45 MIN: CPT | Performed by: PSYCHIATRY & NEUROLOGY

## 2025-03-31 NOTE — PROGRESS NOTES
Patient Care Team:  Prisca Church MD as PCP - General  Prisca Church MD as PCP - Family Medicine  Rocky Ford MD as Consulting Physician (Hematology and Oncology)  Aaron Tolliver MD as Referring Physician (General Surgery)  Zeus Tompkins MD, MPH as Consulting Physician (Sleep Medicine)        History of Present Illness  The patient presents for evaluation of sleep apnea.    She was diagnosed with sleep apnea a significant time ago by Dr. Yu at [facility]. A comprehensive sleep study was conducted, revealing severe sleep apnea. She used a CPAP machine for some time but has since discontinued its use. Currently, she resides alone following the death of her . Her sleep pattern is irregular, often waking up around 4 AM and sleeping intermittently throughout the day. She is on medication for thyroid, cholesterol, heartburn, and blood pressure. Additionally, she takes low-dose aspirin due to a minor blockage in her carotid artery, which is under regular monitoring. She is also being treated for metastatic bone disease, which causes fatigue after 6 to 8 hours of activity, necessitating daytime naps. Her schedule is inconsistent, with periods of wakefulness in the afternoon and potential sleep onset around 7 PM. She plans to stay with her daughter for the next few weeks and is concerned about potential disturbances from her daughter's dogs during a home sleep study.    Supplemental Information  She has been experiencing a sore throat and tongue for several days.    SOCIAL HISTORY  The patient smoked for about 40 years and stopped in 2002.    MEDICATIONS  Aspirin       [place]: 8    Data Reviewed: Medical chart and sleep questionnaire      PMH:  Past Medical History:   Diagnosis Date    Breast cancer 01/2009    Stage I left breast    Drug therapy     Gastritis     GERD (gastroesophageal reflux disease)     H/O lumpectomy     HL (hearing loss) 1988    Hyperlipidemia     Hypertension      "Hypothyroidism           Allergies:  Patient has no known allergies.     Medication Review:   Current Outpatient Medications on File Prior to Visit   Medication Sig Dispense Refill    acetaminophen (TYLENOL) 650 MG 8 hr tablet Take 1 tablet by mouth.      anastrozole (ARIMIDEX) 1 MG tablet Take 1 tablet by mouth Daily. 30 tablet 5    aspirin 81 MG EC tablet Take 1 tablet by mouth Daily.      Calcium Carbonate Antacid (TUMS PO) Take  by mouth As Needed.      Cholecalciferol (Vitamin D3) 50 MCG (2000 UT) capsule TAKE 1 CAPSULE BY MOUTH ONCE DAILY 90 capsule 1    denosumab (XGEVA) 120 MG/1.7ML solution injection Inject  under the skin into the appropriate area as directed 1 (One) Time.      levothyroxine (SYNTHROID, LEVOTHROID) 25 MCG tablet TAKE 1 TABLET BY MOUTH DAILY 90 tablet 0    losartan (Cozaar) 50 MG tablet Take 1 tablet by mouth Daily. 90 tablet 3    Palbociclib (Ibrance) 100 MG capsule capsule Take 1 capsule by mouth Daily for 21 days on, then 7 days off in each 28-day cycle. 21 capsule 5    pantoprazole (PROTONIX) 20 MG EC tablet Take 1 tablet by mouth.      simvastatin (ZOCOR) 40 MG tablet TAKE 1 TABLET BY MOUTH EVERY NIGHT 90 tablet 3    thiamine (VITAMIN B-1) 100 MG tablet  tablet Take 1 tablet by mouth Daily.       No current facility-administered medications on file prior to visit.         Vital Signs:    Vitals:    03/31/25 1037   Pulse: 101   SpO2: 95%   Weight: 67.1 kg (148 lb)   Height: 162.6 cm (64\")        Body mass index is 25.4 kg/m².  Neck Circumference: 15 inches  .BMIFOLLOWUP         Physical Exam:    Constitutional:  Well developed 79 y.o. female that appears in no apparent distress.  Awake & oriented times 3.  Normal mood with normal recent and remote memory and normal judgement.  Eyes:  Conjunctivae normal.  Oropharynx: Moist mucous membranes without exudate and Mallampati 4, I did not see any lesions on her tongue  Neck: Trachea midline  Respiratory: Effort is not labored  Cardiovascular: " Radial pulse regular  Musculoskeletal: Gait appears normal, no digital clubbing evident, no pre-tibial edema        Impression:   Encounter Diagnoses   Name Primary?    ROBERTO CARLOS (obstructive sleep apnea) Yes    Essential hypertension     Multiple-type hyperlipidemia     Antineoplastic chemotherapy induced pancytopenia     Carcinoma of breast metastatic to bone, unspecified laterality     Inadequate sleep hygiene       Patient's BMI is Body mass index is 25.4 kg/m².        Assessment & Plan  1. Sleep Apnea.  She was diagnosed with sleep apnea a long time ago and previously used a CPAP machine, which she has since abandoned. A home sleep study will be conducted to reassess her condition. She can complete the study either at her own residence or at her daughter's house, depending on where she feels she can sleep best. An appointment will be scheduled for her to receive instructions on how to properly use the equipment for the home sleep study.       The patient should practice good sleep hygiene measures.      Weight loss might be beneficial in this patient who has a Body mass index is 25.4 kg/m².      Pathophysiology of ROBERTO CARLOS described to the patient.  Cardiovascular complications of untreated ROBERTO CARLOS also reviewed.      The patient was cautioned about the dangers of drowsy driving.    Patient or patient representative verbalized consent for the use of Ambient Listening during the visit with  Rocky Tompkins MD for chart documentation. 3/31/2025  10:59 EDT     Rocky Tompkins MD  Sleep Medicine  03/31/25  10:52 EDT

## 2025-04-07 ENCOUNTER — INFUSION (OUTPATIENT)
Dept: ONCOLOGY | Facility: HOSPITAL | Age: 80
End: 2025-04-07
Payer: MEDICARE

## 2025-04-07 ENCOUNTER — LAB (OUTPATIENT)
Dept: LAB | Facility: HOSPITAL | Age: 80
End: 2025-04-07
Payer: MEDICARE

## 2025-04-07 ENCOUNTER — OFFICE VISIT (OUTPATIENT)
Dept: ONCOLOGY | Facility: CLINIC | Age: 80
End: 2025-04-07
Payer: MEDICARE

## 2025-04-07 VITALS
HEIGHT: 64 IN | HEART RATE: 79 BPM | SYSTOLIC BLOOD PRESSURE: 135 MMHG | BODY MASS INDEX: 25.7 KG/M2 | TEMPERATURE: 98.7 F | OXYGEN SATURATION: 96 % | DIASTOLIC BLOOD PRESSURE: 78 MMHG | RESPIRATION RATE: 16 BRPM | WEIGHT: 150.5 LBS

## 2025-04-07 DIAGNOSIS — C79.51 CARCINOMA OF BREAST METASTATIC TO BONE, UNSPECIFIED LATERALITY: Primary | ICD-10-CM

## 2025-04-07 DIAGNOSIS — C79.51 CARCINOMA OF BREAST METASTATIC TO BONE, UNSPECIFIED LATERALITY: ICD-10-CM

## 2025-04-07 DIAGNOSIS — C50.919 CARCINOMA OF BREAST METASTATIC TO BONE, UNSPECIFIED LATERALITY: ICD-10-CM

## 2025-04-07 DIAGNOSIS — D61.810 ANTINEOPLASTIC CHEMOTHERAPY INDUCED PANCYTOPENIA: ICD-10-CM

## 2025-04-07 DIAGNOSIS — T45.1X5A ANTINEOPLASTIC CHEMOTHERAPY INDUCED PANCYTOPENIA: ICD-10-CM

## 2025-04-07 DIAGNOSIS — E53.8 B12 DEFICIENCY: ICD-10-CM

## 2025-04-07 DIAGNOSIS — C50.919 CARCINOMA OF BREAST METASTATIC TO BONE, UNSPECIFIED LATERALITY: Primary | ICD-10-CM

## 2025-04-07 DIAGNOSIS — E53.8 B12 DEFICIENCY: Primary | ICD-10-CM

## 2025-04-07 DIAGNOSIS — Z79.69 NEED FOR PROPHYLACTIC CHEMOTHERAPY: ICD-10-CM

## 2025-04-07 LAB
ALBUMIN SERPL-MCNC: 4.2 G/DL (ref 3.5–5.2)
ALBUMIN/GLOB SERPL: 2 G/DL
ALP SERPL-CCNC: 55 U/L (ref 39–117)
ALT SERPL W P-5'-P-CCNC: 11 U/L (ref 1–33)
ANION GAP SERPL CALCULATED.3IONS-SCNC: 10.4 MMOL/L (ref 5–15)
AST SERPL-CCNC: 17 U/L (ref 1–32)
BASOPHILS # BLD AUTO: 0.04 10*3/MM3 (ref 0–0.2)
BASOPHILS NFR BLD AUTO: 1.6 % (ref 0–1.5)
BILIRUB SERPL-MCNC: 0.2 MG/DL (ref 0–1.2)
BUN SERPL-MCNC: 22 MG/DL (ref 8–23)
BUN/CREAT SERPL: 17.2 (ref 7–25)
CALCIUM SPEC-SCNC: 9.3 MG/DL (ref 8.6–10.5)
CANCER AG15-3 SERPL-ACNC: 107 U/ML
CHLORIDE SERPL-SCNC: 106 MMOL/L (ref 98–107)
CO2 SERPL-SCNC: 22.6 MMOL/L (ref 22–29)
CREAT SERPL-MCNC: 1.28 MG/DL (ref 0.57–1)
DEPRECATED RDW RBC AUTO: 49.8 FL (ref 37–54)
EGFRCR SERPLBLD CKD-EPI 2021: 43 ML/MIN/1.73
EOSINOPHIL # BLD AUTO: 0.02 10*3/MM3 (ref 0–0.4)
EOSINOPHIL NFR BLD AUTO: 0.8 % (ref 0.3–6.2)
ERYTHROCYTE [DISTWIDTH] IN BLOOD BY AUTOMATED COUNT: 13 % (ref 12.3–15.4)
FERRITIN SERPL-MCNC: 147 NG/ML (ref 13–150)
FOLATE SERPL-MCNC: 8.63 NG/ML (ref 4.78–24.2)
GLOBULIN UR ELPH-MCNC: 2.1 GM/DL
GLUCOSE SERPL-MCNC: 107 MG/DL (ref 65–99)
HCT VFR BLD AUTO: 27.5 % (ref 34–46.6)
HGB BLD-MCNC: 9.3 G/DL (ref 12–15.9)
HGB RETIC QN AUTO: 40.4 PG (ref 29.8–36.1)
IMM GRANULOCYTES # BLD AUTO: 0.01 10*3/MM3 (ref 0–0.05)
IMM GRANULOCYTES NFR BLD AUTO: 0.4 % (ref 0–0.5)
IMM RETICS NFR: 17.8 % (ref 3–15.8)
IRON 24H UR-MRATE: 72 MCG/DL (ref 37–145)
IRON SATN MFR SERPL: 27 % (ref 20–50)
LDH SERPL-CCNC: 187 U/L (ref 135–214)
LYMPHOCYTES # BLD AUTO: 1 10*3/MM3 (ref 0.7–3.1)
LYMPHOCYTES NFR BLD AUTO: 38.8 % (ref 19.6–45.3)
MAGNESIUM SERPL-MCNC: 2 MG/DL (ref 1.6–2.4)
MCH RBC QN AUTO: 36.5 PG (ref 26.6–33)
MCHC RBC AUTO-ENTMCNC: 33.8 G/DL (ref 31.5–35.7)
MCV RBC AUTO: 107.8 FL (ref 79–97)
MONOCYTES # BLD AUTO: 0.16 10*3/MM3 (ref 0.1–0.9)
MONOCYTES NFR BLD AUTO: 6.2 % (ref 5–12)
NEUTROPHILS NFR BLD AUTO: 1.35 10*3/MM3 (ref 1.7–7)
NEUTROPHILS NFR BLD AUTO: 52.2 % (ref 42.7–76)
NRBC BLD AUTO-RTO: 0 /100 WBC (ref 0–0.2)
PHOSPHATE SERPL-MCNC: 2.2 MG/DL (ref 2.5–4.5)
PLATELET # BLD AUTO: 353 10*3/MM3 (ref 140–450)
PMV BLD AUTO: 8.6 FL (ref 6–12)
POTASSIUM SERPL-SCNC: 4.3 MMOL/L (ref 3.5–5.2)
PROT SERPL-MCNC: 6.3 G/DL (ref 6–8.5)
RBC # BLD AUTO: 2.55 10*6/MM3 (ref 3.77–5.28)
RETICS # AUTO: 0.03 10*6/MM3 (ref 0.02–0.13)
RETICS/RBC NFR AUTO: 1.34 % (ref 0.7–1.9)
SODIUM SERPL-SCNC: 139 MMOL/L (ref 136–145)
TIBC SERPL-MCNC: 264 MCG/DL (ref 298–536)
TRANSFERRIN SERPL-MCNC: 177 MG/DL (ref 200–360)
WBC NRBC COR # BLD AUTO: 2.58 10*3/MM3 (ref 3.4–10.8)

## 2025-04-07 PROCEDURE — 25010000002 CYANOCOBALAMIN PER 1000 MCG: Performed by: INTERNAL MEDICINE

## 2025-04-07 PROCEDURE — 82728 ASSAY OF FERRITIN: CPT

## 2025-04-07 PROCEDURE — G2211 COMPLEX E/M VISIT ADD ON: HCPCS | Performed by: INTERNAL MEDICINE

## 2025-04-07 PROCEDURE — 86300 IMMUNOASSAY TUMOR CA 15-3: CPT | Performed by: INTERNAL MEDICINE

## 2025-04-07 PROCEDURE — 3075F SYST BP GE 130 - 139MM HG: CPT | Performed by: INTERNAL MEDICINE

## 2025-04-07 PROCEDURE — 83735 ASSAY OF MAGNESIUM: CPT

## 2025-04-07 PROCEDURE — 99214 OFFICE O/P EST MOD 30 MIN: CPT | Performed by: INTERNAL MEDICINE

## 2025-04-07 PROCEDURE — 3078F DIAST BP <80 MM HG: CPT | Performed by: INTERNAL MEDICINE

## 2025-04-07 PROCEDURE — 85046 RETICYTE/HGB CONCENTRATE: CPT

## 2025-04-07 PROCEDURE — 96372 THER/PROPH/DIAG INJ SC/IM: CPT

## 2025-04-07 PROCEDURE — 84100 ASSAY OF PHOSPHORUS: CPT

## 2025-04-07 PROCEDURE — 85025 COMPLETE CBC W/AUTO DIFF WBC: CPT

## 2025-04-07 PROCEDURE — 80053 COMPREHEN METABOLIC PANEL: CPT

## 2025-04-07 PROCEDURE — 83540 ASSAY OF IRON: CPT

## 2025-04-07 PROCEDURE — 1126F AMNT PAIN NOTED NONE PRSNT: CPT | Performed by: INTERNAL MEDICINE

## 2025-04-07 PROCEDURE — 25010000002 DENOSUMAB 120 MG/1.7ML SOLUTION: Performed by: INTERNAL MEDICINE

## 2025-04-07 PROCEDURE — 84466 ASSAY OF TRANSFERRIN: CPT

## 2025-04-07 PROCEDURE — 36415 COLL VENOUS BLD VENIPUNCTURE: CPT

## 2025-04-07 PROCEDURE — 83615 LACTATE (LD) (LDH) ENZYME: CPT

## 2025-04-07 PROCEDURE — 82746 ASSAY OF FOLIC ACID SERUM: CPT | Performed by: INTERNAL MEDICINE

## 2025-04-07 RX ORDER — CYANOCOBALAMIN 1000 UG/ML
1000 INJECTION, SOLUTION INTRAMUSCULAR; SUBCUTANEOUS ONCE
Status: COMPLETED | OUTPATIENT
Start: 2025-04-07 | End: 2025-04-07

## 2025-04-07 RX ORDER — CYANOCOBALAMIN 1000 UG/ML
1000 INJECTION, SOLUTION INTRAMUSCULAR; SUBCUTANEOUS ONCE
OUTPATIENT
Start: 2025-05-05

## 2025-04-07 RX ADMIN — DENOSUMAB 120 MG: 120 INJECTION SUBCUTANEOUS at 16:07

## 2025-04-07 RX ADMIN — CYANOCOBALAMIN 1000 MCG: 1000 INJECTION, SOLUTION INTRAMUSCULAR at 16:05

## 2025-04-07 NOTE — PROGRESS NOTES
Subjective    Follow-up for metastatic breast cancer to bone      History of Present Illness    The patient return today for follow-up continuing Arimidex plus Ibrance and monthly Xgeva for metastatic ER positive breast cancer to bone.  She is doing well with minimal lower back pain when first awakening in the morning but none much throughout the day.  She denies diarrhea cough shortness of breath.  She complains of fatigue.    Oncology history:  Mrs. Roberts is a jeffry 79 y.o. woman with a history of stage I breast cancer affecting the left breast (Brady 5/9, 1.5 cm, ER 90%, RI 70%, HER2 2+/negative FISH).  She underwent lumpectomy and 4 cycles of adjuvant chemotherapy with TC.  Her tumor was estrogen receptor positive and she underwent hormonal therapy with Arimidex between May 2009 in May 2014.    The patient saw her primary care provider on 3/21/2023 and complained of low back pain for which plain films were performed suspicious for malignancy.  Whole-body bone scan on 3/20/2023 showed multifocal uptake in the calvarium, ribs, cervical, thoracic and lumbar spine, sacrum, pelvis, humeri and proximal femora.  PET scan on 4/10/2023 showed multiple hypermetabolic bone lesions including the lumbar spine, thoracic spine, most numerous and intense in the pelvic bones and proximal femurs maximum SUV 5.7 pelvis and 6.4 proximal right femur.  The hypermetabolic foci are mixed lytic/sclerotic.  There is no hypermetabolic lymphadenopathy or visceral disease.  She has been started on anastrozole 1 mg daily and Ibrance 100 mg days 1-21 q. 28 days.    The patient is tolerating treatment well.  She denies overt nausea vomiting diarrhea more on the constipated side.  She denies cough or shortness of breath.  Her pain is fairly minimal mostly in the left hip with walking.  Previous back pain has improved.    Repeat PET scan on 3/4/2024 showed resolution of hypermetabolic bone metastases-maximum SUV proximal right femur was  "6.4 and 2.5, right iliac body previously 5.7 now 3.9, tiny focus L3 SUV 2.9, no new hypermetabolic activity.    Past Medical History:  Pertinent for hypertension, acid reflux, hyperlipidemia, hypothyroidism    Review of Systems   Constitutional:  Positive for fatigue. Negative for activity change.   Respiratory: Negative.     Cardiovascular: Negative.    Gastrointestinal:  Negative for abdominal pain.        Acid reflux   Musculoskeletal:  Positive for arthralgias.   Skin: Negative.    Hematological: Negative.    Psychiatric/Behavioral:  Negative for sleep disturbance. The patient is not nervous/anxious.           Medications:  The current medication list was reviewed in the EMR    ALLERGIES:  No Known Allergies    Objective      Vitals:    04/07/25 1502   BP: 135/78   Pulse: 79   Resp: 16   Temp: 98.7 °F (37.1 °C)   TempSrc: Oral   SpO2: 96%   Weight: 68.3 kg (150 lb 8 oz)   Height: 162.6 cm (64.02\")   PainSc: 0-No pain                   4/7/2025     3:10 PM   Current Status   ECOG score 0       Physical Exam    CONSTITUTIONAL: pleasant well-developed adult woman  HEENT: no icterus, no thrush, moist membranes, resolution of the right mastoid nodule, hard of hearing  LYMPH: no cervical or supraclavicular lad  MUSC: no edema, normal gait  NEURO: alert and oriented x3, normal strength  PSYCH: normal mood and affect for situation  SKIN: no pallor or rash  Exam unchanged- 4/7/25      RECENT LABS:  Results from last 7 days   Lab Units 04/07/25  1507   WBC 10*3/mm3 2.58*   NEUTROS ABS 10*3/mm3 1.35*   HEMOGLOBIN g/dL 9.3*   HEMATOCRIT % 27.5*   PLATELETS 10*3/mm3 353       Lab Results   Component Value Date    GLUCOSE 87 03/10/2025    BUN 20 03/10/2025    CREATININE 1.49 (H) 03/10/2025     03/10/2025    K 4.5 03/10/2025     03/10/2025    CALCIUM 10.0 03/10/2025    PROTEINTOT 7.0 03/10/2025    ALBUMIN 4.3 03/10/2025    ALT 9 03/10/2025    AST 17 03/10/2025    ALKPHOS 56 03/10/2025    BILITOT 0.3 03/10/2025    " GLOB 2.7 03/10/2025    AGRATIO 1.6 03/10/2025    BCR 13.4 03/10/2025    ANIONGAP 7.1 03/10/2025    EGFR 35.8 (L) 03/10/2025             PET scan 4/10/2023:  IMPRESSION:  1. Multiple hypermetabolic lytic and mixed lytic and sclerotic bone  metastases as described. The activity at the inferior aspect of the  right mastoid process may possibly represent a metastasis, but the  low-level activity at the adjacent subcutaneous fat is of uncertain  etiology. There is no definitive fluid at the right mastoid air cells to  suggest acute mastoiditis, but please correlate clinically and follow-up  as needed.  2. There is no evidence for metastatic lymphadenopathy or visceral  Metastases.    NM PET/CT Skull Base to Mid Thigh 3/4/2024 - FINDINGS: Mediastinal blood pool has a maximal SUV of 2.5, previously 2.4.  1. There has been resolution of most of the hypermetabolic bone metastases. The maximal SUV at the proximal right femur was 6.4 and is  currently 2.5. Most intense residual activity is at the right iliac body with a maximal SUV of 3.9, previously 5.7. A tiny focus at L3 has a maximal SUV of 2.9, previously 5.0. There are no new abnormal foci of abnormal activity within the bones.  2. There is no hypermetabolic lymphadenopathy at the thorax, axilla, supraclavicular regions, neck, abdomen, or pelvis. There is no suspicious visceral activity. No new pulmonary opacities are seen.    NM Bone Scan Whole Body (10/15/2024 12:33 PM)       Assessment & Plan   *history of stage I breast cancer affecting the left breast ([place] 5/9, 1.5 cm, ER 90%, [place] 70%, HER2 2+/negative FISH).    She underwent lumpectomy and 4 cycles of adjuvant chemotherapy with TC.  Her tumor was estrogen receptor positive and she underwent hormonal therapy with Arimidex between May 2009 in May 2014.    *Radiographic studies consistent with metastatic disease to bone with sclerotic/lytic lesions involving spine (most prominent lumbar), scapula, pelvic bones,  proximal femurs  Initiated Arimidex April 2023; Ibrance subsequently added 100 mg D1-21 q. 28 days  4/26/2023 CT-guided bone biopsy left iliac metastatic ductal adenocarcinoma of the breast ER 99% strongly positive, [place] 31 to 40% weakly positive, HER2 2+/FISH negative; [facility] next generation sequencing available in the chart  CA 15-3 significantly elevated 764  CA 15-3 decreased to 295 on 6/5/2023  Repeat bone scan 8/14/2023-improved intensity uptake multiple locations, no new sites identified  PET scan on 3/4/2024 showed resolution of hypermetabolic bone metastases-maximum SUV proximal right femur was 6.4 and 2.5, right iliac body previously 5.7 now 3.9, tiny focus L3 SUV 2.9, no new hypermetabolic activity; CA 15-3 170 on 2/12/2024  10/15/2024-bone scan similar osseous metastatic disease compared to 2/20/2023; CT chest abdomen pelvis 10/17/2024-unchanged osseous metastatic disease in the axial and proximal appendicular skeleton; no soft tissue metastatic disease; CA 15-3 138    *Pain of malignancy-mild at this point, using extra treatment Tylenol as needed    *Hypophosphatemia/hypocalcemia-on oral replacement    *Mild neutropenia secondary to Ibrance-ANC stable 1.35    *Macrocytic anemia-  9/25/2023-B12 undetectably low; IM replacement initiated  3/11/2024-negative SPEP/GABRIELE, B12 447, folate 14, ferritin 143, iron sat 38%  Hemoglobin 9.3    *Acid reflux-poor response to Carafate, now on PPI    Oncology plan/recommendations:  Continue anastrozole 1 mg daily  Continue Ibrance 100 mg daily 1 through 21 q. 28 days   Continue Xgeva monthly with lab monitoring  She has extra-strength Tylenol or Ultram at home if needed for pain  Continue monthly B12 injection 1000 mcg  7.  Additional anemia labs today-reticulocyte count, ferritin, iron profile, LDH, folic acid, SPEP GABRIELE free light chain ratio  8.  Check CA 15-3 today  9.  4 to 5-week MD visit with restaging CT chest abdomen pelvis and bone scan ordered               4/7/2025      CC:

## 2025-04-07 NOTE — NURSING NOTE
Per Dr. Ford ok to give TriOviz today with phos of 2.2.  Pt given printed copy of labs and has no issues of concern at this time.

## 2025-04-08 ENCOUNTER — SPECIALTY PHARMACY (OUTPATIENT)
Dept: PHARMACY | Facility: HOSPITAL | Age: 80
End: 2025-04-08
Payer: MEDICARE

## 2025-04-08 NOTE — PROGRESS NOTES
Specialty Pharmacy Note: Ibrance (palbociclib)    Nedra Roberts is a 79 y.o. female with metastatic breast cancer was seen 4/7/25 by Dr. Ford. Per provider dictation, no changes to oral oncology regimen Ibrance (palbociclib) 100 mg po daily for 21 days on, then 7 days off.  Labs Review: The CMP and CBC from 4/7/25 have been reviewed. No dose adjustments are needed for the oral specialty medication(s) based on the labs.    Specialty pharmacy will continue to follow patient.    Jyothi Ashford Rph, BCOP  4/8/2025  08:36 EDT

## 2025-04-08 NOTE — PROGRESS NOTES
" Specialty Pharmacy Patient Management Program  Oncology / Hematology Refill Outreach      Nedra \"Jeniffer\" was contacted today regarding refills of her medication(s).    Specialty medication(s) and dose(s) confirmed: Ibrance    Refill Questions      Flowsheet Row Most Recent Value   Changes to allergies? No   Changes to medications? No   New conditions or infections since last clinic visit No   Unplanned office visit, urgent care, ED, or hospital admission in the last 4 weeks  No   How does patient/caregiver feel medication is working? Good   Financial problems or insurance changes  No   Since the previous refill, were any specialty medication doses or scheduled injections missed or delayed?  No   Does this patient require a clinical escalation to a pharmacist? No          Delivery Questions      Flowsheet Row Most Recent Value   Delivery method [employer]   Delivery address verified with patient/caregiver? Yes   Delivery address Home  [Ready to count (Y) Insurance verified (Y) Epic home address verified (Y) Ship date: (4/10/2025) Delivery date: (4/11/2025) Signature required: (N) (TCG) FILL @ [facility] LDD PATIENT]   Other address preferred N/A   Number of medications in delivery 1   Medication(s) being filled and delivered Palbociclib (IBRANCE)   Doses left of specialty medications 6   Copay verified? Yes   Copay amount $0   Copay form of payment No copayment ($0)   Delivery Date Selection 04/11/25   Signature Required No   Do you consent to receive electronic handouts?  No            Follow-Up: 21d [USUALLY TIME FRAME UNTIL NEXT REFILL OUTREACH FOLLOW-UP (APPROX) Ex. 25d, 85d, etc. ]    Shira Bunn, Pharmacy Technician  4/8/2025  10:14 EDT   "

## 2025-04-09 DIAGNOSIS — C79.51 MALIGNANT NEOPLASM METASTATIC TO BONE: ICD-10-CM

## 2025-04-09 LAB
ALBUMIN SERPL ELPH-MCNC: 3.6 G/DL (ref 2.9–4.4)
ALBUMIN/GLOB SERPL: 1.3 {RATIO} (ref 0.7–1.7)
ALPHA1 GLOB SERPL ELPH-MCNC: 0.3 G/DL (ref 0–0.4)
ALPHA2 GLOB SERPL ELPH-MCNC: 0.8 G/DL (ref 0.4–1)
B-GLOBULIN SERPL ELPH-MCNC: 0.9 G/DL (ref 0.7–1.3)
GAMMA GLOB SERPL ELPH-MCNC: 0.8 G/DL (ref 0.4–1.8)
GLOBULIN SER-MCNC: 2.8 G/DL (ref 2.2–3.9)
IGA SERPL-MCNC: 214 MG/DL (ref 64–422)
IGG SERPL-MCNC: 750 MG/DL (ref 586–1602)
IGM SERPL-MCNC: 80 MG/DL (ref 26–217)
INTERPRETATION SERPL IEP-IMP: ABNORMAL
KAPPA LC FREE SER-MCNC: 23.2 MG/L (ref 3.3–19.4)
KAPPA LC FREE/LAMBDA FREE SER: 1.5 {RATIO} (ref 0.26–1.65)
LABORATORY COMMENT REPORT: ABNORMAL
LAMBDA LC FREE SERPL-MCNC: 15.5 MG/L (ref 5.7–26.3)
M PROTEIN SERPL ELPH-MCNC: ABNORMAL G/DL
PROT SERPL-MCNC: 6.4 G/DL (ref 6–8.5)

## 2025-04-09 RX ORDER — ANASTROZOLE 1 MG/1
1 TABLET ORAL DAILY
Qty: 30 TABLET | Refills: 5 | Status: SHIPPED | OUTPATIENT
Start: 2025-04-09

## 2025-04-15 ENCOUNTER — SPECIALTY PHARMACY (OUTPATIENT)
Dept: PHARMACY | Facility: HOSPITAL | Age: 80
End: 2025-04-15
Payer: MEDICARE

## 2025-04-15 NOTE — PROGRESS NOTES
Specialty Pharmacy Patient Management Program       Today Mrs. Roberts contacted the [facility] office stating that she will be out of town from 5/13 to 5/19 and asked that I schedule her next delivery to arrive to her home on 5/9/25.    Shira Bunn - Care Coordinator   4/15/2025  12:36 EDT

## 2025-04-28 ENCOUNTER — HOSPITAL ENCOUNTER (OUTPATIENT)
Dept: SLEEP MEDICINE | Facility: HOSPITAL | Age: 80
Discharge: HOME | End: 2025-04-28
Admitting: PSYCHIATRY & NEUROLOGY
Payer: MEDICARE

## 2025-04-28 DIAGNOSIS — I10 ESSENTIAL HYPERTENSION: ICD-10-CM

## 2025-04-28 DIAGNOSIS — T45.1X5A ANTINEOPLASTIC CHEMOTHERAPY INDUCED PANCYTOPENIA: ICD-10-CM

## 2025-04-28 DIAGNOSIS — D61.810 ANTINEOPLASTIC CHEMOTHERAPY INDUCED PANCYTOPENIA: ICD-10-CM

## 2025-04-28 DIAGNOSIS — E78.2 MULTIPLE-TYPE HYPERLIPIDEMIA: ICD-10-CM

## 2025-04-28 DIAGNOSIS — G47.33 OSA (OBSTRUCTIVE SLEEP APNEA): ICD-10-CM

## 2025-04-28 DIAGNOSIS — C79.51 CARCINOMA OF BREAST METASTATIC TO BONE, UNSPECIFIED LATERALITY: ICD-10-CM

## 2025-04-28 DIAGNOSIS — C50.919 CARCINOMA OF BREAST METASTATIC TO BONE, UNSPECIFIED LATERALITY: ICD-10-CM

## 2025-04-28 PROCEDURE — G0399 HOME SLEEP TEST/TYPE 3 PORTA: HCPCS

## 2025-05-05 ENCOUNTER — SPECIALTY PHARMACY (OUTPATIENT)
Dept: PHARMACY | Facility: HOSPITAL | Age: 80
End: 2025-05-05
Payer: MEDICARE

## 2025-05-05 NOTE — PROGRESS NOTES
" Specialty Pharmacy Patient Management Program  Oncology / Hematology Refill Outreach      Nedra \"Jeniffer\" was contacted today regarding refills of her medication(s).    Specialty medication(s) and dose(s) confirmed: Ibrance    Refill Questions      Flowsheet Row Most Recent Value   Changes to allergies? No   Changes to medications? No   New conditions or infections since last clinic visit No   Unplanned office visit, urgent care, ED, or hospital admission in the last 4 weeks  No   How does patient/caregiver feel medication is working? Good   Financial problems or insurance changes  No   Since the previous refill, were any specialty medication doses or scheduled injections missed or delayed?  No   Does this patient require a clinical escalation to a pharmacist? No          Delivery Questions      Flowsheet Row Most Recent Value   Delivery method [employer]   Delivery address verified with patient/caregiver? Yes   Delivery address Home  [Ready to count (Y) Insurance verified (Y) Epic home address verified (Y) Ship date: (5/8/2025) Delivery date: (5/9/2025) Signature required: (N) (TCG) FILL @ [facility] LDD PATIENT]   Other address preferred N/A   Number of medications in delivery 1   Medication(s) being filled and delivered Palbociclib (IBRANCE)   Doses left of specialty medications Unable to count at this time   Copay verified? Yes   Copay amount $0   Copay form of payment No copayment ($0)   Delivery Date Selection 05/09/25   Signature Required No   Do you consent to receive electronic handouts?  No            Follow-Up: 21d [USUALLY TIME FRAME UNTIL NEXT REFILL OUTREACH FOLLOW-UP (APPROX) Ex. 25d, 85d, etc. ]    Shira Bunn, Pharmacy Technician  5/5/2025  10:22 EDT    ADDENDUM     [facility] was not able to order Ibrance capsules to ship and arrive by 5/9. A new prescription for the tablets will be escribed and shipped to arrive to the patient on 5/12.    Shira Bunn - Care Coordinator   5/8/2025  09:35 EDT      "

## 2025-05-06 ENCOUNTER — TELEPHONE (OUTPATIENT)
Dept: SLEEP MEDICINE | Facility: HOSPITAL | Age: 80
End: 2025-05-06
Payer: MEDICARE

## 2025-05-06 DIAGNOSIS — G47.33 OSA (OBSTRUCTIVE SLEEP APNEA): Primary | ICD-10-CM

## 2025-05-06 DIAGNOSIS — Z72.821 INADEQUATE SLEEP HYGIENE: ICD-10-CM

## 2025-05-06 DIAGNOSIS — I10 ESSENTIAL HYPERTENSION: ICD-10-CM

## 2025-05-06 NOTE — PROGRESS NOTES
Subjective    Follow-up for metastatic breast cancer to bone      History of Present Illness    The patient return today for follow-up continuing Arimidex plus Ibrance and monthly Xgeva for metastatic ER positive breast cancer to bone.  She is doing well with minimal lower back pain when first awakening in the morning but none much throughout the day.  She denies diarrhea cough shortness of breath.  She complains of fatigue which is stable.  She is planning a trip to [place] to visit friends in the near future.    Oncology history:  Mrs. Roberts is a jeffry 79 y.o. woman with a history of stage I breast cancer affecting the left breast ([place] 5/9, 1.5 cm, ER 90%, [place] 70%, HER2 2+/negative FISH).  She underwent lumpectomy and 4 cycles of adjuvant chemotherapy with TC.  Her tumor was estrogen receptor positive and she underwent hormonal therapy with Arimidex between May 2009 in May 2014.    The patient saw her primary care provider on 3/21/2023 and complained of low back pain for which plain films were performed suspicious for malignancy.  Whole-body bone scan on 3/20/2023 showed multifocal uptake in the calvarium, ribs, cervical, thoracic and lumbar spine, sacrum, pelvis, humeri and proximal femora.  PET scan on 4/10/2023 showed multiple hypermetabolic bone lesions including the lumbar spine, thoracic spine, most numerous and intense in the pelvic bones and proximal femurs maximum SUV 5.7 pelvis and 6.4 proximal right femur.  The hypermetabolic foci are mixed lytic/sclerotic.  There is no hypermetabolic lymphadenopathy or visceral disease.  She has been started on anastrozole 1 mg daily and Ibrance 100 mg days 1-21 q. 28 days.    The patient is tolerating treatment well.  She denies overt nausea vomiting diarrhea more on the constipated side.  She denies cough or shortness of breath.  Her pain is fairly minimal mostly in the left hip with walking.  Previous back pain has improved.    Repeat PET scan on  "3/4/2024 showed resolution of hypermetabolic bone metastases-maximum SUV proximal right femur was 6.4 and 2.5, right iliac body previously 5.7 now 3.9, tiny focus L3 SUV 2.9, no new hypermetabolic activity.    Repeat imaging 5/7/2025 CT chest abdomen pelvis and whole-body bone scan-few sub-6 mm pulmonary nodules right middle lobe and right lower lobe not clearly seen 10/17/2024 widespread osseous metastatic disease stable.    Past Medical History:  Pertinent for hypertension, acid reflux, hyperlipidemia, hypothyroidism    Review of Systems   Constitutional:  Positive for fatigue. Negative for activity change.   Respiratory: Negative.     Cardiovascular: Negative.    Gastrointestinal:  Negative for abdominal pain.        Acid reflux   Musculoskeletal:  Positive for arthralgias.   Skin: Negative.    Hematological: Negative.    Psychiatric/Behavioral:  Negative for sleep disturbance. The patient is not nervous/anxious.    Unchanged-5/12/2025      Medications:  The current medication list was reviewed in the EMR    ALLERGIES:  No Known Allergies    Objective      Vitals:    05/12/25 1121   BP: 143/81   Pulse: 76   Resp: 16   Temp: 97.8 °F (36.6 °C)   TempSrc: Oral   SpO2: 97%   Weight: 68 kg (150 lb)   Height: 162.6 cm (64.02\")   PainSc: 0-No pain             5/12/2025    11:25 AM   Current Status   ECOG score 0       Physical Exam    CONSTITUTIONAL: pleasant well-developed adult woman  HEENT: no icterus, no thrush, moist membranes, resolution of the right mastoid nodule, hard of hearing  LYMPH: no cervical or supraclavicular lad  MUSC: no edema, normal gait  NEURO: alert and oriented x3, normal strength  PSYCH: normal mood and affect for situation  SKIN: no pallor or rash  Exam unchanged-5/12/2025      RECENT LABS:  Results from last 7 days   Lab Units 05/12/25  1123   WBC 10*3/mm3 2.77*   NEUTROS ABS 10*3/mm3 1.52*   HEMOGLOBIN g/dL 9.9*   HEMATOCRIT % 29.8*   PLATELETS 10*3/mm3 245         Lab Results   Component " Value Date    GLUCOSE 107 (H) 04/07/2025    BUN 22 04/07/2025    CREATININE 1.28 (H) 04/07/2025     04/07/2025    K 4.3 04/07/2025     04/07/2025    CALCIUM 9.3 04/07/2025    PROTEINTOT 6.3 04/07/2025    PROTEINTOT 6.4 04/07/2025    ALBUMIN 4.2 04/07/2025    ALBUMIN 3.6 04/07/2025    ALT 11 04/07/2025    AST 17 04/07/2025    ALKPHOS 55 04/07/2025    BILITOT 0.2 04/07/2025    GLOB 2.1 04/07/2025    GLOB 2.8 04/07/2025    AGRATIO 2.0 04/07/2025    AGRATIO 1.3 04/07/2025    BCR 17.2 04/07/2025    ANIONGAP 10.4 04/07/2025    EGFR 43.0 (L) 04/07/2025             PET scan 4/10/2023:  IMPRESSION:  1. Multiple hypermetabolic lytic and mixed lytic and sclerotic bone  metastases as described. The activity at the inferior aspect of the  right mastoid process may possibly represent a metastasis, but the  low-level activity at the adjacent subcutaneous fat is of uncertain  etiology. There is no definitive fluid at the right mastoid air cells to  suggest acute mastoiditis, but please correlate clinically and follow-up  as needed.  2. There is no evidence for metastatic lymphadenopathy or visceral  Metastases.    NM PET/CT Skull Base to Mid Thigh 3/4/2024 - FINDINGS: Mediastinal blood pool has a maximal SUV of 2.5, previously 2.4.  1. There has been resolution of most of the hypermetabolic bone metastases. The maximal SUV at the proximal right femur was 6.4 and is  currently 2.5. Most intense residual activity is at the right iliac body with a maximal SUV of 3.9, previously 5.7. A tiny focus at L3 has a maximal SUV of 2.9, previously 5.0. There are no new abnormal foci of abnormal activity within the bones.  2. There is no hypermetabolic lymphadenopathy at the thorax, axilla, supraclavicular regions, neck, abdomen, or pelvis. There is no suspicious visceral activity. No new pulmonary opacities are seen.    NM Bone Scan Whole Body (10/15/2024 12:33 PM)   IMPRESSION;  Multifocal osseous metastatic disease without  scintigraphic evidence for  change.    CT Abdomen Pelvis Without Contrast (05/07/2025 11:21)  CT Chest Without Contrast Diagnostic (05/07/2025 11:21)  Impression:  1.  Widespread osseous metastatic disease, as before.  2.  A few subcentimeter pulm nodules are present in the right lung not  definitively seen on 10/17/2024. Findings are indeterminant and  continued attention follow-up with CT chest in 3 months recommended to  exclude metastatic disease.  3.  Other findings above    Assessment & Plan   *history of stage I breast cancer affecting the left breast (Brady 5/9, 1.5 cm, ER 90%, [place] 70%, HER2 2+/negative FISH).    She underwent lumpectomy and 4 cycles of adjuvant chemotherapy with TC.  Her tumor was estrogen receptor positive and she underwent hormonal therapy with Arimidex between May 2009 in May 2014.    *Radiographic studies consistent with metastatic disease to bone with sclerotic/lytic lesions involving spine (most prominent lumbar), scapula, pelvic bones, proximal femurs  Initiated Arimidex April 2023; Ibrance subsequently added 100 mg D1-21 q. 28 days  4/26/2023 CT-guided bone biopsy left iliac metastatic ductal adenocarcinoma of the breast ER 99% strongly positive, [place] 31 to 40% weakly positive, HER2 2+/FISH negative; [facility] next generation sequencing available in the chart  CA 15-3 significantly elevated 764  CA 15-3 decreased to 295 on 6/5/2023  Repeat bone scan 8/14/2023-improved intensity uptake multiple locations, no new sites identified  PET scan on 3/4/2024 showed resolution of hypermetabolic bone metastases-maximum SUV proximal right femur was 6.4 and 2.5, right iliac body previously 5.7 now 3.9, tiny focus L3 SUV 2.9, no new hypermetabolic activity; CA 15-3 170 on 2/12/2024  10/15/2024-bone scan similar osseous metastatic disease compared to 2/20/2023; CT chest abdomen pelvis 10/17/2024-unchanged osseous metastatic disease in the axial and proximal appendicular skeleton; no soft tissue  metastatic disease; CA 15-3 138  Repeat imaging 5/7/2025 CT chest abdomen pelvis and whole-body bone scan-few sub-6 mm pulmonary nodules right middle lobe and right lower lobe not clearly seen 10/17/2024 widespread osseous metastatic disease stable; CA 15-3 107    *Pain of malignancy-mild at this point, using extra treatment Tylenol as needed    *Hypophosphatemia/hypocalcemia-on oral replacement    *Mild neutropenia secondary to Ibrance-ANC stable 1.52    *Macrocytic anemia-  9/25/2023-B12 undetectably low; IM replacement initiated  3/11/2024-negative SPEP/GABRIELE, B12 447, folate 14, ferritin 143, iron sat 38%  Hemoglobin 9.3  4/7/2025-negative SPEP/GABRIELE ferritin 147 iron sat 27%  B12 485 folate 8.63    *Acid reflux-poor response to Carafate, now on PPI    Oncology plan/recommendations:  Continue anastrozole 1 mg daily  Continue Ibrance 100 mg daily 1 through 21 q. 28 days   Continue Xgeva monthly with lab monitoring  She has extra-strength Tylenol or Ultram at home if needed for pain  Continue monthly B12 injection 1000 mcg  Repeat CT chest 3 months no IV contrast  Okay to reduce PPI to every other day dosing if reflux controlled                5/12/2025      CC:

## 2025-05-07 ENCOUNTER — HOSPITAL ENCOUNTER (OUTPATIENT)
Dept: CT IMAGING | Facility: HOSPITAL | Age: 80
Discharge: HOME | End: 2025-05-07
Payer: MEDICARE

## 2025-05-07 ENCOUNTER — HOSPITAL ENCOUNTER (OUTPATIENT)
Dept: NUCLEAR MEDICINE | Facility: HOSPITAL | Age: 80
Discharge: HOME | End: 2025-05-07
Payer: MEDICARE

## 2025-05-07 DIAGNOSIS — E53.8 B12 DEFICIENCY: ICD-10-CM

## 2025-05-07 DIAGNOSIS — C79.51 CARCINOMA OF BREAST METASTATIC TO BONE, UNSPECIFIED LATERALITY: ICD-10-CM

## 2025-05-07 DIAGNOSIS — C50.919 CARCINOMA OF BREAST METASTATIC TO BONE, UNSPECIFIED LATERALITY: ICD-10-CM

## 2025-05-07 DIAGNOSIS — T45.1X5A ANTINEOPLASTIC CHEMOTHERAPY INDUCED PANCYTOPENIA: ICD-10-CM

## 2025-05-07 DIAGNOSIS — D61.810 ANTINEOPLASTIC CHEMOTHERAPY INDUCED PANCYTOPENIA: ICD-10-CM

## 2025-05-07 PROCEDURE — 34310000005 TECHNETIUM MEDRONATE KIT: Performed by: INTERNAL MEDICINE

## 2025-05-07 PROCEDURE — A9503 TC99M MEDRONATE: HCPCS | Performed by: INTERNAL MEDICINE

## 2025-05-07 PROCEDURE — 78306 BONE IMAGING WHOLE BODY: CPT

## 2025-05-07 PROCEDURE — 71250 CT THORAX DX C-: CPT

## 2025-05-07 PROCEDURE — 74176 CT ABD & PELVIS W/O CONTRAST: CPT

## 2025-05-07 RX ORDER — TC 99M MEDRONATE 20 MG/10ML
21.7 INJECTION, POWDER, LYOPHILIZED, FOR SOLUTION INTRAVENOUS
Status: COMPLETED | OUTPATIENT
Start: 2025-05-07 | End: 2025-05-07

## 2025-05-07 RX ADMIN — Medication 21.7 MILLICURIE: at 11:17

## 2025-05-12 ENCOUNTER — OFFICE VISIT (OUTPATIENT)
Dept: ONCOLOGY | Facility: CLINIC | Age: 80
End: 2025-05-12
Payer: MEDICARE

## 2025-05-12 ENCOUNTER — LAB (OUTPATIENT)
Dept: LAB | Facility: HOSPITAL | Age: 80
End: 2025-05-12
Payer: MEDICARE

## 2025-05-12 ENCOUNTER — SPECIALTY PHARMACY (OUTPATIENT)
Dept: PHARMACY | Facility: HOSPITAL | Age: 80
End: 2025-05-12
Payer: MEDICARE

## 2025-05-12 ENCOUNTER — INFUSION (OUTPATIENT)
Dept: ONCOLOGY | Facility: HOSPITAL | Age: 80
End: 2025-05-12
Payer: MEDICARE

## 2025-05-12 VITALS
OXYGEN SATURATION: 97 % | TEMPERATURE: 97.8 F | BODY MASS INDEX: 25.61 KG/M2 | SYSTOLIC BLOOD PRESSURE: 143 MMHG | WEIGHT: 150 LBS | HEIGHT: 64 IN | DIASTOLIC BLOOD PRESSURE: 81 MMHG | RESPIRATION RATE: 16 BRPM | HEART RATE: 76 BPM

## 2025-05-12 DIAGNOSIS — C50.919 CARCINOMA OF BREAST METASTATIC TO BONE, UNSPECIFIED LATERALITY: ICD-10-CM

## 2025-05-12 DIAGNOSIS — C79.51 CARCINOMA OF BREAST METASTATIC TO BONE, UNSPECIFIED LATERALITY: ICD-10-CM

## 2025-05-12 DIAGNOSIS — R91.8 LUNG NODULES: ICD-10-CM

## 2025-05-12 DIAGNOSIS — Z79.69 NEED FOR PROPHYLACTIC CHEMOTHERAPY: Primary | ICD-10-CM

## 2025-05-12 DIAGNOSIS — Z79.69 NEED FOR PROPHYLACTIC CHEMOTHERAPY: ICD-10-CM

## 2025-05-12 DIAGNOSIS — E53.8 B12 DEFICIENCY: ICD-10-CM

## 2025-05-12 LAB
ALBUMIN SERPL-MCNC: 4.5 G/DL (ref 3.5–5.2)
ALBUMIN/GLOB SERPL: 1.9 G/DL
ALP SERPL-CCNC: 61 U/L (ref 39–117)
ALT SERPL W P-5'-P-CCNC: 14 U/L (ref 1–33)
ANION GAP SERPL CALCULATED.3IONS-SCNC: 11.1 MMOL/L (ref 5–15)
AST SERPL-CCNC: 20 U/L (ref 1–32)
BASOPHILS # BLD AUTO: 0.03 10*3/MM3 (ref 0–0.2)
BASOPHILS NFR BLD AUTO: 1.1 % (ref 0–1.5)
BILIRUB SERPL-MCNC: 0.3 MG/DL (ref 0–1.2)
BUN SERPL-MCNC: 13 MG/DL (ref 8–23)
BUN/CREAT SERPL: 11.7 (ref 7–25)
CALCIUM SPEC-SCNC: 9.4 MG/DL (ref 8.6–10.5)
CHLORIDE SERPL-SCNC: 106 MMOL/L (ref 98–107)
CO2 SERPL-SCNC: 24.9 MMOL/L (ref 22–29)
CREAT SERPL-MCNC: 1.11 MG/DL (ref 0.57–1)
DEPRECATED RDW RBC AUTO: 53.7 FL (ref 37–54)
EGFRCR SERPLBLD CKD-EPI 2021: 51 ML/MIN/1.73
EOSINOPHIL # BLD AUTO: 0.03 10*3/MM3 (ref 0–0.4)
EOSINOPHIL NFR BLD AUTO: 1.1 % (ref 0.3–6.2)
ERYTHROCYTE [DISTWIDTH] IN BLOOD BY AUTOMATED COUNT: 13.5 % (ref 12.3–15.4)
GLOBULIN UR ELPH-MCNC: 2.4 GM/DL
GLUCOSE SERPL-MCNC: 101 MG/DL (ref 65–99)
HCT VFR BLD AUTO: 29.8 % (ref 34–46.6)
HGB BLD-MCNC: 9.9 G/DL (ref 12–15.9)
IMM GRANULOCYTES # BLD AUTO: 0.01 10*3/MM3 (ref 0–0.05)
IMM GRANULOCYTES NFR BLD AUTO: 0.4 % (ref 0–0.5)
LYMPHOCYTES # BLD AUTO: 0.97 10*3/MM3 (ref 0.7–3.1)
LYMPHOCYTES NFR BLD AUTO: 35 % (ref 19.6–45.3)
MAGNESIUM SERPL-MCNC: 2.2 MG/DL (ref 1.6–2.4)
MCH RBC QN AUTO: 36.3 PG (ref 26.6–33)
MCHC RBC AUTO-ENTMCNC: 33.2 G/DL (ref 31.5–35.7)
MCV RBC AUTO: 109.2 FL (ref 79–97)
MONOCYTES # BLD AUTO: 0.21 10*3/MM3 (ref 0.1–0.9)
MONOCYTES NFR BLD AUTO: 7.6 % (ref 5–12)
NEUTROPHILS NFR BLD AUTO: 1.52 10*3/MM3 (ref 1.7–7)
NEUTROPHILS NFR BLD AUTO: 54.8 % (ref 42.7–76)
NRBC BLD AUTO-RTO: 0 /100 WBC (ref 0–0.2)
PHOSPHATE SERPL-MCNC: 2.9 MG/DL (ref 2.5–4.5)
PLATELET # BLD AUTO: 245 10*3/MM3 (ref 140–450)
PMV BLD AUTO: 9 FL (ref 6–12)
POTASSIUM SERPL-SCNC: 4.2 MMOL/L (ref 3.5–5.2)
PROT SERPL-MCNC: 6.9 G/DL (ref 6–8.5)
RBC # BLD AUTO: 2.73 10*6/MM3 (ref 3.77–5.28)
SODIUM SERPL-SCNC: 142 MMOL/L (ref 136–145)
WBC NRBC COR # BLD AUTO: 2.77 10*3/MM3 (ref 3.4–10.8)

## 2025-05-12 PROCEDURE — 36415 COLL VENOUS BLD VENIPUNCTURE: CPT

## 2025-05-12 PROCEDURE — 25010000002 CYANOCOBALAMIN PER 1000 MCG: Performed by: INTERNAL MEDICINE

## 2025-05-12 PROCEDURE — 3079F DIAST BP 80-89 MM HG: CPT | Performed by: INTERNAL MEDICINE

## 2025-05-12 PROCEDURE — 1126F AMNT PAIN NOTED NONE PRSNT: CPT | Performed by: INTERNAL MEDICINE

## 2025-05-12 PROCEDURE — 3077F SYST BP >= 140 MM HG: CPT | Performed by: INTERNAL MEDICINE

## 2025-05-12 PROCEDURE — G2211 COMPLEX E/M VISIT ADD ON: HCPCS | Performed by: INTERNAL MEDICINE

## 2025-05-12 PROCEDURE — 96372 THER/PROPH/DIAG INJ SC/IM: CPT

## 2025-05-12 PROCEDURE — 84100 ASSAY OF PHOSPHORUS: CPT

## 2025-05-12 PROCEDURE — 80053 COMPREHEN METABOLIC PANEL: CPT

## 2025-05-12 PROCEDURE — 85025 COMPLETE CBC W/AUTO DIFF WBC: CPT

## 2025-05-12 PROCEDURE — 83735 ASSAY OF MAGNESIUM: CPT

## 2025-05-12 PROCEDURE — 99214 OFFICE O/P EST MOD 30 MIN: CPT | Performed by: INTERNAL MEDICINE

## 2025-05-12 PROCEDURE — 25010000002 DENOSUMAB 120 MG/1.7ML SOLUTION: Performed by: INTERNAL MEDICINE

## 2025-05-12 RX ORDER — CYANOCOBALAMIN 1000 UG/ML
1000 INJECTION, SOLUTION INTRAMUSCULAR; SUBCUTANEOUS ONCE
Status: COMPLETED | OUTPATIENT
Start: 2025-05-12 | End: 2025-05-12

## 2025-05-12 RX ORDER — CYANOCOBALAMIN 1000 UG/ML
1000 INJECTION, SOLUTION INTRAMUSCULAR; SUBCUTANEOUS ONCE
OUTPATIENT
Start: 2025-06-02

## 2025-05-12 RX ADMIN — CYANOCOBALAMIN 1000 MCG: 1000 INJECTION, SOLUTION INTRAMUSCULAR; SUBCUTANEOUS at 12:12

## 2025-05-12 RX ADMIN — DENOSUMAB 120 MG: 120 INJECTION SUBCUTANEOUS at 12:03

## 2025-05-13 ENCOUNTER — SPECIALTY PHARMACY (OUTPATIENT)
Dept: PHARMACY | Facility: HOSPITAL | Age: 80
End: 2025-05-13
Payer: MEDICARE

## 2025-05-13 NOTE — PROGRESS NOTES
Specialty Pharmacy Note: Ibrance (palbociclib)    Nedra Roberts is a 79 y.o. female with MBC was seen 5/12/25 by Dr. Ford. Per provider dictation, no changes to oral oncology regimen Ibrance (palbociclib) 100 mg po daily for 21 days on, then 7 days off.  Labs Review: The CMP and CBC from 5/12/25 have been reviewed. No dose adjustments are needed for the oral specialty medication(s) based on the labs.    Specialty pharmacy will continue to follow patient.    Jyothi Ashford Rph, BCOP  5/13/2025  09:37 EDT

## 2025-05-15 ENCOUNTER — SPECIALTY PHARMACY (OUTPATIENT)
Dept: PHARMACY | Facility: HOSPITAL | Age: 80
End: 2025-05-15
Payer: MEDICARE

## 2025-05-15 NOTE — PROGRESS NOTES
Specialty Pharmacy Patient Management Program       I have submitted a Renewal Prior Authorization Request for Ibrance through the [employer] Website. I received the following response:        Shira Bunn - Care Coordinator   5/15/2025  13:19 EDT

## 2025-06-05 ENCOUNTER — SPECIALTY PHARMACY (OUTPATIENT)
Age: 80
End: 2025-06-05
Payer: MEDICARE

## 2025-06-05 NOTE — PROGRESS NOTES
" Specialty Pharmacy Patient Management Program  Oncology / Hematology Refill Outreach      Nedra \"Jeniffer\" was contacted today regarding refills of her medication(s).    Specialty medication(s) and dose(s) confirmed: Ibrance    Refill Questions      Flowsheet Row Most Recent Value   Changes to allergies? No   Changes to medications? No   New conditions or infections since last clinic visit No   Unplanned office visit, urgent care, ED, or hospital admission in the last 4 weeks  No   How does patient/caregiver feel medication is working? Good   Financial problems or insurance changes  No   Since the previous refill, were any specialty medication doses or scheduled injections missed or delayed?  No   Does this patient require a clinical escalation to a pharmacist? No          Delivery Questions      Flowsheet Row Most Recent Value   Delivery method [employer]   Delivery address verified with patient/caregiver? Yes   Delivery address Home   Other address preferred N/A   Number of medications in delivery 1   Medication(s) being filled and delivered Palbociclib (IBRANCE)  [Next Cycle Start date will be 6/17]   Doses left of specialty medications 5   Copay verified? Yes   Copay amount $0   Delivery Date Selection 06/10/25   Signature Required No   Do you consent to receive electronic handouts?  No            Follow-Up: 21d [USUALLY TIME FRAME UNTIL NEXT REFILL OUTREACH FOLLOW-UP (APPROX) Ex. 25d, 85d, etc. ]    Shira Bunn, Pharmacy Technician  6/5/2025  16:41 EDT    "

## 2025-06-06 NOTE — PROGRESS NOTES
Specialty Pharmacy Patient Management Program  Per Protocol Prescription Order or Refill       Requested Prescriptions     Signed Prescriptions Disp Refills    Palbociclib (Ibrance) 100 MG tablet tablet 21 tablet 5     Sig: Take 1 tablet by mouth Daily. Take for 21 days on, then 7 days off.  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     Completed independent double check on medication order/RX.    Gio Ortiz PharmD, BCOP  Clinical Specialty Pharmacist, Oncology  6/6/2025  10:47 EDT

## 2025-06-06 NOTE — PROGRESS NOTES
Specialty Pharmacy Patient Management Program  Per Protocol Prescription Order or Refill     Patient will be filling or currently fills medications at [facility] and is enrolled in the Patient Management Program.    Requested Prescriptions     Signed Prescriptions Disp Refills    Palbociclib (Ibrance) 100 MG tablet tablet 21 tablet 5     Sig: Take 1 tablet by mouth Daily. Take for 21 days on, then 7 days off.  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     Last Office Visit: 5/12/25  Next Office Visit: 8/11/25    Jyothi Ashford Rph, BCOP  Clinical Specialty Pharmacist, Oncology  6/6/2025  08:16 EDT

## 2025-06-09 ENCOUNTER — INFUSION (OUTPATIENT)
Dept: ONCOLOGY | Facility: HOSPITAL | Age: 80
End: 2025-06-09
Payer: MEDICARE

## 2025-06-09 ENCOUNTER — LAB (OUTPATIENT)
Dept: LAB | Facility: HOSPITAL | Age: 80
End: 2025-06-09
Payer: MEDICARE

## 2025-06-09 DIAGNOSIS — Z79.69 NEED FOR PROPHYLACTIC CHEMOTHERAPY: ICD-10-CM

## 2025-06-09 DIAGNOSIS — C79.51 CARCINOMA OF BREAST METASTATIC TO BONE, UNSPECIFIED LATERALITY: ICD-10-CM

## 2025-06-09 DIAGNOSIS — Z79.69 NEED FOR PROPHYLACTIC CHEMOTHERAPY: Primary | ICD-10-CM

## 2025-06-09 DIAGNOSIS — C50.919 CARCINOMA OF BREAST METASTATIC TO BONE, UNSPECIFIED LATERALITY: ICD-10-CM

## 2025-06-09 DIAGNOSIS — E53.8 B12 DEFICIENCY: ICD-10-CM

## 2025-06-09 LAB
ALBUMIN SERPL-MCNC: 4.5 G/DL (ref 3.5–5.2)
ALBUMIN/GLOB SERPL: 1.9 G/DL
ALP SERPL-CCNC: 55 U/L (ref 39–117)
ALT SERPL W P-5'-P-CCNC: 10 U/L (ref 1–33)
ANION GAP SERPL CALCULATED.3IONS-SCNC: 8.2 MMOL/L (ref 5–15)
AST SERPL-CCNC: 16 U/L (ref 1–32)
BASOPHILS # BLD AUTO: 0.03 10*3/MM3 (ref 0–0.2)
BASOPHILS NFR BLD AUTO: 1.3 % (ref 0–1.5)
BILIRUB SERPL-MCNC: 0.3 MG/DL (ref 0–1.2)
BUN SERPL-MCNC: 20.6 MG/DL (ref 8–23)
BUN/CREAT SERPL: 19.3 (ref 7–25)
CALCIUM SPEC-SCNC: 9.6 MG/DL (ref 8.6–10.5)
CHLORIDE SERPL-SCNC: 106 MMOL/L (ref 98–107)
CO2 SERPL-SCNC: 25.8 MMOL/L (ref 22–29)
CREAT SERPL-MCNC: 1.07 MG/DL (ref 0.57–1)
DEPRECATED RDW RBC AUTO: 51.4 FL (ref 37–54)
EGFRCR SERPLBLD CKD-EPI 2021: 52.9 ML/MIN/1.73
EOSINOPHIL # BLD AUTO: 0.03 10*3/MM3 (ref 0–0.4)
EOSINOPHIL NFR BLD AUTO: 1.3 % (ref 0.3–6.2)
ERYTHROCYTE [DISTWIDTH] IN BLOOD BY AUTOMATED COUNT: 12.9 % (ref 12.3–15.4)
GLOBULIN UR ELPH-MCNC: 2.4 GM/DL
GLUCOSE SERPL-MCNC: 113 MG/DL (ref 65–99)
HCT VFR BLD AUTO: 30.1 % (ref 34–46.6)
HGB BLD-MCNC: 10 G/DL (ref 12–15.9)
IMM GRANULOCYTES # BLD AUTO: 0.01 10*3/MM3 (ref 0–0.05)
IMM GRANULOCYTES NFR BLD AUTO: 0.4 % (ref 0–0.5)
LYMPHOCYTES # BLD AUTO: 0.68 10*3/MM3 (ref 0.7–3.1)
LYMPHOCYTES NFR BLD AUTO: 30 % (ref 19.6–45.3)
MAGNESIUM SERPL-MCNC: 2.2 MG/DL (ref 1.6–2.4)
MCH RBC QN AUTO: 36 PG (ref 26.6–33)
MCHC RBC AUTO-ENTMCNC: 33.2 G/DL (ref 31.5–35.7)
MCV RBC AUTO: 108.3 FL (ref 79–97)
MONOCYTES # BLD AUTO: 0.15 10*3/MM3 (ref 0.1–0.9)
MONOCYTES NFR BLD AUTO: 6.6 % (ref 5–12)
NEUTROPHILS NFR BLD AUTO: 1.37 10*3/MM3 (ref 1.7–7)
NEUTROPHILS NFR BLD AUTO: 60.4 % (ref 42.7–76)
NRBC BLD AUTO-RTO: 0 /100 WBC (ref 0–0.2)
PHOSPHATE SERPL-MCNC: 2.4 MG/DL (ref 2.5–4.5)
PLATELET # BLD AUTO: 199 10*3/MM3 (ref 140–450)
PMV BLD AUTO: 8.7 FL (ref 6–12)
POTASSIUM SERPL-SCNC: 4.1 MMOL/L (ref 3.5–5.2)
PROT SERPL-MCNC: 6.9 G/DL (ref 6–8.5)
RBC # BLD AUTO: 2.78 10*6/MM3 (ref 3.77–5.28)
SODIUM SERPL-SCNC: 140 MMOL/L (ref 136–145)
WBC NRBC COR # BLD AUTO: 2.27 10*3/MM3 (ref 3.4–10.8)

## 2025-06-09 PROCEDURE — 25010000002 CYANOCOBALAMIN PER 1000 MCG: Performed by: INTERNAL MEDICINE

## 2025-06-09 PROCEDURE — 84100 ASSAY OF PHOSPHORUS: CPT

## 2025-06-09 PROCEDURE — 85025 COMPLETE CBC W/AUTO DIFF WBC: CPT

## 2025-06-09 PROCEDURE — 25010000002 DENOSUMAB 120 MG/1.7ML SOLUTION

## 2025-06-09 PROCEDURE — 36415 COLL VENOUS BLD VENIPUNCTURE: CPT

## 2025-06-09 PROCEDURE — 83735 ASSAY OF MAGNESIUM: CPT

## 2025-06-09 PROCEDURE — 96372 THER/PROPH/DIAG INJ SC/IM: CPT

## 2025-06-09 PROCEDURE — 80053 COMPREHEN METABOLIC PANEL: CPT

## 2025-06-09 RX ORDER — CYANOCOBALAMIN 1000 UG/ML
1000 INJECTION, SOLUTION INTRAMUSCULAR; SUBCUTANEOUS ONCE
OUTPATIENT
Start: 2025-07-07

## 2025-06-09 RX ORDER — CYANOCOBALAMIN 1000 UG/ML
1000 INJECTION, SOLUTION INTRAMUSCULAR; SUBCUTANEOUS ONCE
Status: COMPLETED | OUTPATIENT
Start: 2025-06-09 | End: 2025-06-09

## 2025-06-09 RX ADMIN — DENOSUMAB 120 MG: 120 INJECTION SUBCUTANEOUS at 09:47

## 2025-06-09 RX ADMIN — CYANOCOBALAMIN 1000 MCG: 1000 INJECTION, SOLUTION INTRAMUSCULAR at 09:48

## 2025-06-17 ENCOUNTER — TELEPHONE (OUTPATIENT)
Dept: SLEEP MEDICINE | Facility: HOSPITAL | Age: 80
End: 2025-06-17
Payer: MEDICARE

## 2025-06-17 NOTE — TELEPHONE ENCOUNTER
Per [employer] they have been unable to contact Pt for CPAP setup. I ssent mychart message to Pt for them to call [employer] for setup

## 2025-06-19 RX ORDER — SUCRALFATE 1 G/1
TABLET ORAL
Qty: 90 TABLET | Refills: 1 | OUTPATIENT
Start: 2025-06-19

## 2025-07-02 ENCOUNTER — SPECIALTY PHARMACY (OUTPATIENT)
Dept: PHARMACY | Facility: HOSPITAL | Age: 80
End: 2025-07-02
Payer: MEDICARE

## 2025-07-02 NOTE — PROGRESS NOTES
" Specialty Pharmacy Patient Management Program  Oncology / Hematology Refill Outreach      Nedra \"Jeniffer\" was contacted today regarding refills of her medication(s).    Specialty medication(s) and dose(s) confirmed: Ibrance    Refill Questions      Flowsheet Row Most Recent Value   Changes to allergies? No   Changes to medications? No   New conditions or infections since last clinic visit No   Unplanned office visit, urgent care, ED, or hospital admission in the last 4 weeks  No   How does patient/caregiver feel medication is working? Good   Financial problems or insurance changes  No   Since the previous refill, were any specialty medication doses or scheduled injections missed or delayed?  No   Does this patient require a clinical escalation to a pharmacist? Yes          Delivery Questions      Flowsheet Row Most Recent Value   Delivery method UPS   Delivery address verified with patient/caregiver? Yes   Delivery address Home   Other address preferred N/A   Number of medications in delivery 1   Medication(s) being filled and delivered Palbociclib   Doses left of specialty medications 4   Copay verified? Yes   Copay amount $0.00   Copay form of payment No copayment ($0)   Delivery Date Selection 07/09/25   Signature Required No   Do you consent to receive electronic handouts?  No            Follow-Up: 21d [USUALLY TIME FRAME UNTIL NEXT REFILL OUTREACH FOLLOW-UP (APPROX) Ex. 25d, 85d, etc. ]    Shira Bunn, Pharmacy Technician  7/2/2025  11:03 EDT    "

## 2025-07-03 ENCOUNTER — SPECIALTY PHARMACY (OUTPATIENT)
Dept: PHARMACY | Facility: HOSPITAL | Age: 80
End: 2025-07-03
Payer: MEDICARE

## 2025-07-03 RX ORDER — OMEPRAZOLE 20 MG/1
20 CAPSULE, DELAYED RELEASE ORAL DAILY
COMMUNITY

## 2025-07-03 NOTE — PROGRESS NOTES
Specialty Pharmacy Patient Management Program  Oncology Reassessment     Nedra Roberts was referred by an their provider to the Oncology Patient Management program offered by [facility] for metastatic breast cancer. A follow-up outreach was conducted, including assessment of continued therapy appropriateness, medication adherence, and side effect incidence and management for Ibrance 100 mg po daily for 21 days on, then 7 days off.    Changes to Insurance Coverage or Financial Support  None at this time    Relevant Past Medical History and Comorbidities  Relevant medical history and concomitant health conditions were discussed with the patient. The patient's chart has been reviewed for relevant past medical history and comorbid health conditions and updated as necessary.   Past Medical History:   Diagnosis Date    Breast cancer 2009    Stage I left breast    Drug therapy     Gastritis     GERD (gastroesophageal reflux disease)     H/O lumpectomy     HL (hearing loss)     Hyperlipidemia     Hypertension     Hypothyroidism      Social History     Socioeconomic History    Marital status:      Spouse name: Rocky   Tobacco Use    Smoking status: Former     Current packs/day: 0.00     Average packs/day: 1 pack/day for 40.0 years (40.0 ttl pk-yrs)     Types: Cigarettes     Start date: 1962     Quit date: 2002     Years since quittin.5    Smokeless tobacco: Never   Vaping Use    Vaping status: Never Used   Substance and Sexual Activity    Alcohol use: Yes     Alcohol/week: 1.0 standard drink of alcohol     Types: 1 Glasses of wine per week     Comment: NIGHTLY    Drug use: Never    Sexual activity: Not Currently     Partners: Male     Birth control/protection: Post-menopausal     Problem list reviewed by Susaan Ashford RPH on 7/3/2025 at 11:44 AM    Hospitalizations and Urgent Care Since Last Assessment  ED Visits, Admissions, or Hospitalizations: none  Urgent Office  Visits: none    Allergies  Known allergies and reactions were discussed with the patient. The patient's chart has been reviewed for allergy information and updated as necessary.   No Known Allergies  Allergies reviewed by Susana Ashford RPH on 7/3/2025 at 11:44 AM    Relevant Laboratory Values  Relevant laboratory values were discussed with the patient. The following specialty medication dose adjustment(s) are recommended: No dose adjustments are needed for the oral specialty medication(s) based on the labs.    Lab Results   Component Value Date    GLUCOSE 113 (H) 06/09/2025    CALCIUM 9.6 06/09/2025     06/09/2025    K 4.1 06/09/2025    CO2 25.8 06/09/2025     06/09/2025    BUN 20.6 06/09/2025    CREATININE 1.07 (H) 06/09/2025    EGFRIFAFRI 68 02/21/2022    EGFRIFNONA 59 (L) 02/21/2022    BCR 19.3 06/09/2025    ANIONGAP 8.2 06/09/2025     Lab Results   Component Value Date    WBC 2.27 (L) 06/09/2025    RBC 2.78 (L) 06/09/2025    HGB 10.0 (L) 06/09/2025    HCT 30.1 (L) 06/09/2025    .3 (H) 06/09/2025    MCH 36.0 (H) 06/09/2025    MCHC 33.2 06/09/2025    RDW 12.9 06/09/2025    RDWSD 51.4 06/09/2025    MPV 8.7 06/09/2025     06/09/2025    NEUTRORELPCT 60.4 06/09/2025    LYMPHORELPCT 30.0 06/09/2025    MONORELPCT 6.6 06/09/2025    EOSRELPCT 1.3 06/09/2025    BASORELPCT 1.3 06/09/2025    AUTOIGPER 0.4 06/09/2025    NEUTROABS 1.37 (L) 06/09/2025    LYMPHSABS 0.68 (L) 06/09/2025    MONOSABS 0.15 06/09/2025    EOSABS 0.03 06/09/2025    BASOSABS 0.03 06/09/2025    AUTOIGNUM 0.01 06/09/2025    NRBC 0.0 06/09/2025       Current Medication List  This medication list has been reviewed with the patient and evaluated for any interactions or necessary modifications/recommendations, and updated to include all prescription medications, OTC medications, and supplements the patient is currently taking.  This list reflects what is contained in the patient's profile, which has also been marked as reviewed to  communicate to other providers it is the most up to date version of the patient's current medication therapy.     Current Outpatient Medications:     acetaminophen (TYLENOL) 650 MG 8 hr tablet, Take 1 tablet by mouth., Disp: , Rfl:     anastrozole (ARIMIDEX) 1 MG tablet, TAKE 1 TABLET BY MOUTH DAILY, Disp: 30 tablet, Rfl: 5    aspirin 81 MG EC tablet, Take 1 tablet by mouth Daily., Disp: , Rfl:     Calcium Carbonate Antacid (TUMS PO), Take  by mouth As Needed., Disp: , Rfl:     Cholecalciferol (Vitamin D3) 50 MCG (2000 UT) capsule, TAKE 1 CAPSULE BY MOUTH ONCE DAILY, Disp: 90 capsule, Rfl: 1    denosumab (XGEVA) 120 MG/1.7ML solution injection, Inject  under the skin into the appropriate area as directed 1 (One) Time., Disp: , Rfl:     levothyroxine (SYNTHROID, LEVOTHROID) 25 MCG tablet, TAKE 1 TABLET BY MOUTH DAILY, Disp: 90 tablet, Rfl: 0    losartan (Cozaar) 50 MG tablet, Take 1 tablet by mouth Daily., Disp: 90 tablet, Rfl: 3    omeprazole (priLOSEC) 20 MG capsule, Take 1 capsule by mouth Daily., Disp: , Rfl:     Palbociclib (Ibrance) 100 MG tablet tablet, Take 1 tablet by mouth Daily. Take for 21 days on, then 7 days off.  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    Medicines reviewed by Susana Ashford RPH on 7/3/2025 at 11:23 AM    Drug Interactions  Assessed medication list for interactions, no significant drug interactions noted.   Advised patient to call the clinic if any new medications are started so we can assess for drug-drug interactions.  Drug-food interactions discussed: eating grapefruit and drinking grapefruit juice    Adverse Drug Reactions  Medication tolerability: Patient reported common adverse drug reaction  Medication plan: Continue therapy with normal follow-up  Plan for ADR  Management: we discussed application of tea tree oil to nailbeds and she was agreeable to this approach.      Adherence, Self-Administration, and Current Therapy Problems  Adherence related to the patient's specialty therapy was discussed with the patient. The Adherence segment of this outreach has been reviewed and updated.     Adherence Questions  Linked Medication(s) Assessed: Palbociclib  On average, how many doses/injections does the patient miss per month?: 0  What are the identified reasons for non-adherence or missed doses? : no problems identified  What is the estimated medication adherence level?: %  Based on the patient/caregiver response and refill history, does this patient require an MTP to track adherence improvements?: no    Additional Barriers to Patient Self-Administration: none  Methods for Supporting Patient Self-Administration: pharmacy calls  Patient has had no issues obtaining medication from pharmacy.    Open Medication Therapy Problems  No medication therapy recommendations to display    Goals of Therapy  Goals related to the patient's specialty therapy were discussed with the patient. The Patient Goals segment of this outreach has been reviewed and updated.   Goals Addressed Today        Specialty Pharmacy General Goal      Progression free survival- monitor scans and Ca 15-3  11/10/23 last dictation:  Repeat bone scan 8/14/2023-improved intensity uptake multiple locations, no new sites identified   10/15/2024-bone scan similar osseous metastatic disease compared to 2/20/2023; CT chest abdomen pelvis 10/17/2024-unchanged osseous metastatic disease in the axial and proximal appendicular skeleton; no soft tissue metastatic disease; CA 15-3 138   1/13/25 Ca 15-3 131  4/7/25 Ca 15-3=107  Repeat imaging 5/7/2025 CT chest abdomen pelvis and whole-body bone scan-few sub-6 mm pulmonary nodules right middle lobe and right lower lobe not clearly seen 10/17/2024 widespread osseous metastatic  disease stable; CA 15-3 107              Quality of Life Assessment   Quality of Life related to the patient's enrollment in the patient management program and services provided was discussed with the patient. The QOL segment of this outreach has been reviewed and updated.  Quality of Life Improvement Scale: 8-Moderately better    Discussed aforementioned material with patient over the phone.     Reassessment Plan & Follow-Up  1. Medication Therapy Changes: omeprazole 20 mg po daily added  2. Related Plans, Therapy Recommendations, or Issues to Be Addressed: none  3. Pharmacist to perform regular assessments no more than (6) months from the previous assessment.   4. Care Coordinator to set up future refill outreaches, coordinate prescription delivery, and escalate clinical questions to pharmacist.    Attestation  Therapeutic appropriateness: Appropriate   I attest the patient was actively involved in and has agreed to the above plan of care.  If the prescribed therapy is at any point deemed not appropriate based on the current or future assessments, a consultation will be initiated with the patient's specialty care provider to determine the best course of action. The revised plan of therapy will be documented along with any required assessments and/or additional patient education provided.     Jyothi Ashford Rph, BCOP  Clinical Specialty Pharmacist, Oncology  7/3/2025  11:45 EDT

## 2025-07-03 NOTE — PROGRESS NOTES
Specialty Pharmacy Patient Management Program  One-Time Clinical Outreach     Nedra Roberts is seen by an their provider for MBC and enrolled in the Oncology Patient Management program offered by [facility].      Open Medication Therapy Problems  Breast cancer metastasized to bone   1 Rationale: Medication requires monitoring - Needs additional monitoring - Safety   Recommendation: Continue to Monitor   Identified Date: 7/3/2025   Note: Ms Roberts asked for clarification on use of Pecid vs Pantropazole vs Omeprazole during a refill coordination.  It was determined that her question involved  use with renal insufficiency.  She stated that Omeprazole works best for her GERD.  We discussed that it does not require a renal dose adjustment and she verbalized understanding.  She was agreeable to taking Omeprazole 20 mg po daily.             Jyothi Ashford Rph, BCOP  Clinical Specialty Pharmacist, Oncology  7/3/2025  11:27 EDT

## 2025-07-07 ENCOUNTER — LAB (OUTPATIENT)
Dept: LAB | Facility: HOSPITAL | Age: 80
End: 2025-07-07
Payer: MEDICARE

## 2025-07-07 ENCOUNTER — INFUSION (OUTPATIENT)
Dept: ONCOLOGY | Facility: HOSPITAL | Age: 80
End: 2025-07-07
Payer: MEDICARE

## 2025-07-07 DIAGNOSIS — C79.51 CARCINOMA OF BREAST METASTATIC TO BONE, UNSPECIFIED LATERALITY: ICD-10-CM

## 2025-07-07 DIAGNOSIS — Z79.69 NEED FOR PROPHYLACTIC CHEMOTHERAPY: ICD-10-CM

## 2025-07-07 DIAGNOSIS — C50.919 CARCINOMA OF BREAST METASTATIC TO BONE, UNSPECIFIED LATERALITY: ICD-10-CM

## 2025-07-07 DIAGNOSIS — E53.8 B12 DEFICIENCY: Primary | ICD-10-CM

## 2025-07-07 LAB
ALBUMIN SERPL-MCNC: 4.5 G/DL (ref 3.5–5.2)
ALBUMIN/GLOB SERPL: 2 G/DL
ALP SERPL-CCNC: 59 U/L (ref 39–117)
ALT SERPL W P-5'-P-CCNC: 12 U/L (ref 1–33)
ANION GAP SERPL CALCULATED.3IONS-SCNC: 11.5 MMOL/L (ref 5–15)
AST SERPL-CCNC: 19 U/L (ref 1–32)
BASOPHILS # BLD AUTO: 0.03 10*3/MM3 (ref 0–0.2)
BASOPHILS NFR BLD AUTO: 1.2 % (ref 0–1.5)
BILIRUB SERPL-MCNC: 0.3 MG/DL (ref 0–1.2)
BUN SERPL-MCNC: 17.9 MG/DL (ref 8–23)
BUN/CREAT SERPL: 15.2 (ref 7–25)
CALCIUM SPEC-SCNC: 10 MG/DL (ref 8.6–10.5)
CHLORIDE SERPL-SCNC: 103 MMOL/L (ref 98–107)
CO2 SERPL-SCNC: 23.5 MMOL/L (ref 22–29)
CREAT SERPL-MCNC: 1.18 MG/DL (ref 0.57–1)
DEPRECATED RDW RBC AUTO: 52 FL (ref 37–54)
EGFRCR SERPLBLD CKD-EPI 2021: 47.1 ML/MIN/1.73
EOSINOPHIL # BLD AUTO: 0.02 10*3/MM3 (ref 0–0.4)
EOSINOPHIL NFR BLD AUTO: 0.8 % (ref 0.3–6.2)
ERYTHROCYTE [DISTWIDTH] IN BLOOD BY AUTOMATED COUNT: 13.2 % (ref 12.3–15.4)
GLOBULIN UR ELPH-MCNC: 2.2 GM/DL
GLUCOSE SERPL-MCNC: 99 MG/DL (ref 65–99)
HCT VFR BLD AUTO: 29.6 % (ref 34–46.6)
HGB BLD-MCNC: 9.9 G/DL (ref 12–15.9)
IMM GRANULOCYTES # BLD AUTO: 0.01 10*3/MM3 (ref 0–0.05)
IMM GRANULOCYTES NFR BLD AUTO: 0.4 % (ref 0–0.5)
LYMPHOCYTES # BLD AUTO: 0.83 10*3/MM3 (ref 0.7–3.1)
LYMPHOCYTES NFR BLD AUTO: 32.7 % (ref 19.6–45.3)
MAGNESIUM SERPL-MCNC: 2.1 MG/DL (ref 1.6–2.4)
MCH RBC QN AUTO: 36 PG (ref 26.6–33)
MCHC RBC AUTO-ENTMCNC: 33.4 G/DL (ref 31.5–35.7)
MCV RBC AUTO: 107.6 FL (ref 79–97)
MONOCYTES # BLD AUTO: 0.16 10*3/MM3 (ref 0.1–0.9)
MONOCYTES NFR BLD AUTO: 6.3 % (ref 5–12)
NEUTROPHILS NFR BLD AUTO: 1.49 10*3/MM3 (ref 1.7–7)
NEUTROPHILS NFR BLD AUTO: 58.6 % (ref 42.7–76)
NRBC BLD AUTO-RTO: 0 /100 WBC (ref 0–0.2)
PHOSPHATE SERPL-MCNC: 3.7 MG/DL (ref 2.5–4.5)
PLATELET # BLD AUTO: 246 10*3/MM3 (ref 140–450)
PMV BLD AUTO: 9.3 FL (ref 6–12)
POTASSIUM SERPL-SCNC: 4.8 MMOL/L (ref 3.5–5.2)
PROT SERPL-MCNC: 6.7 G/DL (ref 6–8.5)
RBC # BLD AUTO: 2.75 10*6/MM3 (ref 3.77–5.28)
SODIUM SERPL-SCNC: 138 MMOL/L (ref 136–145)
WBC NRBC COR # BLD AUTO: 2.54 10*3/MM3 (ref 3.4–10.8)

## 2025-07-07 PROCEDURE — 84100 ASSAY OF PHOSPHORUS: CPT

## 2025-07-07 PROCEDURE — 36415 COLL VENOUS BLD VENIPUNCTURE: CPT

## 2025-07-07 PROCEDURE — 83735 ASSAY OF MAGNESIUM: CPT

## 2025-07-07 PROCEDURE — 25010000002 CYANOCOBALAMIN PER 1000 MCG: Performed by: INTERNAL MEDICINE

## 2025-07-07 PROCEDURE — 25010000002 DENOSUMAB 120 MG/1.7ML SOLUTION: Performed by: INTERNAL MEDICINE

## 2025-07-07 PROCEDURE — 80053 COMPREHEN METABOLIC PANEL: CPT

## 2025-07-07 PROCEDURE — 96372 THER/PROPH/DIAG INJ SC/IM: CPT

## 2025-07-07 PROCEDURE — 85025 COMPLETE CBC W/AUTO DIFF WBC: CPT

## 2025-07-07 RX ORDER — CYANOCOBALAMIN 1000 UG/ML
1000 INJECTION, SOLUTION INTRAMUSCULAR; SUBCUTANEOUS ONCE
Status: COMPLETED | OUTPATIENT
Start: 2025-07-07 | End: 2025-07-07

## 2025-07-07 RX ORDER — CYANOCOBALAMIN 1000 UG/ML
1000 INJECTION, SOLUTION INTRAMUSCULAR; SUBCUTANEOUS ONCE
OUTPATIENT
Start: 2025-08-04

## 2025-07-07 RX ADMIN — DENOSUMAB 120 MG: 120 INJECTION SUBCUTANEOUS at 10:46

## 2025-07-07 RX ADMIN — CYANOCOBALAMIN 1000 MCG: 1000 INJECTION, SOLUTION INTRAMUSCULAR at 10:46

## 2025-07-21 RX ORDER — LEVOTHYROXINE SODIUM 25 UG/1
25 TABLET ORAL DAILY
Qty: 90 TABLET | Refills: 0 | Status: SHIPPED | OUTPATIENT
Start: 2025-07-21 | End: 2025-07-28

## 2025-07-28 RX ORDER — LEVOTHYROXINE SODIUM 25 UG/1
25 TABLET ORAL DAILY
Qty: 90 TABLET | Refills: 0 | Status: SHIPPED | OUTPATIENT
Start: 2025-07-28

## 2025-07-28 RX ORDER — SIMVASTATIN 40 MG
40 TABLET ORAL NIGHTLY
Qty: 90 TABLET | Refills: 3 | Status: SHIPPED | OUTPATIENT
Start: 2025-07-28

## 2025-08-01 ENCOUNTER — SPECIALTY PHARMACY (OUTPATIENT)
Dept: PHARMACY | Facility: HOSPITAL | Age: 80
End: 2025-08-01
Payer: MEDICARE

## 2025-08-01 NOTE — PROGRESS NOTES
" Specialty Pharmacy Patient Management Program  Oncology / Hematology Refill Outreach      Nedra \"Jeniffer\" was contacted today regarding refills of her medication(s).    Specialty medication(s) and dose(s) confirmed: Ibrance    Refill Questions      Flowsheet Row Most Recent Value   Changes to allergies? No   Changes to medications? No   New conditions or infections since last clinic visit No   Unplanned office visit, urgent care, ED, or hospital admission in the last 4 weeks  No   How does patient/caregiver feel medication is working? Good   Financial problems or insurance changes  No   Since the previous refill, were any specialty medication doses or scheduled injections missed or delayed?  No   Does this patient require a clinical escalation to a pharmacist? No          Delivery Questions      Flowsheet Row Most Recent Value   Delivery method UPS   Delivery address verified with patient/caregiver? Yes   Delivery address Home   Other address preferred N/A   Number of medications in delivery 1   Medication(s) being filled and delivered Palbociclib (IBRANCE)   Doses left of specialty medications 2  [OFF 8/5 - 8/11  ON 8/12 - 9/1]   Copay verified? Yes   Copay amount $0.00   Copay form of payment No copayment ($0)   Delivery Date Selection 08/05/25   Signature Required No   Do you consent to receive electronic handouts?  No            Follow-Up: 21d  [USUALLY TIME FRAME UNTIL NEXT REFILL OUTREACH FOLLOW-UP (APPROX) Ex. 25d, 85d, etc. ]    Shira Bunn, Pharmacy Technician  8/1/2025  14:01 EDT    "

## 2025-08-04 ENCOUNTER — HOSPITAL ENCOUNTER (OUTPATIENT)
Dept: CT IMAGING | Facility: HOSPITAL | Age: 80
Discharge: HOME | End: 2025-08-04
Admitting: INTERNAL MEDICINE
Payer: MEDICARE

## 2025-08-04 DIAGNOSIS — R91.8 LUNG NODULES: ICD-10-CM

## 2025-08-04 PROCEDURE — 71250 CT THORAX DX C-: CPT

## 2025-08-11 ENCOUNTER — OFFICE VISIT (OUTPATIENT)
Dept: ONCOLOGY | Facility: CLINIC | Age: 80
End: 2025-08-11
Payer: MEDICARE

## 2025-08-11 ENCOUNTER — INFUSION (OUTPATIENT)
Dept: ONCOLOGY | Facility: HOSPITAL | Age: 80
End: 2025-08-11
Payer: MEDICARE

## 2025-08-11 ENCOUNTER — LAB (OUTPATIENT)
Dept: LAB | Facility: HOSPITAL | Age: 80
End: 2025-08-11
Payer: MEDICARE

## 2025-08-11 VITALS
OXYGEN SATURATION: 96 % | RESPIRATION RATE: 16 BRPM | BODY MASS INDEX: 25.88 KG/M2 | WEIGHT: 151.6 LBS | HEART RATE: 77 BPM | HEIGHT: 64 IN | DIASTOLIC BLOOD PRESSURE: 73 MMHG | TEMPERATURE: 98.7 F | SYSTOLIC BLOOD PRESSURE: 118 MMHG

## 2025-08-11 DIAGNOSIS — C79.51 CARCINOMA OF BREAST METASTATIC TO BONE, UNSPECIFIED LATERALITY: ICD-10-CM

## 2025-08-11 DIAGNOSIS — C50.919 CARCINOMA OF BREAST METASTATIC TO BONE, UNSPECIFIED LATERALITY: ICD-10-CM

## 2025-08-11 DIAGNOSIS — R91.8 LUNG NODULES: ICD-10-CM

## 2025-08-11 DIAGNOSIS — E53.8 B12 DEFICIENCY: ICD-10-CM

## 2025-08-11 DIAGNOSIS — C50.919 CARCINOMA OF BREAST METASTATIC TO BONE, UNSPECIFIED LATERALITY: Primary | ICD-10-CM

## 2025-08-11 DIAGNOSIS — T45.1X5A ANTINEOPLASTIC CHEMOTHERAPY INDUCED PANCYTOPENIA: ICD-10-CM

## 2025-08-11 DIAGNOSIS — Z79.69 NEED FOR PROPHYLACTIC CHEMOTHERAPY: Primary | ICD-10-CM

## 2025-08-11 DIAGNOSIS — D61.810 ANTINEOPLASTIC CHEMOTHERAPY INDUCED PANCYTOPENIA: ICD-10-CM

## 2025-08-11 DIAGNOSIS — Z79.69 NEED FOR PROPHYLACTIC CHEMOTHERAPY: ICD-10-CM

## 2025-08-11 DIAGNOSIS — C79.51 CARCINOMA OF BREAST METASTATIC TO BONE, UNSPECIFIED LATERALITY: Primary | ICD-10-CM

## 2025-08-11 LAB
ALBUMIN SERPL-MCNC: 4.6 G/DL (ref 3.5–5.2)
ALBUMIN/GLOB SERPL: 2.1 G/DL
ALP SERPL-CCNC: 52 U/L (ref 39–117)
ALT SERPL W P-5'-P-CCNC: 10 U/L (ref 1–33)
ANION GAP SERPL CALCULATED.3IONS-SCNC: 13.6 MMOL/L (ref 5–15)
AST SERPL-CCNC: 16 U/L (ref 1–32)
BASOPHILS # BLD AUTO: 0.03 10*3/MM3 (ref 0–0.2)
BASOPHILS NFR BLD AUTO: 1.2 % (ref 0–1.5)
BILIRUB SERPL-MCNC: 0.2 MG/DL (ref 0–1.2)
BUN SERPL-MCNC: 20 MG/DL (ref 8–23)
BUN/CREAT SERPL: 17.1 (ref 7–25)
CALCIUM SPEC-SCNC: 10.4 MG/DL (ref 8.6–10.5)
CANCER AG15-3 SERPL-ACNC: 108 U/ML
CHLORIDE SERPL-SCNC: 106 MMOL/L (ref 98–107)
CO2 SERPL-SCNC: 24.4 MMOL/L (ref 22–29)
CREAT SERPL-MCNC: 1.17 MG/DL (ref 0.57–1)
DEPRECATED RDW RBC AUTO: 54.4 FL (ref 37–54)
EGFRCR SERPLBLD CKD-EPI 2021: 47.6 ML/MIN/1.73
EOSINOPHIL # BLD AUTO: 0.03 10*3/MM3 (ref 0–0.4)
EOSINOPHIL NFR BLD AUTO: 1.2 % (ref 0.3–6.2)
ERYTHROCYTE [DISTWIDTH] IN BLOOD BY AUTOMATED COUNT: 14.1 % (ref 12.3–15.4)
GLOBULIN UR ELPH-MCNC: 2.2 GM/DL
GLUCOSE SERPL-MCNC: 101 MG/DL (ref 65–99)
HCT VFR BLD AUTO: 28 % (ref 34–46.6)
HGB BLD-MCNC: 9.8 G/DL (ref 12–15.9)
IMM GRANULOCYTES # BLD AUTO: 0.01 10*3/MM3 (ref 0–0.05)
IMM GRANULOCYTES NFR BLD AUTO: 0.4 % (ref 0–0.5)
LYMPHOCYTES # BLD AUTO: 0.86 10*3/MM3 (ref 0.7–3.1)
LYMPHOCYTES NFR BLD AUTO: 34.8 % (ref 19.6–45.3)
MAGNESIUM SERPL-MCNC: 1.7 MG/DL (ref 1.6–2.4)
MCH RBC QN AUTO: 37.7 PG (ref 26.6–33)
MCHC RBC AUTO-ENTMCNC: 35 G/DL (ref 31.5–35.7)
MCV RBC AUTO: 107.7 FL (ref 79–97)
MONOCYTES # BLD AUTO: 0.34 10*3/MM3 (ref 0.1–0.9)
MONOCYTES NFR BLD AUTO: 13.8 % (ref 5–12)
NEUTROPHILS NFR BLD AUTO: 1.2 10*3/MM3 (ref 1.7–7)
NEUTROPHILS NFR BLD AUTO: 48.6 % (ref 42.7–76)
NRBC BLD AUTO-RTO: 0 /100 WBC (ref 0–0.2)
PHOSPHATE SERPL-MCNC: 3.1 MG/DL (ref 2.5–4.5)
PLATELET # BLD AUTO: 187 10*3/MM3 (ref 140–450)
PMV BLD AUTO: 9.2 FL (ref 6–12)
POTASSIUM SERPL-SCNC: 4.6 MMOL/L (ref 3.5–5.2)
PROT SERPL-MCNC: 6.8 G/DL (ref 6–8.5)
RBC # BLD AUTO: 2.6 10*6/MM3 (ref 3.77–5.28)
SODIUM SERPL-SCNC: 144 MMOL/L (ref 136–145)
WBC NRBC COR # BLD AUTO: 2.47 10*3/MM3 (ref 3.4–10.8)

## 2025-08-11 PROCEDURE — 36415 COLL VENOUS BLD VENIPUNCTURE: CPT

## 2025-08-11 PROCEDURE — 3074F SYST BP LT 130 MM HG: CPT | Performed by: INTERNAL MEDICINE

## 2025-08-11 PROCEDURE — 25010000002 CYANOCOBALAMIN PER 1000 MCG: Performed by: INTERNAL MEDICINE

## 2025-08-11 PROCEDURE — G2211 COMPLEX E/M VISIT ADD ON: HCPCS | Performed by: INTERNAL MEDICINE

## 2025-08-11 PROCEDURE — 1126F AMNT PAIN NOTED NONE PRSNT: CPT | Performed by: INTERNAL MEDICINE

## 2025-08-11 PROCEDURE — 85025 COMPLETE CBC W/AUTO DIFF WBC: CPT

## 2025-08-11 PROCEDURE — 99214 OFFICE O/P EST MOD 30 MIN: CPT | Performed by: INTERNAL MEDICINE

## 2025-08-11 PROCEDURE — 83735 ASSAY OF MAGNESIUM: CPT

## 2025-08-11 PROCEDURE — 3078F DIAST BP <80 MM HG: CPT | Performed by: INTERNAL MEDICINE

## 2025-08-11 PROCEDURE — 96372 THER/PROPH/DIAG INJ SC/IM: CPT

## 2025-08-11 PROCEDURE — 86300 IMMUNOASSAY TUMOR CA 15-3: CPT | Performed by: INTERNAL MEDICINE

## 2025-08-11 PROCEDURE — 80053 COMPREHEN METABOLIC PANEL: CPT

## 2025-08-11 PROCEDURE — 84100 ASSAY OF PHOSPHORUS: CPT

## 2025-08-11 PROCEDURE — 25010000002 DENOSUMAB 120 MG/1.7ML SOLUTION: Performed by: INTERNAL MEDICINE

## 2025-08-11 RX ORDER — CYANOCOBALAMIN 1000 UG/ML
1000 INJECTION, SOLUTION INTRAMUSCULAR; SUBCUTANEOUS ONCE
Status: COMPLETED | OUTPATIENT
Start: 2025-08-11 | End: 2025-08-11

## 2025-08-11 RX ORDER — CYANOCOBALAMIN 1000 UG/ML
1000 INJECTION, SOLUTION INTRAMUSCULAR; SUBCUTANEOUS ONCE
OUTPATIENT
Start: 2025-09-01

## 2025-08-11 RX ADMIN — CYANOCOBALAMIN 1000 MCG: 1000 INJECTION, SOLUTION INTRAMUSCULAR at 09:58

## 2025-08-11 RX ADMIN — DENOSUMAB 120 MG: 120 INJECTION SUBCUTANEOUS at 09:58

## 2025-08-12 ENCOUNTER — SPECIALTY PHARMACY (OUTPATIENT)
Dept: PHARMACY | Facility: HOSPITAL | Age: 80
End: 2025-08-12
Payer: MEDICARE

## 2025-08-27 ENCOUNTER — SPECIALTY PHARMACY (OUTPATIENT)
Dept: PHARMACY | Facility: HOSPITAL | Age: 80
End: 2025-08-27
Payer: MEDICARE

## 2025-08-28 ENCOUNTER — SPECIALTY PHARMACY (OUTPATIENT)
Dept: PHARMACY | Facility: HOSPITAL | Age: 80
End: 2025-08-28
Payer: MEDICARE